# Patient Record
Sex: FEMALE | Race: BLACK OR AFRICAN AMERICAN | NOT HISPANIC OR LATINO | Employment: OTHER | URBAN - METROPOLITAN AREA
[De-identification: names, ages, dates, MRNs, and addresses within clinical notes are randomized per-mention and may not be internally consistent; named-entity substitution may affect disease eponyms.]

---

## 2017-04-10 ENCOUNTER — HOSPITAL ENCOUNTER (OUTPATIENT)
Dept: RADIOLOGY | Facility: HOSPITAL | Age: 67
Discharge: HOME | End: 2017-04-10
Attending: NURSE PRACTITIONER
Payer: MEDICARE

## 2017-04-10 DIAGNOSIS — Z12.31 VISIT FOR SCREENING MAMMOGRAM: ICD-10-CM

## 2017-04-10 PROCEDURE — 77067 SCR MAMMO BI INCL CAD: CPT | Mod: TC

## 2017-04-10 PROCEDURE — 77063 BREAST TOMOSYNTHESIS BI: CPT | Mod: 26,,, | Performed by: RADIOLOGY

## 2017-04-10 PROCEDURE — 77067 SCR MAMMO BI INCL CAD: CPT | Mod: 26,,, | Performed by: RADIOLOGY

## 2018-04-13 ENCOUNTER — OFFICE VISIT (OUTPATIENT)
Dept: FAMILY MEDICINE | Facility: CLINIC | Age: 68
End: 2018-04-13
Payer: MEDICARE

## 2018-04-13 VITALS
OXYGEN SATURATION: 97 % | WEIGHT: 124.31 LBS | HEART RATE: 74 BPM | RESPIRATION RATE: 16 BRPM | BODY MASS INDEX: 20.71 KG/M2 | SYSTOLIC BLOOD PRESSURE: 138 MMHG | HEIGHT: 65 IN | TEMPERATURE: 99 F | DIASTOLIC BLOOD PRESSURE: 76 MMHG

## 2018-04-13 DIAGNOSIS — Z00.00 WELL ADULT EXAM: Primary | ICD-10-CM

## 2018-04-13 DIAGNOSIS — M81.0 AGE-RELATED OSTEOPOROSIS WITHOUT CURRENT PATHOLOGICAL FRACTURE: ICD-10-CM

## 2018-04-13 DIAGNOSIS — Z13.29 SCREENING FOR THYROID DISORDER: ICD-10-CM

## 2018-04-13 DIAGNOSIS — I10 ESSENTIAL HYPERTENSION: ICD-10-CM

## 2018-04-13 PROBLEM — Z98.890 HISTORY OF SURGERY FOR CEREBRAL ANEURYSM: Status: ACTIVE | Noted: 2018-04-13

## 2018-04-13 PROCEDURE — 3075F SYST BP GE 130 - 139MM HG: CPT | Mod: CPTII,S$GLB,, | Performed by: FAMILY MEDICINE

## 2018-04-13 PROCEDURE — 99387 INIT PM E/M NEW PAT 65+ YRS: CPT | Mod: S$GLB,,, | Performed by: FAMILY MEDICINE

## 2018-04-13 PROCEDURE — 99999 PR PBB SHADOW E&M-EST. PATIENT-LVL III: CPT | Mod: PBBFAC,,, | Performed by: FAMILY MEDICINE

## 2018-04-13 PROCEDURE — 3078F DIAST BP <80 MM HG: CPT | Mod: CPTII,S$GLB,, | Performed by: FAMILY MEDICINE

## 2018-04-13 RX ORDER — ALENDRONATE SODIUM 70 MG/1
70 TABLET ORAL
Qty: 4 TABLET | Refills: 2 | Status: SHIPPED | OUTPATIENT
Start: 2018-04-13 | End: 2019-01-07 | Stop reason: SDUPTHER

## 2018-04-13 RX ORDER — LISINOPRIL AND HYDROCHLOROTHIAZIDE 12.5; 2 MG/1; MG/1
1 TABLET ORAL DAILY
Qty: 30 TABLET | Refills: 2 | Status: SHIPPED | OUTPATIENT
Start: 2018-04-13 | End: 2018-05-16 | Stop reason: SDUPTHER

## 2018-04-13 NOTE — PROGRESS NOTES
Chief Complaint   Patient presents with    Annual Exam     est care        Melba Pabon is a 67 y.o. female who presents to [facility].  Chronic medical issues, if present, have been documented.  Acute medical issues, if present have been documented in the Chief Complaint.     Hypertension   This is a chronic problem. The current episode started more than 1 year ago. The problem is unchanged. The problem is controlled. Pertinent negatives include no anxiety, blurred vision, chest pain, headaches, malaise/fatigue, neck pain, orthopnea, palpitations, peripheral edema, PND or shortness of breath. There are no associated agents to hypertension. Risk factors for coronary artery disease include sedentary lifestyle and post-menopausal state. Past treatments include ACE inhibitors and diuretics. The current treatment provides moderate improvement. There are no compliance problems.  There is no history of angina, kidney disease, CAD/MI, CVA, heart failure, left ventricular hypertrophy, PVD, renovascular disease or retinopathy. There is no history of chronic renal disease, coarctation of the aorta, hyperaldosteronism, hypercortisolism, hyperparathyroidism, a hypertension causing med, pheochromocytoma, sleep apnea or a thyroid problem.       ROS  Review of Systems   Constitutional: Negative.  Negative for activity change, appetite change, chills, fatigue, fever and malaise/fatigue.   HENT: Negative for congestion, ear pain, hearing loss, postnasal drip, rhinorrhea, sinus pressure, sore throat and trouble swallowing.    Eyes: Negative.  Negative for blurred vision, pain and visual disturbance.   Respiratory: Negative for cough, chest tightness and shortness of breath.    Cardiovascular: Negative for chest pain, palpitations, orthopnea, leg swelling and PND.   Gastrointestinal: Negative for abdominal pain, constipation, diarrhea, nausea and vomiting.   Endocrine: Negative.    Genitourinary: Negative.  Negative for  "difficulty urinating, dysuria, flank pain, menstrual problem, pelvic pain and urgency.   Musculoskeletal: Negative.  Negative for arthralgias, gait problem, myalgias, neck pain and neck stiffness.   Skin: Negative.  Negative for rash.   Allergic/Immunologic: Negative.  Negative for environmental allergies, food allergies and immunocompromised state.   Neurological: Negative.  Negative for weakness, light-headedness and headaches.   Hematological: Negative.    Psychiatric/Behavioral: Negative.  Negative for decreased concentration, dysphoric mood and sleep disturbance. The patient is not nervous/anxious.        Physical Exam  Vitals:    04/13/18 1502   BP: 138/76   Pulse: 74   Resp: 16   Temp: 98.7 °F (37.1 °C)    Body mass index is 20.69 kg/m².  Weight: 56.4 kg (124 lb 5.4 oz)   Height: 5' 5" (165.1 cm)     Physical Exam   Constitutional: She is oriented to person, place, and time. She appears well-developed and well-nourished. She is active and cooperative.  Non-toxic appearance. She does not have a sickly appearance. She does not appear ill. No distress.   HENT:   Head: Normocephalic and atraumatic.   Right Ear: Hearing, tympanic membrane, external ear and ear canal normal. No tenderness. No foreign bodies. Tympanic membrane is not injected, not scarred, not perforated, not erythematous, not retracted and not bulging. No decreased hearing is noted.   Left Ear: Hearing, tympanic membrane, external ear and ear canal normal. No tenderness. No foreign bodies. Tympanic membrane is not injected, not scarred, not perforated, not erythematous, not retracted and not bulging. No decreased hearing is noted.   Nose: Nose normal. No rhinorrhea or nasal deformity.   Mouth/Throat: Uvula is midline, oropharynx is clear and moist and mucous membranes are normal. She does not have dentures. No oral lesions. No trismus in the jaw. Abnormal dentition. Dental caries present. No dental abscesses, uvula swelling or lacerations.   Eyes: " Conjunctivae, EOM and lids are normal. Pupils are equal, round, and reactive to light. Right eye exhibits no chemosis, no discharge and no exudate. No foreign body present in the right eye. Left eye exhibits no chemosis, no discharge and no exudate. No foreign body present in the left eye. No scleral icterus.   Neck: Normal range of motion and full passive range of motion without pain. Neck supple. No thyroid mass and no thyromegaly present.   Cardiovascular: Normal rate, regular rhythm, S1 normal, S2 normal and normal heart sounds.  Exam reveals no gallop and no friction rub.    No murmur heard.  Pulmonary/Chest: Effort normal. She has no decreased breath sounds. She has no wheezes. She has no rhonchi. She has no rales. She exhibits no mass, no tenderness and no deformity.   Abdominal: Soft. Normal appearance and bowel sounds are normal. She exhibits no distension, no ascites and no mass. There is no hepatosplenomegaly. There is no tenderness. There is no rigidity, no rebound and no guarding.   Musculoskeletal: Normal range of motion.   Lymphadenopathy:        Head (right side): No submental, no submandibular, no tonsillar, no preauricular and no posterior auricular adenopathy present.        Head (left side): No submental, no submandibular, no tonsillar, no preauricular and no posterior auricular adenopathy present.     She has no cervical adenopathy.   Neurological: She is alert and oriented to person, place, and time. She has normal strength. No cranial nerve deficit or sensory deficit. She exhibits normal muscle tone. She displays no seizure activity.   Skin: Skin is warm, dry and intact. No rash noted. She is not diaphoretic. No pallor.   Psychiatric: She has a normal mood and affect. Her speech is normal and behavior is normal. Judgment and thought content normal. Cognition and memory are normal. She is attentive.   Vitals reviewed.      Assessment & Plan    1. Well adult exam  Discussed age appropriate  screenings at this visit and encouraged a healthy diet with low saturated fats, and increased physical activity.  I will request any recent screening test from previous provider and once reviewed, will discuss any necessary follow up or further management.      2. Essential hypertension  Patient was counseled and encouraged to maintain a low sodium diet, as well as increasing physical activity.  Recommend random BP checks at home on a regular basis.  Repeat BP at end of visit was not necessary. Will continue medication at this time, and follow up in 3-6 months, or sooner if blood pressure begins to increase.     - lisinopril-hydrochlorothiazide (PRINZIDE,ZESTORETIC) 20-12.5 mg per tablet; Take 1 tablet by mouth once daily.  Dispense: 30 tablet; Refill: 2  - CBC auto differential; Future  - Comprehensive metabolic panel; Future  - Lipid panel; Future  - TSH; Future    3. Age-related osteoporosis without current pathological fracture  The current medical regimen is effective at this time and no changes to present plan or medications will be made at this visit.     - alendronate (FOSAMAX) 70 MG tablet; Take 1 tablet (70 mg total) by mouth every 7 days.  Dispense: 4 tablet; Refill: 2    4. Screening for thyroid disorder  Screening test will be ordered and once results available patient will be notified of results and managed accordingly.    - TSH; Future      Follow up documented    ACTIVE MEDICAL ISSUES:  Documented in Problem List    PAST MEDICAL HISTORY  Documented  .  PAST SURGICAL HISTORY:  Documented    SOCIAL HISTORY:  Documented    FAMILY HISTORY:  Documented    ALLERGIES AND MEDICATIONS: updated and reviewed.  Documented    Health Maintenance       Date Due Completion Date    Hepatitis C Screening 1950 ---    TETANUS VACCINE 05/07/1969 ---    DEXA SCAN 05/07/1991 ---    Colonoscopy 05/07/2001 ---    Zoster Vaccine 05/07/2011 ---    Pneumococcal (65+) (1 of 2 - PCV13) 05/07/2016 ---    Influenza Vaccine  08/01/2017 ---    Mammogram 04/10/2019 4/10/2017    Lipid Panel 09/27/2021 9/27/2016

## 2018-04-30 ENCOUNTER — LAB VISIT (OUTPATIENT)
Dept: LAB | Facility: HOSPITAL | Age: 68
End: 2018-04-30
Attending: FAMILY MEDICINE
Payer: MEDICARE

## 2018-04-30 DIAGNOSIS — Z13.29 SCREENING FOR THYROID DISORDER: ICD-10-CM

## 2018-04-30 DIAGNOSIS — I10 ESSENTIAL HYPERTENSION: ICD-10-CM

## 2018-04-30 LAB
ALBUMIN SERPL BCP-MCNC: 3.8 G/DL
ALP SERPL-CCNC: 79 U/L
ALT SERPL W/O P-5'-P-CCNC: 10 U/L
ANION GAP SERPL CALC-SCNC: 8 MMOL/L
AST SERPL-CCNC: 23 U/L
BASOPHILS # BLD AUTO: 0.03 K/UL
BASOPHILS NFR BLD: 0.7 %
BILIRUB SERPL-MCNC: 0.6 MG/DL
BUN SERPL-MCNC: 11 MG/DL
CALCIUM SERPL-MCNC: 11.2 MG/DL
CHLORIDE SERPL-SCNC: 105 MMOL/L
CHOLEST SERPL-MCNC: 145 MG/DL
CHOLEST/HDLC SERPL: 2.7 {RATIO}
CO2 SERPL-SCNC: 25 MMOL/L
CREAT SERPL-MCNC: 0.9 MG/DL
DIFFERENTIAL METHOD: ABNORMAL
EOSINOPHIL # BLD AUTO: 0.1 K/UL
EOSINOPHIL NFR BLD: 2.1 %
ERYTHROCYTE [DISTWIDTH] IN BLOOD BY AUTOMATED COUNT: 14.4 %
EST. GFR  (AFRICAN AMERICAN): >60 ML/MIN/1.73 M^2
EST. GFR  (NON AFRICAN AMERICAN): >60 ML/MIN/1.73 M^2
GLUCOSE SERPL-MCNC: 85 MG/DL
HCT VFR BLD AUTO: 40.1 %
HDLC SERPL-MCNC: 54 MG/DL
HDLC SERPL: 37.2 %
HGB BLD-MCNC: 13.3 G/DL
IMM GRANULOCYTES # BLD AUTO: 0.01 K/UL
IMM GRANULOCYTES NFR BLD AUTO: 0.2 %
LDLC SERPL CALC-MCNC: 80 MG/DL
LYMPHOCYTES # BLD AUTO: 1.8 K/UL
LYMPHOCYTES NFR BLD: 42.2 %
MCH RBC QN AUTO: 31.1 PG
MCHC RBC AUTO-ENTMCNC: 33.2 G/DL
MCV RBC AUTO: 94 FL
MONOCYTES # BLD AUTO: 0.4 K/UL
MONOCYTES NFR BLD: 10.1 %
NEUTROPHILS # BLD AUTO: 1.9 K/UL
NEUTROPHILS NFR BLD: 44.7 %
NONHDLC SERPL-MCNC: 91 MG/DL
NRBC BLD-RTO: 0 /100 WBC
PLATELET # BLD AUTO: 188 K/UL
PMV BLD AUTO: 10.3 FL
POTASSIUM SERPL-SCNC: 4.2 MMOL/L
PROT SERPL-MCNC: 7.7 G/DL
RBC # BLD AUTO: 4.27 M/UL
SODIUM SERPL-SCNC: 138 MMOL/L
TRIGL SERPL-MCNC: 55 MG/DL
TSH SERPL DL<=0.005 MIU/L-ACNC: 0.97 UIU/ML
WBC # BLD AUTO: 4.27 K/UL

## 2018-04-30 PROCEDURE — 80053 COMPREHEN METABOLIC PANEL: CPT

## 2018-04-30 PROCEDURE — 85025 COMPLETE CBC W/AUTO DIFF WBC: CPT

## 2018-04-30 PROCEDURE — 36415 COLL VENOUS BLD VENIPUNCTURE: CPT | Mod: PO

## 2018-04-30 PROCEDURE — 84443 ASSAY THYROID STIM HORMONE: CPT

## 2018-04-30 PROCEDURE — 80061 LIPID PANEL: CPT

## 2018-05-16 ENCOUNTER — OFFICE VISIT (OUTPATIENT)
Dept: FAMILY MEDICINE | Facility: CLINIC | Age: 68
End: 2018-05-16
Payer: MEDICARE

## 2018-05-16 VITALS
HEART RATE: 74 BPM | BODY MASS INDEX: 20.57 KG/M2 | OXYGEN SATURATION: 98 % | TEMPERATURE: 98 F | SYSTOLIC BLOOD PRESSURE: 112 MMHG | RESPIRATION RATE: 16 BRPM | DIASTOLIC BLOOD PRESSURE: 70 MMHG | HEIGHT: 65 IN | WEIGHT: 123.44 LBS

## 2018-05-16 DIAGNOSIS — I10 ESSENTIAL HYPERTENSION: Primary | ICD-10-CM

## 2018-05-16 DIAGNOSIS — Z12.11 SCREEN FOR COLON CANCER: ICD-10-CM

## 2018-05-16 DIAGNOSIS — Z11.59 NEED FOR HEPATITIS C SCREENING TEST: ICD-10-CM

## 2018-05-16 DIAGNOSIS — H53.9 VISION CHANGES: ICD-10-CM

## 2018-05-16 DIAGNOSIS — M81.0 AGE-RELATED OSTEOPOROSIS WITHOUT CURRENT PATHOLOGICAL FRACTURE: ICD-10-CM

## 2018-05-16 PROCEDURE — 3078F DIAST BP <80 MM HG: CPT | Mod: CPTII,S$GLB,, | Performed by: FAMILY MEDICINE

## 2018-05-16 PROCEDURE — 3074F SYST BP LT 130 MM HG: CPT | Mod: CPTII,S$GLB,, | Performed by: FAMILY MEDICINE

## 2018-05-16 PROCEDURE — 99999 PR PBB SHADOW E&M-EST. PATIENT-LVL IV: CPT | Mod: PBBFAC,,, | Performed by: FAMILY MEDICINE

## 2018-05-16 PROCEDURE — 99214 OFFICE O/P EST MOD 30 MIN: CPT | Mod: S$GLB,,, | Performed by: FAMILY MEDICINE

## 2018-05-16 RX ORDER — LISINOPRIL AND HYDROCHLOROTHIAZIDE 12.5; 2 MG/1; MG/1
1 TABLET ORAL DAILY
Qty: 30 TABLET | Refills: 2 | Status: SHIPPED | OUTPATIENT
Start: 2018-05-16 | End: 2018-09-11 | Stop reason: SDUPTHER

## 2018-05-16 NOTE — PROGRESS NOTES
Chief Complaint   Patient presents with    Results       Melba Pabon is a 68 y.o. female who presents per the Chief Complaint.  Pt is known to me and was last seen by me on 4/13/2018.  All known chronic medical issues have been documented.       Hypertension   This is a chronic problem. The current episode started more than 1 year ago. The problem is unchanged. The problem is controlled. Pertinent negatives include no anxiety, blurred vision, chest pain, headaches, malaise/fatigue, neck pain, orthopnea, palpitations, peripheral edema, PND or shortness of breath. There are no associated agents to hypertension. Risk factors for coronary artery disease include sedentary lifestyle and post-menopausal state. Past treatments include ACE inhibitors and diuretics. The current treatment provides moderate improvement. There are no compliance problems.  There is no history of angina, kidney disease, CAD/MI, CVA, heart failure, left ventricular hypertrophy, PVD or retinopathy. There is no history of chronic renal disease, coarctation of the aorta, hyperaldosteronism, hypercortisolism, hyperparathyroidism, a hypertension causing med, pheochromocytoma, renovascular disease, sleep apnea or a thyroid problem.        ROS  Review of Systems   Constitutional: Negative.  Negative for activity change, appetite change, chills, diaphoresis, fatigue, fever, malaise/fatigue and unexpected weight change.   HENT: Negative.  Negative for congestion, ear pain, hearing loss, nosebleeds, postnasal drip, rhinorrhea, sinus pressure, sneezing, sore throat and trouble swallowing.    Eyes: Negative for blurred vision, pain and visual disturbance.   Respiratory: Negative for cough, choking and shortness of breath.    Cardiovascular: Negative for chest pain, palpitations, orthopnea, leg swelling and PND.   Gastrointestinal: Negative for abdominal pain, constipation, diarrhea, nausea and vomiting.   Genitourinary: Negative for difficulty urinating,  "dysuria, frequency and urgency.   Musculoskeletal: Negative.  Negative for arthralgias, back pain, gait problem, joint swelling, myalgias and neck pain.   Skin: Negative.    Allergic/Immunologic: Negative for environmental allergies and food allergies.   Neurological: Negative.  Negative for dizziness, seizures, syncope, weakness, light-headedness and headaches.   Psychiatric/Behavioral: Negative.  Negative for confusion, decreased concentration, dysphoric mood and sleep disturbance. The patient is not nervous/anxious.        Physical Exam  Vitals:    05/16/18 1512   BP: 112/70   Pulse: 74   Resp: 16   Temp: 98.2 °F (36.8 °C)    Body mass index is 20.54 kg/m².  Weight: 56 kg (123 lb 7.3 oz)   Height: 5' 5" (165.1 cm)     Physical Exam   Constitutional: She is oriented to person, place, and time. She appears well-developed and well-nourished. She is active and cooperative.  Non-toxic appearance. She does not have a sickly appearance. She does not appear ill. No distress.   HENT:   Head: Normocephalic and atraumatic.   Right Ear: Hearing and external ear normal. No decreased hearing is noted.   Left Ear: Hearing and external ear normal. No decreased hearing is noted.   Nose: Nose normal. No rhinorrhea or nasal deformity.   Mouth/Throat: Uvula is midline and oropharynx is clear and moist. She does not have dentures. Normal dentition.   Eyes: Conjunctivae, EOM and lids are normal. Pupils are equal, round, and reactive to light. Right eye exhibits no chemosis, no discharge and no exudate. No foreign body present in the right eye. Left eye exhibits no chemosis, no discharge and no exudate. No foreign body present in the left eye. No scleral icterus.   Neck: Normal range of motion and full passive range of motion without pain. Neck supple.   Cardiovascular: Normal rate, regular rhythm, S1 normal, S2 normal and normal heart sounds.  Exam reveals no gallop and no friction rub.    No murmur heard.  Pulmonary/Chest: Effort " normal and breath sounds normal. No accessory muscle usage. No respiratory distress. She has no decreased breath sounds. She has no wheezes. She has no rhonchi. She has no rales.   Abdominal: Soft. Normal appearance. She exhibits no distension. There is no hepatosplenomegaly. There is no tenderness. There is no rigidity, no rebound and no guarding.   Musculoskeletal: Normal range of motion.   Neurological: She is alert and oriented to person, place, and time. She has normal strength. No cranial nerve deficit or sensory deficit. She exhibits normal muscle tone. She displays no seizure activity. Coordination and gait normal.   Skin: Skin is warm, dry and intact. No rash noted. She is not diaphoretic.   Psychiatric: She has a normal mood and affect. Her speech is normal and behavior is normal. Judgment and thought content normal. Cognition and memory are normal. She is attentive.       Assessment & Plan    1. Essential hypertension  Patient was counseled and encouraged to maintain a low sodium diet, as well as increasing physical activity.  Recommend random BP checks at home on a regular basis.  Repeat BP at end of visit was not necessary. Will continue medication at this time, and follow up in 3-6 months, or sooner if blood pressure begins to increase.     - lisinopril-hydrochlorothiazide (PRINZIDE,ZESTORETIC) 20-12.5 mg per tablet; Take 1 tablet by mouth once daily.  Dispense: 30 tablet; Refill: 2    2. Age-related osteoporosis without current pathological fracture  Screening test will be ordered and once results available patient will be notified of results and managed accordingly.    - DXA Bone Density Spine And Hip; Future    3. Vision changes  Referral to appropriate specialist for evaluation and management placed in [facility].   - Ambulatory referral to Optometry    4. Need for hepatitis C screening test  Patient advised that a one time screen for Hepatitis C is recommended and will be ordered today. Once results  available, patient will be notified of results.   - Hepatitis C antibody; Future    5. Screen for colon cancer  Patient is due for colonoscopy.  Order placed in [facility] and patient will be contacted to schedule appointment.  I will notify patient of results once available.    - Case request GI: COLONOSCOPY      Follow up documented    ACTIVE MEDICAL ISSUES:  Documented in Problem List    PAST MEDICAL HISTORY  Documented    PAST SURGICAL HISTORY:  Documented    SOCIAL HISTORY:  Documented    FAMILY HISTORY:  Documented    ALLERGIES AND MEDICATIONS: updated and reviewed.  Documented    Health Maintenance       Date Due Completion Date    Hepatitis C Screening 1950 ---    TETANUS VACCINE 05/07/1969 ---    DEXA SCAN 05/07/1991 ---    Colonoscopy 05/07/2001 ---    Pneumococcal (65+) (1 of 2 - PCV13) 05/07/2016 ---    Influenza Vaccine 08/01/2018 ---    Mammogram 04/10/2019 4/10/2017    Lipid Panel 04/30/2023 4/30/2018

## 2018-05-17 ENCOUNTER — TELEPHONE (OUTPATIENT)
Dept: OPTOMETRY | Facility: CLINIC | Age: 68
End: 2018-05-17

## 2018-06-05 ENCOUNTER — HOSPITAL ENCOUNTER (OUTPATIENT)
Dept: RADIOLOGY | Facility: HOSPITAL | Age: 68
Discharge: HOME | End: 2018-06-05
Attending: FAMILY MEDICINE
Payer: MEDICARE

## 2018-06-05 DIAGNOSIS — M81.0 AGE-RELATED OSTEOPOROSIS WITHOUT CURRENT PATHOLOGICAL FRACTURE: ICD-10-CM

## 2018-06-05 PROCEDURE — 77080 DXA BONE DENSITY AXIAL: CPT | Mod: TC

## 2018-06-05 PROCEDURE — 77080 DXA BONE DENSITY AXIAL: CPT | Mod: 26,,, | Performed by: RADIOLOGY

## 2018-07-27 ENCOUNTER — HOSPITAL ENCOUNTER (OUTPATIENT)
Facility: HOSPITAL | Age: 68
LOS: 1 days | Discharge: HOME | End: 2018-07-29
Attending: EMERGENCY MEDICINE | Admitting: INTERNAL MEDICINE
Payer: MEDICARE

## 2018-07-27 DIAGNOSIS — K92.2 ACUTE UPPER GI BLEEDING: Primary | ICD-10-CM

## 2018-07-27 DIAGNOSIS — D62 ACUTE BLOOD LOSS ANEMIA: ICD-10-CM

## 2018-07-27 DIAGNOSIS — D64.9 ANEMIA, UNSPECIFIED TYPE: ICD-10-CM

## 2018-07-27 LAB
ABO + RH BLD: NORMAL
ALBUMIN SERPL BCP-MCNC: 3.7 G/DL
ALP SERPL-CCNC: 64 U/L
ALT SERPL W/O P-5'-P-CCNC: 8 U/L
ANION GAP SERPL CALC-SCNC: 6 MMOL/L
APTT BLDCRRT: 21.9 SEC
AST SERPL-CCNC: 19 U/L
BACTERIA #/AREA URNS HPF: ABNORMAL /HPF
BASOPHILS # BLD AUTO: 0.01 K/UL
BASOPHILS NFR BLD: 0.2 %
BILIRUB SERPL-MCNC: 0.4 MG/DL
BILIRUB UR QL STRIP: NEGATIVE
BILIRUB UR QL STRIP: NEGATIVE
BLD GP AB SCN CELLS X3 SERPL QL: NORMAL
BUN SERPL-MCNC: 38 MG/DL
CALCIUM SERPL-MCNC: 11.4 MG/DL
CHLORIDE SERPL-SCNC: 103 MMOL/L
CLARITY UR: ABNORMAL
CLARITY UR: ABNORMAL
CO2 SERPL-SCNC: 27 MMOL/L
COLOR UR: YELLOW
COLOR UR: YELLOW
CREAT SERPL-MCNC: 0.9 MG/DL
DIFFERENTIAL METHOD: ABNORMAL
EOSINOPHIL # BLD AUTO: 0.1 K/UL
EOSINOPHIL NFR BLD: 1.2 %
ERYTHROCYTE [DISTWIDTH] IN BLOOD BY AUTOMATED COUNT: 14.1 %
EST. GFR  (AFRICAN AMERICAN): >60 ML/MIN/1.73 M^2
EST. GFR  (NON AFRICAN AMERICAN): >60 ML/MIN/1.73 M^2
GLUCOSE SERPL-MCNC: 132 MG/DL
GLUCOSE UR QL STRIP: NEGATIVE
GLUCOSE UR QL STRIP: NEGATIVE
HCT VFR BLD AUTO: 27.1 %
HGB BLD-MCNC: 9.5 G/DL
HGB UR QL STRIP: ABNORMAL
HGB UR QL STRIP: ABNORMAL
INR PPP: 1
KETONES UR QL STRIP: NEGATIVE
KETONES UR QL STRIP: NEGATIVE
LEUKOCYTE ESTERASE UR QL STRIP: NEGATIVE
LEUKOCYTE ESTERASE UR QL STRIP: NEGATIVE
LIPASE SERPL-CCNC: 55 U/L
LYMPHOCYTES # BLD AUTO: 1.8 K/UL
LYMPHOCYTES NFR BLD: 42 %
MAGNESIUM SERPL-MCNC: 2.3 MG/DL
MCH RBC QN AUTO: 31.7 PG
MCHC RBC AUTO-ENTMCNC: 35.1 G/DL
MCV RBC AUTO: 90 FL
MICROSCOPIC COMMENT: ABNORMAL
MONOCYTES # BLD AUTO: 0.3 K/UL
MONOCYTES NFR BLD: 7.7 %
NEUTROPHILS # BLD AUTO: 2.1 K/UL
NEUTROPHILS NFR BLD: 48.7 %
NITRITE UR QL STRIP: NEGATIVE
NITRITE UR QL STRIP: NEGATIVE
PH UR STRIP: 5 [PH] (ref 5–8)
PH UR STRIP: 5 [PH] (ref 5–8)
PLATELET # BLD AUTO: 205 K/UL
PMV BLD AUTO: 10 FL
POTASSIUM SERPL-SCNC: 3.5 MMOL/L
PROT SERPL-MCNC: 7.5 G/DL
PROT UR QL STRIP: NEGATIVE
PROT UR QL STRIP: NEGATIVE
PROTHROMBIN TIME: 10.4 SEC
RBC # BLD AUTO: 3 M/UL
RBC #/AREA URNS HPF: 2 /HPF (ref 0–4)
SODIUM SERPL-SCNC: 136 MMOL/L
SP GR UR STRIP: 1.02 (ref 1–1.03)
SP GR UR STRIP: 1.02 (ref 1–1.03)
SQUAMOUS #/AREA URNS HPF: 20 /HPF
URN SPEC COLLECT METH UR: ABNORMAL
URN SPEC COLLECT METH UR: ABNORMAL
UROBILINOGEN UR STRIP-ACNC: NEGATIVE EU/DL
UROBILINOGEN UR STRIP-ACNC: NEGATIVE EU/DL
WBC # BLD AUTO: 4.31 K/UL
WBC #/AREA URNS HPF: 1 /HPF (ref 0–5)

## 2018-07-27 PROCEDURE — 83735 ASSAY OF MAGNESIUM: CPT

## 2018-07-27 PROCEDURE — 96375 TX/PRO/DX INJ NEW DRUG ADDON: CPT

## 2018-07-27 PROCEDURE — 93010 ELECTROCARDIOGRAM REPORT: CPT | Mod: ,,, | Performed by: INTERNAL MEDICINE

## 2018-07-27 PROCEDURE — 81000 URINALYSIS NONAUTO W/SCOPE: CPT

## 2018-07-27 PROCEDURE — 25000003 PHARM REV CODE 250: Performed by: EMERGENCY MEDICINE

## 2018-07-27 PROCEDURE — C9113 INJ PANTOPRAZOLE SODIUM, VIA: HCPCS | Performed by: EMERGENCY MEDICINE

## 2018-07-27 PROCEDURE — 96361 HYDRATE IV INFUSION ADD-ON: CPT

## 2018-07-27 PROCEDURE — 93005 ELECTROCARDIOGRAM TRACING: CPT

## 2018-07-27 PROCEDURE — 99285 EMERGENCY DEPT VISIT HI MDM: CPT | Mod: 25

## 2018-07-27 PROCEDURE — 63600175 PHARM REV CODE 636 W HCPCS: Performed by: EMERGENCY MEDICINE

## 2018-07-27 PROCEDURE — 80053 COMPREHEN METABOLIC PANEL: CPT

## 2018-07-27 PROCEDURE — G0378 HOSPITAL OBSERVATION PER HR: HCPCS

## 2018-07-27 PROCEDURE — 85730 THROMBOPLASTIN TIME PARTIAL: CPT

## 2018-07-27 PROCEDURE — 85025 COMPLETE CBC W/AUTO DIFF WBC: CPT

## 2018-07-27 PROCEDURE — 86850 RBC ANTIBODY SCREEN: CPT

## 2018-07-27 PROCEDURE — 83690 ASSAY OF LIPASE: CPT

## 2018-07-27 PROCEDURE — 86920 COMPATIBILITY TEST SPIN: CPT

## 2018-07-27 PROCEDURE — 96365 THER/PROPH/DIAG IV INF INIT: CPT

## 2018-07-27 PROCEDURE — 85610 PROTHROMBIN TIME: CPT

## 2018-07-27 PROCEDURE — 96376 TX/PRO/DX INJ SAME DRUG ADON: CPT

## 2018-07-27 RX ORDER — ONDANSETRON 2 MG/ML
8 INJECTION INTRAMUSCULAR; INTRAVENOUS
Status: COMPLETED | OUTPATIENT
Start: 2018-07-27 | End: 2018-07-27

## 2018-07-27 RX ORDER — DEXTROSE MONOHYDRATE AND SODIUM CHLORIDE 5; .9 G/100ML; G/100ML
INJECTION, SOLUTION INTRAVENOUS CONTINUOUS
Status: DISCONTINUED | OUTPATIENT
Start: 2018-07-27 | End: 2018-07-28

## 2018-07-27 RX ORDER — HYDROCODONE BITARTRATE AND ACETAMINOPHEN 5; 325 MG/1; MG/1
1 TABLET ORAL EVERY 6 HOURS PRN
Status: DISCONTINUED | OUTPATIENT
Start: 2018-07-27 | End: 2018-07-29 | Stop reason: HOSPADM

## 2018-07-27 RX ORDER — ONDANSETRON 2 MG/ML
4 INJECTION INTRAMUSCULAR; INTRAVENOUS EVERY 6 HOURS PRN
Status: DISCONTINUED | OUTPATIENT
Start: 2018-07-27 | End: 2018-07-29 | Stop reason: HOSPADM

## 2018-07-27 RX ORDER — HYDROCODONE BITARTRATE AND ACETAMINOPHEN 500; 5 MG/1; MG/1
TABLET ORAL
Status: DISCONTINUED | OUTPATIENT
Start: 2018-07-27 | End: 2018-07-29 | Stop reason: HOSPADM

## 2018-07-27 RX ADMIN — SODIUM CHLORIDE 1000 ML: 0.9 INJECTION, SOLUTION INTRAVENOUS at 05:07

## 2018-07-27 RX ADMIN — DEXTROSE 8 MG/HR: 50 INJECTION, SOLUTION INTRAVENOUS at 11:07

## 2018-07-27 RX ADMIN — DEXTROSE 8 MG/HR: 50 INJECTION, SOLUTION INTRAVENOUS at 07:07

## 2018-07-27 RX ADMIN — DEXTROSE 80 MG: 50 INJECTION, SOLUTION INTRAVENOUS at 06:07

## 2018-07-27 RX ADMIN — DEXTROSE AND SODIUM CHLORIDE: 5; .9 INJECTION, SOLUTION INTRAVENOUS at 11:07

## 2018-07-27 RX ADMIN — ONDANSETRON 8 MG: 2 INJECTION INTRAMUSCULAR; INTRAVENOUS at 06:07

## 2018-07-27 RX ADMIN — SODIUM CHLORIDE 1000 ML: 0.9 INJECTION, SOLUTION INTRAVENOUS at 09:07

## 2018-07-27 NOTE — ED TRIAGE NOTES
[age] y.o. Female presents to the ED with chief complaint of blood in stool. Patient states stool has been present for x3 days with associated abdominal pain. Patient has a mass in her abdomen that is believed to be a hernia. Patient states her stool is black. Patient denies chest pain or recent injury. Patient resting in bed in NAD. Side rails up x2.

## 2018-07-27 NOTE — ED PROVIDER NOTES
Encounter Date: 7/27/2018  SORT: This is a 68 y.o. female with past small bowel surgerywho presents for emergent consideration of melena. Patient reports black stools and periumbilical abdominal pain with bulging. Initial exam notable for periumbilical bulging and tenderness. Patient will be moved to a room when one is available. Orders placed.  CLAUDIA Schmidt PA-C        History     Chief Complaint   Patient presents with    Melena     States she noticed black stool since wednesday.  Also states she has no appetite     HPI   This 68-year-old female presents to the emergency room complaining of a 2 day history of being weak and dizzy and having black stools.  The patient does have abdominal pain she has a knot in her stomach that has been present since she had a bowel surgery for obstruction in the distant past.  The patient had nausea and 1 episode of vomiting yesterday.  She is essentially otherwise well.  Her abdominal pain is around the site of the knot in her abdomen.  She is otherwise well without other injuries or problems.  She has no appetite   Review of patient's allergies indicates:   Allergen Reactions    Pcn [penicillins]      Past Medical History:   Diagnosis Date    Age-related osteoporosis without current pathological fracture 4/13/2018    Allergy     Cataract     Hypertension      Past Surgical History:   Procedure Laterality Date    BRAIN SURGERY      SMALL INTESTINE SURGERY       Family History   Problem Relation Age of Onset    Dementia Mother     No Known Problems Father      Social History   Substance Use Topics    Smoking status: Current Some Day Smoker     Types: Cigarettes    Smokeless tobacco: Never Used    Alcohol use No     Review of Systems  The patient was questioned specifically with regard to the following.  General: Fever, chills, sweats. Neuro: Headache. Eyes: eye problems. ENT: Ear pain, sore throat. Cardiovascular: Chest pain. Respiratory: Cough, shortness of  breath. Gastrointestinal: Abdominal pain, vomiting, diarrhea. Genitourinary: Painful urination.  Musculoskeletal: Arm and leg problems. Skin: Rash.  The review of systems was negative except for the following:  Nausea vomiting and not in the abdomen pain around the not in the abdomen weakness dizziness black stools  Physical Exam     Initial Vitals   BP Pulse Resp Temp SpO2   07/27/18 1647 07/27/18 1647 07/27/18 1647 07/27/18 1750 07/27/18 1647   127/76 103 16 98.9 °F (37.2 °C) 97 %      MAP       --                Physical Exam  The patient was examined specifically for the following:   General:No significant distress, Good color, Warm and dry. Head and neck:Scalp atraumatic, Neck supple. Neurological:Appropriate conversation, Gross motor deficits. Eyes:Conjugate gaze, Clear corneas. ENT: No epistaxis. Cardiac: Regular rate and rhythm, Grossly normal heart tones. Pulmonary: Wheezing, Rales. Gastrointestinal: Abdominal tenderness, Abdominal distention. Musculoskeletal: Extremity deformity, Apparent pain with range of motion of the joints. Skin: Rash.   The findings on examination were normal except for the following:  Patient's heart rate is 103.  She is slightly pale.  She has male on the physical examination that is strongly guaiac positive. She has a large abdominal wall defect in the hypogastrium.  This is spontaneously reduced when lying supine.  There is no other significant abdominal tenderness.  The lungs are clear.  The heart tones are normal.  The extremities are nontender.  The abdomen is nontender.  ED Course   Critical Care  Date/Time: 7/27/2018 9:25 PM  Performed by: STU PICKARD  Authorized by: STU PICKARD   Direct patient critical care time: 22 minutes  Additional history critical care time: 11 minutes  Ordering / reviewing critical care time: 11 minutes  Documentation critical care time: 17 minutes  Consulting other physicians critical care time: 5 minutes  Total critical care time  (exclusive of procedural time) : 66 minutes  Critical care time was exclusive of separately billable procedures and treating other patients and teaching time.  Critical care was necessary to treat or prevent imminent or life-threatening deterioration of the following conditions: circulatory failure.  Critical care was time spent personally by me on the following activities: development of treatment plan with patient or surrogate, discussions with primary provider, evaluation of patient's response to treatment, examination of patient, obtaining history from patient or surrogate, ordering and performing treatments and interventions, ordering and review of laboratory studies, ordering and review of radiographic studies, pulse oximetry, re-evaluation of patient's condition and review of old charts.        Labs Reviewed   CBC W/ AUTO DIFFERENTIAL - Abnormal; Notable for the following:        Result Value    RBC 3.00 (*)     Hemoglobin 9.5 (*)     Hematocrit 27.1 (*)     MCH 31.7 (*)     All other components within normal limits   COMPREHENSIVE METABOLIC PANEL - Abnormal; Notable for the following:     Glucose 132 (*)     BUN, Bld 38 (*)     Calcium 11.4 (*)     ALT 8 (*)     Anion Gap 6 (*)     All other components within normal limits   URINALYSIS, REFLEX TO URINE CULTURE - Abnormal; Notable for the following:     Appearance, UA Hazy (*)     Occult Blood UA 1+ (*)     All other components within normal limits    Narrative:     Preferred Collection Type->Urine, Clean Catch   URINALYSIS, REFLEX TO URINE CULTURE - Abnormal; Notable for the following:     Appearance, UA Hazy (*)     Occult Blood UA 1+ (*)     All other components within normal limits    Narrative:     Preferred Collection Type->Urine, Clean Catch   URINALYSIS MICROSCOPIC - Abnormal; Notable for the following:     Bacteria, UA Few (*)     All other components within normal limits    Narrative:     Preferred Collection Type->Urine, Clean Catch   LIPASE    MAGNESIUM   PROTIME-INR    Narrative:     Recoll. [id number] by SILVERIO at 07/27/2018 18:21, reason:   CLOTTED/DISCARDED/ARCELIA   APTT    Narrative:     Recoll. [id number] by SILVERIO at 07/27/2018 18:21, reason:   CLOTTED/DISCARDED/ARCELIA   TYPE & SCREEN   PREPARE RBC SOFT     EKG Readings: (Independently Interpreted)   This patient is in a normal sinus rhythm with a heart rate of 87.  The p.r. QRS and QT intervals are normal.  There are nonspecific ST segment and T-wave changes.  There is no definite evidence of acute myocardial infarction or malignant arrhythmia.       Imaging Results    None           Medical decision making:  Given the above this patient presents to the emergency room with black stools dizziness and weakness. She is found to have melena on the rectum.  She has a significant anemia.  She has no significant abdominal tenderness.  She is otherwise well.  She does have an elevated BUN.  I believe this is an upper GI bleed.  She was treated with pantoprazole push and drip.  I will admit her and consult Gastroenterology.  Patient did have her blood pressure dropped to 94/50 during her ER visit.  I will bolus her with another L of fluid.  I will do serial hemoglobins and hematocrits.  I will consult Gastroenterology.                         Clinical Impression:   The primary encounter diagnosis was Acute upper GI bleeding. A diagnosis of Anemia, unspecified type was also pertinent to this visit.                             Dillon Cabrera MD  07/27/18 2027

## 2018-07-28 PROBLEM — E83.52 HYPERCALCEMIA: Status: ACTIVE | Noted: 2018-07-28

## 2018-07-28 PROBLEM — D62 ACUTE BLOOD LOSS ANEMIA: Status: ACTIVE | Noted: 2018-07-28

## 2018-07-28 LAB
ALBUMIN SERPL BCP-MCNC: 2.9 G/DL
ALP SERPL-CCNC: 54 U/L
ALT SERPL W/O P-5'-P-CCNC: 8 U/L
ANION GAP SERPL CALC-SCNC: 4 MMOL/L
AST SERPL-CCNC: 20 U/L
BILIRUB SERPL-MCNC: 0.7 MG/DL
BLD PROD TYP BPU: NORMAL
BLOOD UNIT EXPIRATION DATE: NORMAL
BLOOD UNIT TYPE CODE: 7300
BLOOD UNIT TYPE: NORMAL
BUN SERPL-MCNC: 16 MG/DL
CALCIUM SERPL-MCNC: 9.2 MG/DL
CHLORIDE SERPL-SCNC: 109 MMOL/L
CO2 SERPL-SCNC: 23 MMOL/L
CODING SYSTEM: NORMAL
CREAT SERPL-MCNC: 0.7 MG/DL
DISPENSE STATUS: NORMAL
EST. GFR  (AFRICAN AMERICAN): >60 ML/MIN/1.73 M^2
EST. GFR  (NON AFRICAN AMERICAN): >60 ML/MIN/1.73 M^2
GLUCOSE SERPL-MCNC: 103 MG/DL
HCT VFR BLD AUTO: 20.8 %
HCT VFR BLD AUTO: 25.6 %
HCT VFR BLD AUTO: 25.7 %
HGB BLD-MCNC: 7 G/DL
HGB BLD-MCNC: 8.8 G/DL
HGB BLD-MCNC: 8.8 G/DL
IRON SERPL-MCNC: 79 UG/DL
POTASSIUM SERPL-SCNC: 3.7 MMOL/L
PROT SERPL-MCNC: 5.7 G/DL
RETICS/RBC NFR AUTO: 2.2 %
SATURATED IRON: 35 %
SODIUM SERPL-SCNC: 136 MMOL/L
TOTAL IRON BINDING CAPACITY: 228 UG/DL
TRANS ERYTHROCYTES VOL PATIENT: NORMAL ML
TRANSFERRIN SERPL-MCNC: 154 MG/DL

## 2018-07-28 PROCEDURE — 83540 ASSAY OF IRON: CPT

## 2018-07-28 PROCEDURE — 85018 HEMOGLOBIN: CPT

## 2018-07-28 PROCEDURE — 36430 TRANSFUSION BLD/BLD COMPNT: CPT

## 2018-07-28 PROCEDURE — 85014 HEMATOCRIT: CPT | Mod: 91

## 2018-07-28 PROCEDURE — 85045 AUTOMATED RETICULOCYTE COUNT: CPT

## 2018-07-28 PROCEDURE — G0378 HOSPITAL OBSERVATION PER HR: HCPCS

## 2018-07-28 PROCEDURE — P9021 RED BLOOD CELLS UNIT: HCPCS

## 2018-07-28 PROCEDURE — 25000003 PHARM REV CODE 250: Performed by: EMERGENCY MEDICINE

## 2018-07-28 PROCEDURE — 25000003 PHARM REV CODE 250: Performed by: PHYSICIAN ASSISTANT

## 2018-07-28 PROCEDURE — 82728 ASSAY OF FERRITIN: CPT

## 2018-07-28 PROCEDURE — 63600175 PHARM REV CODE 636 W HCPCS: Performed by: EMERGENCY MEDICINE

## 2018-07-28 PROCEDURE — 85018 HEMOGLOBIN: CPT | Mod: 91

## 2018-07-28 PROCEDURE — 80053 COMPREHEN METABOLIC PANEL: CPT

## 2018-07-28 PROCEDURE — 36415 COLL VENOUS BLD VENIPUNCTURE: CPT

## 2018-07-28 PROCEDURE — C9113 INJ PANTOPRAZOLE SODIUM, VIA: HCPCS | Performed by: EMERGENCY MEDICINE

## 2018-07-28 RX ORDER — SODIUM CHLORIDE 9 MG/ML
INJECTION, SOLUTION INTRAVENOUS CONTINUOUS
Status: ACTIVE | OUTPATIENT
Start: 2018-07-28 | End: 2018-07-29

## 2018-07-28 RX ORDER — FERROUS GLUCONATE 324(38)MG
324 TABLET ORAL
Status: DISCONTINUED | OUTPATIENT
Start: 2018-07-29 | End: 2018-07-29 | Stop reason: HOSPADM

## 2018-07-28 RX ADMIN — DEXTROSE 8 MG/HR: 50 INJECTION, SOLUTION INTRAVENOUS at 07:07

## 2018-07-28 RX ADMIN — SODIUM CHLORIDE: 0.9 INJECTION, SOLUTION INTRAVENOUS at 07:07

## 2018-07-28 RX ADMIN — DEXTROSE 8 MG/HR: 50 INJECTION, SOLUTION INTRAVENOUS at 09:07

## 2018-07-28 RX ADMIN — DEXTROSE 8 MG/HR: 50 INJECTION, SOLUTION INTRAVENOUS at 04:07

## 2018-07-28 NOTE — ASSESSMENT & PLAN NOTE
Corrected calcium of 11.6, likely secondary to dehydration from acute fluid loss.   If no improvement with rehydration will investigate further.

## 2018-07-28 NOTE — CONSULTS
DATE OF PROCEDURE:  07/28/2018    CONSULT INDICATION:  GI bleed.    HISTORY OF PRESENT ILLNESS:  This 68-year-old woman who had black stool after   taking a laxative this past Tuesday.  It became worse on Wednesday.  She has had   no bowel movements since then.  She was, however, noted to be dizzy and weak   and brought by family members to the Emergency Room.  She denies any associated   abdominal pain.  She does have a history of an abdominal hernia, which is easily   reducible, and not changed from prior.  This was after operative treatment for   small bowel obstruction treated by Dr. Harrison.  She did have one episode of   nausea and vomiting, but otherwise feels well.  She was found to have severe   anemia with a hemoglobin of 7, blood transfusion pending.  No aggravating NSAIDs   or blood thinners, no family history of malignancy.  She denies any endoscopy   or colonoscopy.    ALLERGIES:  PENICILLIN.    PAST MEDICAL HISTORY:  Hypertension, osteoporosis.    PAST SURGICAL HISTORY:  Cataracts.    FAMILY HISTORY:  No familial gastrointestinal illnesses.    SOCIAL HISTORY:  Notable for tobacco use, alcohol or drug use.    REVIEW OF SYSTEMS:  Notable for dizziness and palpitations, fatigue, nausea.    Otherwise, negative x10 systems.    PHYSICAL EXAMINATION:  VITAL SIGNS:  Blood pressure 127/76, pulse 103, respirations 16, temperature   98.9.  GENERAL:  She appears comfortable, in no apparent distress, alert and oriented   x3.  Normal mood, affect and insight.  HEAD AND NECK:  Normocephalic, atraumatic.  Extraocular movements are intact.    No oropharyngeal lesions or erythema.  NECK:  Supple.  No thyroid mass, nodule or goiter.  LYMPH NODE:  Reveals no cervical, supraclavicular or axillary lymphadenopathy.  LUNGS:  Clear to auscultation bilaterally with good inspiratory and expiratory   air movement effort.  CARDIAC:  Reveals a regular rate and rhythm.  No murmurs, rubs or gallops.  ABDOMEN:  Soft, nontender,  nondistended, normoactive bowel sounds.  There is a   reducible umbilical hernia and a prior surgical scar.  SKIN:  Reveals no visible or palpable lesions aside from prior surgical scar.  EXTREMITIES:  Reveal no cyanosis, clubbing, edema or muscle wasting.  PSYCH:  Reveals normal mood, affect, insight.    LABORATORY WORK:  Reviewed.  Hemoglobin of 7 g/dL after hydration, down from   9.5.  Liver function tests normal.  Occult blood positive.    EKG, normal sinus rhythm.    IMPRESSION:  1.  GI bleed, presumably upper given what sounds like melena.  2.  Anemia, acute blood loss.    PLAN:  1.  EGD likely Monday with anesthesia assistance plus consult.  2.  Outpatient colonoscopy.  3.  Transfuse 1 unit packed red cells as planned.  4.  CBC tomorrow morning.  5.  Regular diet okay for now.  6.  No evidence of active bleeding.  7.  Continue pantoprazole in the future.      GMR/IN  dd: 07/28/2018 09:41:41 (CDT)  td: 07/28/2018 15:25:52 (CDT)  Doc ID   #[id number]  Job ID #[id number]    CC:

## 2018-07-28 NOTE — HOSPITAL COURSE
Melba Pabon 68 y.o. female admitted to the hospital for evaluation and management of acute GI bleed. In the ED, patient was found to have melena on rectal exam and H&H of 9/27.1. She was started on protonix GTT and GI consulted. The next morning patient's H&H was 7/20.8. Patient was transfused a unit of blood and and was seen by GI. GI recommended checking a CBC in the AM after transfusion. On morning of 7/29. H&H stable at 8.3&23.8. Checked again at noon and up to 9.5&27.5. GI cleared for discharge on PPI BID and iron supp for EGD on Tuesday.

## 2018-07-28 NOTE — PLAN OF CARE
Problem: Anemia (Adult)  Intervention: Facilitate Safe Activity   07/28/18 0356   Musculoskeletal Interventions   Fatigue Management activity schedule adjusted;fatigue-related activity identified;paced activity encouraged     Intervention: Assist with Determining Underlying Cause   07/28/18 0356   Safety Interventions   Bleeding Precautions coagulation study results reviewed;monitored for signs of bleeding     Intervention: Minimize Infection Risk   07/28/18 0356   Safety Interventions   Infection Prevention environmental surveillance;rest/sleep promoted     Intervention: Promote Hydration and Nutrition   07/28/18 0356   Nutrition Interventions   Oral Nutrition Promotion rest periods promoted;safe use of adaptive equipment encouraged;medicated

## 2018-07-28 NOTE — PROGRESS NOTES
PCP hospital follow-up scheduled for patient: 8/2/18 at 2pm; written discharge information reviewed with and provided to patient in blue folder.

## 2018-07-28 NOTE — PLAN OF CARE
07/28/18 0915   Discharge Assessment   Assessment Type Discharge Planning Assessment   Confirmed/corrected address and phone number on facesheet? Yes   Assessment information obtained from? Patient   Communicated expected length of stay with patient/caregiver no   Prior to hospitilization cognitive status: Alert/Oriented   Prior to hospitalization functional status: Independent   Current cognitive status: Alert/Oriented   Current Functional Status: Independent   Facility Arrived From: Home   Lives With alone   Is patient able to care for self after discharge? Yes   Who are your caregiver(s) and their phone number(s)? Ellyn-daughter: [phone]   Patient's perception of discharge disposition home or selfcare   Readmission Within The Last 30 Days no previous admission in last 30 days   Patient currently being followed by outpatient case management? No   Patient currently receives any other outside agency services? No   Equipment Currently Used at Home none   Do you have any problems affording any of your prescribed medications? No   Is the patient taking medications as prescribed? yes   Does the patient have transportation home? Yes   Transportation Available car;family or friend will provide   Does the patient receive services at the [facility]? No   Discharge Plan A Home with family   Discharge Plan B Other  (TBD)   Patient/Family In Agreement With Plan yes     Patient is at home alone; daughter is supportive and helps patient at home when needed; no current services; no DME; no needs anticipated    PCP: Azikiwe K Lombard, MD     Extended Emergency Contact Information  Primary Emergency Contact: ClementeEllyn  Address: [address] [place] [id number] [facility]  Home Phone: [phone]  Mobile Phone: [phone]  Relation: Daughter  Secondary Emergency Contact: Javier Edward   [place]  Mobile Phone: [phone]  Relation: Brother     [facility] - [place], [place] - [address]  [address] [place] [address]  Phone: [phone] Fax: [phone]    [facility] - [place], [place] - [address]  [address] [facility] [address]  Phone: [phone] Fax: [phone]    Payor: [employer] MEDICARE / Plan: HUMAN MEDICARE HMO / Product Type: Capitation /      1)  Warning signs/symptoms information reviewed with and provided to patient in blue folder  2)  Discharge planning begins upon admission   3)  Discussed and reinforced patient responsibility for managing health care at home including:        a) Acquiring prescribed medications; b) following-through with scheduled follow-up appointments or scheduling those that could not be set; and c) monitoring health at home.

## 2018-07-28 NOTE — ASSESSMENT & PLAN NOTE
Patient with 1 week history of black stools and melena on exam in the ED. H&H 9&27 in the ED dropped to 7&20 on floor.   Typed and Screened  Protonix GTT  S/P 1 unit of PRB  Follow up H&H after transfusion  Repeat CBC in AM  GI consulted  Will continue IVF until hemodynamically stable.

## 2018-07-28 NOTE — H&P
[facility] Hospital Medicine  History & Physical    Patient Name: Melba Pabon  MRN: [id number]  Admission Date: 7/27/2018  Attending Physician: Osman Orellana MD   Primary Care Provider: Azikiwe K Lombard, MD         Patient information was obtained from patient and ER records.     Subjective:     Principal Problem:Acute upper GI bleeding    Chief Complaint:   Chief Complaint   Patient presents with    Melena     States she noticed black stool since wednesday.  Also states she has no appetite        HPI: Melba Pabon 68 y.o. female with HTN, s/p abdominal hernia repair, s/p brain aneurysm repair (25yrs ago) presenting to the hospital for evaluation and management of black stools. She states over the past week she has noticed a few dark stools, and these dark stools eventually prompted her to come to the hospital. She endorses fatigue, lightheadedness, and dizziness since she began noticing the dark stools. She denies abdominal pain currently. She did not endorse nausea or vomiting. She reported using a laxative early in the week and that's what prompted her to notice the black stools. She reports her last colonoscopy was 10 years ago.     In the ED, patient was found to have melena on rectal exam and an H&H of 9/27.1. She was started protonix GTT and admitted.    Past Medical History:   Diagnosis Date    Age-related osteoporosis without current pathological fracture 4/13/2018    Allergy     Cataract     Hypertension        Past Surgical History:   Procedure Laterality Date    BRAIN SURGERY      SMALL INTESTINE SURGERY         Review of patient's allergies indicates:   Allergen Reactions    Pcn [penicillins]        No current facility-administered medications on file prior to encounter.      Current Outpatient Prescriptions on File Prior to Encounter   Medication Sig    alendronate (FOSAMAX) 70 MG tablet Take 1 tablet (70 mg total) by mouth every 7 days.    DOCUSATE SODIUM (STOOL  SOFTENER ORAL) Take by mouth once daily.    lisinopril-hydrochlorothiazide (PRINZIDE,ZESTORETIC) 20-12.5 mg per tablet Take 1 tablet by mouth once daily.    hydrocodone-acetaminophen 5-325mg (NORCO) 5-325 mg per tablet Take 1 tablet by mouth every 6 (six) hours as needed.     Family History     Problem Relation (Age of Onset)    Dementia Mother    No Known Problems Father        Social History Main Topics    Smoking status: Current Some Day Smoker     Packs/day: 0.50     Years: 25.00     Types: Cigarettes    Smokeless tobacco: Never Used    Alcohol use No    Drug use: No    Sexual activity: Not on file     Review of Systems   Constitutional: Negative for chills and fever.   HENT: Negative for nosebleeds and tinnitus.    Eyes: Negative for photophobia and visual disturbance.   Respiratory: Negative for shortness of breath and wheezing.    Cardiovascular: Negative for chest pain, palpitations and leg swelling.   Gastrointestinal: Positive for blood in stool and constipation. Negative for abdominal distention, nausea and vomiting.   Genitourinary: Negative for flank pain and hematuria.   Musculoskeletal: Negative for gait problem and joint swelling.   Skin: Negative for rash and wound.   Neurological: Positive for dizziness and light-headedness. Negative for seizures and syncope.     Objective:     Vital Signs (Most Recent):  Temp: 98 °F (36.7 °C) (07/28/18 1141)  Pulse: 91 (07/28/18 1141)  Resp: 17 (07/28/18 1141)  BP: (!) 140/72 (07/28/18 1141)  SpO2: 95 % (07/28/18 1141) Vital Signs (24h Range):  Temp:  [97.5 °F (36.4 °C)-98.9 °F (37.2 °C)] 98 °F (36.7 °C)  Pulse:  [] 91  Resp:  [16-21] 17  SpO2:  [95 %-100 %] 95 %  BP: ()/(52-95) 140/72     Weight: 56.1 kg (123 lb 10.9 oz)  Body mass index is 20.58 kg/m².    Physical Exam   Constitutional: She is oriented to person, place, and time. She appears well-developed and well-nourished. No distress.   HENT:   Head: Normocephalic and atraumatic.   Poor  dentition   Eyes: Conjunctivae and EOM are normal. Right eye exhibits no discharge. Left eye exhibits no discharge.   Neck: Normal range of motion. No thyromegaly present.   Cardiovascular: Normal rate, regular rhythm and intact distal pulses.    No murmur heard.  Pulmonary/Chest: Effort normal and breath sounds normal. No respiratory distress.   Abdominal: Soft. Bowel sounds are normal. She exhibits no distension and no mass. There is no tenderness. A hernia (large umbilical hernia) is present.   Musculoskeletal: She exhibits no edema or deformity.   Neurological: She is alert and oriented to person, place, and time.   Skin: Skin is warm and dry.   Psychiatric: She has a normal mood and affect. Her behavior is normal.   Nursing note and vitals reviewed.        CRANIAL NERVES     CN III, IV, VI   Extraocular motions are normal.        Significant Labs:   CBC:   Recent Labs  Lab 07/27/18  1734 07/28/18  0759   WBC 4.31  --    HGB 9.5* 7.0*   HCT 27.1* 20.8*     --      CMP:   Recent Labs  Lab 07/27/18  1734      K 3.5      CO2 27   *   BUN 38*   CREATININE 0.9   CALCIUM 11.4*   PROT 7.5   ALBUMIN 3.7   BILITOT 0.4   ALKPHOS 64   AST 19   ALT 8*   ANIONGAP 6*   EGFRNONAA >60     Coagulation:   Recent Labs  Lab 07/27/18  1821   INR 1.0   APTT 21.9       Significant Imaging: I have reviewed all pertinent imaging results/findings within the past 24 hours.    Assessment/Plan:     * Acute upper GI bleeding    Patient with 1 week history of black stools and melena on exam in the ED. H&H 9&27 in the ED dropped to 7&20 on floor.   Typed and Screened  Protonix GTT  S/P 1 unit of PRB  Follow up H&H after transfusion  Repeat CBC in AM  GI consulted  Will continue IVF until hemodynamically stable.           Essential hypertension    Patient normally hypertensive. Over course of admission patient has low normotensive blood pressures, with one episode of hypotension to 83/52. When patient returns to her  normal blood pressure greater thajn 120 will consider beginning her home medication.            Acute blood loss anemia    Due to GI bleed. H&H of 9/27 dropped to 7/20 on floor  S/P 1 unit PRB  transferrin, ferritin, TIBC and Reticulocyte pending  Iron supplementation started        Hypercalcemia    Corrected calcium of 11.6, likely secondary to dehydration from acute fluid loss.   If no improvement with rehydration will investigate further.           VTE Risk Mitigation         Ordered     Place sequential compression device  Until discontinued      07/28/18 1313     Place TRACEY hose  Until discontinued      07/27/18 1914         Transfuse 1 unit, protonix GTT, and GI consult. If hemodynamically stable and cleared by GI will d/c.      VTE: TRACEY/SCD no lovenox in setting of possible bleed  Code: Full  Diet: Regular diet      Elmo Ruiz PA-C  Department of Hospital Medicine   [facility]

## 2018-07-28 NOTE — ASSESSMENT & PLAN NOTE
Due to GI bleed. H&H of 9/27 dropped to 7/20 on floor  S/P 1 unit PRB  transferrin, ferritin, TIBC and Reticulocyte pending  Iron supplementation started

## 2018-07-28 NOTE — CONSULTS
Dictation #1  MRN:[id number]  CSN:[id number]  [id number]  Imp: GIB, anemia post hemorrhagic  Plan:  PPI gtt or BID  EGD Monday or as outpatient if stable overnight  Colonoscopy as outpatient  Transfuse 1 unit PRBC as planned  Diet ok - no BM since Wednesday, not a brisk bleed  CBC post transfusion and in AM

## 2018-07-28 NOTE — HPI
Melba Pabon 68 y.o. female with HTN, s/p abdominal hernia repair, s/p brain aneurysm repair (25yrs ago) presenting to the hospital for evaluation and management of black stools. She states over the past week she has noticed a few dark stools, and these dark stools eventually prompted her to come to the hospital. She endorses fatigue, lightheadedness, and dizziness since she began noticing the dark stools. She denies abdominal pain currently. She did not endorse nausea or vomiting. She reported using a laxative early in the week and that's what prompted her to notice the black stools. She reports her last colonoscopy was 10 years ago.     In the ED, patient was found to have melena on rectal exam and an H&H of 9/27.1. She was started protonix GTT and admitted.

## 2018-07-28 NOTE — ASSESSMENT & PLAN NOTE
Patient normally hypertensive. Over course of admission patient has low normotensive blood pressures, with one episode of hypotension to 83/52. When patient returns to her normal blood pressure greater thajn 120 will consider beginning her home medication.

## 2018-07-28 NOTE — SUBJECTIVE & OBJECTIVE
Past Medical History:   Diagnosis Date    Age-related osteoporosis without current pathological fracture 4/13/2018    Allergy     Cataract     Hypertension        Past Surgical History:   Procedure Laterality Date    BRAIN SURGERY      SMALL INTESTINE SURGERY         Review of patient's allergies indicates:   Allergen Reactions    Pcn [penicillins]        No current facility-administered medications on file prior to encounter.      Current Outpatient Prescriptions on File Prior to Encounter   Medication Sig    alendronate (FOSAMAX) 70 MG tablet Take 1 tablet (70 mg total) by mouth every 7 days.    DOCUSATE SODIUM (STOOL SOFTENER ORAL) Take by mouth once daily.    lisinopril-hydrochlorothiazide (PRINZIDE,ZESTORETIC) 20-12.5 mg per tablet Take 1 tablet by mouth once daily.    hydrocodone-acetaminophen 5-325mg (NORCO) 5-325 mg per tablet Take 1 tablet by mouth every 6 (six) hours as needed.     Family History     Problem Relation (Age of Onset)    Dementia Mother    No Known Problems Father        Social History Main Topics    Smoking status: Current Some Day Smoker     Packs/day: 0.50     Years: 25.00     Types: Cigarettes    Smokeless tobacco: Never Used    Alcohol use No    Drug use: No    Sexual activity: Not on file     Review of Systems   Constitutional: Negative for chills and fever.   HENT: Negative for nosebleeds and tinnitus.    Eyes: Negative for photophobia and visual disturbance.   Respiratory: Negative for shortness of breath and wheezing.    Cardiovascular: Negative for chest pain, palpitations and leg swelling.   Gastrointestinal: Positive for blood in stool and constipation. Negative for abdominal distention, nausea and vomiting.   Genitourinary: Negative for flank pain and hematuria.   Musculoskeletal: Negative for gait problem and joint swelling.   Skin: Negative for rash and wound.   Neurological: Positive for dizziness and light-headedness. Negative for seizures and syncope.      Objective:     Vital Signs (Most Recent):  Temp: 98 °F (36.7 °C) (07/28/18 1141)  Pulse: 91 (07/28/18 1141)  Resp: 17 (07/28/18 1141)  BP: (!) 140/72 (07/28/18 1141)  SpO2: 95 % (07/28/18 1141) Vital Signs (24h Range):  Temp:  [97.5 °F (36.4 °C)-98.9 °F (37.2 °C)] 98 °F (36.7 °C)  Pulse:  [] 91  Resp:  [16-21] 17  SpO2:  [95 %-100 %] 95 %  BP: ()/(52-95) 140/72     Weight: 56.1 kg (123 lb 10.9 oz)  Body mass index is 20.58 kg/m².    Physical Exam   Constitutional: She is oriented to person, place, and time. She appears well-developed and well-nourished. No distress.   HENT:   Head: Normocephalic and atraumatic.   Poor dentition   Eyes: Conjunctivae and EOM are normal. Right eye exhibits no discharge. Left eye exhibits no discharge.   Neck: Normal range of motion. No thyromegaly present.   Cardiovascular: Normal rate, regular rhythm and intact distal pulses.    No murmur heard.  Pulmonary/Chest: Effort normal and breath sounds normal. No respiratory distress.   Abdominal: Soft. Bowel sounds are normal. She exhibits no distension and no mass. There is no tenderness. A hernia (large umbilical hernia) is present.   Musculoskeletal: She exhibits no edema or deformity.   Neurological: She is alert and oriented to person, place, and time.   Skin: Skin is warm and dry.   Psychiatric: She has a normal mood and affect. Her behavior is normal.   Nursing note and vitals reviewed.        CRANIAL NERVES     CN III, IV, VI   Extraocular motions are normal.        Significant Labs:   CBC:   Recent Labs  Lab 07/27/18  1734 07/28/18  0759   WBC 4.31  --    HGB 9.5* 7.0*   HCT 27.1* 20.8*     --      CMP:   Recent Labs  Lab 07/27/18  1734      K 3.5      CO2 27   *   BUN 38*   CREATININE 0.9   CALCIUM 11.4*   PROT 7.5   ALBUMIN 3.7   BILITOT 0.4   ALKPHOS 64   AST 19   ALT 8*   ANIONGAP 6*   EGFRNONAA >60     Coagulation:   Recent Labs  Lab 07/27/18  1821   INR 1.0   APTT 21.9        Significant Imaging: I have reviewed all pertinent imaging results/findings within the past 24 hours.

## 2018-07-28 NOTE — PLAN OF CARE
Problem: Gastrointestinal Bleeding (Adult)  Intervention: Monitor/Manage Gastrointestinal Bleed Characteristics/Effects   07/28/18 0358   Monitor/Manage Gastrointestinal Bleed Characteristics/Effects   GI Signs/Symptoms abdominal discomfort   Nausea/Vomiting Signs/Symptoms nausea intermittent   Safety Interventions   Medication Review/Management infusion initiated;medications reviewed     Intervention: Support/Optimize Psychosocial Response to Illness   07/28/18 0358   Coping/Psychosocial Interventions   Supportive Measures active listening utilized;positive reinforcement provided;problem solving facilitated;relaxation techniques promoted;self-care encouraged;verbalization of feelings encouraged     Intervention: Provide Comfort Measures Related to Nausea/Vomiting   07/28/18 0358   Monitor/Manage Hypovolemia   Nausea/Vomiting Interventions medication given     Intervention: Provide Frequent Perineal Care   07/28/18 0358   Skin Interventions   Skin Protection Urinary containment device;Incontinence pads     Intervention: Elevate Head of Bed 30-45 Degrees   07/28/18 0358   Safety Interventions   Aspiration Precautions NPO pending swallow screening/evaluation   Positioning   Head of Bed (HOB) HOB elevated     Intervention: Monitor/Manage Fluid Electrolyte Balance   07/28/18 0358   Nutrition Interventions   Fluid/Electrolyte Management intravenous fluid replacement initiated

## 2018-07-29 VITALS
HEIGHT: 65 IN | TEMPERATURE: 99 F | WEIGHT: 123.69 LBS | BODY MASS INDEX: 20.61 KG/M2 | RESPIRATION RATE: 18 BRPM | DIASTOLIC BLOOD PRESSURE: 65 MMHG | SYSTOLIC BLOOD PRESSURE: 142 MMHG | HEART RATE: 82 BPM | OXYGEN SATURATION: 99 %

## 2018-07-29 LAB
ALBUMIN SERPL BCP-MCNC: 2.5 G/DL
ALP SERPL-CCNC: 54 U/L
ALT SERPL W/O P-5'-P-CCNC: 9 U/L
ANION GAP SERPL CALC-SCNC: 3 MMOL/L
AST SERPL-CCNC: 18 U/L
BILIRUB SERPL-MCNC: 0.8 MG/DL
BUN SERPL-MCNC: 10 MG/DL
CALCIUM SERPL-MCNC: 8.7 MG/DL
CHLORIDE SERPL-SCNC: 111 MMOL/L
CO2 SERPL-SCNC: 22 MMOL/L
CREAT SERPL-MCNC: 0.7 MG/DL
EST. GFR  (AFRICAN AMERICAN): >60 ML/MIN/1.73 M^2
EST. GFR  (NON AFRICAN AMERICAN): >60 ML/MIN/1.73 M^2
GLUCOSE SERPL-MCNC: 90 MG/DL
HCT VFR BLD AUTO: 23 %
HCT VFR BLD AUTO: 23.8 %
HCT VFR BLD AUTO: 27.5 %
HGB BLD-MCNC: 8 G/DL
HGB BLD-MCNC: 8.3 G/DL
HGB BLD-MCNC: 9.5 G/DL
POTASSIUM SERPL-SCNC: 3.7 MMOL/L
PROT SERPL-MCNC: 4.9 G/DL
SODIUM SERPL-SCNC: 136 MMOL/L

## 2018-07-29 PROCEDURE — 63600175 PHARM REV CODE 636 W HCPCS: Performed by: NURSE PRACTITIONER

## 2018-07-29 PROCEDURE — 85014 HEMATOCRIT: CPT | Mod: 91

## 2018-07-29 PROCEDURE — G0378 HOSPITAL OBSERVATION PER HR: HCPCS

## 2018-07-29 PROCEDURE — C9113 INJ PANTOPRAZOLE SODIUM, VIA: HCPCS | Performed by: EMERGENCY MEDICINE

## 2018-07-29 PROCEDURE — 85018 HEMOGLOBIN: CPT

## 2018-07-29 PROCEDURE — 80053 COMPREHEN METABOLIC PANEL: CPT

## 2018-07-29 PROCEDURE — C9113 INJ PANTOPRAZOLE SODIUM, VIA: HCPCS | Performed by: NURSE PRACTITIONER

## 2018-07-29 PROCEDURE — 25000003 PHARM REV CODE 250: Performed by: PHYSICIAN ASSISTANT

## 2018-07-29 PROCEDURE — 36415 COLL VENOUS BLD VENIPUNCTURE: CPT

## 2018-07-29 PROCEDURE — 63600175 PHARM REV CODE 636 W HCPCS: Performed by: EMERGENCY MEDICINE

## 2018-07-29 PROCEDURE — 25000003 PHARM REV CODE 250: Performed by: EMERGENCY MEDICINE

## 2018-07-29 PROCEDURE — 25000003 PHARM REV CODE 250: Performed by: NURSE PRACTITIONER

## 2018-07-29 RX ORDER — FERROUS SULFATE 324(65)MG
325 TABLET, DELAYED RELEASE (ENTERIC COATED) ORAL DAILY
Refills: 0 | COMMUNITY
Start: 2018-07-29 | End: 2018-11-01

## 2018-07-29 RX ORDER — FERROUS GLUCONATE 324(38)MG
324 TABLET ORAL
Status: DISCONTINUED | OUTPATIENT
Start: 2018-07-30 | End: 2018-07-29

## 2018-07-29 RX ORDER — PANTOPRAZOLE SODIUM 40 MG/1
40 TABLET, DELAYED RELEASE ORAL 2 TIMES DAILY
Qty: 30 TABLET | Refills: 0 | Status: SHIPPED | OUTPATIENT
Start: 2018-07-29 | End: 2018-08-13 | Stop reason: SDUPTHER

## 2018-07-29 RX ADMIN — DEXTROSE 40 MG: 50 INJECTION, SOLUTION INTRAVENOUS at 08:07

## 2018-07-29 RX ADMIN — DEXTROSE 8 MG/HR: 50 INJECTION, SOLUTION INTRAVENOUS at 02:07

## 2018-07-29 RX ADMIN — FERROUS GLUCONATE TAB 324 MG (37.5 MG ELEMENTAL IRON) 324 MG: 324 (37.5 FE) TAB at 08:07

## 2018-07-29 NOTE — PLAN OF CARE
07/29/18 1431   Final Note   Assessment Type Final Discharge Note   Discharge Disposition Home   What phone number can be called within the next 1-3 days to see how you are doing after discharge? ([phone] )   Hospital Follow Up  Appt(s) scheduled? Yes  (PCP; patient will schedule GI on Monday)   Discharge plans and expectations educations in teach back method with documentation complete? Yes   Right Care Referral Info   Post Acute Recommendation No Care

## 2018-07-29 NOTE — DISCHARGE INSTRUCTIONS
Do not take Lisinopril and Hydrochlorothiazide until after you have seen Dr. Garrido in Gastroenterology.       When You Have Gastrointestinal (GI) Bleeding    Blood in your vomit or stool can be a sign of gastrointestinal (GI) bleeding. GI bleeding can be scary. But the cause may not be serious. You should always see a doctor if GI bleeding occurs.  The GI tract  The GI tract is the path through which food travels in the body. Food passes from the mouth down the esophagus (the tube from the mouth to the stomach). Food begins to break down in the stomach. It then moves through the duodenum, the first part of the small intestine. Nutrients are absorbed as food travels through the small intestine. What is left passes into the colon (large intestine) as waste. The colon removes water from the waste. Waste continues from the colon to the rectum (where stool is stored). Waste then leaves the body through the anus.  Causes of GI bleeding  GI bleeding can be caused by many different problems. Some of the more common causes include:  · Swollen veins in the anus (hemorrhoids)  · Swollen veins in the esophagus (varices)  · Sore on the lining of the GI tract (ulcer)  · Cuts or scrapes in the mouth or throat  · Infection caused by germs such as bacteria or parasites  · Food allergies, such as milk allergy in young children  · Medicines  · Inflammation of the GI tract (gastritis or esophagitis)  · Colitis (Crohn's disease or ulcerative colitis)  · Cancer (tumors or polyps)  · Abnormal pouches in the colon (diverticula)  · Tears in the esophagus or anus  · Nosebleed  · Abnormal blood vessels in the GI tract (angiodysplasia)  Diagnosing the cause of blood in stool  If blood is coming out in your stool, you may have a lower GI tract problem or a very fast upper GI tract bleed. Bleeding from the GI tract can be bright red. Or it may look dark and tarry. Tests may also find blood in your stool that cant be seen with the eye  (occult blood). To find out the cause, tests that may be ordered include:  · Blood tests. A blood sample is taken and sent to a lab for exam.  · Hemoccult test. Checks a stool sample for blood.  · Stool culture. Checks a stool sample for bacteria or parasites.  · X-ray, ultrasound, or CT scan. Imaging tests that take pictures of the digestive tract.  · Colonoscopy or sigmoidoscopy. This test uses a flexible tube with a tiny camera. The tube is inserted through your anus into your rectum to see the inside of your colon. Your provider can also take a tiny tissue sample (biopsy) and treat a bleeding source  Diagnosing the cause of blood in vomit  If you are vomiting blood or something that looks like coffee grounds, you may have an upper GI tract problem. To find the cause, tests that may be done include:  · Upper Endoscopy. A flexible tube with a tiny camera is inserted through your mouth and throat to see inside your upper GI tract. This lets your provider take a tiny tissue sample (biopsy) and treat a bleeding source.  · Nasogastric lavage. This can tell if you have upper GI or lower GI bleeding.  · X-ray, ultrasound, or CT scan. Imaging tests that take pictures of your digestive tract.  · Upper GI series. X-rays of the upper part of your GI tract taken from inside your body.  · Enteroscopy. This sends a flexible tube or a small, swallowed capsule camera into your small intestine.  When to call your healthcare provider  Call your healthcare provider right away if you have any of the following:  · Bleeding from your mouth or anus that can't be stopped  · Fever of 100.4°F (38.0°) or higher  · Bleeding along with feeling lightheaded or dizzy  · Signs of fluid loss (dehydration). These include a dry, sticky mouth, decreased urine output; and very dark urine.  · Belly (abdominal) pain   Date Last Reviewed: 7/1/2016  © 0845-1973 [employer]. [address] [place] [address], [place] [address]. All rights reserved.  This information is not intended as a substitute for professional medical care. Always follow your healthcare professional's instructions.

## 2018-07-29 NOTE — PLAN OF CARE
Problem: Fall Risk (Adult)  Goal: Identify Related Risk Factors and Signs and Symptoms  Related risk factors and signs and symptoms are identified upon initiation of Human Response Clinical Practice Guideline (CPG)   Outcome: Ongoing (interventions implemented as appropriate)   07/29/18 0632   Fall Risk   Related Risk Factors (Fall Risk) age-related changes;gait/mobility problems;sleep pattern alteration;environment unfamiliar;objects hard to reach;inadequate lighting;fear of falling;fatigue/slow reaction     Goal: Absence of Falls  Patient will demonstrate the desired outcomes by discharge/transition of care.   Outcome: Ongoing (interventions implemented as appropriate)   07/29/18 0632   Fall Risk (Adult)   Absence of Falls making progress toward outcome       Problem: Anemia (Adult)  Goal: Identify Related Risk Factors and Signs and Symptoms  Related risk factors and signs and symptoms are identified upon initiation of Human Response Clinical Practice Guideline (CPG)   Outcome: Ongoing (interventions implemented as appropriate)   07/29/18 0632   Anemia   Signs and Symptoms (Anemia) dizziness;fatigue     Goal: Symptom Improvement  Patient will demonstrate the desired outcomes by discharge/transition of care.   Outcome: Ongoing (interventions implemented as appropriate)   07/29/18 0632   Anemia (Adult)   Symptom Improvement making progress toward outcome

## 2018-07-29 NOTE — PROGRESS NOTES
Patient provided with written discharge information form with PCP follow-up appointment and information for GI follow-up which patient will schedule on Monday.

## 2018-07-29 NOTE — NURSING
Report received by INDY Franklin. The pt is lying in bed watching tv. She is having any pain or discomfort. Tele monitor in progress with alarms set. Safety precautions maintained and bed locked in lowest position. Side rails up X2. Call bell and personal items within reach. Will continue to monitor pt for any changes.

## 2018-07-29 NOTE — PROGRESS NOTES
Discharge instructions given to patient at bedside. Patient verbalized understanding of instructions and to call on Monday to schedule follow-up GI appointment . Patient states willingness to comply. Saline lock removed. Tele monitoring removed.

## 2018-07-29 NOTE — ASSESSMENT & PLAN NOTE
Corrected calcium of 11.6, likely secondary to dehydration from acute fluid loss.   Corrected to 8.7 with volume resuscitation.

## 2018-07-29 NOTE — DISCHARGE SUMMARY
[facility] Hospital Medicine  Discharge Summary      Patient Name: Melba Pabon  MRN: [id number]  Admission Date: 7/27/2018  Hospital Length of Stay: 1 days  Discharge Date and Time:  07/29/2018 1:10 PM  Attending Physician: Osman Orellana MD   Discharging Provider: Elmo Ruiz PA-C  Primary Care Provider: Azikiwe K Lombard, MD      HPI:   Melba Pabon 68 y.o. female with HTN, s/p abdominal hernia repair, s/p brain aneurysm repair (25yrs ago) presenting to the hospital for evaluation and management of black stools. She states over the past week she has noticed a few dark stools, and these dark stools eventually prompted her to come to the hospital. She endorses fatigue, lightheadedness, and dizziness since she began noticing the dark stools. She denies abdominal pain currently. She did not endorse nausea or vomiting. She reported using a laxative early in the week and that's what prompted her to notice the black stools. She reports her last colonoscopy was 10 years ago.     In the ED, patient was found to have melena on rectal exam and an H&H of 9/27.1. She was started protonix GTT and admitted.    * No surgery found *      Hospital Course:   Melba Pabon 68 y.o. female admitted to the hospital for evaluation and management of acute GI bleed. In the ED, patient was found to have melena on rectal exam and H&H of 9/27.1. She was started on protonix GTT and GI consulted. The next morning patient's H&H was 7/20.8. Patient was transfused a unit of blood and and was seen by GI. GI recommended checking a CBC in the AM after transfusion. On morning of 7/29. H&H stable at 8.3&23.8. Checked again at noon and up to 9.5&27.5. GI cleared for discharge on PPI BID and iron supp for EGD on Tuesday.      Consults:   Consults         Status Ordering Provider     Inpatient consult to Gastroenterology  Once     Provider:  Jef Garrido MD    Completed STU PICKARD          * Acute upper GI bleeding     Patient with 1 week history of black stools and melena on exam in the ED. H&H 9&27 in the ED dropped to 7&20 on floor.   Typed and Screened  Protonix GTT  S/P 1 unit of PRB  Follow up H&H after transfusion  Repeat CBC in AM  GI consulted  Will continue IVF until hemodynamically stable.           Essential hypertension    Patient normally hypertensive. Over course of admission patient has low normotensive blood pressures, with one episode of hypotension to 83/52. When patient returns to her normal blood pressure greater thajn 120 will consider beginning her home medication.            Acute blood loss anemia    Due to GI bleed. H&H of 9/27 dropped to 7/20 on floor  S/P 1 unit PRB  transferrin, ferritin, TIBC and Reticulocyte pending  Iron supplementation started        Hypercalcemia    Corrected calcium of 11.6, likely secondary to dehydration from acute fluid loss.   Corrected to 8.7 with volume resuscitation.           Final Active Diagnoses:    Diagnosis Date Noted POA    PRINCIPAL PROBLEM:  Acute upper GI bleeding [K92.2] 07/27/2018 Yes    Essential hypertension [I10] 04/13/2018 Yes    Acute blood loss anemia [D62] 07/28/2018 Unknown    Hypercalcemia [E83.52] 07/28/2018 Unknown      Problems Resolved During this Admission:    Diagnosis Date Noted Date Resolved POA       Discharged Condition: stable    Disposition: Home or Self Care    Follow Up:  Follow-up Information     Azikiwe K Lombard, MD On 8/2/2018.    Specialty:  Family Medicine  Why:  2:00pm [facility] hospital follow-up appointment scheduled   Contact information:  [address] [place] [address]  [phone]             Schedule an appointment as soon as possible for a visit with Jef Garrido MD.    Specialty:  Gastroenterology  Why:  Call Monday to schedule a Gastroenterology follow-up with the [facility]: [phone]  Contact information:  [address] [place] [address]  [id number]  [facility]  [place]  [id number]  [phone]                 Patient Instructions:     Diet Cardiac     Notify your health care provider if you experience any of the following:  temperature >100.4     Notify your health care provider if you experience any of the following:  persistent nausea and vomiting or diarrhea     Notify your health care provider if you experience any of the following:  persistent dizziness, light-headedness, or visual disturbances     Activity as tolerated         Significant Diagnostic Studies: Labs:   CMP   Recent Labs  Lab 07/27/18  1734 07/28/18  1624 07/29/18  0548    136 136   K 3.5 3.7 3.7    109 111*   CO2 27 23 22*   * 103 90   BUN 38* 16 10   CREATININE 0.9 0.7 0.7   CALCIUM 11.4* 9.2 8.7   PROT 7.5 5.7* 4.9*   ALBUMIN 3.7 2.9* 2.5*   BILITOT 0.4 0.7 0.8   ALKPHOS 64 54* 54*   AST 19 20 18   ALT 8* 8* 9*   ANIONGAP 6* 4* 3*   ESTGFRAFRICA >60 >60 >60   EGFRNONAA >60 >60 >60    and CBC   Recent Labs  Lab 07/27/18  1734  07/28/18  2355 07/29/18  0547 07/29/18  1151   WBC 4.31  --   --   --   --    HGB 9.5*  < > 8.0* 8.3* 9.5*   HCT 27.1*  < > 23.0* 23.8* 27.5*     --   --   --   --    < > = values in this interval not displayed.    Pending Diagnostic Studies:     None         Medications:  Reconciled Home Medications:      Medication List      START taking these medications    ferrous sulfate 324 mg (65 mg iron) Tbec  Take 1 tablet (324 mg total) by mouth once daily.     pantoprazole 40 MG tablet  Commonly known as:  PROTONIX  Take 1 tablet (40 mg total) by mouth 2 (two) times daily.        CONTINUE taking these medications    alendronate 70 MG tablet  Commonly known as:  FOSAMAX  Take 1 tablet (70 mg total) by mouth every 7 days.     lisinopril-hydrochlorothiazide 20-12.5 mg per tablet  Commonly known as:  PRINZIDE,ZESTORETIC  Take 1 tablet by mouth once daily.    Patient instructed to not take until after seeing Dr. Garrido in GI.      STOOL SOFTENER ORAL  Take by mouth once daily.         STOP taking these medications    HYDROcodone-acetaminophen 5-325 mg per tablet  Commonly known as:  NORCO            Indwelling Lines/Drains at time of discharge:   Lines/Drains/Airways          No matching active lines, drains, or airways          Time spent on the discharge of patient: 34 minutes  Patient was seen and examined on the date of discharge and determined to be suitable for discharge.         Elmo Ruiz PA-C  Department of Hospital Medicine  [facility]

## 2018-07-29 NOTE — PROGRESS NOTES
"No new events; no new complaints ON  No BM, no bleeding, no abd or CP  Case d/w hospitalist, plan for outpatient mgmt  /66   Pulse (!) 57   Temp 98.3 °F (36.8 °C)   Resp 18   Ht 5' 5" (1.651 m)   Wt 56.1 kg (123 lb 10.9 oz)   SpO2 99%   Breastfeeding? No   BMI 20.58 kg/m²   Comf NAD A&O x3  CTA B  RRR no mrg  S NT ND NABS  No c/c/e or MW  Nl mood, affect, insight    Lab Results   Component Value Date    WBC 4.31 07/27/2018    HGB 8.3 (L) 07/29/2018    HCT 23.8 (L) 07/29/2018    MCV 90 07/27/2018     07/27/2018     Imp:  Anemia, acute blood loss  GIB  Plan:  EGD likely Tuesday  Discharge on PPI twice daily  Iron supplement daily  Colonsocopy near future  "

## 2018-07-30 ENCOUNTER — NURSE TRIAGE (OUTPATIENT)
Dept: ADMINISTRATIVE | Facility: CLINIC | Age: 68
End: 2018-07-30

## 2018-08-01 ENCOUNTER — NURSE TRIAGE (OUTPATIENT)
Dept: ADMINISTRATIVE | Facility: CLINIC | Age: 68
End: 2018-08-01

## 2018-08-01 NOTE — TELEPHONE ENCOUNTER
Reason for Disposition   Health Information question, no triage required and triager able to answer question    Protocols used: ST INFORMATION ONLY CALL-A-AH    Pt at Dr Benavidez's (GI)  office yesterday. States that he gave her a medicine reconciliation and told her to restart BP med that was stopped in the hospital. Pt calling to see if it is OK to take BP med. States SBP was 140 at the office visit yesterday. Informed pt if MD told her to take BP med it is ok to take it.

## 2018-08-02 ENCOUNTER — OFFICE VISIT (OUTPATIENT)
Dept: FAMILY MEDICINE | Facility: CLINIC | Age: 68
End: 2018-08-02
Payer: MEDICARE

## 2018-08-02 VITALS
BODY MASS INDEX: 20.64 KG/M2 | WEIGHT: 123.88 LBS | HEART RATE: 78 BPM | SYSTOLIC BLOOD PRESSURE: 128 MMHG | DIASTOLIC BLOOD PRESSURE: 80 MMHG | TEMPERATURE: 99 F | RESPIRATION RATE: 18 BRPM | HEIGHT: 65 IN | OXYGEN SATURATION: 98 %

## 2018-08-02 DIAGNOSIS — D62 ACUTE BLOOD LOSS ANEMIA: ICD-10-CM

## 2018-08-02 DIAGNOSIS — K92.2 UPPER GI BLEED: ICD-10-CM

## 2018-08-02 DIAGNOSIS — K31.89 GASTRIC MASS: Primary | ICD-10-CM

## 2018-08-02 PROCEDURE — 3079F DIAST BP 80-89 MM HG: CPT | Mod: CPTII,S$GLB,, | Performed by: FAMILY MEDICINE

## 2018-08-02 PROCEDURE — 99999 PR PBB SHADOW E&M-EST. PATIENT-LVL III: CPT | Mod: PBBFAC,,, | Performed by: FAMILY MEDICINE

## 2018-08-02 PROCEDURE — 99214 OFFICE O/P EST MOD 30 MIN: CPT | Mod: S$GLB,,, | Performed by: FAMILY MEDICINE

## 2018-08-02 PROCEDURE — 3074F SYST BP LT 130 MM HG: CPT | Mod: CPTII,S$GLB,, | Performed by: FAMILY MEDICINE

## 2018-08-02 NOTE — PROGRESS NOTES
Chief Complaint   Patient presents with    Hospital Follow Up       Melba Pabon is a 68 y.o. female who presents per the Chief Complaint.  Pt is known to me and was last seen by me on 5/16/2018.  All known chronic medical issues have been documented.  Patient recently diagnosed with gastric mass following symptomatic upper GI bleed.      Hypertension   This is a chronic problem. The current episode started more than 1 year ago. The problem is unchanged. The problem is controlled. Pertinent negatives include no anxiety, blurred vision, chest pain, headaches, malaise/fatigue, neck pain, orthopnea, palpitations, peripheral edema, PND or shortness of breath. There are no associated agents to hypertension. Risk factors for coronary artery disease include sedentary lifestyle and post-menopausal state. Past treatments include ACE inhibitors and diuretics. The current treatment provides moderate improvement. There are no compliance problems.  There is no history of angina, kidney disease, CAD/MI, CVA, heart failure, left ventricular hypertrophy, PVD or retinopathy. There is no history of chronic renal disease, coarctation of the aorta, hyperaldosteronism, hypercortisolism, hyperparathyroidism, a hypertension causing med, pheochromocytoma, renovascular disease, sleep apnea or a thyroid problem.        ROS  Review of Systems   Constitutional: Negative for activity change, appetite change, chills, diaphoresis, fatigue, fever, malaise/fatigue and unexpected weight change.   HENT: Negative.  Negative for congestion, ear pain, hearing loss, nosebleeds, postnasal drip, rhinorrhea, sinus pressure, sneezing, sore throat and trouble swallowing.    Eyes: Negative for blurred vision, pain and visual disturbance.   Respiratory: Negative for cough, choking and shortness of breath.    Cardiovascular: Negative for chest pain, palpitations, orthopnea, leg swelling and PND.   Gastrointestinal: Positive for blood in stool. Negative for  "abdominal pain, constipation, diarrhea, nausea and vomiting.   Genitourinary: Negative for difficulty urinating, dysuria, frequency and urgency.   Musculoskeletal: Negative.  Negative for arthralgias, back pain, gait problem, joint swelling, myalgias and neck pain.   Skin: Negative.    Allergic/Immunologic: Negative for environmental allergies and food allergies.   Neurological: Positive for weakness and light-headedness (improved). Negative for dizziness, tremors, seizures, syncope, facial asymmetry, speech difficulty, numbness and headaches.   Hematological: Negative for adenopathy. Does not bruise/bleed easily.   Psychiatric/Behavioral: Negative.  Negative for confusion, decreased concentration, dysphoric mood and sleep disturbance. The patient is not nervous/anxious.        Physical Exam  Vitals:    08/02/18 1418   BP: 128/80   Pulse: 78   Resp: 18   Temp: 98.7 °F (37.1 °C)    Body mass index is 20.62 kg/m².  Weight: 56.2 kg (123 lb 14.4 oz)   Height: 5' 5" (165.1 cm)     Physical Exam   Constitutional: She is oriented to person, place, and time. She appears well-developed and well-nourished. She is active and cooperative.  Non-toxic appearance. She does not have a sickly appearance. She does not appear ill. No distress.   HENT:   Head: Normocephalic and atraumatic.   Right Ear: Hearing and external ear normal. No decreased hearing is noted.   Left Ear: Hearing and external ear normal. No decreased hearing is noted.   Nose: Nose normal. No rhinorrhea or nasal deformity.   Mouth/Throat: Uvula is midline and oropharynx is clear and moist. She does not have dentures. Normal dentition.   Eyes: Conjunctivae, EOM and lids are normal. Pupils are equal, round, and reactive to light. Right eye exhibits no chemosis, no discharge and no exudate. No foreign body present in the right eye. Left eye exhibits no chemosis, no discharge and no exudate. No foreign body present in the left eye. No scleral icterus.   Neck: Normal " range of motion and full passive range of motion without pain. Neck supple.   Cardiovascular: Normal rate, regular rhythm, S1 normal, S2 normal and normal heart sounds.  Exam reveals no gallop and no friction rub.    No murmur heard.  Pulmonary/Chest: Effort normal and breath sounds normal. No accessory muscle usage. No respiratory distress. She has no decreased breath sounds. She has no wheezes. She has no rhonchi. She has no rales.   Abdominal: Soft. Normal appearance. She exhibits no distension. There is no hepatosplenomegaly. There is no tenderness. There is no rigidity, no rebound and no guarding.   Musculoskeletal: Normal range of motion.   Neurological: She is alert and oriented to person, place, and time. She has normal strength. No cranial nerve deficit or sensory deficit. She exhibits normal muscle tone. She displays no seizure activity. Coordination and gait normal.   Skin: Skin is warm, dry and intact. No rash noted. She is not diaphoretic.   Psychiatric: She has a normal mood and affect. Her speech is normal and behavior is normal. Judgment and thought content normal. Cognition and memory are normal. She is attentive.       Assessment & Plan    1. Gastric mass  Managed by GI; scheduled to meet with Oncology Surgeon next week.  Mass suspected to be malignant.  Pathology report through [facility]; not available at this time.    2. Upper GI bleed  Managed by GI; imaging completed but results not available at this time. Patient is scheduled for colonoscopy through [facility].    3. Acute blood loss anemia  Recommended to continue daily iron with stool softener to increase blood level.        Follow up documented    ACTIVE MEDICAL ISSUES:  Documented in Problem List    PAST MEDICAL HISTORY  Documented    PAST SURGICAL HISTORY:  Documented    SOCIAL HISTORY:  Documented    FAMILY HISTORY:  Documented    ALLERGIES AND MEDICATIONS: updated and reviewed.  Documented    Health Maintenance       Date Due  Completion Date    TETANUS VACCINE 05/07/1969 ---    Colonoscopy 05/07/2001 ---    Pneumococcal (65+) (1 of 2 - PCV13) 05/07/2016 ---    Influenza Vaccine 08/01/2018 ---    Mammogram 04/10/2019 4/10/2017    DEXA SCAN 06/05/2021 6/5/2018    Lipid Panel 04/30/2023 4/30/2018

## 2018-08-08 NOTE — H&P (VIEW-ONLY)
History & Physical    SUBJECTIVE:     History of Present Illness:  Patient is a 68 y.o. 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 She has had recent weight lost of at least 20 lbs. She does have a decreased appetite.     Chief Complaint   Patient presents with    Other     GIST       Review of patient's allergies indicates:   Allergen Reactions    Pcn [penicillins]        Current Outpatient Prescriptions   Medication Sig Dispense Refill    alendronate (FOSAMAX) 70 MG tablet Take 1 tablet (70 mg total) by mouth every 7 days. 4 tablet 2    DOCUSATE SODIUM (STOOL SOFTENER ORAL) Take by mouth once daily.      ferrous sulfate 324 mg (65 mg iron) TbEC Take 1 tablet (324 mg total) by mouth once daily.  0    lisinopril-hydrochlorothiazide (PRINZIDE,ZESTORETIC) 20-12.5 mg per tablet Take 1 tablet by mouth once daily. 30 tablet 2    pantoprazole (PROTONIX) 40 MG tablet Take 1 tablet (40 mg total) by mouth 2 (two) times daily. 30 tablet 0     No current facility-administered medications for this visit.        Past Medical History:   Diagnosis Date    Acute blood loss anemia 7/28/2018    Age-related osteoporosis without current pathological fracture 4/13/2018    Allergy     Cataract     Hypertension      Past Surgical History:   Procedure Laterality Date    BRAIN SURGERY      COLON SURGERY      EYE SURGERY      SMALL INTESTINE SURGERY       Family History   Problem Relation Age of Onset    Dementia Mother     No Known Problems Father      Social History   Substance Use Topics    Smoking status: Current Some Day Smoker     Packs/day: 0.50     Years: 25.00     Types: Cigarettes    Smokeless tobacco: Never Used    Alcohol use No        Review of Systems:  Review of Systems   Constitutional: Negative.    HENT: Negative.    Eyes: Negative.    Respiratory: Negative.    Cardiovascular: Negative.    Gastrointestinal: Negative.    Endocrine: Negative.   "  Musculoskeletal: Negative.    Skin: Negative.    Allergic/Immunologic: Negative.    Neurological: Negative.    Hematological: Negative.    Psychiatric/Behavioral: Negative.    All other systems reviewed and are negative.      OBJECTIVE:     Vital Signs (Most Recent)  Pulse: 81 (08/08/18 0908)  BP: 139/75 (08/08/18 0908)  5' 5" (1.651 m)  56.7 kg (125 lb)     Physical Exam:  Physical Exam   Constitutional: She is oriented to person, place, and time. She appears well-developed and well-nourished.   HENT:   Head: Normocephalic and atraumatic.   Right Ear: External ear normal.   Left Ear: External ear normal.   Nose: Nose normal.   Mouth/Throat: Oropharynx is clear and moist.   Eyes: Conjunctivae and EOM are normal. Pupils are equal, round, and reactive to light.   Neck: Normal range of motion. Neck supple.   Cardiovascular: Normal rate, regular rhythm, normal heart sounds and intact distal pulses.    Pulmonary/Chest: Effort normal and breath sounds normal.   Abdominal: Soft. Bowel sounds are normal. A hernia is present. Hernia confirmed positive in the ventral area.       Musculoskeletal: Normal range of motion.   Neurological: She is alert and oriented to person, place, and time. She has normal reflexes.   Skin: Skin is warm and dry.   Psychiatric: She has a normal mood and affect. Her behavior is normal. Thought content normal.   Vitals reviewed.      Laboratory  none    Diagnostic Results:  CT: Reviewed    ASSESSMENT/PLAN:     Gastric mass    PLAN:Plan     I will take her to the OR for gastric resection with the risks and possible complications being discussed and she agrees to proceed as planned       "

## 2018-08-16 ENCOUNTER — HOSPITAL ENCOUNTER (OUTPATIENT)
Dept: PREADMISSION TESTING | Facility: HOSPITAL | Age: 68
Discharge: HOME | End: 2018-08-16
Attending: SURGERY
Payer: MEDICARE

## 2018-08-16 VITALS
DIASTOLIC BLOOD PRESSURE: 75 MMHG | HEIGHT: 65 IN | HEART RATE: 79 BPM | SYSTOLIC BLOOD PRESSURE: 113 MMHG | OXYGEN SATURATION: 98 % | BODY MASS INDEX: 19.98 KG/M2 | TEMPERATURE: 98 F | RESPIRATION RATE: 16 BRPM | WEIGHT: 119.94 LBS

## 2018-08-16 DIAGNOSIS — Z01.818 PRE-OP TESTING: Primary | ICD-10-CM

## 2018-08-16 LAB
ANION GAP SERPL CALC-SCNC: 6 MMOL/L
BASOPHILS # BLD AUTO: 0.01 K/UL
BASOPHILS NFR BLD: 0.2 %
BUN SERPL-MCNC: 15 MG/DL
CALCIUM SERPL-MCNC: 10.5 MG/DL
CHLORIDE SERPL-SCNC: 102 MMOL/L
CO2 SERPL-SCNC: 28 MMOL/L
CREAT SERPL-MCNC: 0.9 MG/DL
DIFFERENTIAL METHOD: ABNORMAL
EOSINOPHIL # BLD AUTO: 0.1 K/UL
EOSINOPHIL NFR BLD: 1.7 %
ERYTHROCYTE [DISTWIDTH] IN BLOOD BY AUTOMATED COUNT: 14.8 %
EST. GFR  (AFRICAN AMERICAN): >60 ML/MIN/1.73 M^2
EST. GFR  (NON AFRICAN AMERICAN): >60 ML/MIN/1.73 M^2
GLUCOSE SERPL-MCNC: 105 MG/DL
HCT VFR BLD AUTO: 33.1 %
HGB BLD-MCNC: 11.5 G/DL
LYMPHOCYTES # BLD AUTO: 1.6 K/UL
LYMPHOCYTES NFR BLD: 35.4 %
MCH RBC QN AUTO: 31.9 PG
MCHC RBC AUTO-ENTMCNC: 34.7 G/DL
MCV RBC AUTO: 92 FL
MONOCYTES # BLD AUTO: 0.3 K/UL
MONOCYTES NFR BLD: 6.5 %
NEUTROPHILS # BLD AUTO: 2.6 K/UL
NEUTROPHILS NFR BLD: 56.2 %
PLATELET # BLD AUTO: 265 K/UL
PMV BLD AUTO: 9.3 FL
POTASSIUM SERPL-SCNC: 4.4 MMOL/L
RBC # BLD AUTO: 3.61 M/UL
SODIUM SERPL-SCNC: 136 MMOL/L
WBC # BLD AUTO: 4.63 K/UL

## 2018-08-16 PROCEDURE — 85025 COMPLETE CBC W/AUTO DIFF WBC: CPT

## 2018-08-16 PROCEDURE — 36415 COLL VENOUS BLD VENIPUNCTURE: CPT

## 2018-08-16 PROCEDURE — 80048 BASIC METABOLIC PNL TOTAL CA: CPT

## 2018-08-16 RX ORDER — PNV NO.95/FERROUS FUM/FOLIC AC 28MG-0.8MG
100 TABLET ORAL DAILY
COMMUNITY
End: 2020-11-02 | Stop reason: CLARIF

## 2018-08-16 RX ORDER — PANTOPRAZOLE SODIUM 40 MG/1
40 TABLET, DELAYED RELEASE ORAL 2 TIMES DAILY
Qty: 30 TABLET | Refills: 2 | Status: SHIPPED | OUTPATIENT
Start: 2018-08-16 | End: 2018-10-01 | Stop reason: ALTCHOICE

## 2018-08-16 NOTE — DISCHARGE INSTRUCTIONS
"For this procedure you will shower at home the night before and also the morning of surgery with HIBICLENS soap provided by the pre op nurse. Do not use this soap on your face or genitals. You will shower a third time here at the hospital on the morning of surgery. Rinse completely after each shower.  Please place clean linens on your bed the night before surgery. Please wear fresh clean clothing after each shower.  No shaving of procedural area at least 4-5 days before surgery due to   increased risk of skin irritation and/or possible infection.        Your surgery is scheduled for _Monday Aug. 27, 2018___________________.    Call [phone] between 2 p.m. and 5 p.m. on   _Friday 8-24-18____ to find out your arrival time for the day of your surgery.      Please report to SAME DAY SURGERY UNIT on the 2nd FLOOR at _______ a.m.  Use front door entrance. The doors open at 0530 am.      If you need WHEELCHAIR assistance please call  [phone] from your cell phone or "[phone]"  from the  hospital courtesy phone located to the right after you enter the hospital lobby.      INSTRUCTIONS IMPORTANT!!!  ¨ Do not eat or drink after 12 midnight-including water. OK to brush teeth, no   gum, candy or mints!            _x___  Return to Hospital Lab on _Saturday Aug. 25, 2018  At 10 AM____for additional blood test.    _x___  Prep instructions: ENEMA   SHOWER   OTHER  ______________________  __x__  Please shower using Hibiclens soap the night before AND  the morning of your surgery/procedure. Do not use Hibiclens on your face or genitals               If your surgery is around your belly button (Navel) be sure to wash inside your  belly button also. Rinse hibiclens off completely.  __x__  No shaving of procedural area at least 4-5 days before surgery due to   increased risk of skin irritation and/or possible infection.  __x__  Do not wear makeup, including mascara. WEARING EYE MAKEUP MAY LEAD TO SERIOUS EYE INJURY during surgery.  __x__  No " powder, lotions or creams to your body.  __x__  You may wear only deodorant on the day of surgery.  __x__  Please remove all jewelry, including piercings and leave at home.  __x__  No money or valuables needed. Please leave at home.  You may bring your cell phone.  ____  Please bring any documents given by your doctor.  ____  If going home the same day, arrange for a ride home. You will not be able to   drive if Anesthesia was used.  ____  Children under 18 years require a parent / guardian present the entire time   they are in surgery / recovery.  __x__  Wear loose fitting clothing. Allow for dressings, bandages.  __x__  Stop Aspirin, Ibuprofen, Motrin and Aleve at least 3-5 days before  surgery, unless otherwise instructed by your doctor, or the nurse.              You MAY use Tylenol/acetaminophen until day of surgery.    __x__  Call MD for temperature above 101 degrees.        __x__ Stop taking any Fish Oil supplement or any Vitamins that contain Vitamin E at least 5 days prior to surgery.          I have read or had read and explained to me, and understand the above information.  Additional comments or instructions:Please call   [phone] if you have any questions regarding the instructions above.

## 2018-08-16 NOTE — PRE-PROCEDURE INSTRUCTIONS
Pre operative instructions, medication directives and pain scales/post op pain management discussed with patient.  Instructed to wash with Hibiclens prior to surgeryAll questions were answered. Patient re-assured regarding surgical procedure and day of surgery routine as needed. Patient verbalized understanding of pre-op instructions.

## 2018-08-26 ENCOUNTER — ANESTHESIA EVENT (OUTPATIENT)
Dept: SURGERY | Facility: HOSPITAL | Age: 68
DRG: 517 | End: 2018-08-26
Payer: MEDICARE

## 2018-08-27 ENCOUNTER — HOSPITAL ENCOUNTER (INPATIENT)
Facility: HOSPITAL | Age: 68
LOS: 5 days | Discharge: HOME | DRG: 517 | End: 2018-09-01
Attending: SURGERY | Admitting: SURGERY
Payer: MEDICARE

## 2018-08-27 ENCOUNTER — ANESTHESIA (OUTPATIENT)
Dept: SURGERY | Facility: HOSPITAL | Age: 68
DRG: 517 | End: 2018-08-27
Payer: MEDICARE

## 2018-08-27 DIAGNOSIS — K31.89 GASTRIC MASS: Primary | ICD-10-CM

## 2018-08-27 PROCEDURE — 63600175 PHARM REV CODE 636 W HCPCS: Performed by: SURGERY

## 2018-08-27 PROCEDURE — 88307 TISSUE EXAM BY PATHOLOGIST: CPT | Performed by: PATHOLOGY

## 2018-08-27 PROCEDURE — 0FB20ZX EXCISION OF LEFT LOBE LIVER, OPEN APPROACH, DIAGNOSTIC: ICD-10-PCS | Performed by: SURGERY

## 2018-08-27 PROCEDURE — 63600175 PHARM REV CODE 636 W HCPCS: Performed by: ANESTHESIOLOGY

## 2018-08-27 PROCEDURE — 71000039 HC RECOVERY, EACH ADD'L HOUR: Performed by: SURGERY

## 2018-08-27 PROCEDURE — C9290 INJ, BUPIVACAINE LIPOSOME: HCPCS | Performed by: SURGERY

## 2018-08-27 PROCEDURE — D9220A PRA ANESTHESIA: Mod: CRNA,,, | Performed by: NURSE ANESTHETIST, CERTIFIED REGISTERED

## 2018-08-27 PROCEDURE — 36000707: Performed by: SURGERY

## 2018-08-27 PROCEDURE — 71000033 HC RECOVERY, INTIAL HOUR: Performed by: SURGERY

## 2018-08-27 PROCEDURE — 63600175 PHARM REV CODE 636 W HCPCS: Performed by: NURSE ANESTHETIST, CERTIFIED REGISTERED

## 2018-08-27 PROCEDURE — 11000001 HC ACUTE MED/SURG PRIVATE ROOM

## 2018-08-27 PROCEDURE — 37000008 HC ANESTHESIA 1ST 15 MINUTES: Performed by: SURGERY

## 2018-08-27 PROCEDURE — 88342 IMHCHEM/IMCYTCHM 1ST ANTB: CPT | Performed by: PATHOLOGY

## 2018-08-27 PROCEDURE — 82962 GLUCOSE BLOOD TEST: CPT | Performed by: SURGERY

## 2018-08-27 PROCEDURE — 27100025 HC TUBING, SET FLUID WARMER: Performed by: NURSE ANESTHETIST, CERTIFIED REGISTERED

## 2018-08-27 PROCEDURE — S0020 INJECTION, BUPIVICAINE HYDRO: HCPCS | Performed by: SURGERY

## 2018-08-27 PROCEDURE — 88342 IMHCHEM/IMCYTCHM 1ST ANTB: CPT | Mod: 26,,, | Performed by: PATHOLOGY

## 2018-08-27 PROCEDURE — 36000706: Performed by: SURGERY

## 2018-08-27 PROCEDURE — 25000003 PHARM REV CODE 250: Performed by: NURSE ANESTHETIST, CERTIFIED REGISTERED

## 2018-08-27 PROCEDURE — 25000003 PHARM REV CODE 250: Performed by: SURGERY

## 2018-08-27 PROCEDURE — 25000003 PHARM REV CODE 250: Performed by: ANESTHESIOLOGY

## 2018-08-27 PROCEDURE — 88307 TISSUE EXAM BY PATHOLOGIST: CPT | Mod: 26,,, | Performed by: PATHOLOGY

## 2018-08-27 PROCEDURE — D9220A PRA ANESTHESIA: Mod: ANES,,, | Performed by: ANESTHESIOLOGY

## 2018-08-27 PROCEDURE — 37000009 HC ANESTHESIA EA ADD 15 MINS: Performed by: SURGERY

## 2018-08-27 PROCEDURE — 88341 IMHCHEM/IMCYTCHM EA ADD ANTB: CPT | Mod: 26,,, | Performed by: PATHOLOGY

## 2018-08-27 PROCEDURE — 0DB60ZZ EXCISION OF STOMACH, OPEN APPROACH: ICD-10-PCS | Performed by: SURGERY

## 2018-08-27 PROCEDURE — 27201423 OPTIME MED/SURG SUP & DEVICES STERILE SUPPLY: Performed by: SURGERY

## 2018-08-27 PROCEDURE — 94799 UNLISTED PULMONARY SVC/PX: CPT

## 2018-08-27 DEVICE — IMPLANTABLE DEVICE: Type: IMPLANTABLE DEVICE | Site: ABDOMEN | Status: FUNCTIONAL

## 2018-08-27 RX ORDER — ACETAMINOPHEN 10 MG/ML
1000 INJECTION, SOLUTION INTRAVENOUS ONCE
Status: DISCONTINUED | OUTPATIENT
Start: 2018-08-27 | End: 2018-08-27

## 2018-08-27 RX ORDER — GLYCOPYRROLATE 0.2 MG/ML
INJECTION INTRAMUSCULAR; INTRAVENOUS
Status: DISCONTINUED | OUTPATIENT
Start: 2018-08-27 | End: 2018-08-27

## 2018-08-27 RX ORDER — MEPERIDINE HYDROCHLORIDE 50 MG/ML
12.5 INJECTION INTRAMUSCULAR; INTRAVENOUS; SUBCUTANEOUS ONCE AS NEEDED
Status: DISCONTINUED | OUTPATIENT
Start: 2018-08-27 | End: 2018-08-27 | Stop reason: HOSPADM

## 2018-08-27 RX ORDER — ALVIMOPAN 12 MG/1
12 CAPSULE ORAL 2 TIMES DAILY
Status: DISCONTINUED | OUTPATIENT
Start: 2018-08-27 | End: 2018-09-01 | Stop reason: HOSPADM

## 2018-08-27 RX ORDER — SODIUM CHLORIDE 0.9 % (FLUSH) 0.9 %
3 SYRINGE (ML) INJECTION
Status: DISCONTINUED | OUTPATIENT
Start: 2018-08-27 | End: 2018-08-27

## 2018-08-27 RX ORDER — ACETAMINOPHEN 10 MG/ML
1000 INJECTION, SOLUTION INTRAVENOUS EVERY 8 HOURS
Status: DISCONTINUED | OUTPATIENT
Start: 2018-08-27 | End: 2018-08-27

## 2018-08-27 RX ORDER — MORPHINE SULFATE 4 MG/ML
5 INJECTION, SOLUTION INTRAMUSCULAR; INTRAVENOUS EVERY 4 HOURS PRN
Status: DISCONTINUED | OUTPATIENT
Start: 2018-08-27 | End: 2018-08-31

## 2018-08-27 RX ORDER — CEFAZOLIN SODIUM 2 G/50ML
2 SOLUTION INTRAVENOUS
Status: COMPLETED | OUTPATIENT
Start: 2018-08-27 | End: 2018-08-27

## 2018-08-27 RX ORDER — HYDROMORPHONE HYDROCHLORIDE 2 MG/ML
0.2 INJECTION, SOLUTION INTRAMUSCULAR; INTRAVENOUS; SUBCUTANEOUS EVERY 5 MIN PRN
Status: DISCONTINUED | OUTPATIENT
Start: 2018-08-27 | End: 2018-08-27 | Stop reason: HOSPADM

## 2018-08-27 RX ORDER — ROCURONIUM BROMIDE 10 MG/ML
INJECTION, SOLUTION INTRAVENOUS
Status: DISCONTINUED | OUTPATIENT
Start: 2018-08-27 | End: 2018-08-27

## 2018-08-27 RX ORDER — PROPOFOL 10 MG/ML
VIAL (ML) INTRAVENOUS
Status: DISCONTINUED | OUTPATIENT
Start: 2018-08-27 | End: 2018-08-27

## 2018-08-27 RX ORDER — DEXTROSE MONOHYDRATE, SODIUM CHLORIDE, AND POTASSIUM CHLORIDE 50; 1.49; 4.5 G/1000ML; G/1000ML; G/1000ML
INJECTION, SOLUTION INTRAVENOUS CONTINUOUS
Status: DISCONTINUED | OUTPATIENT
Start: 2018-08-27 | End: 2018-08-27

## 2018-08-27 RX ORDER — SODIUM CHLORIDE 9 MG/ML
INJECTION, SOLUTION INTRAVENOUS CONTINUOUS
Status: DISCONTINUED | OUTPATIENT
Start: 2018-08-27 | End: 2018-08-31

## 2018-08-27 RX ORDER — SODIUM CHLORIDE 9 MG/ML
INJECTION, SOLUTION INTRAVENOUS CONTINUOUS
Status: DISCONTINUED | OUTPATIENT
Start: 2018-08-27 | End: 2018-08-27

## 2018-08-27 RX ORDER — FENTANYL CITRATE 50 UG/ML
INJECTION, SOLUTION INTRAMUSCULAR; INTRAVENOUS
Status: DISCONTINUED | OUTPATIENT
Start: 2018-08-27 | End: 2018-08-27

## 2018-08-27 RX ORDER — HYDRALAZINE HYDROCHLORIDE 20 MG/ML
10 INJECTION INTRAMUSCULAR; INTRAVENOUS EVERY 6 HOURS PRN
Status: DISCONTINUED | OUTPATIENT
Start: 2018-08-27 | End: 2018-08-27

## 2018-08-27 RX ORDER — SODIUM CHLORIDE, SODIUM LACTATE, POTASSIUM CHLORIDE, CALCIUM CHLORIDE 600; 310; 30; 20 MG/100ML; MG/100ML; MG/100ML; MG/100ML
INJECTION, SOLUTION INTRAVENOUS CONTINUOUS
Status: DISCONTINUED | OUTPATIENT
Start: 2018-08-27 | End: 2018-08-27

## 2018-08-27 RX ORDER — OXYCODONE AND ACETAMINOPHEN 5; 325 MG/1; MG/1
1 TABLET ORAL
Status: DISCONTINUED | OUTPATIENT
Start: 2018-08-27 | End: 2018-08-27 | Stop reason: HOSPADM

## 2018-08-27 RX ORDER — LIDOCAINE HCL/PF 100 MG/5ML
SYRINGE (ML) INTRAVENOUS
Status: DISCONTINUED | OUTPATIENT
Start: 2018-08-27 | End: 2018-08-27

## 2018-08-27 RX ORDER — LIDOCAINE HYDROCHLORIDE 10 MG/ML
1 INJECTION, SOLUTION EPIDURAL; INFILTRATION; INTRACAUDAL; PERINEURAL ONCE
Status: DISCONTINUED | OUTPATIENT
Start: 2018-08-27 | End: 2018-08-27 | Stop reason: HOSPADM

## 2018-08-27 RX ORDER — METOCLOPRAMIDE HYDROCHLORIDE 5 MG/ML
10 INJECTION INTRAMUSCULAR; INTRAVENOUS EVERY 10 MIN PRN
Status: DISCONTINUED | OUTPATIENT
Start: 2018-08-27 | End: 2018-08-27 | Stop reason: HOSPADM

## 2018-08-27 RX ORDER — SUCCINYLCHOLINE CHLORIDE 20 MG/ML
INJECTION INTRAMUSCULAR; INTRAVENOUS
Status: DISCONTINUED | OUTPATIENT
Start: 2018-08-27 | End: 2018-08-27

## 2018-08-27 RX ORDER — NEOSTIGMINE METHYLSULFATE 1 MG/ML
INJECTION, SOLUTION INTRAVENOUS
Status: DISCONTINUED | OUTPATIENT
Start: 2018-08-27 | End: 2018-08-27

## 2018-08-27 RX ORDER — BUPIVACAINE HYDROCHLORIDE 2.5 MG/ML
INJECTION, SOLUTION INFILTRATION; PERINEURAL
Status: DISCONTINUED | OUTPATIENT
Start: 2018-08-27 | End: 2018-08-27 | Stop reason: HOSPADM

## 2018-08-27 RX ORDER — MORPHINE SULFATE 4 MG/ML
2 INJECTION, SOLUTION INTRAMUSCULAR; INTRAVENOUS EVERY 5 MIN PRN
Status: DISCONTINUED | OUTPATIENT
Start: 2018-08-27 | End: 2018-08-27 | Stop reason: HOSPADM

## 2018-08-27 RX ORDER — ONDANSETRON 2 MG/ML
INJECTION INTRAMUSCULAR; INTRAVENOUS
Status: DISCONTINUED | OUTPATIENT
Start: 2018-08-27 | End: 2018-08-27

## 2018-08-27 RX ORDER — ACETAMINOPHEN 10 MG/ML
1000 INJECTION, SOLUTION INTRAVENOUS EVERY 8 HOURS
Status: COMPLETED | OUTPATIENT
Start: 2018-08-27 | End: 2018-08-28

## 2018-08-27 RX ORDER — MIDAZOLAM HYDROCHLORIDE 1 MG/ML
INJECTION, SOLUTION INTRAMUSCULAR; INTRAVENOUS
Status: DISCONTINUED | OUTPATIENT
Start: 2018-08-27 | End: 2018-08-27

## 2018-08-27 RX ORDER — ACETAMINOPHEN 10 MG/ML
INJECTION, SOLUTION INTRAVENOUS
Status: DISCONTINUED | OUTPATIENT
Start: 2018-08-27 | End: 2018-08-27

## 2018-08-27 RX ORDER — ENOXAPARIN SODIUM 100 MG/ML
30 INJECTION SUBCUTANEOUS EVERY 24 HOURS
Status: DISCONTINUED | OUTPATIENT
Start: 2018-08-28 | End: 2018-08-31

## 2018-08-27 RX ADMIN — SUCCINYLCHOLINE CHLORIDE 100 MG: 20 INJECTION, SOLUTION INTRAMUSCULAR; INTRAVENOUS at 08:08

## 2018-08-27 RX ADMIN — ROCURONIUM BROMIDE 10 MG: 10 INJECTION, SOLUTION INTRAVENOUS at 08:08

## 2018-08-27 RX ADMIN — ALVIMOPAN 12 MG: 12 CAPSULE ORAL at 02:08

## 2018-08-27 RX ADMIN — ONDANSETRON 4 MG: 2 INJECTION, SOLUTION INTRAMUSCULAR; INTRAVENOUS at 09:08

## 2018-08-27 RX ADMIN — SODIUM CHLORIDE: 0.9 INJECTION, SOLUTION INTRAVENOUS at 11:08

## 2018-08-27 RX ADMIN — FENTANYL CITRATE 100 MCG: 50 INJECTION INTRAMUSCULAR; INTRAVENOUS at 08:08

## 2018-08-27 RX ADMIN — ALVIMOPAN 12 MG: 12 CAPSULE ORAL at 09:08

## 2018-08-27 RX ADMIN — SODIUM CHLORIDE, SODIUM LACTATE, POTASSIUM CHLORIDE, AND CALCIUM CHLORIDE: .6; .31; .03; .02 INJECTION, SOLUTION INTRAVENOUS at 07:08

## 2018-08-27 RX ADMIN — ROCURONIUM BROMIDE 15 MG: 10 INJECTION, SOLUTION INTRAVENOUS at 08:08

## 2018-08-27 RX ADMIN — ACETAMINOPHEN 1000 MG: 10 INJECTION, SOLUTION INTRAVENOUS at 09:08

## 2018-08-27 RX ADMIN — HYDROMORPHONE HYDROCHLORIDE 0.2 MG: 2 INJECTION, SOLUTION INTRAMUSCULAR; INTRAVENOUS; SUBCUTANEOUS at 09:08

## 2018-08-27 RX ADMIN — GLYCOPYRROLATE 0.4 MG: 0.2 INJECTION, SOLUTION INTRAMUSCULAR; INTRAVENOUS at 09:08

## 2018-08-27 RX ADMIN — PROPOFOL 130 MG: 10 INJECTION, EMULSION INTRAVENOUS at 08:08

## 2018-08-27 RX ADMIN — NEOSTIGMINE METHYLSULFATE 3 MG: 1 INJECTION INTRAVENOUS at 09:08

## 2018-08-27 RX ADMIN — ACETAMINOPHEN 1000 MG: 10 INJECTION, SOLUTION INTRAVENOUS at 06:08

## 2018-08-27 RX ADMIN — CEFAZOLIN SODIUM 2 G: 2 SOLUTION INTRAVENOUS at 08:08

## 2018-08-27 RX ADMIN — SODIUM CHLORIDE: 0.9 INJECTION, SOLUTION INTRAVENOUS at 09:08

## 2018-08-27 RX ADMIN — HYDROMORPHONE HYDROCHLORIDE 0.2 MG: 2 INJECTION, SOLUTION INTRAMUSCULAR; INTRAVENOUS; SUBCUTANEOUS at 10:08

## 2018-08-27 RX ADMIN — LIDOCAINE HYDROCHLORIDE 75 MG: 20 INJECTION, SOLUTION INTRAVENOUS at 08:08

## 2018-08-27 RX ADMIN — MIDAZOLAM HYDROCHLORIDE 2 MG: 1 INJECTION, SOLUTION INTRAMUSCULAR; INTRAVENOUS at 08:08

## 2018-08-27 NOTE — ANESTHESIA PREPROCEDURE EVALUATION
08/27/2018  Melba Pabon is a 68 y.o., female.    Anesthesia Evaluation    I have reviewed the Patient Summary Reports.     I have reviewed the Medications.     Review of Systems  Anesthesia Hx:  No problems with previous Anesthesia    Social:  Smoker, No Alcohol Use    Hematology/Oncology:         -- Anemia:   Cardiovascular:   Exercise tolerance: good Hypertension        Physical Exam  General:  Well nourished    Airway/Jaw/Neck:  AIRWAY FINDINGS: Normal       Dental:  DENTAL FINDINGS: Normal   Chest/Lungs:  Chest/Lungs Clear    Heart/Vascular:  Heart Findings: Normal       Mental Status:  Mental Status Findings:  Cooperative, Alert and Oriented         Anesthesia Plan  Type of Anesthesia, risks & benefits discussed:  Anesthesia Type:  general  Patient's Preference:   Intra-op Monitoring Plan: standard ASA monitors  Intra-op Monitoring Plan Comments:   Post Op Pain Control Plan: multimodal analgesia, IV/PO Opioids PRN and per primary service following discharge from PACU  Post Op Pain Control Plan Comments:   Induction:   IV  Beta Blocker:  Patient is not currently on a Beta-Blocker (No further documentation required).       Informed Consent: Patient understands risks and agrees with Anesthesia plan.  Questions answered. Anesthesia consent signed with patient.  ASA Score: 3     Day of Surgery Review of History & Physical:    H&P update referred to the surgeon.         Ready For Surgery From Anesthesia Perspective.

## 2018-08-27 NOTE — OP NOTE
DATE OF PROCEDURE:  08/27/2018.    PREOPERATIVE DIAGNOSIS:  Gastric mass.    POSTOPERATIVE DIAGNOSIS:  Gastric mass with hepatic cyst.    PROCEDURE PERFORMED:  Resection of gastric mass, drainage of hepatic cyst.    SURGEON:  Chuck Palomares M.D.    ASSISTANT:  Karl Edward M.D.    ANESTHESIA:  General.    ESTIMATED BLOOD LOSS:  Minimal.    SPECIMENS:  Gastric mass as well as hepatic cyst wall.    DESCRIPTION OF OPERATION:  The patient was brought to the Operating Room, placed   on the operating table in the supine position.  Under adequate anesthesia,   prepped and draped around her abdomen in the usual sterile fashion.  Incision   was made in the upper midline after the proposed incision site was injected with   local anesthetic.  It was deepened to expose the patient's abdominal cavity   with some adhesions inferiorly.  A self-retaining retractor was then placed.    The lesser sac was entered by dividing the gastrocolic ligament.  Upon doing   this all the way up to the short gastrics, there was a mass measuring   approximately 5 cm that could be appreciated extending along the posterior wall.    With a staple,r three loads, this was wedged out.  There was no evidence of   any ongoing bleeding or issues.  They could not be resolved with electrocautery.    A piece of amniotic membrane tissue was then placed over this area as well as   some Evicel on the lesser sac.  The patient also had an incidental finding of   hepatic cyst that was in segment 3 of the liver.  The roof was unroofed with the   Harmonic scalpel and the cyst wall was sent to Pathology for analysis.  NG tube   was in place.  No evidence of any other issues.  The patient's abdomen was then   closed in layers with absorbable suture.  Bandages were applied.  The patient   was awakened and transported to Recovery Room in satisfactory condition.  /nita [id number] blank(s)        HILDA/VALERIA  dd: 08/27/2018 09:24:21 (CDT)  td: 08/27/2018 10:59:30 (CDT)  Doc ID    #[id number]  Job ID #[id number]    CC:     X

## 2018-08-27 NOTE — BRIEF OP NOTE
[facility]  Brief Operative Note    SUMMARY     Surgery Date: 8/27/2018     Surgeon(s) and Role:     * Chuck Palomares MD - Primary     * Karl Edward MD - Resident - Assisting        Pre-op Diagnosis:  Gastric mass [K31.9]    Post-op Diagnosis:  Post-Op Diagnosis Codes:     * Gastric mass [K31.9] hepatic cyst    Procedure(s) (LRB):  RESECTION GASTRIC MASS (N/A)   Open drainage of hepatic cyst    Anesthesia: General    Description of Procedure: resection of posterior gastric mass and drainage of hepatic cyst    Description of the findings of the procedure: gastric mass and hepatic cyst    Estimated Blood Loss: minimal         Specimens:   Specimen (12h ago, onward)    Start     Ordered    08/27/18 0916  Specimen to Pathology - Surgery  Once     Comments:  GASTRIC MASSLIVER CYST     Start Status   08/27/18 0916 Collected (08/27/18 0915)       08/27/18 0915

## 2018-08-27 NOTE — TRANSFER OF CARE
"Anesthesia Transfer of Care Note    Patient: Melba Pabon    Procedure(s) Performed: Procedure(s) (LRB):  RESECTION GASTRIC MASS (N/A)    Patient location: PACU    Anesthesia Type: general    Transport from OR: Transported from OR on room air with adequate spontaneous ventilation    Post pain: adequate analgesia    Post assessment: no apparent anesthetic complications and tolerated procedure well    Post vital signs: stable    Level of consciousness: sedated and responds to stimulation    Nausea/Vomiting: no nausea/vomiting    Complications: none    Transfer of care protocol was followed      Last vitals:   Visit Vitals  BP (!) 168/76 (BP Location: Left arm)   Pulse 72   Temp 36.6 °C (97.9 °F) (Oral)   Resp 18   Ht 5' 5" (1.651 m)   Wt 51.7 kg (114 lb)   SpO2 100%   BMI 18.97 kg/m²     "

## 2018-08-27 NOTE — NURSING
Patient transported to unit from PACU.  No apparent distress, pain or SOB.  NG tube to R nare, no apparent discomfort.  Patient moving independently in bed.

## 2018-08-28 LAB
ALBUMIN SERPL BCP-MCNC: 3.2 G/DL
ALP SERPL-CCNC: 72 U/L
ALT SERPL W/O P-5'-P-CCNC: 6 U/L
ANION GAP SERPL CALC-SCNC: 7 MMOL/L
ANION GAP SERPL CALC-SCNC: 7 MMOL/L
AST SERPL-CCNC: 18 U/L
BASOPHILS # BLD AUTO: 0.01 K/UL
BASOPHILS NFR BLD: 0.2 %
BILIRUB SERPL-MCNC: 1 MG/DL
BUN SERPL-MCNC: 10 MG/DL
BUN SERPL-MCNC: 10 MG/DL
CALCIUM SERPL-MCNC: 10.1 MG/DL
CALCIUM SERPL-MCNC: 10.1 MG/DL
CHLORIDE SERPL-SCNC: 101 MMOL/L
CHLORIDE SERPL-SCNC: 101 MMOL/L
CO2 SERPL-SCNC: 29 MMOL/L
CO2 SERPL-SCNC: 29 MMOL/L
CREAT SERPL-MCNC: 0.9 MG/DL
CREAT SERPL-MCNC: 0.9 MG/DL
DIFFERENTIAL METHOD: ABNORMAL
EOSINOPHIL # BLD AUTO: 0 K/UL
EOSINOPHIL NFR BLD: 0.2 %
ERYTHROCYTE [DISTWIDTH] IN BLOOD BY AUTOMATED COUNT: 13.7 %
EST. GFR  (AFRICAN AMERICAN): >60 ML/MIN/1.73 M^2
EST. GFR  (AFRICAN AMERICAN): >60 ML/MIN/1.73 M^2
EST. GFR  (NON AFRICAN AMERICAN): >60 ML/MIN/1.73 M^2
EST. GFR  (NON AFRICAN AMERICAN): >60 ML/MIN/1.73 M^2
GLUCOSE SERPL-MCNC: 72 MG/DL
GLUCOSE SERPL-MCNC: 72 MG/DL
HCT VFR BLD AUTO: 36.3 %
HGB BLD-MCNC: 12 G/DL
LYMPHOCYTES # BLD AUTO: 0.7 K/UL
LYMPHOCYTES NFR BLD: 13.4 %
MAGNESIUM SERPL-MCNC: 1.9 MG/DL
MCH RBC QN AUTO: 30.9 PG
MCHC RBC AUTO-ENTMCNC: 33.1 G/DL
MCV RBC AUTO: 94 FL
MONOCYTES # BLD AUTO: 0.3 K/UL
MONOCYTES NFR BLD: 6.4 %
NEUTROPHILS # BLD AUTO: 4.2 K/UL
NEUTROPHILS NFR BLD: 79.8 %
PLATELET # BLD AUTO: 210 K/UL
PMV BLD AUTO: 9.3 FL
POCT GLUCOSE: 100 MG/DL (ref 70–110)
POTASSIUM SERPL-SCNC: 4.2 MMOL/L
POTASSIUM SERPL-SCNC: 4.2 MMOL/L
PROT SERPL-MCNC: 6.7 G/DL
RBC # BLD AUTO: 3.88 M/UL
SODIUM SERPL-SCNC: 137 MMOL/L
SODIUM SERPL-SCNC: 137 MMOL/L
WBC # BLD AUTO: 5.29 K/UL

## 2018-08-28 PROCEDURE — 85025 COMPLETE CBC W/AUTO DIFF WBC: CPT

## 2018-08-28 PROCEDURE — 25000003 PHARM REV CODE 250: Performed by: PEDIATRICS

## 2018-08-28 PROCEDURE — 63600175 PHARM REV CODE 636 W HCPCS: Performed by: SURGERY

## 2018-08-28 PROCEDURE — 63600175 PHARM REV CODE 636 W HCPCS: Performed by: PEDIATRICS

## 2018-08-28 PROCEDURE — 94761 N-INVAS EAR/PLS OXIMETRY MLT: CPT

## 2018-08-28 PROCEDURE — 36415 COLL VENOUS BLD VENIPUNCTURE: CPT

## 2018-08-28 PROCEDURE — 11000001 HC ACUTE MED/SURG PRIVATE ROOM

## 2018-08-28 PROCEDURE — 80053 COMPREHEN METABOLIC PANEL: CPT

## 2018-08-28 PROCEDURE — 94799 UNLISTED PULMONARY SVC/PX: CPT

## 2018-08-28 PROCEDURE — 83735 ASSAY OF MAGNESIUM: CPT

## 2018-08-28 PROCEDURE — 25000003 PHARM REV CODE 250: Performed by: SURGERY

## 2018-08-28 RX ORDER — HYDRALAZINE HYDROCHLORIDE 20 MG/ML
10 INJECTION INTRAMUSCULAR; INTRAVENOUS EVERY 8 HOURS PRN
Status: DISCONTINUED | OUTPATIENT
Start: 2018-08-28 | End: 2018-09-01 | Stop reason: HOSPADM

## 2018-08-28 RX ORDER — ACETAMINOPHEN 10 MG/ML
1000 INJECTION, SOLUTION INTRAVENOUS EVERY 8 HOURS
Status: COMPLETED | OUTPATIENT
Start: 2018-08-28 | End: 2018-08-29

## 2018-08-28 RX ADMIN — ACETAMINOPHEN 1000 MG: 10 INJECTION, SOLUTION INTRAVENOUS at 02:08

## 2018-08-28 RX ADMIN — ALVIMOPAN 12 MG: 12 CAPSULE ORAL at 09:08

## 2018-08-28 RX ADMIN — HYDRALAZINE HYDROCHLORIDE 10 MG: 20 INJECTION INTRAMUSCULAR; INTRAVENOUS at 09:08

## 2018-08-28 RX ADMIN — SODIUM CHLORIDE: 0.9 INJECTION, SOLUTION INTRAVENOUS at 04:08

## 2018-08-28 RX ADMIN — PROMETHAZINE HYDROCHLORIDE 6.25 MG: 25 INJECTION INTRAMUSCULAR; INTRAVENOUS at 01:08

## 2018-08-28 RX ADMIN — SODIUM CHLORIDE: 0.9 INJECTION, SOLUTION INTRAVENOUS at 02:08

## 2018-08-28 RX ADMIN — ENOXAPARIN SODIUM 30 MG: 100 INJECTION SUBCUTANEOUS at 04:08

## 2018-08-28 RX ADMIN — ACETAMINOPHEN 1000 MG: 10 INJECTION, SOLUTION INTRAVENOUS at 09:08

## 2018-08-28 RX ADMIN — PROMETHAZINE HYDROCHLORIDE 6.25 MG: 25 INJECTION INTRAMUSCULAR; INTRAVENOUS at 09:08

## 2018-08-28 NOTE — PLAN OF CARE
Problem: Patient Care Overview  Goal: Plan of Care Review  Outcome: Ongoing (interventions implemented as appropriate)  Patient remains free from injury and falls. Pain controlled with scheduled IV tylenol. IVF infusing as ordered. Midline abdominal dressing clean, dry, and intact. Remains NPO. Medicated once for nausea. NGT to LIWS, drainage brownish/red. Total 400ml this shift. Current plan of care continued.

## 2018-08-28 NOTE — PROGRESS NOTES
[facility]      General Surgery Progress Note    08/28/2018  7:26 AM      Patient: Melba Pabon  MRN: [id number]  Admit Date: 8/27/2018  LOS: 1    POD #  1    Subjective                                                                                                        No acute events overnight.  NG remains to LIWS with output 700 cc recorded; complained of some phlegm in addition to this. Pain present but controlled. No flatus or BM yet.    Patient Active Problem List   Diagnosis    History of resection of small bowel    History of surgery for cerebral aneurysm    Essential hypertension    Age-related osteoporosis without current pathological fracture    Upper GI bleed    Acute blood loss anemia    Hypercalcemia    Gastric mass       No current facility-administered medications on file prior to encounter.      Current Outpatient Medications on File Prior to Encounter   Medication Sig Dispense Refill    DOCUSATE SODIUM (STOOL SOFTENER ORAL) Take by mouth once daily.      ferrous sulfate 324 mg (65 mg iron) TbEC Take 1 tablet (324 mg total) by mouth once daily.  0    lisinopril-hydrochlorothiazide (PRINZIDE,ZESTORETIC) 20-12.5 mg per tablet Take 1 tablet by mouth once daily. 30 tablet 2    alendronate (FOSAMAX) 70 MG tablet Take 1 tablet (70 mg total) by mouth every 7 days. 4 tablet 2       Objective                                                                                                          Vitals:    08/27/18 1927 08/27/18 2009 08/27/18 2319 08/28/18 0511   BP:  136/61 119/60 133/64   BP Location:  Right arm Right arm Right arm   Patient Position:  Lying Lying Lying   Pulse: (!) 55 (!) 52 62 62   Resp: 17 18 18 18   Temp:  98.2 °F (36.8 °C) 98.6 °F (37 °C) 98.9 °F (37.2 °C)   TempSrc:  Oral Oral Oral   SpO2: 97% (!) 94% 96% 96%   Weight:       Height:           CBC No results for input(s): WBC, HGB, HCT, PLT in the last 168 hours.    CHEM No results for input(s): NA,  K, CL, CO2, BUN, CREATININE, GLU in the last 168 hours.    PHYSICAL EXAM:    GEN: NAD, alert  HEENT: atraumatic, normocephalic  RESP: equal movement, no distress  CV: RRR no m/r/g  ABD: soft, appropriately tender at incision sites, incisions clean and dry, some distension, no rebound or guarding  EXT:  Moves all, no c/c/e bilateral lower extreminnards    Assessment / Plan:                                                                                           A/P: 67 yo F stable overnight POD#1 s/p lap resection of gastric mass  Continue NPO with NG to LIWS for now, await return of bowel function   Encourage IS, ambulation   Follow up labs  Lovenox  Dispo pending clinical improvement with return of bowel function      Michelle Myles MD  [facility] Surgery PGY-IV

## 2018-08-28 NOTE — ANESTHESIA POSTPROCEDURE EVALUATION
"Anesthesia Post Evaluation    Patient: Melba Pabon    Procedure(s) Performed: Procedure(s) (LRB):  RESECTION GASTRIC MASS (N/A)    Final Anesthesia Type: general  Patient location during evaluation: PACU  Patient participation: Yes- Able to Participate  Level of consciousness: awake and alert and oriented  Post-procedure vital signs: reviewed and stable  Pain management: adequate  Airway patency: patent  PONV status at discharge: No PONV  Anesthetic complications: no      Cardiovascular status: blood pressure returned to baseline and hemodynamically stable  Respiratory status: unassisted, spontaneous ventilation and room air  Hydration status: euvolemic  Follow-up not needed.        Visit Vitals  BP (!) 150/65 (BP Location: Right arm, Patient Position: Lying)   Pulse (!) 52   Temp 36.9 °C (98.4 °F) (Oral)   Resp 18   Ht 5' 5" (1.651 m)   Wt 51.7 kg (114 lb)   SpO2 97%   Breastfeeding? No   BMI 18.97 kg/m²       Pain/La Score: Pain Assessment Performed: Yes (8/28/2018  8:00 AM)  Presence of Pain: denies (8/28/2018  8:00 AM)  Pain Rating Prior to Med Admin: 2 (8/28/2018  2:35 PM)  Pain Rating Post Med Admin: 2 (8/28/2018  2:50 AM)  La Score: 9 (8/27/2018 10:30 AM)        "

## 2018-08-29 PROCEDURE — 99900035 HC TECH TIME PER 15 MIN (STAT)

## 2018-08-29 PROCEDURE — 94761 N-INVAS EAR/PLS OXIMETRY MLT: CPT

## 2018-08-29 PROCEDURE — 63600175 PHARM REV CODE 636 W HCPCS: Performed by: SURGERY

## 2018-08-29 PROCEDURE — 11000001 HC ACUTE MED/SURG PRIVATE ROOM

## 2018-08-29 PROCEDURE — 25000003 PHARM REV CODE 250: Performed by: PEDIATRICS

## 2018-08-29 PROCEDURE — 25000003 PHARM REV CODE 250: Performed by: SURGERY

## 2018-08-29 PROCEDURE — 63600175 PHARM REV CODE 636 W HCPCS: Performed by: PEDIATRICS

## 2018-08-29 RX ORDER — METOCLOPRAMIDE 10 MG/1
10 TABLET ORAL
Status: DISCONTINUED | OUTPATIENT
Start: 2018-08-29 | End: 2018-08-31

## 2018-08-29 RX ADMIN — ALVIMOPAN 12 MG: 12 CAPSULE ORAL at 08:08

## 2018-08-29 RX ADMIN — ACETAMINOPHEN 1000 MG: 10 INJECTION, SOLUTION INTRAVENOUS at 03:08

## 2018-08-29 RX ADMIN — HYDRALAZINE HYDROCHLORIDE 10 MG: 20 INJECTION INTRAMUSCULAR; INTRAVENOUS at 04:08

## 2018-08-29 RX ADMIN — ACETAMINOPHEN 1000 MG: 10 INJECTION, SOLUTION INTRAVENOUS at 05:08

## 2018-08-29 RX ADMIN — PROMETHAZINE HYDROCHLORIDE 6.25 MG: 25 INJECTION INTRAMUSCULAR; INTRAVENOUS at 07:08

## 2018-08-29 RX ADMIN — METOCLOPRAMIDE HYDROCHLORIDE 10 MG: 10 TABLET ORAL at 11:08

## 2018-08-29 RX ADMIN — ALVIMOPAN 12 MG: 12 CAPSULE ORAL at 09:08

## 2018-08-29 RX ADMIN — ENOXAPARIN SODIUM 30 MG: 100 INJECTION SUBCUTANEOUS at 04:08

## 2018-08-29 RX ADMIN — METOCLOPRAMIDE HYDROCHLORIDE 10 MG: 10 TABLET ORAL at 04:08

## 2018-08-29 NOTE — PLAN OF CARE
08/28/18 1449   Discharge Assessment   Assessment Type Discharge Planning Assessment   Confirmed/corrected address and phone number on facesheet? Yes   Assessment information obtained from? Patient   Prior to hospitilization cognitive status: Alert/Oriented   Prior to hospitalization functional status: Independent   Current cognitive status: Alert/Oriented   Current Functional Status: Independent   Lives With alone   Able to Return to Prior Arrangements yes   Is patient able to care for self after discharge? Unable to determine at this time (comments)   Who are your caregiver(s) and their phone number(s)? David bolton- [phone]   Patient's perception of discharge disposition home or selfcare;home health   Readmission Within The Last 30 Days previous discharge plan unsuccessful   Patient currently being followed by outpatient case management? No   Patient currently receives any other outside agency services? No   Equipment Currently Used at Home none   Do you have any problems affording any of your prescribed medications? No   Is the patient taking medications as prescribed? yes   Does the patient have transportation home? Yes   Transportation Available family or friend will provide;car   Does the patient receive services at the [facility]? No   Discharge Plan A Home with family   Discharge Plan B Home with family;Home Health  (with follow up appointments)   Patient/Family In Agreement With Plan yes     [id number] - [place], [place] - [address]  [address] [place] [address]  Phone: [phone] Fax: [phone]    [facility] - [place], [place] - [address]  [address] [address]  Phone: [phone] Fax: [phone]

## 2018-08-29 NOTE — PLAN OF CARE
Problem: Patient Care Overview  Goal: Plan of Care Review  Outcome: Ongoing (interventions implemented as appropriate)  Patient remains free from injury and falls. Pain is adequately controlled with scheduled IV Tylenol. Patient remains NPO. NGT to LIWS. Patient had episode of nausea and coughing up phlegm. PRN phenergan administered with relief. Patient ambulated with previous shift around nursing unit. Denies passing gas. Current plan of care continued.

## 2018-08-29 NOTE — PROGRESS NOTES
[facility]      General Surgery Progress Note    08/29/2018  7:31 AM      Patient: Melba Pabon  MRN: [id number]  Admit Date: 8/27/2018  LOS: 2        Subjective                                                                                                      POD2 open partial gastrectomy.   Doing well NGT inplace 1L output.  Pain  Controlled.   Did have some vomiting around NGT  However in good position and flushed, not occluded.     Patient Active Problem List   Diagnosis    History of resection of small bowel    History of surgery for cerebral aneurysm    Essential hypertension    Age-related osteoporosis without current pathological fracture    Upper GI bleed    Acute blood loss anemia    Hypercalcemia    Gastric mass       No current facility-administered medications on file prior to encounter.      Current Outpatient Medications on File Prior to Encounter   Medication Sig Dispense Refill    DOCUSATE SODIUM (STOOL SOFTENER ORAL) Take by mouth once daily.      ferrous sulfate 324 mg (65 mg iron) TbEC Take 1 tablet (324 mg total) by mouth once daily.  0    lisinopril-hydrochlorothiazide (PRINZIDE,ZESTORETIC) 20-12.5 mg per tablet Take 1 tablet by mouth once daily. 30 tablet 2    alendronate (FOSAMAX) 70 MG tablet Take 1 tablet (70 mg total) by mouth every 7 days. 4 tablet 2       Objective                                                                                                          Vitals:    08/29/18 0459 08/29/18 0747 08/29/18 0800 08/29/18 1111   BP: 131/72 (!) 176/72  (!) 158/74   BP Location: Left arm Right arm  Left arm   Patient Position: Lying Lying  Lying   Pulse: (!) 57 (!) 55  62   Resp: 18 19  18   Temp: 98.4 °F (36.9 °C) 98.5 °F (36.9 °C)  98.7 °F (37.1 °C)   TempSrc: Oral Oral  Oral   SpO2: 98% 98% 98% 98%   Weight:       Height:           CBC   Recent Labs   Lab  08/28/18   0719   WBC  5.29   HGB  12.0   HCT  36.3*   PLT  210       CHEM   Recent Labs    Lab  08/28/18   0719   NA  137  137   K  4.2  4.2   CL  101  101   CO2  29  29   BUN  10  10   CREATININE  0.9  0.9   GLU  72  72       PHYSICAL EXAM:    GEN: NAD, sitting up in bed   HEENT: Atraumatic NGT in place to LIS with bilious output   RESP: Nonlabored  CV: RRR  ABD: Soft, incision clean and dry. Appropriate tenderness, nondistended   EXT:  No peripheral edema     Assessment / Plan:                                                                                           A/P:  68 y.o. female POD2 open partial gastrectomy.   Doing well.  - Keep NGT to LIS, NPO flush PRN   - protonix IV  - IVF @100  - Up and out of bed  - PRN pain meds  - lovenox   - Will keep NGT till at least tomorrow prior to removal.    - start michelle Edward MD  PGY5 General Surgery

## 2018-08-30 LAB
ANION GAP SERPL CALC-SCNC: 18 MMOL/L
BASOPHILS # BLD AUTO: 0.01 K/UL
BASOPHILS NFR BLD: 0.2 %
BUN SERPL-MCNC: 11 MG/DL
CALCIUM SERPL-MCNC: 10 MG/DL
CHLORIDE SERPL-SCNC: 103 MMOL/L
CO2 SERPL-SCNC: 19 MMOL/L
CREAT SERPL-MCNC: 0.8 MG/DL
DIFFERENTIAL METHOD: ABNORMAL
EOSINOPHIL # BLD AUTO: 0 K/UL
EOSINOPHIL NFR BLD: 0.2 %
ERYTHROCYTE [DISTWIDTH] IN BLOOD BY AUTOMATED COUNT: 13.9 %
EST. GFR  (AFRICAN AMERICAN): >60 ML/MIN/1.73 M^2
EST. GFR  (NON AFRICAN AMERICAN): >60 ML/MIN/1.73 M^2
GLUCOSE SERPL-MCNC: 50 MG/DL
HCT VFR BLD AUTO: 36.7 %
HGB BLD-MCNC: 12.4 G/DL
LYMPHOCYTES # BLD AUTO: 1.1 K/UL
LYMPHOCYTES NFR BLD: 16.7 %
MCH RBC QN AUTO: 31.4 PG
MCHC RBC AUTO-ENTMCNC: 33.8 G/DL
MCV RBC AUTO: 93 FL
MONOCYTES # BLD AUTO: 0.5 K/UL
MONOCYTES NFR BLD: 7.9 %
NEUTROPHILS # BLD AUTO: 4.8 K/UL
NEUTROPHILS NFR BLD: 74.8 %
PLATELET # BLD AUTO: 202 K/UL
PMV BLD AUTO: 10.6 FL
POTASSIUM SERPL-SCNC: 3.3 MMOL/L
RBC # BLD AUTO: 3.95 M/UL
SODIUM SERPL-SCNC: 140 MMOL/L
WBC # BLD AUTO: 6.36 K/UL

## 2018-08-30 PROCEDURE — 63600175 PHARM REV CODE 636 W HCPCS: Performed by: PEDIATRICS

## 2018-08-30 PROCEDURE — 36415 COLL VENOUS BLD VENIPUNCTURE: CPT

## 2018-08-30 PROCEDURE — 25000003 PHARM REV CODE 250: Performed by: SURGERY

## 2018-08-30 PROCEDURE — 85025 COMPLETE CBC W/AUTO DIFF WBC: CPT

## 2018-08-30 PROCEDURE — 11000001 HC ACUTE MED/SURG PRIVATE ROOM

## 2018-08-30 PROCEDURE — 63600175 PHARM REV CODE 636 W HCPCS: Performed by: SURGERY

## 2018-08-30 PROCEDURE — 25000003 PHARM REV CODE 250: Performed by: PEDIATRICS

## 2018-08-30 PROCEDURE — 80048 BASIC METABOLIC PNL TOTAL CA: CPT

## 2018-08-30 PROCEDURE — 63600175 PHARM REV CODE 636 W HCPCS: Performed by: STUDENT IN AN ORGANIZED HEALTH CARE EDUCATION/TRAINING PROGRAM

## 2018-08-30 RX ORDER — ACETAMINOPHEN 10 MG/ML
1000 INJECTION, SOLUTION INTRAVENOUS EVERY 8 HOURS
Status: COMPLETED | OUTPATIENT
Start: 2018-08-30 | End: 2018-08-30

## 2018-08-30 RX ORDER — DIPHENHYDRAMINE HYDROCHLORIDE 50 MG/ML
25 INJECTION INTRAMUSCULAR; INTRAVENOUS ONCE
Status: COMPLETED | OUTPATIENT
Start: 2018-08-30 | End: 2018-08-30

## 2018-08-30 RX ADMIN — HYDRALAZINE HYDROCHLORIDE 10 MG: 20 INJECTION INTRAMUSCULAR; INTRAVENOUS at 08:08

## 2018-08-30 RX ADMIN — ENOXAPARIN SODIUM 30 MG: 100 INJECTION SUBCUTANEOUS at 04:08

## 2018-08-30 RX ADMIN — ALVIMOPAN 12 MG: 12 CAPSULE ORAL at 08:08

## 2018-08-30 RX ADMIN — METOCLOPRAMIDE HYDROCHLORIDE 10 MG: 10 TABLET ORAL at 04:08

## 2018-08-30 RX ADMIN — PROMETHAZINE HYDROCHLORIDE 6.25 MG: 25 INJECTION INTRAMUSCULAR; INTRAVENOUS at 08:08

## 2018-08-30 RX ADMIN — PROMETHAZINE HYDROCHLORIDE 6.25 MG: 25 INJECTION INTRAMUSCULAR; INTRAVENOUS at 12:08

## 2018-08-30 RX ADMIN — SODIUM CHLORIDE: 0.9 INJECTION, SOLUTION INTRAVENOUS at 12:08

## 2018-08-30 RX ADMIN — ACETAMINOPHEN 1000 MG: 10 INJECTION, SOLUTION INTRAVENOUS at 09:08

## 2018-08-30 RX ADMIN — DIPHENHYDRAMINE HYDROCHLORIDE 25 MG: 50 INJECTION, SOLUTION INTRAMUSCULAR; INTRAVENOUS at 12:08

## 2018-08-30 RX ADMIN — ACETAMINOPHEN 1000 MG: 10 INJECTION, SOLUTION INTRAVENOUS at 05:08

## 2018-08-30 RX ADMIN — METOCLOPRAMIDE HYDROCHLORIDE 10 MG: 10 TABLET ORAL at 05:08

## 2018-08-30 RX ADMIN — METOCLOPRAMIDE HYDROCHLORIDE 10 MG: 10 TABLET ORAL at 12:08

## 2018-08-30 RX ADMIN — SODIUM CHLORIDE: 0.9 INJECTION, SOLUTION INTRAVENOUS at 10:08

## 2018-08-30 RX ADMIN — ACETAMINOPHEN 1000 MG: 10 INJECTION, SOLUTION INTRAVENOUS at 01:08

## 2018-08-30 NOTE — PROGRESS NOTES
[facility]      General Surgery Progress Note    08/30/2018  7:31 AM      Patient: Melba Pabon  MRN: [id number]  Admit Date: 8/27/2018  LOS: 3        Subjective                                                                                                      POD3 open partial gastrectomy.   Doing well NGT inplace now clear with 600cc output. No N/V. Having +gas no Bm.   Pain  Controlled.       Patient Active Problem List   Diagnosis    History of resection of small bowel    History of surgery for cerebral aneurysm    Essential hypertension    Age-related osteoporosis without current pathological fracture    Upper GI bleed    Acute blood loss anemia    Hypercalcemia    Gastric mass       No current facility-administered medications on file prior to encounter.      Current Outpatient Medications on File Prior to Encounter   Medication Sig Dispense Refill    DOCUSATE SODIUM (STOOL SOFTENER ORAL) Take by mouth once daily.      ferrous sulfate 324 mg (65 mg iron) TbEC Take 1 tablet (324 mg total) by mouth once daily.  0    lisinopril-hydrochlorothiazide (PRINZIDE,ZESTORETIC) 20-12.5 mg per tablet Take 1 tablet by mouth once daily. 30 tablet 2    alendronate (FOSAMAX) 70 MG tablet Take 1 tablet (70 mg total) by mouth every 7 days. 4 tablet 2       Objective                                                                                                          Vitals:    08/29/18 1605 08/29/18 1939 08/29/18 2354 08/30/18 0825   BP: (!) 154/74 132/64 137/68 135/68   BP Location: Right arm Left arm  Right arm   Patient Position: Lying Lying Lying Lying   Pulse: 60 64 (!) 57 61   Resp: 19 18 18 18   Temp: 99.2 °F (37.3 °C) 99.1 °F (37.3 °C) 98.3 °F (36.8 °C) 98.5 °F (36.9 °C)   TempSrc: Oral Oral Oral Oral   SpO2: 98% 97% 97% 97%   Weight:       Height:           CBC   Recent Labs   Lab  08/28/18   0719  08/30/18   0537   WBC  5.29  6.36   HGB  12.0  12.4   HCT  36.3*  36.7*   PLT  210  202        CHEM   Recent Labs   Lab  08/28/18   0719  08/30/18   0537   NA  137  137  140   K  4.2  4.2  3.3*   CL  101  101  103   CO2  29  29  19*   BUN  10  10  11   CREATININE  0.9  0.9  0.8   GLU  72  72  50*       PHYSICAL EXAM:    GEN: NAD, sitting up in bed   HEENT: Atraumatic NGT in place to LIS   RESP: Nonlabored  CV: RRR  ABD: Soft, incision clean and dry. Appropriate tenderness, nondistended   EXT:  No peripheral edema     Assessment / Plan:                                                                                           A/P:  68 y.o. female POD3 open partial gastrectomy.   Doing well.  - ok to remove NGT, sips and ice chips for now, will advance likely later today   - protonix IV  - IVF @100 until taking full PO.   - Up and out of bed  - PRN pain meds  - lovenox   - continue reglan for now     Karl Edward MD  PGY5 General Surgery

## 2018-08-30 NOTE — PLAN OF CARE
Problem: Patient Care Overview  Goal: Plan of Care Review  Outcome: Ongoing (interventions implemented as appropriate)  Pt alert resting in bed no complaint of pain; pt had episode of NV prn meds given; scheduled meds given without difficulty; NG tube w/ LIWS; pt free from fall/injury during shift. Call light with in reach bed locked in lowest side rails up x2. Will cont to monitor

## 2018-08-31 LAB
ANION GAP SERPL CALC-SCNC: 19 MMOL/L
BASOPHILS # BLD AUTO: 0.04 K/UL
BASOPHILS NFR BLD: 0.6 %
BUN SERPL-MCNC: 8 MG/DL
CALCIUM SERPL-MCNC: 9.9 MG/DL
CHLORIDE SERPL-SCNC: 104 MMOL/L
CO2 SERPL-SCNC: 15 MMOL/L
CREAT SERPL-MCNC: 0.8 MG/DL
DIFFERENTIAL METHOD: ABNORMAL
EOSINOPHIL # BLD AUTO: 0.1 K/UL
EOSINOPHIL NFR BLD: 0.9 %
ERYTHROCYTE [DISTWIDTH] IN BLOOD BY AUTOMATED COUNT: 13.8 %
EST. GFR  (AFRICAN AMERICAN): >60 ML/MIN/1.73 M^2
EST. GFR  (NON AFRICAN AMERICAN): >60 ML/MIN/1.73 M^2
GLUCOSE SERPL-MCNC: 50 MG/DL
HCT VFR BLD AUTO: 37.1 %
HGB BLD-MCNC: 13.2 G/DL
LYMPHOCYTES # BLD AUTO: 1.1 K/UL
LYMPHOCYTES NFR BLD: 16.8 %
MCH RBC QN AUTO: 31.9 PG
MCHC RBC AUTO-ENTMCNC: 35.6 G/DL
MCV RBC AUTO: 90 FL
MONOCYTES # BLD AUTO: 0.5 K/UL
MONOCYTES NFR BLD: 7.5 %
NEUTROPHILS # BLD AUTO: 4.7 K/UL
NEUTROPHILS NFR BLD: 75 %
PLATELET # BLD AUTO: ABNORMAL K/UL
PLATELET BLD QL SMEAR: ABNORMAL
PMV BLD AUTO: ABNORMAL FL
POTASSIUM SERPL-SCNC: 3.3 MMOL/L
RBC # BLD AUTO: 4.14 M/UL
SODIUM SERPL-SCNC: 138 MMOL/L
WBC # BLD AUTO: 6.36 K/UL

## 2018-08-31 PROCEDURE — 25000003 PHARM REV CODE 250: Performed by: PEDIATRICS

## 2018-08-31 PROCEDURE — 80048 BASIC METABOLIC PNL TOTAL CA: CPT

## 2018-08-31 PROCEDURE — 85025 COMPLETE CBC W/AUTO DIFF WBC: CPT

## 2018-08-31 PROCEDURE — 36415 COLL VENOUS BLD VENIPUNCTURE: CPT

## 2018-08-31 PROCEDURE — 25000003 PHARM REV CODE 250: Performed by: SURGERY

## 2018-08-31 PROCEDURE — 63600175 PHARM REV CODE 636 W HCPCS: Performed by: PEDIATRICS

## 2018-08-31 PROCEDURE — 63600175 PHARM REV CODE 636 W HCPCS: Performed by: SURGERY

## 2018-08-31 PROCEDURE — 11000001 HC ACUTE MED/SURG PRIVATE ROOM

## 2018-08-31 RX ORDER — TRAMADOL HYDROCHLORIDE 50 MG/1
50 TABLET ORAL EVERY 6 HOURS PRN
Status: DISCONTINUED | OUTPATIENT
Start: 2018-08-31 | End: 2018-09-01 | Stop reason: HOSPADM

## 2018-08-31 RX ORDER — ENOXAPARIN SODIUM 100 MG/ML
40 INJECTION SUBCUTANEOUS EVERY 24 HOURS
Status: DISCONTINUED | OUTPATIENT
Start: 2018-08-31 | End: 2018-09-01 | Stop reason: HOSPADM

## 2018-08-31 RX ORDER — LISINOPRIL 20 MG/1
20 TABLET ORAL DAILY
Status: DISCONTINUED | OUTPATIENT
Start: 2018-08-31 | End: 2018-09-01 | Stop reason: HOSPADM

## 2018-08-31 RX ORDER — HYDROCHLOROTHIAZIDE 12.5 MG/1
12.5 TABLET ORAL DAILY
Status: DISCONTINUED | OUTPATIENT
Start: 2018-08-31 | End: 2018-09-01 | Stop reason: HOSPADM

## 2018-08-31 RX ORDER — PANTOPRAZOLE SODIUM 40 MG/1
40 TABLET, DELAYED RELEASE ORAL DAILY
Status: DISCONTINUED | OUTPATIENT
Start: 2018-08-31 | End: 2018-09-01 | Stop reason: HOSPADM

## 2018-08-31 RX ORDER — TRAMADOL HYDROCHLORIDE 50 MG/1
50 TABLET ORAL EVERY 6 HOURS PRN
Qty: 20 TABLET | Refills: 0 | Status: SHIPPED | OUTPATIENT
Start: 2018-08-31 | End: 2018-10-01

## 2018-08-31 RX ADMIN — ALVIMOPAN 12 MG: 12 CAPSULE ORAL at 09:08

## 2018-08-31 RX ADMIN — HYDROCHLOROTHIAZIDE 12.5 MG: 12.5 TABLET ORAL at 04:08

## 2018-08-31 RX ADMIN — PROMETHAZINE HYDROCHLORIDE 6.25 MG: 25 INJECTION INTRAMUSCULAR; INTRAVENOUS at 07:08

## 2018-08-31 RX ADMIN — ALVIMOPAN 12 MG: 12 CAPSULE ORAL at 08:08

## 2018-08-31 RX ADMIN — METOCLOPRAMIDE HYDROCHLORIDE 10 MG: 10 TABLET ORAL at 12:08

## 2018-08-31 RX ADMIN — TRAMADOL HYDROCHLORIDE 50 MG: 50 TABLET, FILM COATED ORAL at 09:08

## 2018-08-31 RX ADMIN — SODIUM CHLORIDE: 0.9 INJECTION, SOLUTION INTRAVENOUS at 07:08

## 2018-08-31 RX ADMIN — HYDRALAZINE HYDROCHLORIDE 10 MG: 20 INJECTION INTRAMUSCULAR; INTRAVENOUS at 04:08

## 2018-08-31 RX ADMIN — PANTOPRAZOLE SODIUM 40 MG: 40 TABLET, DELAYED RELEASE ORAL at 04:08

## 2018-08-31 RX ADMIN — LISINOPRIL 20 MG: 20 TABLET ORAL at 04:08

## 2018-08-31 RX ADMIN — METOCLOPRAMIDE HYDROCHLORIDE 10 MG: 10 TABLET ORAL at 05:08

## 2018-08-31 RX ADMIN — ENOXAPARIN SODIUM 40 MG: 40 INJECTION, SOLUTION INTRAVENOUS; SUBCUTANEOUS at 04:08

## 2018-08-31 NOTE — DISCHARGE SUMMARY
[facility]  Discharge Summary      Admit Date: 8/27/2018    Discharge Date and Time: 9/1/18 11am    Attending Physician: Chuck Palomares MD     Reason for Admission: Partial gastrectomy for GIST    Procedures Performed: Procedure(s) (LRB):  RESECTION GASTRIC MASS (N/A)    Hospital Course (synopsis of major diagnoses, care, treatment, and services provided during the course of the hospital stay):   Patient was admitted for an elective open partial gastrectomy (wedge). Post op she did well NGT was left in place to LIS for 3 days. It was then removed and given sips of CLD. She was then advanced to regular diet which she tolerated well. The following day she was tolerating diet and was discharged to home.     Consults: none    Significant Diagnostic Studies: Labs:   CBC   Recent Labs   Lab  08/30/18   0537  08/31/18   0535   WBC  6.36  6.36   HGB  12.4  13.2   HCT  36.7*  37.1   PLT  202  SEE COMMENT       Final Diagnoses:    Principal Problem: Gastric mass   Secondary Diagnoses:   Active Hospital Problems    Diagnosis  POA    *Gastric mass [K31.9]  Yes      Resolved Hospital Problems   No resolved problems to display.       Discharged Condition: good    Disposition: Home or Self Care    Follow Up/Patient Instructions:     Medications:  Reconciled Home Medications:      Medication List      ASK your doctor about these medications    alendronate 70 MG tablet  Commonly known as:  FOSAMAX  Take 1 tablet (70 mg total) by mouth every 7 days.     ferrous sulfate 324 mg (65 mg iron) Tbec  Take 1 tablet (324 mg total) by mouth once daily.     lisinopril-hydrochlorothiazide 20-12.5 mg per tablet  Commonly known as:  PRINZIDE,ZESTORETIC  Take 1 tablet by mouth once daily.     pantoprazole 40 MG tablet  Commonly known as:  PROTONIX  Take 1 tablet (40 mg total) by mouth 2 (two) times daily.     STOOL SOFTENER ORAL  Take by mouth once daily.     VITAMIN B-12 100 MCG tablet  Generic drug:  cyanocobalamin  Take  100 mcg by mouth once daily.          No discharge procedures on file.  Follow-up Information     Chuck Palomares MD On 9/5/2018.    Specialty:  General Surgery  Why:  at 10am.   Contact information:  [address] [place] [address]  [address]  [place] [address] [phone]

## 2018-08-31 NOTE — PLAN OF CARE
08/31/18 1020   Medicare Message   Important Message from Medicare regarding Discharge Appeal Rights Given to patient/caregiver;Explained to patient/caregiver;Signed/date by patient/caregiver   Date IMM was signed 08/31/18   Time IMM was signed 1020

## 2018-08-31 NOTE — PROGRESS NOTES
[facility]      General Surgery Progress Note    08/31/2018  7:31 AM      Patient: Melba Pabon  MRN: [id number]  Admit Date: 8/27/2018  LOS: 4        Subjective                                                                                                      POD4 open partial gastrectomy.   Now on regular diet, has not taken much PO.        Patient Active Problem List   Diagnosis    History of resection of small bowel    History of surgery for cerebral aneurysm    Essential hypertension    Age-related osteoporosis without current pathological fracture    Upper GI bleed    Acute blood loss anemia    Hypercalcemia    Gastric mass       No current facility-administered medications on file prior to encounter.      Current Outpatient Medications on File Prior to Encounter   Medication Sig Dispense Refill    DOCUSATE SODIUM (STOOL SOFTENER ORAL) Take by mouth once daily.      ferrous sulfate 324 mg (65 mg iron) TbEC Take 1 tablet (324 mg total) by mouth once daily.  0    lisinopril-hydrochlorothiazide (PRINZIDE,ZESTORETIC) 20-12.5 mg per tablet Take 1 tablet by mouth once daily. 30 tablet 2    alendronate (FOSAMAX) 70 MG tablet Take 1 tablet (70 mg total) by mouth every 7 days. 4 tablet 2       Objective                                                                                                          Vitals:    08/31/18 0459 08/31/18 0543 08/31/18 0752 08/31/18 1120   BP: (!) 153/73 134/72 (!) 160/72 138/81   Patient Position:       Pulse: 73 75 82 87   Resp:   18 18   Temp:   97.5 °F (36.4 °C) 98.9 °F (37.2 °C)   TempSrc:       SpO2:   99% 99%   Weight:       Height:           CBC   Recent Labs   Lab  08/30/18   0537  08/31/18   0535   WBC  6.36  6.36   HGB  12.4  13.2   HCT  36.7*  37.1   PLT  202  SEE COMMENT       CHEM   Recent Labs   Lab  08/30/18   0537  08/31/18   0535   NA  140  138   K  3.3*  3.3*   CL  103  104   CO2  19*  15*   BUN  11  8   CREATININE  0.8  0.8   GLU   50*  50*       PHYSICAL EXAM:    GEN: NAD, sitting up in bed   HEENT: Atraumatic  RESP: Nonlabored  CV: RRR  ABD: Soft, incision clean and dry. Appropriate tenderness, nondistended   EXT:  No peripheral edema     Assessment / Plan:                                                                                           A/P:  68 y.o. female POD4 open partial gastrectomy.   Doing well.  - regular diet   - protonix PO  - dc IVF  - Up and out of bed  - PRN pain meds  - lovenox   - continue home medications  - home tomorrow     Karl Edward MD  PGY5 General Surgery

## 2018-08-31 NOTE — PLAN OF CARE
Problem: Patient Care Overview  Goal: Plan of Care Review  Outcome: Ongoing (interventions implemented as appropriate)  Pt AAO resting well in bed no complaint of pain ordered meds given w/out difficulty cont IV fluids; SCDs; N/V noted; call light w/in reach bed locked in lowest position will cont to monitor

## 2018-09-01 VITALS
OXYGEN SATURATION: 98 % | TEMPERATURE: 98 F | BODY MASS INDEX: 18.99 KG/M2 | SYSTOLIC BLOOD PRESSURE: 143 MMHG | WEIGHT: 114 LBS | RESPIRATION RATE: 18 BRPM | HEIGHT: 65 IN | DIASTOLIC BLOOD PRESSURE: 76 MMHG | HEART RATE: 71 BPM

## 2018-09-01 LAB
ANION GAP SERPL CALC-SCNC: 11 MMOL/L
BASOPHILS # BLD AUTO: 0.01 K/UL
BASOPHILS NFR BLD: 0.2 %
BUN SERPL-MCNC: 6 MG/DL
CALCIUM SERPL-MCNC: 9.8 MG/DL
CHLORIDE SERPL-SCNC: 99 MMOL/L
CO2 SERPL-SCNC: 25 MMOL/L
CREAT SERPL-MCNC: 0.8 MG/DL
DIFFERENTIAL METHOD: ABNORMAL
EOSINOPHIL # BLD AUTO: 0.2 K/UL
EOSINOPHIL NFR BLD: 3.3 %
ERYTHROCYTE [DISTWIDTH] IN BLOOD BY AUTOMATED COUNT: 13.6 %
EST. GFR  (AFRICAN AMERICAN): >60 ML/MIN/1.73 M^2
EST. GFR  (NON AFRICAN AMERICAN): >60 ML/MIN/1.73 M^2
GLUCOSE SERPL-MCNC: 97 MG/DL
HCT VFR BLD AUTO: 36.8 %
HGB BLD-MCNC: 12.9 G/DL
LYMPHOCYTES # BLD AUTO: 1.3 K/UL
LYMPHOCYTES NFR BLD: 29 %
MCH RBC QN AUTO: 31.5 PG
MCHC RBC AUTO-ENTMCNC: 35.1 G/DL
MCV RBC AUTO: 90 FL
MONOCYTES # BLD AUTO: 0.4 K/UL
MONOCYTES NFR BLD: 8.3 %
NEUTROPHILS # BLD AUTO: 2.7 K/UL
NEUTROPHILS NFR BLD: 59.2 %
PLATELET # BLD AUTO: 207 K/UL
PMV BLD AUTO: 9.9 FL
POTASSIUM SERPL-SCNC: 2.5 MMOL/L
RBC # BLD AUTO: 4.1 M/UL
SODIUM SERPL-SCNC: 135 MMOL/L
WBC # BLD AUTO: 4.59 K/UL

## 2018-09-01 PROCEDURE — 85025 COMPLETE CBC W/AUTO DIFF WBC: CPT

## 2018-09-01 PROCEDURE — 36415 COLL VENOUS BLD VENIPUNCTURE: CPT

## 2018-09-01 PROCEDURE — 25000003 PHARM REV CODE 250: Performed by: PEDIATRICS

## 2018-09-01 PROCEDURE — 25000003 PHARM REV CODE 250: Performed by: SURGERY

## 2018-09-01 PROCEDURE — 80048 BASIC METABOLIC PNL TOTAL CA: CPT

## 2018-09-01 RX ORDER — POTASSIUM CHLORIDE 20 MEQ/1
80 TABLET, EXTENDED RELEASE ORAL ONCE
Status: COMPLETED | OUTPATIENT
Start: 2018-09-01 | End: 2018-09-01

## 2018-09-01 RX ADMIN — PANTOPRAZOLE SODIUM 40 MG: 40 TABLET, DELAYED RELEASE ORAL at 08:09

## 2018-09-01 RX ADMIN — HYDROCHLOROTHIAZIDE 12.5 MG: 12.5 TABLET ORAL at 08:09

## 2018-09-01 RX ADMIN — LISINOPRIL 20 MG: 20 TABLET ORAL at 08:09

## 2018-09-01 RX ADMIN — POTASSIUM CHLORIDE 80 MEQ: 1500 TABLET, EXTENDED RELEASE ORAL at 10:09

## 2018-09-01 RX ADMIN — ALVIMOPAN 12 MG: 12 CAPSULE ORAL at 08:09

## 2018-09-01 NOTE — PLAN OF CARE
"   09/01/18 1107   Final Note   Assessment Type Final Discharge Note   Discharge Disposition Home   What phone number can be called within the next 1-3 days to see how you are doing after discharge? [phone]   Hospital Follow Up  Appt(s) scheduled? Yes   Discharge plans and expectations educations in teach back method with documentation complete? Yes     EDUCATION:  Pt provided with educational information on POST OPP.  Information reviewed and placed in :My Healthcare Packet" to be brought home for  use as resource after discharge.  Information included:  signs and symptoms to look for at discharge. JORDYN instructed pt to call the doctor if experiencing symptoms that may indicate a medical emergency: CALL 911 if signs and symptoms worsen.   Reminded pt things SHE will be responsible for to manage HER healthcare at home: getting Rx filled, attending follow up appointments, and taking medication as prescribed were discussed.   Teach back method used.  All questions answered.  Patient verbalized understanding of all information.      JORDYN provided pt with a copy of follow-up appointments. JORDYN explained/highlighted date, time, and location of each appointment. JORDYN provided pt with a blue folder and instructed pt to place all medical documents in blue folder. JORDYN explained to pt the nurse will provide an AVS with diagnosis and instructed pt to place in blue folder and bring to follow-up appointment. JORDYN notified pt nurse, Jacqueline, all CM needs have been met.     "

## 2018-09-01 NOTE — NURSING
Pt's discharge instructions, prescriptions and follow up appts given. Verbalized understanding. IV discontinued. Belongings at bedside. Pt refused transported and ambulated off unit with nurse to car

## 2018-09-01 NOTE — PLAN OF CARE
Problem: Patient Care Overview  Goal: Plan of Care Review  Outcome: Ongoing (interventions implemented as appropriate)   08/31/18 2010   Coping/Psychosocial   Plan Of Care Reviewed With patient   No falls or injuries noted.Oriented to person place and thing,Staples intact to mid abdomen. No drainage redness or  Edema.Turned every 2 hours and prn.Denies pain

## 2018-09-01 NOTE — PROGRESS NOTES
WRITTEN HEALTHCARE DISCHARGE INFORMATION     Things that YOU are RESPONSIBLE for to Manage Your Care At Home:    1. Getting your prescriptions filled.  2. Taking you medications as directed. DO NOT MISS ANY DOSES!  3. Going to your follow-up doctor appointments. This is important because it allows the doctor to monitor your progress and to determine if any changes need to be made to your treatment plan.    If you are unable to make your follow up appointments, please call the number listed and reschedule this appointment.     ____________HELP AT HOME____________________    Experiencing any SIGNS or SYMPTOMS: YOU CAN    Schedule a same day appopintment with your Primary Care Doctor or  you can call [facility] Line for 24/7 assistance at [phone]    If you are experience any signs or symptoms that have become severe, Call 911 and come to your nearest Emergency Room.    Thank you for choosing [facility] and allowing us to care for you.   From your care management team: Bharti CROOKS [facility] [phone]    You should receive a call from [facility] within 48-72 hours to help manage your care after discharge. Please try to make sure that you answer your phone for this important phone call.  Follow-up Information     Chuck Palomares MD On 9/5/2018.    Specialty:  General Surgery  Why:  at 10am.   Contact information:  [address] [place] [address]  [address]  [place] [address]  [phone]             Azikiwe K Lombard, MD On 9/11/2018.    Specialty:  Family Medicine  Why:  Outpatient Services, Please arrive by 10:20am.   Contact information:  [address] [place] [id number] [phone]

## 2018-09-01 NOTE — PLAN OF CARE
Problem: Patient Care Overview  Goal: Plan of Care Review  Outcome: Ongoing (interventions implemented as appropriate)   09/01/18 0685   Coping/Psychosocial   Plan Of Care Reviewed With patient   Pt AOx4. No c/o pain. No s/s of distress or discomfort. Jacques intact. abd incision c/d/i. Safety maintained. Will cont with plan of care....

## 2018-09-04 NOTE — PHYSICIAN QUERY
PT Name: Melba Pabon  MR #: [id number]    Physician Query Form - Pathology Findings Clarification     CDS/: Kaila Benoit RN             Contact information:[phone]  This form is a permanent document in the medical record.     Query Date: September 4, 2018      By submitting this query, we are merely seeking further clarification of documentation.  Please utilize your independent clinical judgment when addressing the question(s) below.      The medical record contains the following:     Findings Supporting Clinical Information Location in Medical Record   Part 1  Segment of stomach (partial gastrectomy, submitted as gastric mass):  -Spindle cell neoplasm.  -A battery of immunohistochemical stains is in progress. A supplemental report will follow.  Part 2  Segment of liver (submitted as liver cyst):  -Benign cyst suggestive of lymphangioma  Path report 9/4     Please document the clinical significance of the Pathologists findings of __above noted  findings_____________________.          [x  ] I agree with the Pathology Findings        [  ] I do not agree with the Pathology Findings        [  ] Clinically Insignificant        [  ] Clinically Undetermined        [  ] Other/Clarification of Findings: ______________________________________________    Please document in your progress notes daily for the duration of treatment until resolved and include in your discharge summary.

## 2018-09-11 ENCOUNTER — OFFICE VISIT (OUTPATIENT)
Dept: FAMILY MEDICINE | Facility: CLINIC | Age: 68
End: 2018-09-11
Payer: MEDICARE

## 2018-09-11 VITALS
OXYGEN SATURATION: 98 % | HEIGHT: 65 IN | RESPIRATION RATE: 16 BRPM | HEART RATE: 90 BPM | WEIGHT: 118.81 LBS | TEMPERATURE: 98 F | BODY MASS INDEX: 19.79 KG/M2 | SYSTOLIC BLOOD PRESSURE: 116 MMHG | DIASTOLIC BLOOD PRESSURE: 72 MMHG

## 2018-09-11 DIAGNOSIS — Z23 NEED FOR INFLUENZA VACCINATION: ICD-10-CM

## 2018-09-11 DIAGNOSIS — I10 ESSENTIAL HYPERTENSION: Primary | ICD-10-CM

## 2018-09-11 DIAGNOSIS — K31.89 GASTRIC MASS: ICD-10-CM

## 2018-09-11 DIAGNOSIS — Z23 NEED FOR PNEUMOCOCCAL VACCINATION: ICD-10-CM

## 2018-09-11 PROBLEM — K92.2 UPPER GI BLEED: Status: RESOLVED | Noted: 2018-07-27 | Resolved: 2018-09-11

## 2018-09-11 PROCEDURE — 3074F SYST BP LT 130 MM HG: CPT | Mod: CPTII,,, | Performed by: FAMILY MEDICINE

## 2018-09-11 PROCEDURE — 99213 OFFICE O/P EST LOW 20 MIN: CPT | Mod: PBBFAC,PO | Performed by: FAMILY MEDICINE

## 2018-09-11 PROCEDURE — 99214 OFFICE O/P EST MOD 30 MIN: CPT | Mod: S$PBB,,, | Performed by: FAMILY MEDICINE

## 2018-09-11 PROCEDURE — 1101F PT FALLS ASSESS-DOCD LE1/YR: CPT | Mod: CPTII,,, | Performed by: FAMILY MEDICINE

## 2018-09-11 PROCEDURE — 3078F DIAST BP <80 MM HG: CPT | Mod: CPTII,,, | Performed by: FAMILY MEDICINE

## 2018-09-11 PROCEDURE — 99999 PR PBB SHADOW E&M-EST. PATIENT-LVL III: CPT | Mod: PBBFAC,,, | Performed by: FAMILY MEDICINE

## 2018-09-11 RX ORDER — LISINOPRIL AND HYDROCHLOROTHIAZIDE 12.5; 2 MG/1; MG/1
1 TABLET ORAL DAILY
Qty: 30 TABLET | Refills: 5 | Status: SHIPPED | OUTPATIENT
Start: 2018-09-11 | End: 2019-01-07 | Stop reason: SDUPTHER

## 2018-09-11 NOTE — PROGRESS NOTES
Chief Complaint   Patient presents with    Hospital Follow Up       Melba Pabon is a 68 y.o. female who presents per the Chief Complaint.  Pt is known to me and was last seen by me on 8/2/2018.  All known chronic medical issues have been documented.       Hypertension   This is a chronic problem. The current episode started more than 1 year ago. The problem is unchanged. The problem is controlled. Pertinent negatives include no anxiety, blurred vision, chest pain, headaches, malaise/fatigue, neck pain, orthopnea, palpitations, peripheral edema, PND or shortness of breath. There are no associated agents to hypertension. Risk factors for coronary artery disease include sedentary lifestyle and post-menopausal state. Past treatments include ACE inhibitors and diuretics. The current treatment provides moderate improvement. There are no compliance problems.  There is no history of angina, kidney disease, CAD/MI, CVA, heart failure, left ventricular hypertrophy, PVD or retinopathy. There is no history of chronic renal disease, coarctation of the aorta, hyperaldosteronism, hypercortisolism, hyperparathyroidism, a hypertension causing med, pheochromocytoma, renovascular disease, sleep apnea or a thyroid problem.        ROS  Review of Systems   Constitutional: Negative for activity change, appetite change, chills, diaphoresis, fatigue, fever, malaise/fatigue and unexpected weight change.   HENT: Negative.  Negative for congestion, ear pain, hearing loss, nosebleeds, postnasal drip, rhinorrhea, sinus pressure, sneezing, sore throat and trouble swallowing.    Eyes: Negative for blurred vision, pain and visual disturbance.   Respiratory: Negative for cough, choking and shortness of breath.    Cardiovascular: Negative for chest pain, palpitations, orthopnea, leg swelling and PND.   Gastrointestinal: Negative for abdominal distention, abdominal pain, anal bleeding, blood in stool, constipation, diarrhea, nausea, rectal pain and  "vomiting.   Genitourinary: Negative for difficulty urinating, dysuria, frequency and urgency.   Musculoskeletal: Positive for gait problem (following surgery). Negative for arthralgias, back pain, joint swelling, myalgias and neck pain.   Skin: Negative.    Allergic/Immunologic: Negative for environmental allergies and food allergies.   Neurological: Positive for weakness (following surgery). Negative for dizziness, tremors, seizures, syncope, facial asymmetry, speech difficulty, light-headedness (improved), numbness and headaches.   Hematological: Negative for adenopathy. Does not bruise/bleed easily.   Psychiatric/Behavioral: Negative.  Negative for confusion, decreased concentration, dysphoric mood and sleep disturbance. The patient is not nervous/anxious.        Physical Exam  Vitals:    09/11/18 0946   BP: 116/72   Pulse: 90   Resp: 16   Temp: 98.1 °F (36.7 °C)    Body mass index is 19.77 kg/m².  Weight: 53.9 kg (118 lb 13.3 oz)   Height: 5' 5" (165.1 cm)     Physical Exam   Constitutional: She is oriented to person, place, and time. She appears well-developed and well-nourished. She is active and cooperative.  Non-toxic appearance. She does not have a sickly appearance. She does not appear ill. No distress.   HENT:   Head: Normocephalic and atraumatic.   Right Ear: Hearing and external ear normal. No decreased hearing is noted.   Left Ear: Hearing and external ear normal. No decreased hearing is noted.   Nose: Nose normal. No rhinorrhea or nasal deformity.   Mouth/Throat: Uvula is midline and oropharynx is clear and moist. She does not have dentures. Normal dentition.   Eyes: Conjunctivae, EOM and lids are normal. Pupils are equal, round, and reactive to light. Right eye exhibits no chemosis, no discharge and no exudate. No foreign body present in the right eye. Left eye exhibits no chemosis, no discharge and no exudate. No foreign body present in the left eye. No scleral icterus.   Neck: Normal range of " motion and full passive range of motion without pain. Neck supple.   Cardiovascular: Normal rate, regular rhythm, S1 normal, S2 normal and normal heart sounds. Exam reveals no gallop and no friction rub.   No murmur heard.  Pulmonary/Chest: Effort normal and breath sounds normal. No accessory muscle usage. No respiratory distress. She has no decreased breath sounds. She has no wheezes. She has no rhonchi. She has no rales.   Abdominal: Soft. Normal appearance. She exhibits no distension. There is no hepatosplenomegaly. There is no tenderness. There is no rigidity, no rebound and no guarding.   Musculoskeletal: Normal range of motion.   Neurological: She is alert and oriented to person, place, and time. She has normal strength. No cranial nerve deficit or sensory deficit. She exhibits normal muscle tone. She displays no seizure activity. Coordination and gait normal.   Skin: Skin is warm, dry and intact. No rash noted. She is not diaphoretic.        Psychiatric: She has a normal mood and affect. Her speech is normal and behavior is normal. Judgment and thought content normal. Cognition and memory are normal. She is attentive.       Assessment & Plan    1. Essential hypertension  Patient was counseled and encouraged to maintain a low sodium diet, as well as increasing physical activity.  Recommend random BP checks at home on a regular basis.  Repeat BP at end of visit was not necessary. Will continue medication at this time, and follow up in 3-6 months, or sooner if blood pressure begins to increase.     - lisinopril-hydrochlorothiazide (PRINZIDE,ZESTORETIC) 20-12.5 mg per tablet; Take 1 tablet by mouth once daily.  Dispense: 30 tablet; Refill: 5    2. Gastric mass  Recent surgical resection; awaiting pathology staining to confirm diagnosis.  Patient without residual effects and states her appetite has improved.    3. Need for influenza vaccination  Patient will return for vaccine at a later date.  - Influenza - High  Dose (65+) (PF) (IM); Future    4. Need for pneumococcal vaccination  Patient will return for vaccine at a later date.  - Pneumococcal Conjugate Vaccine (13 Valent) (IM); Future      Follow up documented    ACTIVE MEDICAL ISSUES:  Documented in Problem List    PAST MEDICAL HISTORY  Documented    PAST SURGICAL HISTORY:  Documented    SOCIAL HISTORY:  Documented    FAMILY HISTORY:  Documented    ALLERGIES AND MEDICATIONS: updated and reviewed.  Documented    Health Maintenance       Date Due Completion Date    TETANUS VACCINE 05/07/1969 ---    Colonoscopy 05/07/2001 ---    Pneumococcal (65+) (1 of 2 - PCV13) 05/07/2016 ---    Influenza Vaccine 08/01/2018 ---    Mammogram 04/10/2019 4/10/2017    DEXA SCAN 06/05/2021 6/5/2018    Lipid Panel 04/30/2023 4/30/2018

## 2018-09-26 ENCOUNTER — OFFICE VISIT (OUTPATIENT)
Dept: SURGERY | Facility: CLINIC | Age: 68
End: 2018-09-26
Payer: MEDICARE

## 2018-09-26 DIAGNOSIS — C49.A0 GASTROINTESTINAL STROMAL TUMOR (GIST): Primary | ICD-10-CM

## 2018-09-26 PROCEDURE — 99024 POSTOP FOLLOW-UP VISIT: CPT | Mod: S$GLB,,, | Performed by: SURGERY

## 2018-09-26 NOTE — PROGRESS NOTES
Subjective:       Patient ID: Melba Pabon is a 68 y.o. female.    Chief Complaint: Post-op Evaluation    HPI 67 yo female with recent gastric resection for GIST without complaint  Review of Systems   Constitutional: Negative.    HENT: Negative.    Eyes: Negative.    Respiratory: Negative.    Cardiovascular: Negative.    Gastrointestinal: Negative.    Endocrine: Negative.    Musculoskeletal: Negative.    Skin: Negative.    Allergic/Immunologic: Negative.    Neurological: Negative.    Hematological: Negative.    Psychiatric/Behavioral: Negative.    All other systems reviewed and are negative.      Objective:      Physical Exam   Constitutional: She is oriented to person, place, and time. She appears well-developed and well-nourished.   HENT:   Head: Normocephalic and atraumatic.   Right Ear: External ear normal.   Left Ear: External ear normal.   Nose: Nose normal.   Mouth/Throat: Oropharynx is clear and moist.   Eyes: Conjunctivae and EOM are normal. Pupils are equal, round, and reactive to light.   Neck: Normal range of motion. Neck supple.   Cardiovascular: Normal rate, regular rhythm, normal heart sounds and intact distal pulses.   Pulmonary/Chest: Effort normal and breath sounds normal.   Abdominal: Soft. Bowel sounds are normal.   Musculoskeletal: Normal range of motion.   Neurological: She is alert and oriented to person, place, and time. She has normal reflexes.   Skin: Skin is warm and dry.   Psychiatric: She has a normal mood and affect. Her behavior is normal. Thought content normal.   Vitals reviewed.      Assessment:       1. Gastrointestinal stromal tumor (GIST)         Plan:       I will refer her to Dr Orantes for evaluation and possible treatment

## 2018-09-27 ENCOUNTER — TELEPHONE (OUTPATIENT)
Dept: SURGERY | Facility: CLINIC | Age: 68
End: 2018-09-27

## 2018-10-01 ENCOUNTER — OFFICE VISIT (OUTPATIENT)
Dept: HEMATOLOGY/ONCOLOGY | Facility: CLINIC | Age: 68
End: 2018-10-01
Payer: MEDICARE

## 2018-10-01 VITALS
TEMPERATURE: 99 F | DIASTOLIC BLOOD PRESSURE: 79 MMHG | OXYGEN SATURATION: 97 % | BODY MASS INDEX: 20.2 KG/M2 | SYSTOLIC BLOOD PRESSURE: 127 MMHG | HEART RATE: 99 BPM | HEIGHT: 63 IN | WEIGHT: 114 LBS

## 2018-10-01 DIAGNOSIS — C49.A0 GASTROINTESTINAL STROMAL TUMOR (GIST): Primary | ICD-10-CM

## 2018-10-01 PROCEDURE — 3074F SYST BP LT 130 MM HG: CPT | Mod: CPTII,,, | Performed by: INTERNAL MEDICINE

## 2018-10-01 PROCEDURE — 99213 OFFICE O/P EST LOW 20 MIN: CPT | Mod: PBBFAC | Performed by: INTERNAL MEDICINE

## 2018-10-01 PROCEDURE — 3078F DIAST BP <80 MM HG: CPT | Mod: CPTII,,, | Performed by: INTERNAL MEDICINE

## 2018-10-01 PROCEDURE — 99205 OFFICE O/P NEW HI 60 MIN: CPT | Mod: S$PBB,,, | Performed by: INTERNAL MEDICINE

## 2018-10-01 PROCEDURE — 1101F PT FALLS ASSESS-DOCD LE1/YR: CPT | Mod: CPTII,,, | Performed by: INTERNAL MEDICINE

## 2018-10-01 PROCEDURE — 99999 PR PBB SHADOW E&M-EST. PATIENT-LVL III: CPT | Mod: PBBFAC,,, | Performed by: INTERNAL MEDICINE

## 2018-10-01 RX ORDER — NAPROXEN SODIUM 220 MG
220 TABLET ORAL
COMMUNITY
End: 2020-11-02 | Stop reason: CLARIF

## 2018-10-01 NOTE — PROGRESS NOTES
Chief Complaint :   GIST    Hx of Present illness :  Patient is a 68 y.o. year old female who presents to the clinic today for   Oncology evaluation.  Noted to have blck stools. Hospitalised . Had transfusion. EGD showed mass.  Histology revealed GIST. Patient has undergone partial gastrectomy. Also documented to have Liver cyst      Allergies :    Review of patient's allergies indicates:   Allergen Reactions    Pcn [penicillins]        Occupation :  Retired from school system    Transfusion :  Yes    Menstrual & obstetric Hx :  1; para 1.  Age of menarche: 13  Age of first pregnancy: 32  Lactation history: None  Age of menopause:  Hysterectomy for fibroid tumors around age 45  HRT: None    Present Meds :      Medication List           Accurate as of 10/1/18  9:44 AM. If you have any questions, ask your nurse or doctor.               CONTINUE taking these medications    alendronate 70 MG tablet  Commonly known as:  FOSAMAX  Take 1 tablet (70 mg total) by mouth every 7 days.     ferrous sulfate 324 mg (65 mg iron) Tbec  Take 1 tablet (324 mg total) by mouth once daily.     lisinopril-hydrochlorothiazide 20-12.5 mg per tablet  Commonly known as:  PRINZIDE,ZESTORETIC  Take 1 tablet by mouth once daily.     pantoprazole 40 MG tablet  Commonly known as:  PROTONIX  Take 1 tablet (40 mg total) by mouth 2 (two) times daily.     STOOL SOFTENER ORAL     traMADol 50 mg tablet  Commonly known as:  ULTRAM  Take 1 tablet (50 mg total) by mouth every 6 (six) hours as needed for Pain.     VITAMIN B-12 100 MCG tablet  Generic drug:  cyanocobalamin            Past Medical Hx :   Recent Dx of GIST. Hypertension. No past Hx of DM, Hepatitis, Liver disease, thyroid dysfunction, TB, sarcoid, Lupus, rheumatoid artritis, seizure disorder, CVA , DVT or PE. No past Hx of Cancer, chemotherapy or radiation therapy    Past Medical Hx :  Past Medical History:   Diagnosis Date    Acute blood loss anemia 2018    Age-related  osteoporosis without current pathological fracture 4/13/2018    Allergy     Cataract     Hypertension        Travel Hx :  N/A    Immunization :  There is no immunization history for the selected administration types on file for this patient.    Family Hx :  Family History   Problem Relation Age of Onset    Dementia Mother     No Known Problems Father        Social Hx :  Social History     Socioeconomic History    Marital status: Single     Spouse name: Not on file    Number of children: Not on file    Years of education: Not on file    Highest education level: Not on file   Social Needs    Financial resource strain: Not on file    Food insecurity - worry: Not on file    Food insecurity - inability: Not on file    Transportation needs - medical: Not on file    Transportation needs - non-medical: Not on file   Occupational History    Not on file   Tobacco Use    Smoking status: Current Some Day Smoker     Packs/day: 0.50     Years: 25.00     Pack years: 12.50     Types: Cigarettes    Smokeless tobacco: Never Used    Tobacco comment: occassional cigarette   Substance and Sexual Activity    Alcohol use: No    Drug use: No    Sexual activity: Not Currently     Partners: Male   Other Topics Concern    Not on file   Social History Narrative    Not on file       Surgery :   Partial Gastrectomy; Umbilical hernia repair. Small Bowel obstruction due to adhesions. Intracranial aneurysm repair.  EGD. Has not had Colonoscopy    Symptoms :    Review of Systems   Constitutional: Positive for weight loss. Negative for chills, diaphoresis, fever and malaise/fatigue.   HENT: Negative for congestion, ear discharge, ear pain, hearing loss, nosebleeds, sinus pain, sore throat and tinnitus.    Eyes: Negative for blurred vision, double vision, photophobia, pain, discharge and redness.        Wears glasses   Respiratory: Negative for cough, hemoptysis, sputum production, shortness of breath, wheezing and stridor.     Cardiovascular: Negative for chest pain, palpitations, orthopnea, claudication, leg swelling and PND.   Gastrointestinal: Positive for blood in stool (Prior to surgery.). Negative for abdominal pain, constipation, diarrhea, heartburn, melena, nausea and vomiting.   Genitourinary: Negative for dysuria, flank pain, frequency, hematuria and urgency.   Musculoskeletal: Negative for back pain, falls, joint pain, myalgias and neck pain.   Skin: Negative for itching and rash.   Neurological: Negative for dizziness, tingling, tremors, sensory change, speech change, focal weakness, seizures, loss of consciousness, weakness and headaches.   Endo/Heme/Allergies: Negative for environmental allergies and polydipsia. Does not bruise/bleed easily.   Psychiatric/Behavioral: Negative for depression, hallucinations, memory loss, substance abuse and suicidal ideas. The patient is nervous/anxious (Brother  is seriously ill at [facility]). The patient does not have insomnia.        Physical Exam :   Physical Exam   Constitutional: She is oriented to person, place, and time and well-developed, well-nourished, and in no distress. Vital signs are normal. No distress.   HENT:   Head: Normocephalic and atraumatic.   Right Ear: External ear normal.   Left Ear: External ear normal.   Nose: Nose normal.   Mouth/Throat: Oropharynx is clear and moist. No oropharyngeal exudate.   Eyes: Conjunctivae, EOM and lids are normal. Pupils are equal, round, and reactive to light. Lids are everted and swept, no foreign bodies found. Right eye exhibits no discharge. Left eye exhibits no discharge.   Neck: Trachea normal, normal range of motion, full passive range of motion without pain and phonation normal. Neck supple. Normal carotid pulses, no hepatojugular reflux and no JVD present. No tracheal tenderness present. Carotid bruit is not present. No tracheal deviation present. No thyroid mass and no thyromegaly present.   Cardiovascular: Normal rate,  regular rhythm, normal heart sounds, intact distal pulses and normal pulses. PMI is not displaced. Exam reveals no gallop and no friction rub.   No murmur heard.  Pulmonary/Chest: Effort normal and breath sounds normal. No stridor. No respiratory distress. She has no wheezes. She has no rales. She exhibits no tenderness.   Abdominal: Soft. Normal appearance, normal aorta and bowel sounds are normal. She exhibits no distension, no ascites and no mass. There is no hepatosplenomegaly. There is no tenderness. There is no rebound, no guarding and no CVA tenderness. No hernia.   Genitourinary:   Genitourinary Comments: Not examined   Musculoskeletal: Normal range of motion. She exhibits no edema, tenderness or deformity.   Lymphadenopathy:        Head (right side): No submental, no submandibular, no tonsillar, no preauricular, no posterior auricular and no occipital adenopathy present.        Head (left side): No submental, no submandibular, no tonsillar, no preauricular, no posterior auricular and no occipital adenopathy present.     She has no cervical adenopathy.     She has no axillary adenopathy.        Right: No inguinal, no supraclavicular and no epitrochlear adenopathy present.        Left: No inguinal, no supraclavicular and no epitrochlear adenopathy present.   Neurological: She is alert and oriented to person, place, and time. She has normal motor skills, normal sensation, normal strength, normal reflexes and intact cranial nerves. She displays normal reflexes. No cranial nerve deficit. She exhibits normal muscle tone. Gait normal. Coordination normal. GCS score is 15.   Skin: Skin is warm, dry and intact. No rash noted. She is not diaphoretic. No cyanosis or erythema. No pallor. Nails show no clubbing.   Psychiatric: Mood, memory, affect and judgment normal.         Labs & Imaging :   Partial Gastrectomy. Gastric Mass showed features of Gastrointestinal stromal tumor.        Dx :  Gatrointestinal Stromal  tumor. S/P Partial Gastrectomy.      Assessment & Plan:  Spoke with pathologist . He stated tumor measured 2.4 x 2.0 cms. 4 mitotic figures, considered to be intermediate grade.  More leaning to low grade. Get Labs from . RTC 4 weeks. May Start on gleevec

## 2018-10-01 NOTE — LETTER
October 1, 2018      Chuck Palomares MD  [address] [facility] [address]  [address]  [facility] [address]           [facility]  [address] [facility] [address] [place] [address]  Phone: [phone]          Patient: Melba Pabon   MR Number: [id number]   YOB: 1950   Date of Visit: 10/1/2018       Dear Dr. Chuck Palomares:    Thank you for referring Melba Pabon to me for evaluation. Attached you will find relevant portions of my assessment and plan of care.    If you have questions, please do not hesitate to call me. I look forward to following Melba Pabon along with you.    Sincerely,    Barber Zimmer MD    Enclosure  CC:  No Recipients    If you would like to receive this communication electronically, please contact [email or web address] or [phone] to request more information on [employer] access.    For providers and/or their staff who would like to refer a patient to [facility], please contact us through our one-stop-shop provider referral line, Arac[facility] Perezge, at [phone].    If you feel you have received this communication in error or would no longer like to receive these types of communications, please e-mail [email or web address]

## 2018-10-15 ENCOUNTER — TELEPHONE (OUTPATIENT)
Dept: FAMILY MEDICINE | Facility: CLINIC | Age: 68
End: 2018-10-15

## 2018-10-15 NOTE — TELEPHONE ENCOUNTER
----- Message from Phil Trujillo sent at 10/15/2018  3:18 PM CDT -----  Contact: Self/[phone]  Patient called to request an appointment for a Flu/Pneumonia and TB Skin Test. Thank you.

## 2018-10-15 NOTE — TELEPHONE ENCOUNTER
Attempt to contact patient to schedule nurse visit. No answer. Unable to leave message due to mailbox full.

## 2018-10-16 ENCOUNTER — TELEPHONE (OUTPATIENT)
Dept: FAMILY MEDICINE | Facility: CLINIC | Age: 68
End: 2018-10-16

## 2018-10-16 NOTE — TELEPHONE ENCOUNTER
----- Message from Nae Wilkes sent at 10/16/2018 10:38 AM CDT -----  Contact: Self  Pt is returning a call to schedule nurse visit. Please call pt at [phone]

## 2018-10-16 NOTE — TELEPHONE ENCOUNTER
Spoke with patient requesting appoint for immunization flu and pneumonia , schedule at this time 10/17/18 @ 8:50am , patient verbalized understand .

## 2018-10-17 ENCOUNTER — CLINICAL SUPPORT (OUTPATIENT)
Dept: FAMILY MEDICINE | Facility: CLINIC | Age: 68
End: 2018-10-17
Payer: MEDICARE

## 2018-10-17 DIAGNOSIS — Z23 NEED FOR INFLUENZA VACCINATION: ICD-10-CM

## 2018-10-17 DIAGNOSIS — Z23 NEED FOR PNEUMOCOCCAL VACCINATION: ICD-10-CM

## 2018-10-17 DIAGNOSIS — Z11.1 ENCOUNTER FOR SCREENING FOR RESPIRATORY TUBERCULOSIS: Primary | ICD-10-CM

## 2018-10-17 PROCEDURE — 90670 PCV13 VACCINE IM: CPT | Mod: PBBFAC,PO

## 2018-10-17 PROCEDURE — 86580 TB INTRADERMAL TEST: CPT | Mod: PBBFAC,PO

## 2018-10-17 PROCEDURE — 90662 IIV NO PRSV INCREASED AG IM: CPT | Mod: PBBFAC,PO

## 2018-10-17 NOTE — PROGRESS NOTES
Pt tolerated flu vaccine to left deltoid without difficulty; no adverse reaction noted; VIS given  Pt tolerated pneumonia vaccine to right deltoid without difficulty; no adverse reaction noted; VIS given  Pt tolerated PPD to right forearm without difficulty; no adverse reaction noted; wheal noted; pt informed to return on Friday after 9am to have test read; verbalized understanding

## 2018-10-19 LAB
TB INDURATION - 48 HR READ: 0 MM
TB INDURATION - 72 HR READ: NORMAL MM
TB SKIN TEST - 48 HR READ: NEGATIVE
TB SKIN TEST - 72 HR READ: NORMAL

## 2018-11-01 ENCOUNTER — OFFICE VISIT (OUTPATIENT)
Dept: HEMATOLOGY/ONCOLOGY | Facility: CLINIC | Age: 68
End: 2018-11-01
Payer: MEDICARE

## 2018-11-01 VITALS
WEIGHT: 108.69 LBS | BODY MASS INDEX: 19.25 KG/M2 | SYSTOLIC BLOOD PRESSURE: 129 MMHG | HEART RATE: 82 BPM | TEMPERATURE: 98 F | OXYGEN SATURATION: 98 % | DIASTOLIC BLOOD PRESSURE: 79 MMHG

## 2018-11-01 DIAGNOSIS — I70.0 ATHEROSCLEROSIS OF AORTA: ICD-10-CM

## 2018-11-01 DIAGNOSIS — C49.A0 GASTROINTESTINAL STROMAL TUMOR (GIST): Primary | ICD-10-CM

## 2018-11-01 PROCEDURE — 99213 OFFICE O/P EST LOW 20 MIN: CPT | Mod: S$GLB,,, | Performed by: INTERNAL MEDICINE

## 2018-11-01 PROCEDURE — 99213 OFFICE O/P EST LOW 20 MIN: CPT | Mod: PBBFAC | Performed by: INTERNAL MEDICINE

## 2018-11-01 PROCEDURE — 1101F PT FALLS ASSESS-DOCD LE1/YR: CPT | Mod: CPTII,S$GLB,, | Performed by: INTERNAL MEDICINE

## 2018-11-01 PROCEDURE — 3078F DIAST BP <80 MM HG: CPT | Mod: CPTII,S$GLB,, | Performed by: INTERNAL MEDICINE

## 2018-11-01 PROCEDURE — 3074F SYST BP LT 130 MM HG: CPT | Mod: CPTII,S$GLB,, | Performed by: INTERNAL MEDICINE

## 2018-11-01 PROCEDURE — 99999 PR PBB SHADOW E&M-EST. PATIENT-LVL III: CPT | Mod: PBBFAC,,, | Performed by: INTERNAL MEDICINE

## 2018-11-01 RX ORDER — MULTIVITAMIN
1 TABLET ORAL DAILY
COMMUNITY

## 2018-11-01 NOTE — PROGRESS NOTES
Chief Complaint :  GIST    Hx of Present illness :  Patient is a 68 y.o. year old female who presents to the clinic today for  Oncology followup.. In 2018, hospitalised with dx of Bowel Obstruction. Evaluation led to Dx of gatric mass. Underwent partial gastrectomy. Final Dx consistent with GIST.      Allergies :    Review of patient's allergies indicates:   Allergen Reactions    Pcn [penicillins]        Occupation :   retired    Transfusion :  none    Menstrual & obstetric Hx :   1; para 1.  Age of menarche: 13  Age of first pregnancy: 32  Lactation history: None  Age of menopause: about age 45; following hysterectomy for fibroid tumors  HRT: None    Present Meds :      Medication List           Accurate as of 18  7:44 AM. If you have any questions, ask your nurse or doctor.               CONTINUE taking these medications    alendronate 70 MG tablet  Commonly known as:  FOSAMAX  Take 1 tablet (70 mg total) by mouth every 7 days.     ferrous sulfate 324 mg (65 mg iron) Tbec  Take 1 tablet (324 mg total) by mouth once daily.     lisinopril-hydrochlorothiazide 20-12.5 mg per tablet  Commonly known as:  PRINZIDE,ZESTORETIC  Take 1 tablet by mouth once daily.     naproxen sodium 220 MG tablet  Commonly known as:  ANAPROX     STOOL SOFTENER ORAL     VITAMIN B-12 100 MCG tablet  Generic drug:  cyanocobalamin            Past Medical Hx :   Recent dx of GIST involving stomach; hypertension; allergies; Osteoporosis.  No DM, hyperlipidemia, DVT or PE. No past hx of cancer, Chemotherapy or radiation.    Past Medical Hx :  Past Medical History:   Diagnosis Date    Acute blood loss anemia 2018    Age-related osteoporosis without current pathological fracture 2018    Allergy     Cataract     Hypertension        Travel Hx :  N/A    Immunization :  Immunization History   Administered Date(s) Administered    Influenza - High Dose 10/17/2018    PPD Test 10/17/2018    Pneumococcal Conjugate - 13  Jaleesa 10/17/2018       Family Hx :  Family History   Problem Relation Age of Onset    Dementia Mother     No Known Problems Father        Social Hx :  Social History     Socioeconomic History    Marital status: Single     Spouse name: Not on file    Number of children: Not on file    Years of education: Not on file    Highest education level: Not on file   Social Needs    Financial resource strain: Not on file    Food insecurity - worry: Not on file    Food insecurity - inability: Not on file    Transportation needs - medical: Not on file    Transportation needs - non-medical: Not on file   Occupational History    Not on file   Tobacco Use    Smoking status: Current Some Day Smoker     Packs/day: 0.50     Years: 25.00     Pack years: 12.50     Types: Cigarettes    Smokeless tobacco: Never Used    Tobacco comment: occassional cigarette   Substance and Sexual Activity    Alcohol use: No    Drug use: No    Sexual activity: Not Currently     Partners: Male   Other Topics Concern    Not on file   Social History Narrative    Not on file       Surgery :  Hystyerectomy.  Partial gastrectomy.  Hernia repai; Colon surgery. Eye surgery.    Symptoms :   Unchanged since initial visit on 10/01/18  ROS    Physical Exam :   Physical Exam   Constitutional: She is oriented to person, place, and time and well-developed, well-nourished, and in no distress. No distress.   HENT:   Head: Normocephalic and atraumatic.   Right Ear: External ear normal.   Left Ear: External ear normal.   Nose: Nose normal.   Mouth/Throat: Oropharynx is clear and moist. No oropharyngeal exudate.   Eyes: Conjunctivae and EOM are normal. Pupils are equal, round, and reactive to light. Right eye exhibits no discharge. Left eye exhibits no discharge. No scleral icterus.   Neck: Normal range of motion. Neck supple. No JVD present. No tracheal deviation present. No thyromegaly present.   Cardiovascular: Normal rate, regular rhythm, normal heart  sounds and intact distal pulses. Exam reveals no gallop and no friction rub.   No murmur heard.  Pulmonary/Chest: Effort normal and breath sounds normal. No stridor. No respiratory distress. She has no wheezes. She has no rales. She exhibits no tenderness.   Musculoskeletal: Normal range of motion. She exhibits no edema, tenderness or deformity.   Lymphadenopathy:     She has no cervical adenopathy.   Neurological: She is alert and oriented to person, place, and time. She has normal reflexes. She displays normal reflexes. No cranial nerve deficit. She exhibits normal muscle tone. Gait normal. Coordination normal. GCS score is 15.   Skin: Skin is warm and dry. No rash noted. She is not diaphoretic. No erythema. No pallor.   Psychiatric: Mood, memory, affect and judgment normal.         Labs & Imaging :  Xray abdomen 9/30/16 : x Ray abdomen showed  Atherosclerotic clacification present I abdominal aorta and proximal iliac arteries        Dx : Low grade GIST involving stomach.  S/P partial gastrectomy.      Assessment & Plan: reviewed slides again with pathologist. Low grade GIST.  Will place on Observation status.  Reviewed diet intake; frequent small feeds. RTC 3 months with CBC,CMP. Patient agreeable

## 2018-12-03 ENCOUNTER — TELEPHONE (OUTPATIENT)
Dept: ADMINISTRATIVE | Facility: HOSPITAL | Age: 68
End: 2018-12-03

## 2019-01-07 ENCOUNTER — OFFICE VISIT (OUTPATIENT)
Dept: FAMILY MEDICINE | Facility: CLINIC | Age: 69
End: 2019-01-07
Payer: MEDICARE

## 2019-01-07 VITALS
HEART RATE: 70 BPM | SYSTOLIC BLOOD PRESSURE: 108 MMHG | TEMPERATURE: 98 F | WEIGHT: 112 LBS | OXYGEN SATURATION: 96 % | BODY MASS INDEX: 19.84 KG/M2 | HEIGHT: 63 IN | RESPIRATION RATE: 20 BRPM | DIASTOLIC BLOOD PRESSURE: 70 MMHG

## 2019-01-07 DIAGNOSIS — I10 ESSENTIAL HYPERTENSION: Primary | ICD-10-CM

## 2019-01-07 DIAGNOSIS — I70.0 ATHEROSCLEROSIS OF AORTA: ICD-10-CM

## 2019-01-07 DIAGNOSIS — C49.A0 GASTROINTESTINAL STROMAL TUMOR (GIST): ICD-10-CM

## 2019-01-07 DIAGNOSIS — M81.0 AGE-RELATED OSTEOPOROSIS WITHOUT CURRENT PATHOLOGICAL FRACTURE: ICD-10-CM

## 2019-01-07 PROCEDURE — 99214 PR OFFICE/OUTPT VISIT, EST, LEVL IV, 30-39 MIN: ICD-10-PCS | Mod: S$GLB,,, | Performed by: FAMILY MEDICINE

## 2019-01-07 PROCEDURE — 3074F PR MOST RECENT SYSTOLIC BLOOD PRESSURE < 130 MM HG: ICD-10-PCS | Mod: CPTII,S$GLB,, | Performed by: FAMILY MEDICINE

## 2019-01-07 PROCEDURE — 3078F PR MOST RECENT DIASTOLIC BLOOD PRESSURE < 80 MM HG: ICD-10-PCS | Mod: CPTII,S$GLB,, | Performed by: FAMILY MEDICINE

## 2019-01-07 PROCEDURE — 99999 PR PBB SHADOW E&M-EST. PATIENT-LVL III: CPT | Mod: PBBFAC,,, | Performed by: FAMILY MEDICINE

## 2019-01-07 PROCEDURE — 3074F SYST BP LT 130 MM HG: CPT | Mod: CPTII,S$GLB,, | Performed by: FAMILY MEDICINE

## 2019-01-07 PROCEDURE — 99999 PR PBB SHADOW E&M-EST. PATIENT-LVL III: ICD-10-PCS | Mod: PBBFAC,,, | Performed by: FAMILY MEDICINE

## 2019-01-07 PROCEDURE — 99214 OFFICE O/P EST MOD 30 MIN: CPT | Mod: S$GLB,,, | Performed by: FAMILY MEDICINE

## 2019-01-07 PROCEDURE — 1101F PR PT FALLS ASSESS DOC 0-1 FALLS W/OUT INJ PAST YR: ICD-10-PCS | Mod: CPTII,S$GLB,, | Performed by: FAMILY MEDICINE

## 2019-01-07 PROCEDURE — 3078F DIAST BP <80 MM HG: CPT | Mod: CPTII,S$GLB,, | Performed by: FAMILY MEDICINE

## 2019-01-07 PROCEDURE — 1101F PT FALLS ASSESS-DOCD LE1/YR: CPT | Mod: CPTII,S$GLB,, | Performed by: FAMILY MEDICINE

## 2019-01-07 RX ORDER — ALENDRONATE SODIUM 70 MG/1
70 TABLET ORAL
Qty: 12 TABLET | Refills: 3 | Status: SHIPPED | OUTPATIENT
Start: 2019-01-07 | End: 2019-01-07 | Stop reason: SDUPTHER

## 2019-01-07 RX ORDER — LISINOPRIL AND HYDROCHLOROTHIAZIDE 12.5; 2 MG/1; MG/1
1 TABLET ORAL DAILY
Qty: 90 TABLET | Refills: 0 | Status: SHIPPED | OUTPATIENT
Start: 2019-01-07 | End: 2019-03-07 | Stop reason: SDUPTHER

## 2019-01-07 RX ORDER — ALENDRONATE SODIUM 70 MG/1
70 TABLET ORAL
Qty: 4 TABLET | Refills: 0 | Status: SHIPPED | OUTPATIENT
Start: 2019-01-07 | End: 2019-03-07 | Stop reason: SDUPTHER

## 2019-01-07 NOTE — PROGRESS NOTES
Chief Complaint   Patient presents with    Hypertension     follow up        Melba Pabon is a 68 y.o. female who presents per the Chief Complaint.  Pt is known to me and was last seen by me on 9/11/2018.  All known chronic medical issues have been documented.       Hypertension   This is a chronic problem. The current episode started more than 1 year ago. The problem is unchanged. The problem is controlled. Pertinent negatives include no anxiety, blurred vision, chest pain, headaches, malaise/fatigue, neck pain, orthopnea, palpitations, peripheral edema, PND, shortness of breath or sweats. There are no associated agents to hypertension. Risk factors for coronary artery disease include sedentary lifestyle and post-menopausal state. Past treatments include ACE inhibitors and diuretics. The current treatment provides significant improvement. There are no compliance problems.  There is no history of angina, kidney disease, CAD/MI, CVA, heart failure, left ventricular hypertrophy, PVD or retinopathy. There is no history of chronic renal disease, coarctation of the aorta, hyperaldosteronism, hypercortisolism, hyperparathyroidism, a hypertension causing med, pheochromocytoma, renovascular disease, sleep apnea or a thyroid problem.        ROS  Review of Systems   Constitutional: Negative for activity change, appetite change, chills, diaphoresis, fatigue, fever, malaise/fatigue and unexpected weight change.   HENT: Negative.  Negative for congestion, ear pain, hearing loss, nosebleeds, postnasal drip, rhinorrhea, sinus pressure, sneezing, sore throat and trouble swallowing.    Eyes: Negative for blurred vision, pain and visual disturbance.   Respiratory: Negative for cough, choking and shortness of breath.    Cardiovascular: Negative for chest pain, palpitations, orthopnea, leg swelling and PND.   Gastrointestinal: Negative for abdominal distention, abdominal pain, anal bleeding, blood in stool, constipation, diarrhea,  "nausea, rectal pain and vomiting.   Genitourinary: Negative for difficulty urinating, dysuria, frequency and urgency.   Musculoskeletal: Positive for gait problem (following surgery). Negative for arthralgias, back pain, joint swelling, myalgias and neck pain.   Skin: Negative.    Allergic/Immunologic: Negative for environmental allergies and food allergies.   Neurological: Positive for weakness (following surgery). Negative for dizziness, tremors, seizures, syncope, facial asymmetry, speech difficulty, light-headedness (improved), numbness and headaches.   Hematological: Negative for adenopathy. Does not bruise/bleed easily.   Psychiatric/Behavioral: Negative.  Negative for confusion, decreased concentration, dysphoric mood and sleep disturbance. The patient is not nervous/anxious.        Physical Exam  Vitals:    01/07/19 1355   BP: 108/70   Pulse: 70   Resp: 20   Temp: 98.3 °F (36.8 °C)    Body mass index is 19.84 kg/m².  Weight: 50.8 kg (111 lb 15.9 oz)   Height: 5' 3" (160 cm)     Physical Exam   Constitutional: She is oriented to person, place, and time. She appears well-developed and well-nourished. She is active and cooperative.  Non-toxic appearance. She does not have a sickly appearance. She does not appear ill. No distress.   HENT:   Head: Normocephalic and atraumatic.   Right Ear: Hearing and external ear normal. No decreased hearing is noted.   Left Ear: Hearing and external ear normal. No decreased hearing is noted.   Nose: Nose normal. No rhinorrhea or nasal deformity.   Mouth/Throat: Uvula is midline and oropharynx is clear and moist. She does not have dentures. Normal dentition.   Eyes: Conjunctivae, EOM and lids are normal. Pupils are equal, round, and reactive to light. Right eye exhibits no chemosis, no discharge and no exudate. No foreign body present in the right eye. Left eye exhibits no chemosis, no discharge and no exudate. No foreign body present in the left eye. No scleral icterus.   Neck: " Normal range of motion and full passive range of motion without pain. Neck supple.   Cardiovascular: Normal rate, regular rhythm, S1 normal, S2 normal and normal heart sounds. Exam reveals no gallop and no friction rub.   No murmur heard.  Pulmonary/Chest: Effort normal and breath sounds normal. No accessory muscle usage. No respiratory distress. She has no decreased breath sounds. She has no wheezes. She has no rhonchi. She has no rales.   Abdominal: Soft. Normal appearance. She exhibits no distension. There is no hepatosplenomegaly. There is no tenderness. There is no rigidity, no rebound and no guarding.   Musculoskeletal: Normal range of motion.   Neurological: She is alert and oriented to person, place, and time. She has normal strength. No cranial nerve deficit or sensory deficit. She exhibits normal muscle tone. She displays no seizure activity. Coordination and gait normal.   Skin: Skin is warm, dry and intact. No rash noted. She is not diaphoretic.        Psychiatric: She has a normal mood and affect. Her speech is normal and behavior is normal. Judgment and thought content normal. Cognition and memory are normal. She is attentive.       Assessment & Plan    1. Essential hypertension  Patient was counseled and encouraged to maintain a low sodium diet, as well as increasing physical activity.  Recommend random BP checks at home on a regular basis.  Repeat BP at end of visit was not necessary. Will continue medication at this time, and follow up in 3-6 months, or sooner if blood pressure begins to increase.     - lisinopril-hydrochlorothiazide (PRINZIDE,ZESTORETIC) 20-12.5 mg per tablet; Take 1 tablet by mouth once daily.  Dispense: 90 tablet; Refill: 0    2. Gastrointestinal stromal tumor (GIST)  Stable; no therapeutic changes at this time.  Surgically resected with no complications.  Managed by GI and Heme/Onc.    3. Age-related osteoporosis without current pathological fracture  The current medical regimen  is effective at this time and no changes to present plan or medications will be made at this visit.     - alendronate (FOSAMAX) 70 MG tablet; Take 1 tablet (70 mg total) by mouth every 7 days.  Dispense: 4 tablet; Refill: 0    4. Atherosclerosis of aorta  Chronic condition with no expected resolution or improvement.  Does not require further management.       Follow up documented    ACTIVE MEDICAL ISSUES:  Documented in Problem List    PAST MEDICAL HISTORY  Documented    PAST SURGICAL HISTORY:  Documented    SOCIAL HISTORY:  Documented    FAMILY HISTORY:  Documented    ALLERGIES AND MEDICATIONS: updated and reviewed.  Documented    Health Maintenance       Date Due Completion Date    TETANUS VACCINE 05/07/1969 ---    Colonoscopy 05/07/2001 ---    Mammogram 04/10/2019 4/10/2017    Pneumococcal Vaccine (65+ Low/Medium Risk) (2 of 2 - PPSV23) 10/17/2019 10/17/2018    DEXA SCAN 06/05/2021 6/5/2018    Lipid Panel 04/30/2023 4/30/2018

## 2019-02-12 ENCOUNTER — TELEPHONE (OUTPATIENT)
Dept: HEMATOLOGY/ONCOLOGY | Facility: CLINIC | Age: 69
End: 2019-02-12

## 2019-02-12 NOTE — TELEPHONE ENCOUNTER
I attempted to call the patient about her scheduled labs that she did not have done the for her MD visit. L/M that I called

## 2019-03-07 ENCOUNTER — OFFICE VISIT (OUTPATIENT)
Dept: FAMILY MEDICINE | Facility: CLINIC | Age: 69
End: 2019-03-07
Payer: MEDICARE

## 2019-03-07 VITALS
RESPIRATION RATE: 16 BRPM | HEART RATE: 63 BPM | HEIGHT: 63 IN | DIASTOLIC BLOOD PRESSURE: 70 MMHG | TEMPERATURE: 98 F | OXYGEN SATURATION: 99 % | SYSTOLIC BLOOD PRESSURE: 114 MMHG | WEIGHT: 106.06 LBS | BODY MASS INDEX: 18.79 KG/M2

## 2019-03-07 DIAGNOSIS — E83.52 HYPERCALCEMIA: ICD-10-CM

## 2019-03-07 DIAGNOSIS — I10 ESSENTIAL HYPERTENSION: Primary | ICD-10-CM

## 2019-03-07 DIAGNOSIS — M81.0 AGE-RELATED OSTEOPOROSIS WITHOUT CURRENT PATHOLOGICAL FRACTURE: ICD-10-CM

## 2019-03-07 DIAGNOSIS — Z12.39 SCREENING FOR BREAST CANCER: ICD-10-CM

## 2019-03-07 PROCEDURE — 1101F PR PT FALLS ASSESS DOC 0-1 FALLS W/OUT INJ PAST YR: ICD-10-PCS | Mod: CPTII,S$GLB,, | Performed by: FAMILY MEDICINE

## 2019-03-07 PROCEDURE — 3074F PR MOST RECENT SYSTOLIC BLOOD PRESSURE < 130 MM HG: ICD-10-PCS | Mod: CPTII,S$GLB,, | Performed by: FAMILY MEDICINE

## 2019-03-07 PROCEDURE — 99999 PR PBB SHADOW E&M-EST. PATIENT-LVL III: CPT | Mod: PBBFAC,,, | Performed by: FAMILY MEDICINE

## 2019-03-07 PROCEDURE — 1101F PT FALLS ASSESS-DOCD LE1/YR: CPT | Mod: CPTII,S$GLB,, | Performed by: FAMILY MEDICINE

## 2019-03-07 PROCEDURE — 3074F SYST BP LT 130 MM HG: CPT | Mod: CPTII,S$GLB,, | Performed by: FAMILY MEDICINE

## 2019-03-07 PROCEDURE — 99214 PR OFFICE/OUTPT VISIT, EST, LEVL IV, 30-39 MIN: ICD-10-PCS | Mod: S$GLB,,, | Performed by: FAMILY MEDICINE

## 2019-03-07 PROCEDURE — 99999 PR PBB SHADOW E&M-EST. PATIENT-LVL III: ICD-10-PCS | Mod: PBBFAC,,, | Performed by: FAMILY MEDICINE

## 2019-03-07 PROCEDURE — 99214 OFFICE O/P EST MOD 30 MIN: CPT | Mod: S$GLB,,, | Performed by: FAMILY MEDICINE

## 2019-03-07 PROCEDURE — 3078F DIAST BP <80 MM HG: CPT | Mod: CPTII,S$GLB,, | Performed by: FAMILY MEDICINE

## 2019-03-07 PROCEDURE — 3078F PR MOST RECENT DIASTOLIC BLOOD PRESSURE < 80 MM HG: ICD-10-PCS | Mod: CPTII,S$GLB,, | Performed by: FAMILY MEDICINE

## 2019-03-07 RX ORDER — ALENDRONATE SODIUM 70 MG/1
70 TABLET ORAL
Qty: 12 TABLET | Refills: 3 | Status: SHIPPED | OUTPATIENT
Start: 2019-03-07 | End: 2020-11-02 | Stop reason: CLARIF

## 2019-03-07 RX ORDER — LISINOPRIL AND HYDROCHLOROTHIAZIDE 12.5; 2 MG/1; MG/1
1 TABLET ORAL DAILY
Qty: 90 TABLET | Refills: 0 | Status: SHIPPED | OUTPATIENT
Start: 2019-03-07 | End: 2019-07-09 | Stop reason: SDUPTHER

## 2019-03-07 NOTE — PROGRESS NOTES
No chief complaint on file.      Melba Pabon is a 68 y.o. female who presents per the Chief Complaint.  Pt is known to me and was last seen by me on 1/7/2019.  All known chronic medical issues have been documented.  Patient complains of continued itching over her body, mostly arms.      Hypertension   This is a chronic problem. The current episode started more than 1 year ago. The problem is unchanged. The problem is controlled. Pertinent negatives include no anxiety, blurred vision, chest pain, headaches, malaise/fatigue, neck pain, orthopnea, palpitations, peripheral edema, PND, shortness of breath or sweats. There are no associated agents to hypertension. Risk factors for coronary artery disease include sedentary lifestyle and post-menopausal state. Past treatments include ACE inhibitors and diuretics. The current treatment provides significant improvement. There are no compliance problems.  There is no history of angina, kidney disease, CAD/MI, CVA, heart failure, left ventricular hypertrophy, PVD or retinopathy. There is no history of chronic renal disease, coarctation of the aorta, hyperaldosteronism, hypercortisolism, hyperparathyroidism, a hypertension causing med, pheochromocytoma, renovascular disease, sleep apnea or a thyroid problem.        ROS  Review of Systems   Constitutional: Negative.  Negative for activity change, appetite change, chills, diaphoresis, fatigue, fever, malaise/fatigue and unexpected weight change.   HENT: Negative.  Negative for congestion, ear pain, hearing loss, nosebleeds, postnasal drip, rhinorrhea, sinus pressure, sneezing, sore throat and trouble swallowing.    Eyes: Negative for blurred vision, pain and visual disturbance.   Respiratory: Negative for cough, choking and shortness of breath.    Cardiovascular: Negative for chest pain, palpitations, orthopnea, leg swelling and PND.   Gastrointestinal: Negative for abdominal distention, abdominal pain, anal bleeding, blood in  "stool, constipation, diarrhea, nausea, rectal pain and vomiting.   Genitourinary: Negative for difficulty urinating, dysuria, frequency and urgency.   Musculoskeletal: Positive for gait problem (following surgery). Negative for arthralgias, back pain, joint swelling, myalgias and neck pain.   Skin: Negative.         Itching over arms.   Allergic/Immunologic: Negative for environmental allergies and food allergies.   Neurological: Negative for dizziness, tremors, seizures, syncope, facial asymmetry, speech difficulty, weakness, light-headedness (improved), numbness and headaches.   Hematological: Negative for adenopathy. Does not bruise/bleed easily.   Psychiatric/Behavioral: Negative.  Negative for confusion, decreased concentration, dysphoric mood and sleep disturbance. The patient is not nervous/anxious.        Physical Exam  Vitals:    03/07/19 1505   BP: 114/70   Pulse: 63   Resp: 16   Temp: 97.9 °F (36.6 °C)    Body mass index is 18.78 kg/m².  Weight: 48.1 kg (106 lb 0.7 oz)   Height: 5' 3" (160 cm)     Physical Exam   Constitutional: She is oriented to person, place, and time. She appears well-developed and well-nourished. She is active and cooperative.  Non-toxic appearance. She does not have a sickly appearance. She does not appear ill. No distress.   HENT:   Head: Normocephalic and atraumatic.   Right Ear: Hearing and external ear normal. No decreased hearing is noted.   Left Ear: Hearing and external ear normal. No decreased hearing is noted.   Nose: Nose normal. No rhinorrhea or nasal deformity.   Mouth/Throat: Uvula is midline and oropharynx is clear and moist. She does not have dentures. Normal dentition.   Eyes: Conjunctivae, EOM and lids are normal. Pupils are equal, round, and reactive to light. Right eye exhibits no chemosis, no discharge and no exudate. No foreign body present in the right eye. Left eye exhibits no chemosis, no discharge and no exudate. No foreign body present in the left eye. No " scleral icterus.   Neck: Normal range of motion and full passive range of motion without pain. Neck supple.   Cardiovascular: Normal rate, regular rhythm, S1 normal, S2 normal and normal heart sounds. Exam reveals no gallop and no friction rub.   No murmur heard.  Pulmonary/Chest: Effort normal and breath sounds normal. No accessory muscle usage. No respiratory distress. She has no decreased breath sounds. She has no wheezes. She has no rhonchi. She has no rales.   Abdominal: Soft. Normal appearance. She exhibits no distension. There is no hepatosplenomegaly. There is no tenderness. There is no rigidity, no rebound and no guarding.   Musculoskeletal: Normal range of motion.   Neurological: She is alert and oriented to person, place, and time. She has normal strength. No cranial nerve deficit or sensory deficit. She exhibits normal muscle tone. She displays no seizure activity. Coordination and gait normal.   Skin: Skin is warm, dry and intact. No rash noted. She is not diaphoretic.   Psychiatric: She has a normal mood and affect. Her speech is normal and behavior is normal. Judgment and thought content normal. Cognition and memory are normal. She is attentive.       Assessment & Plan    1. Essential hypertension  Patient was counseled and encouraged to maintain a low sodium diet, as well as increasing physical activity.  Recommend random BP checks at home on a regular basis.  Repeat BP at end of visit was not necessary. Will continue medication at this time, and follow up in 3-6 months, or sooner if blood pressure begins to increase.     - lisinopril-hydrochlorothiazide (PRINZIDE,ZESTORETIC) 20-12.5 mg per tablet; Take 1 tablet by mouth once daily.  Dispense: 90 tablet; Refill: 0    2. Age-related osteoporosis without current pathological fracture  Will restart weekly therapy and follow up as needed.  - alendronate (FOSAMAX) 70 MG tablet; Take 1 tablet (70 mg total) by mouth every 7 days.  Dispense: 12 tablet;  Refill: 3    3. Hypercalcemia  Will monitor levels with upcoming blood test.    4. Screening for breast cancer  Patient is due for her annual mammogram.  Order placed in [facility] and patient will be notified of results once available.   - Mammo Digital Screening Bilat; Future      Follow up documented    ACTIVE MEDICAL ISSUES:  Documented in Problem List    PAST MEDICAL HISTORY  Documented    PAST SURGICAL HISTORY:  Documented    SOCIAL HISTORY:  Documented    FAMILY HISTORY:  Documented    ALLERGIES AND MEDICATIONS: updated and reviewed.  Documented    Health Maintenance       Date Due Completion Date    TETANUS VACCINE 05/07/1969 ---    High Dose Statin 05/07/1972 ---    Colonoscopy 05/07/2001 ---    Mammogram 04/10/2019 4/10/2017    Pneumococcal Vaccine (65+ Low/Medium Risk) (2 of 2 - PPSV23) 10/17/2019 10/17/2018    DEXA SCAN 06/05/2021 6/5/2018    Lipid Panel 04/30/2023 4/30/2018

## 2019-04-15 ENCOUNTER — HOSPITAL ENCOUNTER (OUTPATIENT)
Dept: RADIOLOGY | Facility: HOSPITAL | Age: 69
Discharge: HOME | End: 2019-04-15
Attending: FAMILY MEDICINE
Payer: MEDICARE

## 2019-04-15 DIAGNOSIS — Z12.39 SCREENING FOR BREAST CANCER: ICD-10-CM

## 2019-04-15 PROCEDURE — 77067 SCR MAMMO BI INCL CAD: CPT | Mod: 26,,, | Performed by: RADIOLOGY

## 2019-04-15 PROCEDURE — 77067 MAMMO DIGITAL SCREENING BILAT WITH TOMOSYNTHESIS_CAD: ICD-10-PCS | Mod: 26,,, | Performed by: RADIOLOGY

## 2019-04-15 PROCEDURE — 77067 SCR MAMMO BI INCL CAD: CPT | Mod: TC

## 2019-04-15 PROCEDURE — 77063 MAMMO DIGITAL SCREENING BILAT WITH TOMOSYNTHESIS_CAD: ICD-10-PCS | Mod: 26,,, | Performed by: RADIOLOGY

## 2019-04-15 PROCEDURE — 77063 BREAST TOMOSYNTHESIS BI: CPT | Mod: 26,,, | Performed by: RADIOLOGY

## 2019-07-09 DIAGNOSIS — I10 ESSENTIAL HYPERTENSION: ICD-10-CM

## 2019-07-09 RX ORDER — LISINOPRIL AND HYDROCHLOROTHIAZIDE 12.5; 2 MG/1; MG/1
1 TABLET ORAL DAILY
Qty: 90 TABLET | Refills: 0 | Status: SHIPPED | OUTPATIENT
Start: 2019-07-09 | End: 2019-09-30

## 2019-07-09 NOTE — TELEPHONE ENCOUNTER
----- Message from Theresa Lopez sent at 7/9/2019  9:25 AM CDT -----  Contact: Patient [phone]  Type: RX Refill Request    Who Called: Patient    Refill or New Rx: Refill    RX Name and Strength: lisinopril-hydrochlorothiazide (PRINZIDE,ZESTORETIC) 20-12.5 mg per tablet    Is this a 30 day or 90 day RX: 90 day    Preferred Pharmacy with phone number: .  [id number] - [place], [place] - [address] [place] [address]  Phone: [phone] Fax: [phone]    Local or Mail Order: Local    Would the patient rather a call back or a response via [facility]? Call back    Best Call Back Number: [phone]

## 2019-07-09 NOTE — TELEPHONE ENCOUNTER
Spoke with patient requesting refill lisinopril-hctz , refill sent to provider , also appoint schedule at this time for follow up 09/30/19 @ 3 pm . Patient verbalized understand .

## 2019-09-03 NOTE — PROGRESS NOTES
[facility]      General Surgery Progress Note    08/28/2018  7:31 AM      Patient: Melba Pabon  MRN: [id number]  Admit Date: 8/27/2018  LOS: 1        Subjective                                                                                                      POD1 open partial gastrectomy.   Doing well NGT inplace 750cc output.  Pain  Controlled.     Patient Active Problem List   Diagnosis    History of resection of small bowel    History of surgery for cerebral aneurysm    Essential hypertension    Age-related osteoporosis without current pathological fracture    Upper GI bleed    Acute blood loss anemia    Hypercalcemia    Gastric mass       No current facility-administered medications on file prior to encounter.      Current Outpatient Medications on File Prior to Encounter   Medication Sig Dispense Refill    DOCUSATE SODIUM (STOOL SOFTENER ORAL) Take by mouth once daily.      ferrous sulfate 324 mg (65 mg iron) TbEC Take 1 tablet (324 mg total) by mouth once daily.  0    lisinopril-hydrochlorothiazide (PRINZIDE,ZESTORETIC) 20-12.5 mg per tablet Take 1 tablet by mouth once daily. 30 tablet 2    alendronate (FOSAMAX) 70 MG tablet Take 1 tablet (70 mg total) by mouth every 7 days. 4 tablet 2       Objective                                                                                                          Vitals:    08/27/18 1927 08/27/18 2009 08/27/18 2319 08/28/18 0511   BP:  136/61 119/60 133/64   BP Location:  Right arm Right arm Right arm   Patient Position:  Lying Lying Lying   Pulse: (!) 55 (!) 52 62 62   Resp: 17 18 18 18   Temp:  98.2 °F (36.8 °C) 98.6 °F (37 °C) 98.9 °F (37.2 °C)   TempSrc:  Oral Oral Oral   SpO2: 97% (!) 94% 96% 96%   Weight:       Height:           CBC No results for input(s): WBC, HGB, HCT, PLT in the last 168 hours.    CHEM No results for input(s): NA, K, CL, CO2, BUN, CREATININE, GLU in the last 168 hours.    PHYSICAL EXAM:    GEN: NAD, sleeping  this am  HEENT: Atraumatic NGT in place   RESP: Nonlabored  CV: RRR  ABD: Soft, incision clean and dry. Appropriate tenderness, nondistended   EXT:  No peripheral edema     Assessment / Plan:                                                                                           A/P:  68 y.o. female POD1 open partial gastrectomy.   Doing well.  - Keep NGT to LIS, NPO   - protonix IV  - IVF @125  - Up and out of bed  - PRN pain meds    Karl Edward MD  PGY5 General Surgery      no

## 2019-09-30 ENCOUNTER — DOCUMENTATION ONLY (OUTPATIENT)
Dept: ADMINISTRATIVE | Facility: HOSPITAL | Age: 69
End: 2019-09-30

## 2019-09-30 ENCOUNTER — OFFICE VISIT (OUTPATIENT)
Dept: FAMILY MEDICINE | Facility: CLINIC | Age: 69
End: 2019-09-30
Payer: MEDICARE

## 2019-09-30 VITALS
DIASTOLIC BLOOD PRESSURE: 70 MMHG | HEART RATE: 68 BPM | OXYGEN SATURATION: 94 % | TEMPERATURE: 99 F | HEIGHT: 63 IN | WEIGHT: 111.56 LBS | BODY MASS INDEX: 19.77 KG/M2 | SYSTOLIC BLOOD PRESSURE: 96 MMHG | RESPIRATION RATE: 20 BRPM

## 2019-09-30 DIAGNOSIS — Z23 NEED FOR INFLUENZA VACCINATION: ICD-10-CM

## 2019-09-30 DIAGNOSIS — Z12.11 SCREEN FOR COLON CANCER: Primary | ICD-10-CM

## 2019-09-30 DIAGNOSIS — Z11.1 TUBERCULOSIS SCREENING: ICD-10-CM

## 2019-09-30 DIAGNOSIS — M81.0 AGE-RELATED OSTEOPOROSIS WITHOUT CURRENT PATHOLOGICAL FRACTURE: ICD-10-CM

## 2019-09-30 DIAGNOSIS — I10 ESSENTIAL HYPERTENSION: Primary | ICD-10-CM

## 2019-09-30 PROCEDURE — G0008 ADMIN INFLUENZA VIRUS VAC: HCPCS | Mod: S$GLB,,, | Performed by: FAMILY MEDICINE

## 2019-09-30 PROCEDURE — 99214 PR OFFICE/OUTPT VISIT, EST, LEVL IV, 30-39 MIN: ICD-10-PCS | Mod: 25,S$GLB,, | Performed by: FAMILY MEDICINE

## 2019-09-30 PROCEDURE — 3074F PR MOST RECENT SYSTOLIC BLOOD PRESSURE < 130 MM HG: ICD-10-PCS | Mod: CPTII,S$GLB,, | Performed by: FAMILY MEDICINE

## 2019-09-30 PROCEDURE — 1101F PR PT FALLS ASSESS DOC 0-1 FALLS W/OUT INJ PAST YR: ICD-10-PCS | Mod: CPTII,S$GLB,, | Performed by: FAMILY MEDICINE

## 2019-09-30 PROCEDURE — 90662 FLU VACCINE - HIGH DOSE (65+) PRESERVATIVE FREE IM: ICD-10-PCS | Mod: S$GLB,,, | Performed by: FAMILY MEDICINE

## 2019-09-30 PROCEDURE — G0008 FLU VACCINE - HIGH DOSE (65+) PRESERVATIVE FREE IM: ICD-10-PCS | Mod: S$GLB,,, | Performed by: FAMILY MEDICINE

## 2019-09-30 PROCEDURE — 3078F DIAST BP <80 MM HG: CPT | Mod: CPTII,S$GLB,, | Performed by: FAMILY MEDICINE

## 2019-09-30 PROCEDURE — 3078F PR MOST RECENT DIASTOLIC BLOOD PRESSURE < 80 MM HG: ICD-10-PCS | Mod: CPTII,S$GLB,, | Performed by: FAMILY MEDICINE

## 2019-09-30 PROCEDURE — 99214 OFFICE O/P EST MOD 30 MIN: CPT | Mod: 25,S$GLB,, | Performed by: FAMILY MEDICINE

## 2019-09-30 PROCEDURE — 90662 IIV NO PRSV INCREASED AG IM: CPT | Mod: S$GLB,,, | Performed by: FAMILY MEDICINE

## 2019-09-30 PROCEDURE — 3074F SYST BP LT 130 MM HG: CPT | Mod: CPTII,S$GLB,, | Performed by: FAMILY MEDICINE

## 2019-09-30 PROCEDURE — 86580 TB INTRADERMAL TEST: CPT | Mod: S$GLB,,, | Performed by: FAMILY MEDICINE

## 2019-09-30 PROCEDURE — 99999 PR PBB SHADOW E&M-EST. PATIENT-LVL III: CPT | Mod: PBBFAC,,, | Performed by: FAMILY MEDICINE

## 2019-09-30 PROCEDURE — 1101F PT FALLS ASSESS-DOCD LE1/YR: CPT | Mod: CPTII,S$GLB,, | Performed by: FAMILY MEDICINE

## 2019-09-30 PROCEDURE — 99999 PR PBB SHADOW E&M-EST. PATIENT-LVL III: ICD-10-PCS | Mod: PBBFAC,,, | Performed by: FAMILY MEDICINE

## 2019-09-30 PROCEDURE — 86580 POCT TB SKIN TEST: ICD-10-PCS | Mod: S$GLB,,, | Performed by: FAMILY MEDICINE

## 2019-09-30 RX ORDER — LISINOPRIL 10 MG/1
10 TABLET ORAL DAILY
Qty: 90 TABLET | Refills: 0 | Status: SHIPPED | OUTPATIENT
Start: 2019-09-30 | End: 2019-12-30

## 2019-09-30 RX ORDER — ASPIRIN 81 MG/1
81 TABLET ORAL DAILY
COMMUNITY
End: 2020-11-02 | Stop reason: CLARIF

## 2019-09-30 NOTE — PROGRESS NOTES
Chief Complaint   Patient presents with    Hypertension     follow up        Melba Pabon is a 69 y.o. female who presents per the Chief Complaint.  Pt is known to me and was last seen by me on 3/7/2019.  All known chronic medical issues have been documented.       Hypertension   This is a chronic problem. The current episode started more than 1 year ago. The problem is unchanged. The problem is controlled. Pertinent negatives include no anxiety, blurred vision, chest pain, headaches, malaise/fatigue, neck pain, orthopnea, palpitations, peripheral edema, PND, shortness of breath or sweats. There are no associated agents to hypertension. Risk factors for coronary artery disease include sedentary lifestyle and post-menopausal state. Past treatments include ACE inhibitors and diuretics. The current treatment provides significant improvement. There are no compliance problems.  There is no history of angina, kidney disease, CAD/MI, CVA, heart failure, left ventricular hypertrophy, PVD or retinopathy. There is no history of chronic renal disease, coarctation of the aorta, hyperaldosteronism, hypercortisolism, hyperparathyroidism, a hypertension causing med, pheochromocytoma, renovascular disease, sleep apnea or a thyroid problem.        ROS  Review of Systems   Constitutional: Positive for fatigue. Negative for activity change, appetite change, chills, diaphoresis, fever, malaise/fatigue and unexpected weight change.   HENT: Negative.  Negative for congestion, ear pain, hearing loss, nosebleeds, postnasal drip, rhinorrhea, sinus pressure, sneezing, sore throat and trouble swallowing.    Eyes: Negative for blurred vision, pain and visual disturbance.   Respiratory: Negative for cough, choking and shortness of breath.    Cardiovascular: Negative for chest pain, palpitations, orthopnea, leg swelling and PND.   Gastrointestinal: Negative for abdominal distention, abdominal pain, anal bleeding, blood in stool, constipation,  "diarrhea, nausea, rectal pain and vomiting.   Genitourinary: Negative for difficulty urinating, dysuria, frequency and urgency.   Musculoskeletal: Positive for gait problem (following surgery). Negative for arthralgias, back pain, joint swelling, myalgias and neck pain.   Skin: Negative.         Itching over arms.   Allergic/Immunologic: Negative for environmental allergies and food allergies.   Neurological: Negative for dizziness, tremors, seizures, syncope, facial asymmetry, speech difficulty, weakness, light-headedness, numbness and headaches.   Hematological: Negative for adenopathy. Does not bruise/bleed easily.   Psychiatric/Behavioral: Negative.  Negative for confusion, decreased concentration, dysphoric mood and sleep disturbance. The patient is not nervous/anxious.        Physical Exam  Vitals:    09/30/19 1521   BP: 96/70   Pulse: 68   Resp: 20   Temp: 98.7 °F (37.1 °C)    Body mass index is 19.76 kg/m².  Weight: 50.6 kg (111 lb 8.8 oz)   Height: 5' 3" (160 cm)     Physical Exam   Constitutional: She is oriented to person, place, and time. She appears well-developed and well-nourished. She is active and cooperative.  Non-toxic appearance. She does not have a sickly appearance. She does not appear ill. No distress.   HENT:   Head: Normocephalic and atraumatic.   Right Ear: Hearing and external ear normal. No decreased hearing is noted.   Left Ear: Hearing and external ear normal. No decreased hearing is noted.   Nose: Nose normal. No rhinorrhea or nasal deformity.   Mouth/Throat: Uvula is midline and oropharynx is clear and moist. She does not have dentures. Normal dentition.   Eyes: Pupils are equal, round, and reactive to light. Conjunctivae, EOM and lids are normal. Right eye exhibits no chemosis, no discharge and no exudate. No foreign body present in the right eye. Left eye exhibits no chemosis, no discharge and no exudate. No foreign body present in the left eye. No scleral icterus.   Neck: Normal " range of motion and full passive range of motion without pain. Neck supple.   Cardiovascular: Normal rate, regular rhythm, S1 normal, S2 normal and normal heart sounds. Exam reveals no gallop and no friction rub.   No murmur heard.  Pulmonary/Chest: Effort normal and breath sounds normal. No accessory muscle usage. No respiratory distress. She has no decreased breath sounds. She has no wheezes. She has no rhonchi. She has no rales.   Abdominal: Soft. Normal appearance. She exhibits no distension. There is no hepatosplenomegaly. There is no tenderness. There is no rigidity, no rebound and no guarding.   Musculoskeletal: Normal range of motion.   Neurological: She is alert and oriented to person, place, and time. She has normal strength. No cranial nerve deficit or sensory deficit. She exhibits normal muscle tone. She displays no seizure activity. Coordination and gait normal.   Skin: Skin is warm, dry and intact. No rash noted. She is not diaphoretic.   Psychiatric: She has a normal mood and affect. Her speech is normal and behavior is normal. Judgment and thought content normal. Cognition and memory are normal. She is attentive.       Assessment & Plan    Discussion of plan of care including treatment options regarding health and wellness were reviewed and discussed with patient.  Any changes to medication or treatment plan, as well as any screening blood test, imaging, or referrals to specialist, are documented.  Follow up as indicated.     1. Essential hypertension  Will discontinue diuretic and lower dose of ACEI due to hypotension and occasional lightheadedness.  Return in 2 weeks for BP check.  Screening test will be ordered and once results available patient will be notified of results and managed accordingly.    - lisinopril 10 MG tablet; Take 1 tablet (10 mg total) by mouth once daily.  Dispense: 90 tablet; Refill: 0  - Comprehensive metabolic panel; Future    2. Age-related osteoporosis without current  pathological fracture  Screening test will be ordered and once results available patient will be notified of results and managed accordingly.    - Vitamin D; Future    3. Need for influenza vaccination  Patient requested Flu vaccine, and was consented and vaccine given in office without complication.   - Influenza - High Dose (65+) (PF) (IM)    4. Tuberculosis screening  Test placed today; patient advised to return 48 - 72 hours to have test read.   - POCT TB Skin Test Read       Follow up in about 2 months (around 11/30/2019).        ACTIVE MEDICAL ISSUES:  Documented in Problem List    PAST MEDICAL HISTORY  Documented    PAST SURGICAL HISTORY:  Documented    SOCIAL HISTORY:  Documented    FAMILY HISTORY:  Documented    ALLERGIES AND MEDICATIONS: updated and reviewed.  Documented    Health Maintenance       Date Due Completion Date    TETANUS VACCINE 05/07/1969 ---    High Dose Statin 05/07/1972 ---    Shingles Vaccine (1 of 2) 05/07/2001 ---    Colonoscopy 05/07/2001 ---    Influenza Vaccine (1) 09/01/2019 10/17/2018    Pneumococcal Vaccine (65+ Low/Medium Risk) (2 of 2 - PPSV23) 10/17/2019 10/17/2018    Mammogram 04/15/2021 4/15/2019    DEXA SCAN 06/05/2021 6/5/2018    Lipid Panel 04/30/2023 4/30/2018

## 2019-09-30 NOTE — PROGRESS NOTES
Flu vaccine administered IM to right deltoid.Tolerated well. No adverse reaction noted.  PPD placed to right inner lower forearm. Tolerated well. No adverse reaction noted. Patient advised to return within 48-72 hours to have read. Verbalized understanding.

## 2019-10-03 LAB
TB INDURATION - 48 HR READ: NORMAL
TB INDURATION - 72 HR READ: 0 MM
TB SKIN TEST - 48 HR READ: NORMAL
TB SKIN TEST - 72 HR READ: NEGATIVE

## 2019-12-16 ENCOUNTER — PATIENT OUTREACH (OUTPATIENT)
Dept: ADMINISTRATIVE | Facility: HOSPITAL | Age: 69
End: 2019-12-16

## 2019-12-16 DIAGNOSIS — I10 ESSENTIAL HYPERTENSION: Primary | ICD-10-CM

## 2019-12-16 DIAGNOSIS — Z12.11 SCREEN FOR COLON CANCER: Primary | ICD-10-CM

## 2019-12-30 ENCOUNTER — OFFICE VISIT (OUTPATIENT)
Dept: FAMILY MEDICINE | Facility: CLINIC | Age: 69
End: 2019-12-30
Payer: MEDICARE

## 2019-12-30 ENCOUNTER — LAB VISIT (OUTPATIENT)
Dept: LAB | Facility: HOSPITAL | Age: 69
End: 2019-12-30
Attending: FAMILY MEDICINE
Payer: MEDICARE

## 2019-12-30 VITALS
DIASTOLIC BLOOD PRESSURE: 94 MMHG | WEIGHT: 108.69 LBS | SYSTOLIC BLOOD PRESSURE: 142 MMHG | HEIGHT: 63 IN | BODY MASS INDEX: 19.26 KG/M2 | HEART RATE: 77 BPM | RESPIRATION RATE: 16 BRPM | OXYGEN SATURATION: 97 % | TEMPERATURE: 98 F

## 2019-12-30 DIAGNOSIS — Z23 NEED FOR PNEUMOCOCCAL VACCINATION: Primary | ICD-10-CM

## 2019-12-30 DIAGNOSIS — I70.0 ATHEROSCLEROSIS OF AORTA: ICD-10-CM

## 2019-12-30 DIAGNOSIS — M81.0 AGE-RELATED OSTEOPOROSIS WITHOUT CURRENT PATHOLOGICAL FRACTURE: ICD-10-CM

## 2019-12-30 DIAGNOSIS — I10 ESSENTIAL HYPERTENSION: ICD-10-CM

## 2019-12-30 PROBLEM — D62 ACUTE BLOOD LOSS ANEMIA: Status: RESOLVED | Noted: 2018-07-28 | Resolved: 2019-12-30

## 2019-12-30 LAB
25(OH)D3+25(OH)D2 SERPL-MCNC: 38 NG/ML (ref 30–96)
ALBUMIN SERPL BCP-MCNC: 3.9 G/DL (ref 3.5–5.2)
ALP SERPL-CCNC: 91 U/L (ref 55–135)
ALT SERPL W/O P-5'-P-CCNC: 8 U/L (ref 10–44)
ANION GAP SERPL CALC-SCNC: 9 MMOL/L (ref 8–16)
AST SERPL-CCNC: 22 U/L (ref 10–40)
BASOPHILS # BLD AUTO: 0.02 K/UL (ref 0–0.2)
BASOPHILS NFR BLD: 0.7 % (ref 0–1.9)
BILIRUB SERPL-MCNC: 0.7 MG/DL (ref 0.1–1)
BUN SERPL-MCNC: 9 MG/DL (ref 8–23)
CALCIUM SERPL-MCNC: 11.7 MG/DL (ref 8.7–10.5)
CHLORIDE SERPL-SCNC: 104 MMOL/L (ref 95–110)
CHOLEST SERPL-MCNC: 175 MG/DL (ref 120–199)
CHOLEST/HDLC SERPL: 2.6 {RATIO} (ref 2–5)
CO2 SERPL-SCNC: 24 MMOL/L (ref 23–29)
CREAT SERPL-MCNC: 0.8 MG/DL (ref 0.5–1.4)
DIFFERENTIAL METHOD: ABNORMAL
EOSINOPHIL # BLD AUTO: 0 K/UL (ref 0–0.5)
EOSINOPHIL NFR BLD: 1 % (ref 0–8)
ERYTHROCYTE [DISTWIDTH] IN BLOOD BY AUTOMATED COUNT: 13.8 % (ref 11.5–14.5)
EST. GFR  (AFRICAN AMERICAN): >60 ML/MIN/1.73 M^2
EST. GFR  (NON AFRICAN AMERICAN): >60 ML/MIN/1.73 M^2
GLUCOSE SERPL-MCNC: 75 MG/DL (ref 70–110)
HCT VFR BLD AUTO: 48.7 % (ref 37–48.5)
HDLC SERPL-MCNC: 68 MG/DL (ref 40–75)
HDLC SERPL: 38.9 % (ref 20–50)
HGB BLD-MCNC: 16.3 G/DL (ref 12–16)
IMM GRANULOCYTES # BLD AUTO: 0 K/UL (ref 0–0.04)
IMM GRANULOCYTES NFR BLD AUTO: 0 % (ref 0–0.5)
LDLC SERPL CALC-MCNC: 96.8 MG/DL (ref 63–159)
LYMPHOCYTES # BLD AUTO: 1.4 K/UL (ref 1–4.8)
LYMPHOCYTES NFR BLD: 46 % (ref 18–48)
MCH RBC QN AUTO: 32.1 PG (ref 27–31)
MCHC RBC AUTO-ENTMCNC: 33.5 G/DL (ref 32–36)
MCV RBC AUTO: 96 FL (ref 82–98)
MONOCYTES # BLD AUTO: 0.3 K/UL (ref 0.3–1)
MONOCYTES NFR BLD: 9.3 % (ref 4–15)
NEUTROPHILS # BLD AUTO: 1.3 K/UL (ref 1.8–7.7)
NEUTROPHILS NFR BLD: 43 % (ref 38–73)
NONHDLC SERPL-MCNC: 107 MG/DL
NRBC BLD-RTO: 0 /100 WBC
PLATELET # BLD AUTO: 227 K/UL (ref 150–350)
PMV BLD AUTO: 10.3 FL (ref 9.2–12.9)
POTASSIUM SERPL-SCNC: 5.4 MMOL/L (ref 3.5–5.1)
PROT SERPL-MCNC: 8.3 G/DL (ref 6–8.4)
RBC # BLD AUTO: 5.07 M/UL (ref 4–5.4)
SODIUM SERPL-SCNC: 137 MMOL/L (ref 136–145)
TRIGL SERPL-MCNC: 51 MG/DL (ref 30–150)
WBC # BLD AUTO: 3 K/UL (ref 3.9–12.7)

## 2019-12-30 PROCEDURE — 1126F PR PAIN SEVERITY QUANTIFIED, NO PAIN PRESENT: ICD-10-PCS | Mod: S$GLB,,, | Performed by: FAMILY MEDICINE

## 2019-12-30 PROCEDURE — 36415 COLL VENOUS BLD VENIPUNCTURE: CPT | Mod: PO

## 2019-12-30 PROCEDURE — 82306 VITAMIN D 25 HYDROXY: CPT

## 2019-12-30 PROCEDURE — 90732 PNEUMOCOCCAL POLYSACCHARIDE VACCINE 23-VALENT =>2YO SQ IM: ICD-10-PCS | Mod: S$GLB,,, | Performed by: FAMILY MEDICINE

## 2019-12-30 PROCEDURE — 99999 PR PBB SHADOW E&M-EST. PATIENT-LVL III: CPT | Mod: PBBFAC,,, | Performed by: FAMILY MEDICINE

## 2019-12-30 PROCEDURE — 1101F PR PT FALLS ASSESS DOC 0-1 FALLS W/OUT INJ PAST YR: ICD-10-PCS | Mod: CPTII,S$GLB,, | Performed by: FAMILY MEDICINE

## 2019-12-30 PROCEDURE — G0009 PNEUMOCOCCAL POLYSACCHARIDE VACCINE 23-VALENT =>2YO SQ IM: ICD-10-PCS | Mod: S$GLB,,, | Performed by: FAMILY MEDICINE

## 2019-12-30 PROCEDURE — 99214 PR OFFICE/OUTPT VISIT, EST, LEVL IV, 30-39 MIN: ICD-10-PCS | Mod: S$GLB,,, | Performed by: FAMILY MEDICINE

## 2019-12-30 PROCEDURE — 90732 PPSV23 VACC 2 YRS+ SUBQ/IM: CPT | Mod: S$GLB,,, | Performed by: FAMILY MEDICINE

## 2019-12-30 PROCEDURE — 3077F PR MOST RECENT SYSTOLIC BLOOD PRESSURE >= 140 MM HG: ICD-10-PCS | Mod: CPTII,S$GLB,, | Performed by: FAMILY MEDICINE

## 2019-12-30 PROCEDURE — 80053 COMPREHEN METABOLIC PANEL: CPT

## 2019-12-30 PROCEDURE — G0009 ADMIN PNEUMOCOCCAL VACCINE: HCPCS | Mod: S$GLB,,, | Performed by: FAMILY MEDICINE

## 2019-12-30 PROCEDURE — 1101F PT FALLS ASSESS-DOCD LE1/YR: CPT | Mod: CPTII,S$GLB,, | Performed by: FAMILY MEDICINE

## 2019-12-30 PROCEDURE — 99214 OFFICE O/P EST MOD 30 MIN: CPT | Mod: S$GLB,,, | Performed by: FAMILY MEDICINE

## 2019-12-30 PROCEDURE — 1159F PR MEDICATION LIST DOCUMENTED IN MEDICAL RECORD: ICD-10-PCS | Mod: S$GLB,,, | Performed by: FAMILY MEDICINE

## 2019-12-30 PROCEDURE — 3080F PR MOST RECENT DIASTOLIC BLOOD PRESSURE >= 90 MM HG: ICD-10-PCS | Mod: CPTII,S$GLB,, | Performed by: FAMILY MEDICINE

## 2019-12-30 PROCEDURE — 99999 PR PBB SHADOW E&M-EST. PATIENT-LVL III: ICD-10-PCS | Mod: PBBFAC,,, | Performed by: FAMILY MEDICINE

## 2019-12-30 PROCEDURE — 1126F AMNT PAIN NOTED NONE PRSNT: CPT | Mod: S$GLB,,, | Performed by: FAMILY MEDICINE

## 2019-12-30 PROCEDURE — 1159F MED LIST DOCD IN RCRD: CPT | Mod: S$GLB,,, | Performed by: FAMILY MEDICINE

## 2019-12-30 PROCEDURE — 3080F DIAST BP >= 90 MM HG: CPT | Mod: CPTII,S$GLB,, | Performed by: FAMILY MEDICINE

## 2019-12-30 PROCEDURE — 3077F SYST BP >= 140 MM HG: CPT | Mod: CPTII,S$GLB,, | Performed by: FAMILY MEDICINE

## 2019-12-30 PROCEDURE — 80061 LIPID PANEL: CPT

## 2019-12-30 PROCEDURE — 85025 COMPLETE CBC W/AUTO DIFF WBC: CPT

## 2019-12-30 RX ORDER — LISINOPRIL AND HYDROCHLOROTHIAZIDE 10; 12.5 MG/1; MG/1
1 TABLET ORAL DAILY
Qty: 90 TABLET | Refills: 1 | Status: SHIPPED | OUTPATIENT
Start: 2019-12-30 | End: 2020-06-23 | Stop reason: SDUPTHER

## 2019-12-30 RX ORDER — ATORVASTATIN CALCIUM 20 MG/1
20 TABLET, FILM COATED ORAL DAILY
Qty: 90 TABLET | Refills: 1 | Status: SHIPPED | OUTPATIENT
Start: 2019-12-30 | End: 2020-06-23 | Stop reason: SDUPTHER

## 2019-12-30 NOTE — PROGRESS NOTES
.Pt received pneumococcal vaccine. Tolerated well. Pt instructed to wait 15mins to be assessed for adverse reactions. verbalized understanding.

## 2019-12-30 NOTE — PROGRESS NOTES
Chief Complaint   Patient presents with    Follow-up       Melba Pabon is a 69 y.o. female who presents per the Chief Complaint.  Pt is known to me and was last seen by me on 9/30/2019.  All known chronic medical issues have been documented.       HPI     ROS  Review of Systems   Constitutional: Negative.  Negative for activity change, appetite change, chills, diaphoresis, fatigue, fever and unexpected weight change.   HENT: Negative.  Negative for congestion, ear pain, hearing loss, nosebleeds, postnasal drip, rhinorrhea, sinus pressure, sneezing, sore throat and trouble swallowing.    Eyes: Negative for pain and visual disturbance.   Respiratory: Negative for cough, choking and shortness of breath.    Cardiovascular: Negative for chest pain, palpitations and leg swelling.   Gastrointestinal: Negative for abdominal distention, abdominal pain, anal bleeding, blood in stool, constipation, diarrhea, nausea, rectal pain and vomiting.   Genitourinary: Negative for difficulty urinating, dysuria, frequency and urgency.   Musculoskeletal: Positive for gait problem (following surgery). Negative for arthralgias, back pain, joint swelling, myalgias and neck pain.   Skin: Negative.         Itching over arms.   Allergic/Immunologic: Negative for environmental allergies and food allergies.   Neurological: Negative for dizziness, tremors, seizures, syncope, facial asymmetry, speech difficulty, weakness, light-headedness, numbness and headaches.   Hematological: Negative for adenopathy. Does not bruise/bleed easily.   Psychiatric/Behavioral: Positive for dysphoric mood (recent death of best friend). Negative for agitation, behavioral problems, confusion, decreased concentration, hallucinations, self-injury, sleep disturbance and suicidal ideas. The patient is not nervous/anxious and is not hyperactive.        Physical Exam  Vitals:    12/30/19 0807   BP: (!) 142/94   Pulse: 77   Resp: 16   Temp: 98.4 °F (36.9 °C)    Body mass  "index is 19.25 kg/m².  Weight: 49.3 kg (108 lb 11 oz)   Height: 5' 3" (160 cm)     Physical Exam   Constitutional: She is oriented to person, place, and time. She appears well-developed and well-nourished. She is active and cooperative.  Non-toxic appearance. She does not have a sickly appearance. She does not appear ill. No distress.   HENT:   Head: Normocephalic and atraumatic.   Right Ear: Hearing and external ear normal. No decreased hearing is noted.   Left Ear: Hearing and external ear normal. No decreased hearing is noted.   Nose: Nose normal. No rhinorrhea or nasal deformity.   Mouth/Throat: Uvula is midline and oropharynx is clear and moist. She does not have dentures. Normal dentition.   Eyes: Pupils are equal, round, and reactive to light. Conjunctivae, EOM and lids are normal. Right eye exhibits no chemosis, no discharge and no exudate. No foreign body present in the right eye. Left eye exhibits no chemosis, no discharge and no exudate. No foreign body present in the left eye. No scleral icterus.   Neck: Normal range of motion and full passive range of motion without pain. Neck supple.   Cardiovascular: Normal rate, regular rhythm, S1 normal, S2 normal and normal heart sounds. Exam reveals no gallop and no friction rub.   No murmur heard.  Pulmonary/Chest: Effort normal and breath sounds normal. No accessory muscle usage. No respiratory distress. She has no decreased breath sounds. She has no wheezes. She has no rhonchi. She has no rales.   Abdominal: Soft. Normal appearance. She exhibits no distension. There is no hepatosplenomegaly. There is no tenderness. There is no rigidity, no rebound and no guarding.   Musculoskeletal: Normal range of motion.   Neurological: She is alert and oriented to person, place, and time. She has normal strength. No cranial nerve deficit or sensory deficit. She exhibits normal muscle tone. She displays no seizure activity. Coordination and gait normal.   Skin: Skin is warm, " dry and intact. No rash noted. She is not diaphoretic.   Psychiatric: She has a normal mood and affect. Her speech is normal and behavior is normal. Judgment and thought content normal. Cognition and memory are normal. She is attentive.       Assessment & Plan    Discussion of plan of care including treatment options regarding health and wellness were reviewed and discussed with patient.  Any changes to medication or treatment plan, as well as any screening blood test, imaging, or referrals to specialist, are documented.  Follow up as indicated.     1. Essential hypertension  Patient was counseled and encouraged to maintain a low sodium diet, as well as increasing physical activity.  Recommend random BP checks at home on a regular basis.  Repeat BP at end of visit was not improved. Will continue medication at this time, and follow up in 4-6 weeks, or sooner if blood pressure does not improve over time.  Patient advised to return for nurse BP check in two weeks.   - lisinopril-hydrochlorothiazide (PRINZIDE,ZESTORETIC) 10-12.5 mg per tablet; Take 1 tablet by mouth once daily.  Dispense: 90 tablet; Refill: 1  - CBC auto differential; Future    2. Atherosclerosis of aorta  Will start statin therapy.  - atorvastatin (LIPITOR) 20 MG tablet; Take 1 tablet (20 mg total) by mouth once daily.  Dispense: 90 tablet; Refill: 1    3. Need for pneumococcal vaccination  Patient due for pneumonia vaccine; Patient agreed and vaccine was administered in clinic today without complications.   - Pneumococcal Polysaccharide Vaccine (23 Valent) (SQ/IM)       Follow up in about 3 months (around 3/30/2020), or if symptoms worsen or fail to improve.        ACTIVE MEDICAL ISSUES:  Documented in Problem List    PAST MEDICAL HISTORY  Documented    PAST SURGICAL HISTORY:  Documented    SOCIAL HISTORY:  Documented    FAMILY HISTORY:  Documented    ALLERGIES AND MEDICATIONS: updated and reviewed.  Documented    Health Maintenance       Date Due  Completion Date    TETANUS VACCINE 05/07/1969 ---    High Dose Statin 05/07/1972 ---    Shingles Vaccine (1 of 2) 05/07/2001 ---    Colonoscopy 05/07/2001 ---    Lipid Panel 04/30/2019 4/30/2018    Pneumococcal Vaccine (65+ Low/Medium Risk) (2 of 2 - PPSV23) 10/17/2019 10/17/2018    Mammogram 04/15/2021 4/15/2019    DEXA SCAN 06/05/2021 6/5/2018

## 2020-02-27 DIAGNOSIS — H02.431 PARALYTIC PTOSIS OF RIGHT EYELID: ICD-10-CM

## 2020-02-27 DIAGNOSIS — H16.213 EXPOSURE KERATOCONJUNCTIVITIS OF BOTH EYES: ICD-10-CM

## 2020-02-27 DIAGNOSIS — H18.519 ENDOTHELIAL CORNEAL DYSTROPHY: ICD-10-CM

## 2020-02-27 DIAGNOSIS — H25.13 NUCLEAR SCLEROTIC CATARACT OF BOTH EYES: Primary | ICD-10-CM

## 2020-03-05 ENCOUNTER — HOSPITAL ENCOUNTER (OUTPATIENT)
Dept: RADIOLOGY | Facility: HOSPITAL | Age: 70
Discharge: HOME | End: 2020-03-05
Attending: OPTOMETRIST
Payer: MEDICARE

## 2020-03-05 DIAGNOSIS — H16.213 EXPOSURE KERATOCONJUNCTIVITIS OF BOTH EYES: ICD-10-CM

## 2020-03-05 DIAGNOSIS — H02.431 PARALYTIC PTOSIS OF RIGHT EYELID: ICD-10-CM

## 2020-03-05 DIAGNOSIS — H25.13 NUCLEAR SCLEROTIC CATARACT OF BOTH EYES: ICD-10-CM

## 2020-03-05 DIAGNOSIS — H18.519 ENDOTHELIAL CORNEAL DYSTROPHY: ICD-10-CM

## 2020-03-05 PROCEDURE — 70480 CT ORBIT/EAR/FOSSA W/O DYE: CPT | Mod: 26,HCNC,, | Performed by: RADIOLOGY

## 2020-03-05 PROCEDURE — 70480 CT ORBITS SELLA POST FOSSA WITHOUT CONT: ICD-10-PCS | Mod: 26,HCNC,, | Performed by: RADIOLOGY

## 2020-03-05 PROCEDURE — 70480 CT ORBIT/EAR/FOSSA W/O DYE: CPT | Mod: TC,HCNC

## 2020-05-26 ENCOUNTER — LAB VISIT (OUTPATIENT)
Dept: PRIMARY CARE CLINIC | Facility: CLINIC | Age: 70
End: 2020-05-26
Payer: MEDICARE

## 2020-05-26 DIAGNOSIS — R05.9 COUGH: Primary | ICD-10-CM

## 2020-05-26 PROCEDURE — U0003 INFECTIOUS AGENT DETECTION BY NUCLEIC ACID (DNA OR RNA); SEVERE ACUTE RESPIRATORY SYNDROME CORONAVIRUS 2 (SARS-COV-2) (CORONAVIRUS DISEASE [COVID-19]), AMPLIFIED PROBE TECHNIQUE, MAKING USE OF HIGH THROUGHPUT TECHNOLOGIES AS DESCRIBED BY CMS-2020-01-R: HCPCS | Mod: HCNC

## 2020-05-27 LAB — SARS-COV-2 RNA RESP QL NAA+PROBE: NOT DETECTED

## 2020-05-29 ENCOUNTER — TELEPHONE (OUTPATIENT)
Dept: FAMILY MEDICINE | Facility: CLINIC | Age: 70
End: 2020-05-29

## 2020-06-16 ENCOUNTER — PATIENT OUTREACH (OUTPATIENT)
Dept: ADMINISTRATIVE | Facility: HOSPITAL | Age: 70
End: 2020-06-16

## 2020-06-23 DIAGNOSIS — I70.0 ATHEROSCLEROSIS OF AORTA: ICD-10-CM

## 2020-06-23 DIAGNOSIS — I10 ESSENTIAL HYPERTENSION: ICD-10-CM

## 2020-06-23 NOTE — TELEPHONE ENCOUNTER
----- Message from Kailey Chaudhary sent at 6/23/2020  2:13 PM CDT -----  Regarding: Self/  [phone]  Type: RX Refill Request    Who Called:   Patient    Refill or New Rx:  Refill    RX Name and Strength:  lisinopril-hydrochlorothiazide (PRINZIDE,ZESTORETIC) 10-12.5 mg per tablet                                          atorvastatin (LIPITOR) 20 MG tablet    Preferred Pharmacy with phone number:  [facility] - [place], [place] - [address]    Local or Mail Order:  Local    Ordering Provider:  Lombard, A    Would the patient rather a call back or a response via [facility]?   Call back    Best Call Back Number:   [phone]

## 2020-06-24 RX ORDER — ATORVASTATIN CALCIUM 20 MG/1
20 TABLET, FILM COATED ORAL DAILY
Qty: 90 TABLET | Refills: 1 | Status: SHIPPED | OUTPATIENT
Start: 2020-06-24 | End: 2021-03-24 | Stop reason: SDUPTHER

## 2020-06-24 RX ORDER — LISINOPRIL AND HYDROCHLOROTHIAZIDE 10; 12.5 MG/1; MG/1
1 TABLET ORAL DAILY
Qty: 90 TABLET | Refills: 1 | Status: SHIPPED | OUTPATIENT
Start: 2020-06-24 | End: 2020-12-10

## 2020-06-29 ENCOUNTER — TELEPHONE (OUTPATIENT)
Dept: FAMILY MEDICINE | Facility: CLINIC | Age: 70
End: 2020-06-29

## 2020-06-30 ENCOUNTER — TELEPHONE (OUTPATIENT)
Dept: OPHTHALMOLOGY | Facility: CLINIC | Age: 70
End: 2020-06-30

## 2020-07-01 ENCOUNTER — PATIENT OUTREACH (OUTPATIENT)
Dept: ADMINISTRATIVE | Facility: OTHER | Age: 70
End: 2020-07-01

## 2020-07-01 NOTE — PROGRESS NOTES
Requested updates within Care Everywhere.  Patient's chart was reviewed for overdue CORNELIA topics.  Immunizations reconciled.

## 2020-07-31 ENCOUNTER — PATIENT OUTREACH (OUTPATIENT)
Dept: ADMINISTRATIVE | Facility: OTHER | Age: 70
End: 2020-07-31

## 2020-07-31 NOTE — LETTER
AUTHORIZATION FOR RELEASE OF   CONFIDENTIAL INFORMATION    Dear Konstantin Shaver,    We are seeing Melba Pabon, date of birth 1950, in the clinic at [facility]. Azikiwe K Lombard, MD is the patient's PCP. Melba Pabon has an outstanding lab/procedure at the time we reviewed her chart. In order to help keep her health information updated, she has authorized us to request the following medical record(s):        (  )  MAMMOGRAM                                      (xx)  COLONOSCOPY      (  )  PAP SMEAR                                          (  )  OUTSIDE LAB RESULTS     (  )  DEXA SCAN                                          (  )  EYE EXAM            (  )  FOOT EXAM                                          (  )  ENTIRE RECORD     (  )  OUTSIDE IMMUNIZATIONS                 (  )  _______________         Please fax records to [facility], Azikiwe K Lombard, MD, FAX # [phone]  Any questions please contact Steff Umanzor at [phone]        Patient Name: Melba Pabon  : 1950  Patient Phone #: [phone]

## 2020-08-12 ENCOUNTER — PATIENT OUTREACH (OUTPATIENT)
Dept: ADMINISTRATIVE | Facility: HOSPITAL | Age: 70
End: 2020-08-12

## 2020-08-18 ENCOUNTER — TELEPHONE (OUTPATIENT)
Dept: FAMILY MEDICINE | Facility: CLINIC | Age: 70
End: 2020-08-18

## 2020-08-18 NOTE — TELEPHONE ENCOUNTER
----- Message from Charlotte Horn LPN sent at 8/18/2020  9:52 AM CDT -----  Regarding: constipation  Hi,    Patient called states she has been constipated times 3 days not sure if she can take stool softner had small bowel surgery couple of years ago wanted to know if it was ok to take exlax.    Charlotte

## 2020-08-19 NOTE — TELEPHONE ENCOUNTER
Stool softener ok, per PCP. Like dulcolax. Do not recommend laxatives. If pain occurs or no BM recommend ED

## 2020-08-26 ENCOUNTER — OFFICE VISIT (OUTPATIENT)
Dept: FAMILY MEDICINE | Facility: CLINIC | Age: 70
End: 2020-08-26
Payer: MEDICARE

## 2020-08-26 VITALS
WEIGHT: 94.56 LBS | HEART RATE: 84 BPM | RESPIRATION RATE: 16 BRPM | TEMPERATURE: 99 F | OXYGEN SATURATION: 97 % | SYSTOLIC BLOOD PRESSURE: 110 MMHG | BODY MASS INDEX: 16.75 KG/M2 | HEIGHT: 63 IN | DIASTOLIC BLOOD PRESSURE: 80 MMHG

## 2020-08-26 DIAGNOSIS — C49.A0 GASTROINTESTINAL STROMAL TUMOR (GIST): ICD-10-CM

## 2020-08-26 DIAGNOSIS — I10 ESSENTIAL HYPERTENSION: ICD-10-CM

## 2020-08-26 DIAGNOSIS — R63.4 UNEXPLAINED WEIGHT LOSS: Primary | ICD-10-CM

## 2020-08-26 PROCEDURE — 3074F PR MOST RECENT SYSTOLIC BLOOD PRESSURE < 130 MM HG: ICD-10-PCS | Mod: CPTII,S$GLB,, | Performed by: FAMILY MEDICINE

## 2020-08-26 PROCEDURE — 3079F PR MOST RECENT DIASTOLIC BLOOD PRESSURE 80-89 MM HG: ICD-10-PCS | Mod: CPTII,S$GLB,, | Performed by: FAMILY MEDICINE

## 2020-08-26 PROCEDURE — 1159F MED LIST DOCD IN RCRD: CPT | Mod: S$GLB,,, | Performed by: FAMILY MEDICINE

## 2020-08-26 PROCEDURE — 1126F AMNT PAIN NOTED NONE PRSNT: CPT | Mod: S$GLB,,, | Performed by: FAMILY MEDICINE

## 2020-08-26 PROCEDURE — 99214 PR OFFICE/OUTPT VISIT, EST, LEVL IV, 30-39 MIN: ICD-10-PCS | Mod: S$GLB,,, | Performed by: FAMILY MEDICINE

## 2020-08-26 PROCEDURE — 99999 PR PBB SHADOW E&M-EST. PATIENT-LVL III: CPT | Mod: PBBFAC,,, | Performed by: FAMILY MEDICINE

## 2020-08-26 PROCEDURE — 3079F DIAST BP 80-89 MM HG: CPT | Mod: CPTII,S$GLB,, | Performed by: FAMILY MEDICINE

## 2020-08-26 PROCEDURE — 99499 UNLISTED E&M SERVICE: CPT | Mod: S$GLB,,, | Performed by: FAMILY MEDICINE

## 2020-08-26 PROCEDURE — 99999 PR PBB SHADOW E&M-EST. PATIENT-LVL III: ICD-10-PCS | Mod: PBBFAC,,, | Performed by: FAMILY MEDICINE

## 2020-08-26 PROCEDURE — 1126F PR PAIN SEVERITY QUANTIFIED, NO PAIN PRESENT: ICD-10-PCS | Mod: S$GLB,,, | Performed by: FAMILY MEDICINE

## 2020-08-26 PROCEDURE — 1159F PR MEDICATION LIST DOCUMENTED IN MEDICAL RECORD: ICD-10-PCS | Mod: S$GLB,,, | Performed by: FAMILY MEDICINE

## 2020-08-26 PROCEDURE — 3008F BODY MASS INDEX DOCD: CPT | Mod: CPTII,S$GLB,, | Performed by: FAMILY MEDICINE

## 2020-08-26 PROCEDURE — 3008F PR BODY MASS INDEX (BMI) DOCUMENTED: ICD-10-PCS | Mod: CPTII,S$GLB,, | Performed by: FAMILY MEDICINE

## 2020-08-26 PROCEDURE — 1101F PR PT FALLS ASSESS DOC 0-1 FALLS W/OUT INJ PAST YR: ICD-10-PCS | Mod: CPTII,S$GLB,, | Performed by: FAMILY MEDICINE

## 2020-08-26 PROCEDURE — 1101F PT FALLS ASSESS-DOCD LE1/YR: CPT | Mod: CPTII,S$GLB,, | Performed by: FAMILY MEDICINE

## 2020-08-26 PROCEDURE — 3074F SYST BP LT 130 MM HG: CPT | Mod: CPTII,S$GLB,, | Performed by: FAMILY MEDICINE

## 2020-08-26 PROCEDURE — 99214 OFFICE O/P EST MOD 30 MIN: CPT | Mod: S$GLB,,, | Performed by: FAMILY MEDICINE

## 2020-08-26 PROCEDURE — 99499 RISK ADDL DX/OHS AUDIT: ICD-10-PCS | Mod: S$GLB,,, | Performed by: FAMILY MEDICINE

## 2020-08-26 RX ORDER — CYPROHEPTADINE HYDROCHLORIDE 4 MG/1
4 TABLET ORAL 3 TIMES DAILY PRN
Qty: 90 TABLET | Refills: 2 | Status: SHIPPED | OUTPATIENT
Start: 2020-08-26 | End: 2021-05-03 | Stop reason: SDUPTHER

## 2020-08-26 NOTE — PROGRESS NOTES
Chief Complaint   Patient presents with    Hypertension     follow up        Melba Pabon is a 70 y.o. female who presents per the Chief Complaint.  Pt is known to me and was last seen by me on 12/30/2019.  All known chronic medical issues have been documented.    Hypertension  This is a chronic problem. The current episode started more than 1 year ago. The problem is unchanged. The problem is controlled. Pertinent negatives include no anxiety, blurred vision, chest pain, headaches, malaise/fatigue, neck pain, orthopnea, palpitations, peripheral edema, PND, shortness of breath or sweats. There are no associated agents to hypertension. Risk factors for coronary artery disease include sedentary lifestyle and post-menopausal state. Past treatments include ACE inhibitors and diuretics. The current treatment provides significant improvement. There are no compliance problems.  There is no history of angina, kidney disease, CAD/MI, CVA, heart failure, left ventricular hypertrophy, PVD or retinopathy. There is no history of chronic renal disease, coarctation of the aorta, hyperaldosteronism, hypercortisolism, hyperparathyroidism, a hypertension causing med, pheochromocytoma, renovascular disease, sleep apnea or a thyroid problem.       ROS  Review of Systems   Constitutional: Positive for appetite change and unexpected weight change. Negative for activity change, chills, diaphoresis, fatigue, fever and malaise/fatigue.   HENT: Negative.  Negative for congestion, ear pain, hearing loss, nosebleeds, postnasal drip, rhinorrhea, sinus pressure, sneezing, sore throat and trouble swallowing.    Eyes: Negative for blurred vision, pain and visual disturbance.   Respiratory: Negative for cough, choking and shortness of breath.    Cardiovascular: Negative for chest pain, palpitations, orthopnea, leg swelling and PND.   Gastrointestinal: Positive for nausea. Negative for abdominal distention, abdominal pain, anal bleeding, blood in  "stool, constipation, diarrhea, rectal pain and vomiting.   Genitourinary: Negative for difficulty urinating, dysuria, frequency and urgency.   Musculoskeletal: Positive for gait problem (following surgery). Negative for arthralgias, back pain, joint swelling, myalgias and neck pain.   Skin: Negative.    Allergic/Immunologic: Negative for environmental allergies and food allergies.   Neurological: Positive for dizziness and light-headedness (improved). Negative for tremors, seizures, syncope, facial asymmetry, speech difficulty, weakness, numbness and headaches.   Hematological: Negative for adenopathy. Does not bruise/bleed easily.   Psychiatric/Behavioral: Negative.  Negative for confusion, decreased concentration, dysphoric mood and sleep disturbance. The patient is not nervous/anxious.        Physical Exam  Vitals:    08/26/20 1316   BP: 110/80   Pulse: 84   Resp: 16   Temp: 98.7 °F (37.1 °C)    Body mass index is 16.75 kg/m².  Weight: 42.9 kg (94 lb 9.2 oz)   Height: 5' 3" (160 cm)     Physical Exam  Constitutional:       General: She is not in acute distress.     Appearance: Normal appearance. She is well-developed. She is not ill-appearing, toxic-appearing or diaphoretic.   HENT:      Head: Normocephalic and atraumatic.      Right Ear: Hearing and external ear normal. No decreased hearing noted.      Left Ear: Hearing and external ear normal. No decreased hearing noted.      Nose: Nose normal. No nasal deformity or rhinorrhea.      Mouth/Throat:      Dentition: Normal dentition. Does not have dentures.      Pharynx: Uvula midline.   Eyes:      General: Lids are normal. No scleral icterus.        Right eye: No foreign body or discharge.         Left eye: No foreign body or discharge.      Conjunctiva/sclera: Conjunctivae normal.      Right eye: No chemosis or exudate.     Left eye: No chemosis or exudate.     Pupils: Pupils are equal, round, and reactive to light.   Neck:      Musculoskeletal: Full passive " range of motion without pain, normal range of motion and neck supple.   Cardiovascular:      Rate and Rhythm: Normal rate and regular rhythm.      Heart sounds: Normal heart sounds, S1 normal and S2 normal. No murmur. No friction rub. No gallop.    Pulmonary:      Effort: Pulmonary effort is normal. No accessory muscle usage or respiratory distress.      Breath sounds: Normal breath sounds. No decreased breath sounds, wheezing, rhonchi or rales.   Abdominal:      General: There is no distension.      Palpations: Abdomen is soft. Abdomen is not rigid.      Tenderness: There is no abdominal tenderness. There is no guarding or rebound.   Musculoskeletal: Normal range of motion.   Skin:     General: Skin is warm and dry.      Findings: No rash.   Neurological:      Mental Status: She is alert and oriented to person, place, and time.      Cranial Nerves: No cranial nerve deficit.      Sensory: No sensory deficit.      Motor: No abnormal muscle tone or seizure activity.      Coordination: Coordination normal.      Gait: Gait normal.   Psychiatric:         Attention and Perception: She is attentive.         Speech: Speech normal.         Behavior: Behavior normal. Behavior is cooperative.         Thought Content: Thought content normal.         Judgment: Judgment normal.         Assessment & Plan    Discussion of plan of care including treatment options regarding health and wellness were reviewed and discussed with patient.  Any changes to medication or treatment plan, as well as any screening blood test, imaging, or referrals to specialist, are documented.  Follow up as indicated.     1. Unexplained weight loss  Patient with significant weight loss following intestinal surgery in 2016 and again following gastric surgery in 2018.  Current weight loss may be related to decreased appetite; recommended increased protein intake and will start oral medication to stimulate appetite.  - cyproheptadine (PERIACTIN) 4 mg tablet;  Take 1 tablet (4 mg total) by mouth 3 (three) times daily as needed.  Dispense: 90 tablet; Refill: 2    2. Gastrointestinal stromal tumor (GIST)  Stable; no therapeutic changes at this time.     3. Essential hypertension  Patient was counseled and encouraged to maintain a low sodium diet, as well as increasing physical activity.  Recommend random BP checks at home on a regular basis.  Repeat BP at end of visit was not necessary. Will continue medication at this time, and follow up in 3-6 months, or sooner if blood pressure begins to increase.  Recommended to hold blood pressure medication; will return in one week for nurse BP check.  Symptoms of lightheadedness and dizziness may be due to hypotension.       Follow up in about 2 months (around 10/26/2020).      ACTIVE MEDICAL ISSUES:  Documented in Problem List    PAST MEDICAL HISTORY  Documented  .  PAST SURGICAL HISTORY:  Documented    SOCIAL HISTORY:  Documented    FAMILY HISTORY:  Documented    ALLERGIES AND MEDICATIONS: updated and reviewed.  Documented    Health Maintenance       Date Due Completion Date    TETANUS VACCINE 05/07/1969 ---    Shingles Vaccine (1 of 2) 05/07/2001 ---    Colorectal Cancer Screening 09/10/2017 9/10/2007    Influenza Vaccine (1) 09/01/2020 9/30/2019    Lipid Panel 12/30/2020 12/30/2019    Mammogram 04/15/2021 4/15/2019    DEXA SCAN 06/05/2021 6/5/2018    High Dose Statin 08/26/2021 8/26/2020

## 2020-08-26 NOTE — PROGRESS NOTES
Health Maintenance Due   Topic     Shingles Vaccine (1 of 2) hx chickenpox ; inform pt can get vaccine at pharmacy.      Colorectal Cancer Screening  Mailed out 8/24/2020 fitkit per patient

## 2020-09-01 ENCOUNTER — CLINICAL SUPPORT (OUTPATIENT)
Dept: FAMILY MEDICINE | Facility: CLINIC | Age: 70
End: 2020-09-01
Payer: MEDICARE

## 2020-09-01 VITALS — SYSTOLIC BLOOD PRESSURE: 100 MMHG | DIASTOLIC BLOOD PRESSURE: 64 MMHG

## 2020-09-01 DIAGNOSIS — I10 ESSENTIAL HYPERTENSION: Primary | ICD-10-CM

## 2020-09-01 NOTE — PROGRESS NOTES
Melba Pabon 70 y.o. female is here today for Blood Pressure check.   History of HTN yes.    Review of patient's allergies indicates:   Allergen Reactions    Pcn [penicillins]      Creatinine   Date Value Ref Range Status   03/05/2020 0.8 0.5 - 1.4 mg/dL Final     Sodium   Date Value Ref Range Status   12/30/2019 137 136 - 145 mmol/L Final     Potassium   Date Value Ref Range Status   12/30/2019 5.4 (H) 3.5 - 5.1 mmol/L Final     Comment:     *No Visible Hemolysis   ]  Patient denies taking blood pressure medications on a regular basis at the same time of the day.     Current Outpatient Medications:     alendronate (FOSAMAX) 70 MG tablet, Take 1 tablet (70 mg total) by mouth every 7 days. (Patient not taking: Reported on 8/26/2020), Disp: 12 tablet, Rfl: 3    aspirin (ECOTRIN) 81 MG EC tablet, Take 81 mg by mouth once daily., Disp: , Rfl:     atorvastatin (LIPITOR) 20 MG tablet, Take 1 tablet (20 mg total) by mouth once daily., Disp: 90 tablet, Rfl: 1    cyanocobalamin (VITAMIN B-12) 100 MCG tablet, Take 100 mcg by mouth once daily., Disp: , Rfl:     cyproheptadine (PERIACTIN) 4 mg tablet, Take 1 tablet (4 mg total) by mouth 3 (three) times daily as needed., Disp: 90 tablet, Rfl: 2    DOCUSATE SODIUM (STOOL SOFTENER ORAL), Take by mouth once daily., Disp: , Rfl:     lisinopriL-hydrochlorothiazide (PRINZIDE,ZESTORETIC) 10-12.5 mg per tablet, Take 1 tablet by mouth once daily., Disp: 90 tablet, Rfl: 1    multivitamin (ONE DAILY MULTIVITAMIN) per tablet, Take 1 tablet by mouth once daily., Disp: , Rfl:     naproxen sodium (ANAPROX) 220 MG tablet, Take 220 mg by mouth as needed., Disp: , Rfl:     Current Facility-Administered Medications:     lidocaine (PF) 10 mg/ml (1%) injection 10 mg, 1 mL, Intradermal, Once, Chuck Palomares MD  Does patient have record of home blood pressure readings no. Readings have been averaging not done.   Last dose of blood pressure medication was taken at medication  discontinued.  Patient is asymptomatic.   Complains of no complaints.    Vitals:    09/01/20 1015   BP: 100/64   BP Location: Left arm   Patient Position: Sitting   BP Method: Small (Manual)         Dr. Lombard informed of nurse visit.   pts medication was discontinued due to her low blood pressure, she is concerned because she has been on this medication for years, informed her BP too low to restart medication, she has OV scheduled for end of october

## 2020-09-02 ENCOUNTER — PATIENT OUTREACH (OUTPATIENT)
Dept: ADMINISTRATIVE | Facility: OTHER | Age: 70
End: 2020-09-02

## 2020-09-02 NOTE — PROGRESS NOTES
Health Maintenance Due   Topic Date Due    TETANUS VACCINE  05/07/1969    Shingles Vaccine (1 of 2) 05/07/2001    Colorectal Cancer Screening  09/10/2017    Influenza Vaccine (1) 09/01/2020     Updates were requested from care everywhere.  Chart was reviewed for overdue Proactive [facility] Encounters ([facility]) topics (CRS, Breast Cancer Screening, Eye exam)  Health Maintenance has been updated.  LINKS immunization registry triggered.  Immunizations were reconciled.

## 2020-09-03 ENCOUNTER — OFFICE VISIT (OUTPATIENT)
Dept: OPHTHALMOLOGY | Facility: CLINIC | Age: 70
End: 2020-09-03
Payer: MEDICARE

## 2020-09-03 DIAGNOSIS — H52.7 REFRACTIVE ERROR: ICD-10-CM

## 2020-09-03 DIAGNOSIS — H25.13 NUCLEAR SCLEROSIS OF BOTH EYES: Primary | ICD-10-CM

## 2020-09-03 DIAGNOSIS — I10 ESSENTIAL HYPERTENSION: ICD-10-CM

## 2020-09-03 DIAGNOSIS — H26.9 CORTICAL CATARACT OF BOTH EYES: ICD-10-CM

## 2020-09-03 PROCEDURE — 99999 PR PBB SHADOW E&M-EST. PATIENT-LVL III: CPT | Mod: PBBFAC,,, | Performed by: OPHTHALMOLOGY

## 2020-09-03 PROCEDURE — 92136 BIOMETRY: ICD-10-PCS | Mod: LT,S$GLB,, | Performed by: OPHTHALMOLOGY

## 2020-09-03 PROCEDURE — 92004 COMPRE OPH EXAM NEW PT 1/>: CPT | Mod: S$GLB,,, | Performed by: OPHTHALMOLOGY

## 2020-09-03 PROCEDURE — 92136 OPHTHALMIC BIOMETRY: CPT | Mod: LT,S$GLB,, | Performed by: OPHTHALMOLOGY

## 2020-09-03 PROCEDURE — 99999 PR PBB SHADOW E&M-EST. PATIENT-LVL III: ICD-10-PCS | Mod: PBBFAC,,, | Performed by: OPHTHALMOLOGY

## 2020-09-03 PROCEDURE — 92004 PR EYE EXAM, NEW PATIENT,COMPREHESV: ICD-10-PCS | Mod: S$GLB,,, | Performed by: OPHTHALMOLOGY

## 2020-09-03 RX ORDER — DUREZOL 0.5 MG/ML
1 EMULSION OPHTHALMIC 4 TIMES DAILY
Qty: 5 ML | Refills: 1 | Status: SHIPPED | OUTPATIENT
Start: 2020-11-05 | End: 2020-12-05

## 2020-09-03 RX ORDER — NEPAFENAC 3 MG/ML
1 SUSPENSION/ DROPS OPHTHALMIC DAILY
Qty: 3 ML | Refills: 1 | Status: SHIPPED | OUTPATIENT
Start: 2020-11-02 | End: 2020-12-02

## 2020-09-03 RX ORDER — OFLOXACIN 3 MG/ML
1 SOLUTION/ DROPS OPHTHALMIC 4 TIMES DAILY
Qty: 5 ML | Refills: 1 | Status: SHIPPED | OUTPATIENT
Start: 2020-11-02 | End: 2020-11-12

## 2020-09-03 NOTE — LETTER
September 3, 2020      Donn Liang, OD  [address]  [address]  [place] [address]           [facility]  [address]  [place] [address]  Phone: [phone]  Fax: [phone]          Patient: Melba Pabon   MR Number: [id number]   YOB: 1950   Date of Visit: 9/3/2020       Dear Dr. Donn Liang:    Thank you for referring Melba Pabon to me for evaluation. Attached you will find relevant portions of my assessment and plan of care.    If you have questions, please do not hesitate to call me. I look forward to following Melba Pabon along with you.    Sincerely,    Howard Pond MD    Enclosure  CC:  No Recipients    If you would like to receive this communication electronically, please contact [email or web address] or [phone] to request more information on [employer] Link access.    For providers and/or their staff who would like to refer a patient to [facility], please contact us through our one-stop-shop provider referral line, [facility], at [phone].    If you feel you have received this communication in error or would no longer like to receive these types of communications, please e-mail [email or web address]

## 2020-09-04 NOTE — PROGRESS NOTES
Subjective:       Patient ID: Melba Pabon is a 70 y.o. female.    Chief Complaint: Cataract    HPI     LO: about 4 month ago at [facility]   Pt states va is blurry with glasses. Pt states glare is a bother. Pt   harder to see small print and drive.   +Dry eyes     Pt would like recommendation for drops for dry eyes.     Last edited by Sylvia Hernandez on 9/3/2020  2:53 PM. (History)             Assessment:       1. Nuclear sclerosis of both eyes    2. Cortical cataract of both eyes    3. Essential hypertension    4. Refractive error        Plan:       Visually significant cataract OU -Pt. Wants Sx.     HTN-No retinopathy OU.  RE      Cataract Surgery Consent: Patient with a visually significant cataract with difficulties of ADLs, reading, driving, night vision, glare (any and all).  Discussed with Patient/Family/Caregiver: options, risks and benefits, expectations of cataract surgery, utilized an eye model with questions and answers to facilitate discussion.  Discussed lens options and patient understands that glasses may be required for optimal vision for distance and/or near vision after cataract surgery.  The Patient/Family/Caregiver  voice good understanding and patient wishes to proceed with surgery.  The patient will likely benefit from surgery and patient signed consent for Left Eye.  CE OS 11/5/2020 SN60WF 18.5,        OD 11/19/2020 SN60WF 18.0.  Control HTN.

## 2020-09-10 ENCOUNTER — TELEPHONE (OUTPATIENT)
Dept: FAMILY MEDICINE | Facility: CLINIC | Age: 70
End: 2020-09-10

## 2020-09-10 NOTE — TELEPHONE ENCOUNTER
----- Message from Joanne Tapia sent at 9/9/2020 12:54 PM CDT -----  Type: Patient Call Back    Who called: pt     What is the request in detail: pt asking for a call back to discuss flu vaccine.     Can the clinic reply by [facility]? No     Would the patient rather a call back or a response via [facility]? Call back     Best call back number: [phone]    Additional Information:

## 2020-09-15 ENCOUNTER — TELEPHONE (OUTPATIENT)
Dept: OPHTHALMOLOGY | Facility: CLINIC | Age: 70
End: 2020-09-15

## 2020-09-15 DIAGNOSIS — H25.12 NS (NUCLEAR SCLEROSIS), LEFT: Primary | ICD-10-CM

## 2020-09-21 ENCOUNTER — CLINICAL SUPPORT (OUTPATIENT)
Dept: FAMILY MEDICINE | Facility: CLINIC | Age: 70
End: 2020-09-21
Payer: MEDICARE

## 2020-09-21 DIAGNOSIS — Z23 NEED FOR INFLUENZA VACCINATION: Primary | ICD-10-CM

## 2020-09-21 PROCEDURE — G0008 ADMIN INFLUENZA VIRUS VAC: HCPCS | Mod: HCNC,S$GLB,, | Performed by: FAMILY MEDICINE

## 2020-09-21 PROCEDURE — G0008 FLU VACCINE - QUADRIVALENT - ADJUVANTED: ICD-10-PCS | Mod: HCNC,S$GLB,, | Performed by: FAMILY MEDICINE

## 2020-09-21 PROCEDURE — 90694 FLU VACCINE - QUADRIVALENT - ADJUVANTED: ICD-10-PCS | Mod: HCNC,S$GLB,, | Performed by: FAMILY MEDICINE

## 2020-09-21 PROCEDURE — 99499 NO LOS: ICD-10-PCS | Mod: HCNC,S$GLB,, | Performed by: FAMILY MEDICINE

## 2020-09-21 PROCEDURE — 99499 UNLISTED E&M SERVICE: CPT | Mod: HCNC,S$GLB,, | Performed by: FAMILY MEDICINE

## 2020-09-21 PROCEDURE — 90694 VACC AIIV4 NO PRSRV 0.5ML IM: CPT | Mod: HCNC,S$GLB,, | Performed by: FAMILY MEDICINE

## 2020-09-21 NOTE — PROGRESS NOTES
Pt tolerated injection well. Instructed to wait in the clinic for 15 minutes and report any adverse effects immediately to the nurse. Verbalized understanding.

## 2020-09-22 ENCOUNTER — TELEPHONE (OUTPATIENT)
Dept: OPHTHALMOLOGY | Facility: CLINIC | Age: 70
End: 2020-09-22

## 2020-09-22 DIAGNOSIS — H25.11 NUCLEAR SCLEROSIS, RIGHT: Primary | ICD-10-CM

## 2020-10-16 ENCOUNTER — PATIENT OUTREACH (OUTPATIENT)
Dept: ADMINISTRATIVE | Facility: HOSPITAL | Age: 70
End: 2020-10-16

## 2020-10-30 NOTE — H&P
[facility]  History & Physical    Subjective:      Chief Complaint/Reason for Admission:     Melba Pabon is a 70 y.o. female.    Past Medical History:   Diagnosis Date    Acute blood loss anemia 7/28/2018    Age-related osteoporosis without current pathological fracture 4/13/2018    Allergy     Cataract     Gastrointestinal stromal tumor (GIST)     Hypertension      Past Surgical History:   Procedure Laterality Date    BRAIN SURGERY      metal plate in head    BREAST BIOPSY Right     COLON SURGERY  2016    small bowel resection    EXCISION OF MASS OF ABDOMEN N/A 8/27/2018    Procedure: RESECTION GASTRIC MASS;  Surgeon: Chuck Palomares MD;  Location: [facility];  Service: General;  Laterality: N/A;  RN Pre Op 8-16-18    EYE SURGERY      HERNIA REPAIR  2016    HYSTERECTOMY      OOPHORECTOMY      SMALL INTESTINE SURGERY       Family History   Problem Relation Age of Onset    Dementia Mother     No Known Problems Father      Social History     Tobacco Use    Smoking status: Current Some Day Smoker     Packs/day: 0.50     Years: 20.00     Pack years: 10.00     Types: Cigarettes     Start date: 1999    Smokeless tobacco: Never Used    Tobacco comment: occassional cigarette   Substance Use Topics    Alcohol use: No    Drug use: No       No medications prior to admission.     Review of patient's allergies indicates:   Allergen Reactions    Pcn [penicillins]         Review of Systems   Eyes: Positive for blurred vision.   All other systems reviewed and are negative.      Objective:      Vital Signs (Most Recent)       Vital Signs Range (Last 24H):  BP: ()/()   Arterial Line BP: ()/()     Physical Exam  Constitutional:       Appearance: She is well-developed.   HENT:      Head: Normocephalic.   Eyes:      Conjunctiva/sclera: Conjunctivae normal.      Pupils: Pupils are equal, round, and reactive to light.   Neck:      Musculoskeletal: Normal range of motion and neck supple.    Cardiovascular:      Rate and Rhythm: Normal rate and regular rhythm.      Heart sounds: Normal heart sounds.   Pulmonary:      Effort: Pulmonary effort is normal.      Breath sounds: Normal breath sounds.   Abdominal:      General: Bowel sounds are normal.      Palpations: Abdomen is soft.   Musculoskeletal: Normal range of motion.   Skin:     General: Skin is warm.   Neurological:      Mental Status: She is alert and oriented to person, place, and time.         Data Review:    ECG:     Assessment:      Cataract OS.    Plan:    CE OS.

## 2020-11-02 ENCOUNTER — CLINICAL SUPPORT (OUTPATIENT)
Dept: FAMILY MEDICINE | Facility: CLINIC | Age: 70
End: 2020-11-02
Payer: MEDICARE

## 2020-11-02 VITALS
SYSTOLIC BLOOD PRESSURE: 125 MMHG | HEART RATE: 69 BPM | BODY MASS INDEX: 17.93 KG/M2 | DIASTOLIC BLOOD PRESSURE: 70 MMHG | WEIGHT: 101.19 LBS

## 2020-11-02 DIAGNOSIS — I10 ESSENTIAL HYPERTENSION: Primary | ICD-10-CM

## 2020-11-02 PROCEDURE — 99999 PR PBB SHADOW E&M-EST. PATIENT-LVL I: ICD-10-PCS | Mod: PBBFAC,HCNC,,

## 2020-11-02 PROCEDURE — 99999 PR PBB SHADOW E&M-EST. PATIENT-LVL I: CPT | Mod: PBBFAC,HCNC,,

## 2020-11-02 NOTE — PROGRESS NOTES
Melba MCCALL Cm 70 y.o. female is here today for Blood Pressure check.   History of HTN yes.    Review of patient's allergies indicates:   Allergen Reactions    Pcn [penicillins]      Creatinine   Date Value Ref Range Status   03/05/2020 0.8 0.5 - 1.4 mg/dL Final     Sodium   Date Value Ref Range Status   12/30/2019 137 136 - 145 mmol/L Final     Potassium   Date Value Ref Range Status   12/30/2019 5.4 (H) 3.5 - 5.1 mmol/L Final     Comment:     *No Visible Hemolysis   ]  Patient denies taking blood pressure medications on a regular basis at the same time of the day.     Current Outpatient Medications:     alendronate (FOSAMAX) 70 MG tablet, Take 1 tablet (70 mg total) by mouth every 7 days., Disp: 12 tablet, Rfl: 3    aspirin (ECOTRIN) 81 MG EC tablet, Take 81 mg by mouth once daily., Disp: , Rfl:     atorvastatin (LIPITOR) 20 MG tablet, Take 1 tablet (20 mg total) by mouth once daily., Disp: 90 tablet, Rfl: 1    cyanocobalamin (VITAMIN B-12) 100 MCG tablet, Take 100 mcg by mouth once daily., Disp: , Rfl:     cyproheptadine (PERIACTIN) 4 mg tablet, Take 1 tablet (4 mg total) by mouth 3 (three) times daily as needed., Disp: 90 tablet, Rfl: 2    [START ON 11/5/2020] difluprednate (DUREZOL) 0.05 % Drop ophthalmic solution, Place 1 drop into the left eye 4 (four) times daily. For 30 days, Disp: 5 mL, Rfl: 1    DOCUSATE SODIUM (STOOL SOFTENER ORAL), Take by mouth once daily., Disp: , Rfl:     ILEVRO 0.3 % DrpS, Place 1 drop into both eyes once daily. For 30 days, Disp: 3 mL, Rfl: 1    lisinopriL-hydrochlorothiazide (PRINZIDE,ZESTORETIC) 10-12.5 mg per tablet, Take 1 tablet by mouth once daily., Disp: 90 tablet, Rfl: 1    multivitamin (ONE DAILY MULTIVITAMIN) per tablet, Take 1 tablet by mouth once daily., Disp: , Rfl:     naproxen sodium (ANAPROX) 220 MG tablet, Take 220 mg by mouth as needed., Disp: , Rfl:     ofloxacin (OCUFLOX) 0.3 % ophthalmic solution, Place 1 drop into the left eye 4 (four) times daily.  for 10 days, Disp: 5 mL, Rfl: 1    Current Facility-Administered Medications:     lidocaine (PF) 10 mg/ml (1%) injection 10 mg, 1 mL, Intradermal, Once, Chuck Palomares MD  Does patient have record of home blood pressure readings no.    Last dose of blood pressure medication was taken at ? Pt states she no longer takes bp med.  Patient is asymptomatic.   Complains of ? No complaints.    BP: 125/70 , Pulse: 69 .    Dr. Lombard notified.

## 2020-11-02 NOTE — PRE ADMISSION SCREENING
RN Phone Pre Op.  Patient instructed to remain NPO after midnight prior t surgery.  For Covid screen 11-4-2020.  Expressed understanding of instructions. Surgery arrival 11:45 AM.

## 2020-11-04 ENCOUNTER — HOSPITAL ENCOUNTER (OUTPATIENT)
Dept: PREADMISSION TESTING | Facility: HOSPITAL | Age: 70
Discharge: HOME | End: 2020-11-04
Attending: OPHTHALMOLOGY
Payer: MEDICARE

## 2020-11-04 ENCOUNTER — ANESTHESIA EVENT (OUTPATIENT)
Dept: SURGERY | Facility: HOSPITAL | Age: 70
End: 2020-11-04
Payer: MEDICARE

## 2020-11-04 DIAGNOSIS — Z01.818 PRE-OP TESTING: ICD-10-CM

## 2020-11-04 LAB — SARS-COV-2 RDRP RESP QL NAA+PROBE: NEGATIVE

## 2020-11-04 PROCEDURE — U0002 COVID-19 LAB TEST NON-CDC: HCPCS | Mod: HCNC

## 2020-11-05 ENCOUNTER — ANESTHESIA (OUTPATIENT)
Dept: SURGERY | Facility: HOSPITAL | Age: 70
End: 2020-11-05
Payer: MEDICARE

## 2020-11-05 ENCOUNTER — HOSPITAL ENCOUNTER (OUTPATIENT)
Facility: HOSPITAL | Age: 70
Discharge: HOME | End: 2020-11-05
Attending: OPHTHALMOLOGY | Admitting: OPHTHALMOLOGY
Payer: MEDICARE

## 2020-11-05 VITALS
HEART RATE: 60 BPM | HEIGHT: 65 IN | TEMPERATURE: 98 F | OXYGEN SATURATION: 97 % | WEIGHT: 100 LBS | BODY MASS INDEX: 16.66 KG/M2 | SYSTOLIC BLOOD PRESSURE: 133 MMHG | RESPIRATION RATE: 20 BRPM | DIASTOLIC BLOOD PRESSURE: 73 MMHG

## 2020-11-05 DIAGNOSIS — H25.12 NS (NUCLEAR SCLEROSIS), LEFT: Primary | ICD-10-CM

## 2020-11-05 DIAGNOSIS — Z01.818 PRE-OP TESTING: ICD-10-CM

## 2020-11-05 PROCEDURE — 63600175 PHARM REV CODE 636 W HCPCS: Mod: HCNC | Performed by: NURSE ANESTHETIST, CERTIFIED REGISTERED

## 2020-11-05 PROCEDURE — 66984 PR REMOVAL, CATARACT, W/INSRT INTRAOC LENS, W/O ENDO CYCLO: ICD-10-PCS | Mod: HCNC,LT,, | Performed by: OPHTHALMOLOGY

## 2020-11-05 PROCEDURE — D9220A PRA ANESTHESIA: ICD-10-PCS | Mod: HCNC,ANES,, | Performed by: ANESTHESIOLOGY

## 2020-11-05 PROCEDURE — D9220A PRA ANESTHESIA: Mod: HCNC,ANES,, | Performed by: ANESTHESIOLOGY

## 2020-11-05 PROCEDURE — 36000706: Mod: HCNC | Performed by: OPHTHALMOLOGY

## 2020-11-05 PROCEDURE — 37000009 HC ANESTHESIA EA ADD 15 MINS: Mod: HCNC | Performed by: OPHTHALMOLOGY

## 2020-11-05 PROCEDURE — A4217 STERILE WATER/SALINE, 500 ML: HCPCS | Mod: HCNC | Performed by: OPHTHALMOLOGY

## 2020-11-05 PROCEDURE — 63600175 PHARM REV CODE 636 W HCPCS: Mod: HCNC | Performed by: OPHTHALMOLOGY

## 2020-11-05 PROCEDURE — 66984 XCAPSL CTRC RMVL W/O ECP: CPT | Mod: HCNC,LT,, | Performed by: OPHTHALMOLOGY

## 2020-11-05 PROCEDURE — V2632 POST CHMBR INTRAOCULAR LENS: HCPCS | Mod: HCNC | Performed by: OPHTHALMOLOGY

## 2020-11-05 PROCEDURE — D9220A PRA ANESTHESIA: ICD-10-PCS | Mod: HCNC,CRNA,, | Performed by: NURSE ANESTHETIST, CERTIFIED REGISTERED

## 2020-11-05 PROCEDURE — 36000707: Mod: HCNC | Performed by: OPHTHALMOLOGY

## 2020-11-05 PROCEDURE — 37000008 HC ANESTHESIA 1ST 15 MINUTES: Mod: HCNC | Performed by: OPHTHALMOLOGY

## 2020-11-05 PROCEDURE — D9220A PRA ANESTHESIA: Mod: HCNC,CRNA,, | Performed by: NURSE ANESTHETIST, CERTIFIED REGISTERED

## 2020-11-05 PROCEDURE — 25000003 PHARM REV CODE 250: Mod: HCNC

## 2020-11-05 PROCEDURE — 71000015 HC POSTOP RECOV 1ST HR: Mod: HCNC | Performed by: OPHTHALMOLOGY

## 2020-11-05 PROCEDURE — 25000003 PHARM REV CODE 250: Mod: HCNC | Performed by: OPHTHALMOLOGY

## 2020-11-05 PROCEDURE — 63600175 PHARM REV CODE 636 W HCPCS: Mod: HCNC | Performed by: ANESTHESIOLOGY

## 2020-11-05 DEVICE — LENS 18.5: Type: IMPLANTABLE DEVICE | Site: EYE | Status: FUNCTIONAL

## 2020-11-05 RX ORDER — HYDROCODONE BITARTRATE AND ACETAMINOPHEN 5; 325 MG/1; MG/1
1 TABLET ORAL EVERY 4 HOURS PRN
Status: CANCELLED | OUTPATIENT
Start: 2020-11-05

## 2020-11-05 RX ORDER — OFLOXACIN 3 MG/ML
1 SOLUTION/ DROPS OPHTHALMIC
Status: DISPENSED | OUTPATIENT
Start: 2020-11-05

## 2020-11-05 RX ORDER — SODIUM CHLORIDE 0.9 % (FLUSH) 0.9 %
10 SYRINGE (ML) INJECTION
Status: ACTIVE | OUTPATIENT
Start: 2020-11-05

## 2020-11-05 RX ORDER — TETRACAINE HYDROCHLORIDE 5 MG/ML
1 SOLUTION OPHTHALMIC
Status: COMPLETED | OUTPATIENT
Start: 2020-11-05 | End: 2020-11-05

## 2020-11-05 RX ORDER — PHENYLEPHRINE HYDROCHLORIDE 25 MG/ML
1 SOLUTION/ DROPS OPHTHALMIC
Status: COMPLETED | OUTPATIENT
Start: 2020-11-05 | End: 2020-11-05

## 2020-11-05 RX ORDER — PREDNISOLONE ACETATE 10 MG/ML
SUSPENSION/ DROPS OPHTHALMIC
Status: DISCONTINUED | OUTPATIENT
Start: 2020-11-05 | End: 2020-11-05 | Stop reason: HOSPADM

## 2020-11-05 RX ORDER — ACETAMINOPHEN 325 MG/1
650 TABLET ORAL EVERY 4 HOURS PRN
Status: CANCELLED | OUTPATIENT
Start: 2020-11-05

## 2020-11-05 RX ORDER — LIDOCAINE HYDROCHLORIDE 20 MG/ML
INJECTION, SOLUTION INFILTRATION; PERINEURAL
Status: DISCONTINUED | OUTPATIENT
Start: 2020-11-05 | End: 2020-11-05 | Stop reason: HOSPADM

## 2020-11-05 RX ORDER — TROPICAMIDE 10 MG/ML
1 SOLUTION/ DROPS OPHTHALMIC
Status: COMPLETED | OUTPATIENT
Start: 2020-11-05 | End: 2020-11-05

## 2020-11-05 RX ORDER — WATER 1 ML/ML
IRRIGANT IRRIGATION
Status: DISCONTINUED | OUTPATIENT
Start: 2020-11-05 | End: 2020-11-05 | Stop reason: HOSPADM

## 2020-11-05 RX ORDER — CYCLOPENTOLATE HYDROCHLORIDE 10 MG/ML
1 SOLUTION/ DROPS OPHTHALMIC
Status: DISPENSED | OUTPATIENT
Start: 2020-11-05

## 2020-11-05 RX ORDER — SODIUM CHLORIDE, SODIUM LACTATE, POTASSIUM CHLORIDE, CALCIUM CHLORIDE 600; 310; 30; 20 MG/100ML; MG/100ML; MG/100ML; MG/100ML
INJECTION, SOLUTION INTRAVENOUS CONTINUOUS
Status: DISCONTINUED | OUTPATIENT
Start: 2020-11-05 | End: 2020-11-05 | Stop reason: HOSPADM

## 2020-11-05 RX ORDER — POVIDONE-IODINE 5 %
SOLUTION, NON-ORAL OPHTHALMIC (EYE)
Status: DISCONTINUED | OUTPATIENT
Start: 2020-11-05 | End: 2020-11-05 | Stop reason: HOSPADM

## 2020-11-05 RX ORDER — FENTANYL CITRATE 50 UG/ML
INJECTION, SOLUTION INTRAMUSCULAR; INTRAVENOUS
Status: DISCONTINUED | OUTPATIENT
Start: 2020-11-05 | End: 2020-11-05

## 2020-11-05 RX ADMIN — FENTANYL CITRATE 50 MCG: 50 INJECTION INTRAMUSCULAR; INTRAVENOUS at 02:11

## 2020-11-05 RX ADMIN — PHENYLEPHRINE HYDROCHLORIDE 1 DROP: 25 SOLUTION/ DROPS OPHTHALMIC at 12:11

## 2020-11-05 RX ADMIN — FENTANYL CITRATE 25 MCG: 50 INJECTION INTRAMUSCULAR; INTRAVENOUS at 02:11

## 2020-11-05 RX ADMIN — TROPICAMIDE 1 DROP: 10 SOLUTION/ DROPS OPHTHALMIC at 12:11

## 2020-11-05 RX ADMIN — TETRACAINE HYDROCHLORIDE 1 DROP: 5 SOLUTION OPHTHALMIC at 12:11

## 2020-11-05 RX ADMIN — OFLOXACIN 1 DROP: 3 SOLUTION OPHTHALMIC at 12:11

## 2020-11-05 RX ADMIN — CYCLOPENTOLATE HYDROCHLORIDE 1 DROP: 10 SOLUTION/ DROPS OPHTHALMIC at 12:11

## 2020-11-05 RX ADMIN — SODIUM CHLORIDE, SODIUM LACTATE, POTASSIUM CHLORIDE, AND CALCIUM CHLORIDE: .6; .31; .03; .02 INJECTION, SOLUTION INTRAVENOUS at 12:11

## 2020-11-05 NOTE — DISCHARGE INSTRUCTIONS
ACTIVITY LEVEL:  If you received sedation or an anesthetic, you may feel sleepy for several hours. Rest until you are more awake. Gradually resume your normal activities. Normal activity will cause no undue risk to your eye. The white part of your eye might be red - this is nothing to worry about. Wear your old glasses or sunglasses that were given to you for protection during the day.    RESTRICTIONS - for the next 7 days  · Do not lift anything over 10 pounds.  · When bending, bend at the knees not the waist.  · Do not rub the eye.  · Do not get water in the eye.  · Do not sleep on the side that had surgery.  · Protect your eye at bedtime with the shield provided.    DIET: At home, continue with liquids. If there is no nausea, you may eat a soft diet and gradually resume your normal diet. Limit alcohol intake for 24 hours.    BATHING: You may shower or bathe as normal. You may take a warm wash cloth, which has been wrung out to remove excess water, and gently remove crusting on the lashes.    MEDICATIONS: You may take Extra Strength Tylenol every 4 hours for pain/headache.     Use your drops     Today: Pink- 5 pm, 7 pm, and 9 pm     Beige - 5 pm, 7 pm,  And 9 pm     Oates- 5 pm    Tomorrow:  Resume pink and beige cap drops four times a day.  Resume grey cap drops once a day.    Place one drop in the eye that had surgery from the first bottle. Wait 5 minutes and then use the second bottle. (It does not matter which bottle is used first.) CONTINUE all glaucoma drops.   No driving, alcoholic beverages or signing legal documents for next 24 hours or while taking pain medication      WHEN TO CALL THE DOCTOR:  · Redness that increases significantly  · Pain not relieved by Tylenol    RETURN APPOINTMENT:  You will need to see Dr. Pond on Friday at [facility] .  Bring your eye kit with you on your follow-up visit. Your kit contains sunglasses, eye shield, tape.    FOR EMERGENCIES:      If any unusual problems  or difficulties occur, contact Dr. Pond at the Clinic office at [phone] during normal business hours. If after hours, call [phone].    Fall Prevention  Millions of people fall every year and injure themselves. You may have had anesthesia or sedation which may increase your risk of falling. You may have health issues that put you at an increased risk of falling.     Here are ways to reduce your risk of falling.  ·   · Make your home safe by keeping walkways clear of objects you may trip over.  · Use non-slip pads under rugs. Do not use area rugs or small throw rugs.  · Use non-slip mats in bathtubs and showers.  · Install handrails and lights on staircases.  · Do not walk in poorly lit areas.  · Do not stand on chairs or wobbly ladders.  · Use caution when reaching overhead or looking upward. This position can cause a loss of balance.  · Be sure your shoes fit properly, have non-slip bottoms and are in good condition.   · Wear shoes both inside and out. Avoid going barefoot or wearing slippers.  · Be cautious when going up and down stairs, curbs, and when walking on uneven sidewalks.  · If your balance is poor, consider using a cane or walker.  · If your fall was related to alcohol use, stop or limit alcohol intake.   · If your fall was related to use of sleeping medicines, talk to your doctor about this. You may need to reduce your dosage at bedtime if you awaken during the night to go to the bathroom.    · To reduce the need for nighttime bathroom trips:  ¨ Avoid drinking fluids for several hours before going to bed  ¨ Empty your bladder before going to bed  ¨ Men can keep a urinal at the bedside  · Stay as active as you can. Balance, flexibility, strength, and endurance all come from exercise. They all play a role in preventing falls. Ask your healthcare provider which types of activity are right for you.  · Get your vision checked on a regular basis.  · If you have pets, know where they are  before you stand up or walk so you don't trip over them.  Use night lights.

## 2020-11-05 NOTE — ANESTHESIA PREPROCEDURE EVALUATION
11/05/2020  Melba Pabon is a 70 y.o., female.    Pre-op Assessment    I have reviewed the Patient Summary Reports.       I have reviewed the Medications.     Review of Systems  Anesthesia Hx:  No problems with previous Anesthesia    Social:  Smoker, No Alcohol Use    Hematology/Oncology:         -- Anemia: --  Cancer in past history:  surgery    Cardiovascular:   Exercise tolerance: good Hypertension    Renal/:  Renal/ Normal     Hepatic/GI:  Hepatic/GI Normal    Musculoskeletal:  Musculoskeletal Normal    Neurological:   Hx of cerebral aneurysm s/p clipping   Endocrine:  Endocrine Normal    Dermatological:  Skin Normal    Psych:  Psychiatric Normal           Physical Exam  General:  Well nourished    Airway/Jaw/Neck:  AIRWAY FINDINGS: Normal       Dental:  DENTAL FINDINGS: Normal   Chest/Lungs:  Chest/Lungs Clear    Heart/Vascular:  Heart Findings: Normal       Mental Status:  Mental Status Findings:  Cooperative, Alert and Oriented         Anesthesia Plan  Type of Anesthesia, risks & benefits discussed:  Anesthesia Type:  MAC  Patient's Preference:   Intra-op Monitoring Plan: standard ASA monitors  Intra-op Monitoring Plan Comments:   Post Op Pain Control Plan: multimodal analgesia, IV/PO Opioids PRN and per primary service following discharge from PACU  Post Op Pain Control Plan Comments:   Induction:   IV  Beta Blocker:  Patient is not currently on a Beta-Blocker (No further documentation required).       Informed Consent: Patient understands risks and agrees with Anesthesia plan.  Questions answered. Anesthesia consent signed with patient.  ASA Score: 2     Day of Surgery Review of History & Physical:    H&P update referred to the surgeon.         Ready For Surgery From Anesthesia Perspective.

## 2020-11-05 NOTE — OP NOTE
Operative Date:  11/05/2020    Discharge Date:  11/05/2020    Discharge Patient Home    Report Title: Operative Note      SURGEON: Howard Pond MD     ASSISTANT:     PREOPERATIVE DIAGNOSIS: Visually significant NSC cataract,  Left Eye.    POSTOPERATIVE DIAGNOSIS: Visually-significant NSC cataract,  Left Eye.    PROCEDURE PERFORMED: Phacoemulsification of the cataract with posterior chamber intraocular lens Left Eye.    ANESTHESIA: Topical with MAC     COMPLICATIONS: None    ESTIMATED BLOOD LOSS: Minimal    INDICATIONS FOR PROCEDURE:   The patient is a pleasant 70 year old woman with increasing difficulties with activities of daily living secondary to a dense visually significant cataract in the Left Eye.  Discussions have been carried out with this patient concerning the options to surgery, risks, benefits and expectations.  The patient voiced good understanding and wished to proceed with the above procedure.    PROCEDURE IN DETAIL: The patient was brought to the operating room and received topical anesthetic to the eye and then was prepped and draped in the usual sterile fashion.  Using the Arshad ring and the guarded josé luis blade set at 0.37 mm, a partial thickness clear cornea incision was made temporally.  The paracentesis site was made at the six o'clock position.  Omidria was injected into the anterior chamber through the paracentesis.  Viscoat was then injected into the anterior chamber.  The eye was then reentered at the primary surgical site with a 2.4 mm keratome followed by continuous capsulotomy, hydrodissection, hydrodelineation and phacoemulsification of the cataract.  Residual cortical material was removed using automated irrigation-aspiration technique.  Healon was injected into the posterior chamber and an SN60WF 18.5 diopter lens was placed in the bag without difficulty. Residual viscoelastic was removed using automated irrigation-aspiration technique. The eye was re-pressurized using  BSS solution and both the paracentesis site and the primary surgical site were demonstrated to be watertight at the end of the case with Weck--Adrienne manipulation.  One drop of Ofloxacin and one drop of Pred acetate 1% was applied to the Left Eye .The eye was closed, patched and a Art shield placed.  The patient was taken to the recovery room in good and stable condition.  The patient tolerated the procedure well.  The patient was instructed to refrain from any heavy lifting, bending, stooping or straining activities, discharged home  and to follow-up in the morning for routine postoperative care with Howard Pond MD.

## 2020-11-05 NOTE — BRIEF OP NOTE
[facility]  Brief Operative Note    Surgery Date: 11/5/2020     Surgeon(s) and Role:     * Howard Pond MD - Primary    Assisting Surgeon: None    Pre-op Diagnosis:  NS (nuclear sclerosis), left [H25.12]    Post-op Diagnosis:  Post-Op Diagnosis Codes:     * NS (nuclear sclerosis), left [H25.12]    Procedure(s) (LRB):  PHACOEMULSIFICATION, CATARACT (Left)  INSERTION, IOL PROSTHESIS (Left)    Anesthesia: Local MAC    Description of the findings of the procedure(s):     Estimated Blood Loss: * No values recorded between 11/5/2020  1:58 PM and 11/5/2020  2:30 PM *         Specimens:   Specimen (12h ago, onward)    None            Discharge Note    OUTCOME: Patient tolerated treatment/procedure well without complication and is now ready for discharge.    DISPOSITION: Home or Self Care    FINAL DIAGNOSIS:  NS (nuclear sclerosis), left    FOLLOWUP: In clinic    DISCHARGE INSTRUCTIONS:    Discharge Procedure Orders   Other restrictions (specify):   Order Comments: No heavy lifting or bending for 1 week.        Clinical Reference Documents Added to Patient Instructions       Document    CATARACTS, WHAT ARE (ENGLISH)    IMPLANTING A NEW LENS (IOL), CATARACT TREATMENT (ENGLISH)    REMOVING THE CLOUDY LENS, CATARACT TREATMENT (ENGLISH)

## 2020-11-05 NOTE — TRANSFER OF CARE
"Anesthesia Transfer of Care Note    Patient: Melba Pabon    Procedure(s) Performed: Procedure(s) (LRB):  PHACOEMULSIFICATION, CATARACT (Left)  INSERTION, IOL PROSTHESIS (Left)    Patient location: [facility]    Anesthesia Type: MAC    Transport from OR: Transported from OR on room air with adequate spontaneous ventilation    Post pain: adequate analgesia    Post assessment: no apparent anesthetic complications and tolerated procedure well    Post vital signs: stable    Level of consciousness: awake, alert and oriented    Nausea/Vomiting: no nausea/vomiting    Complications: none    Transfer of care protocol was followed      Last vitals:   Visit Vitals  /74   Pulse 72   Temp 36.7 °C (98.1 °F) (Oral)   Resp 16   Ht 5' 5" (1.651 m)   Wt 45.4 kg (100 lb)   SpO2 98%   Breastfeeding No   BMI 16.64 kg/m²     "

## 2020-11-06 ENCOUNTER — OFFICE VISIT (OUTPATIENT)
Dept: OPHTHALMOLOGY | Facility: CLINIC | Age: 70
End: 2020-11-06
Payer: MEDICARE

## 2020-11-06 DIAGNOSIS — Z98.890 POST-OPERATIVE STATE: Primary | ICD-10-CM

## 2020-11-06 PROCEDURE — 99999 PR PBB SHADOW E&M-EST. PATIENT-LVL II: ICD-10-PCS | Mod: PBBFAC,HCNC,, | Performed by: OPHTHALMOLOGY

## 2020-11-06 PROCEDURE — 99999 PR PBB SHADOW E&M-EST. PATIENT-LVL II: CPT | Mod: PBBFAC,HCNC,, | Performed by: OPHTHALMOLOGY

## 2020-11-06 PROCEDURE — 99024 PR POST-OP FOLLOW-UP VISIT: ICD-10-PCS | Mod: HCNC,S$GLB,, | Performed by: OPHTHALMOLOGY

## 2020-11-06 PROCEDURE — 99024 POSTOP FOLLOW-UP VISIT: CPT | Mod: HCNC,S$GLB,, | Performed by: OPHTHALMOLOGY

## 2020-11-06 NOTE — ANESTHESIA POSTPROCEDURE EVALUATION
Anesthesia Post Evaluation    Patient: Melba Pabon    Procedure(s) Performed: Procedure(s) (LRB):  PHACOEMULSIFICATION, CATARACT (Left)  INSERTION, IOL PROSTHESIS (Left)    Final Anesthesia Type: MAC    Patient location during evaluation: PACU  Patient participation: Yes- Able to Participate  Level of consciousness: awake and alert, oriented and awake  Post-procedure vital signs: reviewed and stable  Airway patency: patent    PONV status at discharge: No PONV  Anesthetic complications: no      Cardiovascular status: blood pressure returned to baseline  Respiratory status: unassisted, spontaneous ventilation and room air  Hydration status: euvolemic  Follow-up not needed.          Vitals Value Taken Time   /74 11/05/20 1443   Temp 36.7 °C (98.1 °F) 11/05/20 1443   Pulse 72 11/05/20 1443   Resp 16 11/05/20 1443   SpO2 98 % 11/05/20 1443         No case tracking events are documented in the log.      Pain/La Score: La Score: 10 (11/5/2020  4:15 PM)

## 2020-11-07 NOTE — PROGRESS NOTES
"Subjective:       Patient ID: Melba Pabon is a 70 y.o. female.    Chief Complaint: Post-op Evaluation    HPI     DLS 11/05/2020 POD 1 Cataract ext with IOL OS       Pt reports a little pain today in the left eye , pain scale " #1"  Took some Tylenol to help .   Pt says she is happy with her vision       Gtts    Ofloxacin QID OS   Durezol QID OS   Ilevro QD OS     Last edited by Laura Silva on 11/6/2020  2:23 PM. (History)             Assessment:       1. Post-operative state        Plan:       S/p CE OS- Doing well.           CPM OS.  RTC 1 wk.  "

## 2020-11-12 ENCOUNTER — OFFICE VISIT (OUTPATIENT)
Dept: OPHTHALMOLOGY | Facility: CLINIC | Age: 70
End: 2020-11-12
Payer: MEDICARE

## 2020-11-12 DIAGNOSIS — Z98.890 POST-OPERATIVE STATE: Primary | ICD-10-CM

## 2020-11-12 DIAGNOSIS — H25.11 NUCLEAR SCLEROSIS, RIGHT: ICD-10-CM

## 2020-11-12 PROCEDURE — 3288F PR FALLS RISK ASSESSMENT DOCUMENTED: ICD-10-PCS | Mod: HCNC,CPTII,S$GLB, | Performed by: OPHTHALMOLOGY

## 2020-11-12 PROCEDURE — 1126F PR PAIN SEVERITY QUANTIFIED, NO PAIN PRESENT: ICD-10-PCS | Mod: HCNC,S$GLB,, | Performed by: OPHTHALMOLOGY

## 2020-11-12 PROCEDURE — 1101F PT FALLS ASSESS-DOCD LE1/YR: CPT | Mod: HCNC,CPTII,S$GLB, | Performed by: OPHTHALMOLOGY

## 2020-11-12 PROCEDURE — 99024 POSTOP FOLLOW-UP VISIT: CPT | Mod: HCNC,S$GLB,, | Performed by: OPHTHALMOLOGY

## 2020-11-12 PROCEDURE — 1126F AMNT PAIN NOTED NONE PRSNT: CPT | Mod: HCNC,S$GLB,, | Performed by: OPHTHALMOLOGY

## 2020-11-12 PROCEDURE — 92136 PR OPHTHAL BIOMETRY,INTRAOC LENS POW CALC: ICD-10-PCS | Mod: 26,HCNC,RT,S$GLB | Performed by: OPHTHALMOLOGY

## 2020-11-12 PROCEDURE — 92136 OPHTHALMIC BIOMETRY: CPT | Mod: 26,HCNC,RT,S$GLB | Performed by: OPHTHALMOLOGY

## 2020-11-12 PROCEDURE — 1101F PR PT FALLS ASSESS DOC 0-1 FALLS W/OUT INJ PAST YR: ICD-10-PCS | Mod: HCNC,CPTII,S$GLB, | Performed by: OPHTHALMOLOGY

## 2020-11-12 PROCEDURE — 3288F FALL RISK ASSESSMENT DOCD: CPT | Mod: HCNC,CPTII,S$GLB, | Performed by: OPHTHALMOLOGY

## 2020-11-12 PROCEDURE — 99999 PR PBB SHADOW E&M-EST. PATIENT-LVL II: CPT | Mod: PBBFAC,HCNC,, | Performed by: OPHTHALMOLOGY

## 2020-11-12 PROCEDURE — 99999 PR PBB SHADOW E&M-EST. PATIENT-LVL II: ICD-10-PCS | Mod: PBBFAC,HCNC,, | Performed by: OPHTHALMOLOGY

## 2020-11-12 PROCEDURE — 99024 PR POST-OP FOLLOW-UP VISIT: ICD-10-PCS | Mod: HCNC,S$GLB,, | Performed by: OPHTHALMOLOGY

## 2020-11-12 NOTE — PROGRESS NOTES
Subjective:       Patient ID: Melba Pabon is a 70 y.o. female.    Chief Complaint: Post-op Evaluation    HPI     DLS 11/6/20    PO 1 week PHACO with IOL OS 11/5/20 (papers for OD 11/19/20)  Pt states os doing well no pain.   Pt states not ready to have sx for od wants to wait until completely   finished with os.       Eye meds  Ofloxacin QID OS   Durezol QID OS   Ilevro QD OU      Last edited by Shy Castañeda on 11/12/2020  9:53 AM. (History)             Assessment:       1. Post-operative state    2. Nuclear sclerosis, right        Plan:       S/p CE OS- Doing well.     Visually significant cataract OD -Pt. Wants to postpone Sx until 1/14/2021.      Taper gtts OS.  Cancel sx OD on 11/19/2020.  Cataract Surgery Consent: Patient with a visually significant cataract with difficulties of ADLs, reading, driving, night vision, glare (any and all).  Discussed with Patient/Family/Caregiver: options, risks and benefits, expectations of cataract surgery, utilized an eye model with questions and answers to facilitate discussion.  Discussed lens options and patient understands that glasses may be required for optimal vision for distance and/or near vision after cataract surgery.  The Patient/Family/Caregiver  voice good understanding and patient wishes to proceed with surgery.  The patient will likely benefit from surgery and patient signed consent for Right Eye.  CE OD 1/14/2021.  RTC 3 wks to recheck OS.

## 2020-12-10 ENCOUNTER — OFFICE VISIT (OUTPATIENT)
Dept: FAMILY MEDICINE | Facility: CLINIC | Age: 70
End: 2020-12-10
Payer: MEDICARE

## 2020-12-10 VITALS
RESPIRATION RATE: 16 BRPM | OXYGEN SATURATION: 96 % | DIASTOLIC BLOOD PRESSURE: 82 MMHG | SYSTOLIC BLOOD PRESSURE: 122 MMHG

## 2020-12-10 DIAGNOSIS — Z11.1 PPD SCREENING TEST: ICD-10-CM

## 2020-12-10 DIAGNOSIS — R63.6 UNDERWEIGHT DUE TO INADEQUATE CALORIC INTAKE: ICD-10-CM

## 2020-12-10 DIAGNOSIS — I10 ESSENTIAL HYPERTENSION: Primary | ICD-10-CM

## 2020-12-10 DIAGNOSIS — I70.0 ATHEROSCLEROSIS OF AORTA: ICD-10-CM

## 2020-12-10 DIAGNOSIS — Z12.12 ENCOUNTER FOR COLORECTAL CANCER SCREENING: ICD-10-CM

## 2020-12-10 DIAGNOSIS — Z12.11 ENCOUNTER FOR COLORECTAL CANCER SCREENING: ICD-10-CM

## 2020-12-10 PROCEDURE — 1159F PR MEDICATION LIST DOCUMENTED IN MEDICAL RECORD: ICD-10-PCS | Mod: HCNC,S$GLB,, | Performed by: FAMILY MEDICINE

## 2020-12-10 PROCEDURE — 3079F PR MOST RECENT DIASTOLIC BLOOD PRESSURE 80-89 MM HG: ICD-10-PCS | Mod: HCNC,CPTII,S$GLB, | Performed by: FAMILY MEDICINE

## 2020-12-10 PROCEDURE — 3074F SYST BP LT 130 MM HG: CPT | Mod: HCNC,CPTII,S$GLB, | Performed by: FAMILY MEDICINE

## 2020-12-10 PROCEDURE — 99214 OFFICE O/P EST MOD 30 MIN: CPT | Mod: HCNC,S$GLB,, | Performed by: FAMILY MEDICINE

## 2020-12-10 PROCEDURE — 99499 RISK ADDL DX/OHS AUDIT: ICD-10-PCS | Mod: S$GLB,,, | Performed by: FAMILY MEDICINE

## 2020-12-10 PROCEDURE — 99499 UNLISTED E&M SERVICE: CPT | Mod: S$GLB,,, | Performed by: FAMILY MEDICINE

## 2020-12-10 PROCEDURE — 99999 PR PBB SHADOW E&M-EST. PATIENT-LVL V: CPT | Mod: PBBFAC,HCNC,, | Performed by: FAMILY MEDICINE

## 2020-12-10 PROCEDURE — 99999 PR PBB SHADOW E&M-EST. PATIENT-LVL V: ICD-10-PCS | Mod: PBBFAC,HCNC,, | Performed by: FAMILY MEDICINE

## 2020-12-10 PROCEDURE — 99214 PR OFFICE/OUTPT VISIT, EST, LEVL IV, 30-39 MIN: ICD-10-PCS | Mod: HCNC,S$GLB,, | Performed by: FAMILY MEDICINE

## 2020-12-10 PROCEDURE — 3079F DIAST BP 80-89 MM HG: CPT | Mod: HCNC,CPTII,S$GLB, | Performed by: FAMILY MEDICINE

## 2020-12-10 PROCEDURE — 3074F PR MOST RECENT SYSTOLIC BLOOD PRESSURE < 130 MM HG: ICD-10-PCS | Mod: HCNC,CPTII,S$GLB, | Performed by: FAMILY MEDICINE

## 2020-12-10 PROCEDURE — 1159F MED LIST DOCD IN RCRD: CPT | Mod: HCNC,S$GLB,, | Performed by: FAMILY MEDICINE

## 2020-12-10 RX ORDER — LISINOPRIL 10 MG/1
10 TABLET ORAL DAILY
Qty: 90 TABLET | Refills: 1 | Status: SHIPPED | OUTPATIENT
Start: 2020-12-10 | End: 2021-03-24 | Stop reason: SDUPTHER

## 2020-12-10 NOTE — PROGRESS NOTES
Health Maintenance Due   Topic     Shingles Vaccine (1 of 2) hx chickenpox ; inform pt can get vaccine at pharmacy.    Colorectal Cancer Screening  Pending c scope

## 2020-12-10 NOTE — PROGRESS NOTES
No chief complaint on file.      Melba Pabon is a 70 y.o. female who presents per the Chief Complaint.  Pt is known to me and was last seen by me on 8/26/2020.  All known chronic medical issues have been documented.    Hypertension  This is a chronic problem. The current episode started more than 1 year ago. The problem is unchanged. The problem is controlled. Pertinent negatives include no anxiety, blurred vision, chest pain, headaches, malaise/fatigue, neck pain, orthopnea, palpitations, peripheral edema, PND, shortness of breath or sweats. There are no associated agents to hypertension. Risk factors for coronary artery disease include sedentary lifestyle and post-menopausal state. Past treatments include ACE inhibitors and diuretics. The current treatment provides significant improvement. There are no compliance problems.  There is no history of angina, kidney disease, CAD/MI, CVA, heart failure, left ventricular hypertrophy, PVD or retinopathy. There is no history of chronic renal disease, coarctation of the aorta, hyperaldosteronism, hypercortisolism, hyperparathyroidism, a hypertension causing med, pheochromocytoma, renovascular disease, sleep apnea or a thyroid problem.       ROS  Review of Systems   Constitutional: Positive for appetite change (improving) and unexpected weight change (increasing). Negative for activity change, chills, diaphoresis, fatigue, fever and malaise/fatigue.   HENT: Negative.  Negative for congestion, ear pain, hearing loss, nosebleeds, postnasal drip, rhinorrhea, sinus pressure, sneezing, sore throat and trouble swallowing.    Eyes: Negative for blurred vision, pain and visual disturbance.   Respiratory: Negative for cough, choking and shortness of breath.    Cardiovascular: Negative for chest pain, palpitations, orthopnea, leg swelling and PND.   Gastrointestinal: Positive for nausea (improved). Negative for abdominal distention, abdominal pain, anal bleeding, blood in stool,  "constipation, diarrhea, rectal pain and vomiting.   Genitourinary: Negative for difficulty urinating, dysuria, frequency and urgency.   Musculoskeletal: Negative for arthralgias, back pain, gait problem, joint swelling, myalgias and neck pain.   Skin: Negative.    Allergic/Immunologic: Negative for environmental allergies and food allergies.   Neurological: Negative for dizziness, tremors, seizures, syncope, facial asymmetry, speech difficulty, weakness, light-headedness, numbness and headaches.   Hematological: Negative for adenopathy. Does not bruise/bleed easily.   Psychiatric/Behavioral: Negative.  Negative for confusion, decreased concentration, dysphoric mood and sleep disturbance. The patient is not nervous/anxious.        Physical Exam  Vitals:    12/10/20 1038   BP: 122/82   Pulse: (P) 90   Resp: 16   Temp: (P) 98.5 °F (36.9 °C)    Body mass index is 16.69 kg/m² (pended).  Weight: (P) 45.5 kg (100 lb 5 oz)   Height: (P) 5' 5" (165.1 cm)     Physical Exam  Constitutional:       General: She is not in acute distress.     Appearance: Normal appearance. She is well-developed. She is not ill-appearing, toxic-appearing or diaphoretic.   HENT:      Head: Normocephalic and atraumatic.      Right Ear: Hearing and external ear normal. No decreased hearing noted.      Left Ear: Hearing and external ear normal. No decreased hearing noted.      Nose: Nose normal. No nasal deformity or rhinorrhea.      Mouth/Throat:      Dentition: Normal dentition. Does not have dentures.      Pharynx: Uvula midline.   Eyes:      General: Lids are normal. No scleral icterus.        Right eye: No foreign body or discharge.         Left eye: No foreign body or discharge.      Conjunctiva/sclera: Conjunctivae normal.      Right eye: No chemosis or exudate.     Left eye: No chemosis or exudate.     Pupils: Pupils are equal, round, and reactive to light.   Neck:      Musculoskeletal: Full passive range of motion without pain, normal range of " motion and neck supple.   Cardiovascular:      Rate and Rhythm: Normal rate and regular rhythm.      Heart sounds: Normal heart sounds, S1 normal and S2 normal. No murmur. No friction rub. No gallop.    Pulmonary:      Effort: Pulmonary effort is normal. No accessory muscle usage or respiratory distress.      Breath sounds: Normal breath sounds. No decreased breath sounds, wheezing, rhonchi or rales.   Abdominal:      General: There is no distension.      Palpations: Abdomen is soft. Abdomen is not rigid.      Tenderness: There is no abdominal tenderness. There is no guarding or rebound.   Musculoskeletal: Normal range of motion.   Skin:     General: Skin is warm and dry.      Findings: No rash.   Neurological:      Mental Status: She is alert and oriented to person, place, and time.      Cranial Nerves: No cranial nerve deficit.      Sensory: No sensory deficit.      Motor: No abnormal muscle tone or seizure activity.      Coordination: Coordination normal.      Gait: Gait normal.   Psychiatric:         Attention and Perception: She is attentive.         Speech: Speech normal.         Behavior: Behavior normal. Behavior is cooperative.         Thought Content: Thought content normal.         Judgment: Judgment normal.     Assessment & Plan    Discussion of plan of care including treatment options regarding health and wellness were reviewed and discussed with patient.  Any changes to medication or treatment plan, as well as any screening blood test, imaging, or referrals to specialist, are documented.  Follow up as indicated.     1. Essential hypertension  Patient was counseled and encouraged to maintain a low sodium diet, as well as increasing physical activity.  Recommend random BP checks at home on a regular basis.  Repeat BP at end of visit was not necessary. Will continue medication at this time, and follow up in 3-6 months, or sooner if blood pressure begins to increase.   Patient requested to restart ACEI due  to previous aneurysm rupture for piece of mind.  - lisinopriL 10 MG tablet; Take 1 tablet (10 mg total) by mouth once daily.  Dispense: 90 tablet; Refill: 1    2. Atherosclerosis of aorta  Stable; no therapeutic changes at this time.     3. Underweight due to inadequate caloric intake  Increasing with appetite improvement.  Patient now drinks high calorie milk shakes with protein powder.    4. Encounter for colorectal cancer screening  Patient is due for colonoscopy.  Order placed in [facility] and patient will be contacted to schedule appointment.  I will notify patient of results once available.    - Case Request Endoscopy: COLONOSCOPY    5. PPD screening test  Will return on Monday for test placement.  Patient advised to return 48 - 72 hours to have test read.   - POCT TB Skin Test Read; Future       Follow up in about 3 months (around 3/10/2021) for Chronic Disease Management.      ACTIVE MEDICAL ISSUES:  Documented in Problem List    PAST MEDICAL HISTORY  Documented    PAST SURGICAL HISTORY:  Documented    SOCIAL HISTORY:  Documented    FAMILY HISTORY:  Documented    ALLERGIES AND MEDICATIONS: updated and reviewed.  Documented    Health Maintenance       Date Due Completion Date    TETANUS VACCINE 05/07/1969 ---    Shingles Vaccine (1 of 2) 05/07/2001 ---    Colorectal Cancer Screening 09/10/2017 9/10/2007    Lipid Panel 12/30/2020 12/30/2019    Mammogram 04/15/2021 4/15/2019    DEXA SCAN 06/05/2021 6/5/2018    High Dose Statin 12/10/2021 12/10/2020

## 2020-12-14 ENCOUNTER — CLINICAL SUPPORT (OUTPATIENT)
Dept: FAMILY MEDICINE | Facility: CLINIC | Age: 70
End: 2020-12-14
Payer: MEDICARE

## 2020-12-14 DIAGNOSIS — Z11.1 PPD SCREENING TEST: ICD-10-CM

## 2020-12-14 PROCEDURE — 86580 POCT TB SKIN TEST: ICD-10-PCS | Mod: HCNC,S$GLB,, | Performed by: FAMILY MEDICINE

## 2020-12-14 PROCEDURE — 86580 TB INTRADERMAL TEST: CPT | Mod: HCNC,S$GLB,, | Performed by: FAMILY MEDICINE

## 2020-12-14 NOTE — PROGRESS NOTES
Ppd placed in lt arm. tolerated well. Pt instructed to come for ppd reading in 48-72 hrs. Verbalized understanding.

## 2020-12-16 LAB
TB INDURATION - 48 HR READ: 0 MM
TB INDURATION - 72 HR READ: NORMAL
TB SKIN TEST - 48 HR READ: NEGATIVE
TB SKIN TEST - 72 HR READ: NORMAL

## 2020-12-29 ENCOUNTER — TELEPHONE (OUTPATIENT)
Dept: OPHTHALMOLOGY | Facility: CLINIC | Age: 70
End: 2020-12-29

## 2020-12-29 NOTE — TELEPHONE ENCOUNTER
----- Message from Mitzi Bowen sent at 12/29/2020 10:32 AM CST -----  Regarding: PT  Contact: PT  PT needs to reschedule her post op appointment on 12/31 bc her father has covid so her family and herself at in quarantine. Please call back to reschedule     Callback: [phone]

## 2021-01-01 ENCOUNTER — CLINICAL SUPPORT (OUTPATIENT)
Dept: URGENT CARE | Facility: CLINIC | Age: 71
End: 2021-01-01
Payer: MEDICARE

## 2021-01-01 VITALS — TEMPERATURE: 97 F

## 2021-01-01 DIAGNOSIS — Z11.9 SCREENING EXAMINATION FOR UNSPECIFIED INFECTIOUS DISEASE: Primary | ICD-10-CM

## 2021-01-01 LAB
CTP QC/QA: YES
SARS-COV-2 RDRP RESP QL NAA+PROBE: NEGATIVE

## 2021-01-01 PROCEDURE — U0002 COVID-19 LAB TEST NON-CDC: HCPCS | Mod: QW,S$GLB,, | Performed by: PHYSICIAN ASSISTANT

## 2021-01-01 PROCEDURE — U0002: ICD-10-PCS | Mod: QW,S$GLB,, | Performed by: PHYSICIAN ASSISTANT

## 2021-01-07 ENCOUNTER — TELEPHONE (OUTPATIENT)
Dept: OPHTHALMOLOGY | Facility: CLINIC | Age: 71
End: 2021-01-07

## 2021-01-07 ENCOUNTER — ANESTHESIA EVENT (OUTPATIENT)
Dept: SURGERY | Facility: HOSPITAL | Age: 71
End: 2021-01-07
Payer: MEDICARE

## 2021-01-07 DIAGNOSIS — H25.11 NUCLEAR SCLEROTIC CATARACT OF RIGHT EYE: Primary | ICD-10-CM

## 2021-01-07 RX ORDER — DUREZOL 0.5 MG/ML
1 EMULSION OPHTHALMIC 4 TIMES DAILY
Qty: 5 ML | Refills: 1 | Status: SHIPPED | OUTPATIENT
Start: 2021-01-14 | End: 2021-02-13

## 2021-01-07 RX ORDER — OFLOXACIN 3 MG/ML
1 SOLUTION/ DROPS OPHTHALMIC 4 TIMES DAILY
Qty: 5 ML | Refills: 1 | Status: SHIPPED | OUTPATIENT
Start: 2021-01-11 | End: 2021-01-21

## 2021-01-07 RX ORDER — NEPAFENAC 3 MG/ML
1 SUSPENSION/ DROPS OPHTHALMIC DAILY
Qty: 3 ML | Refills: 1 | Status: SHIPPED | OUTPATIENT
Start: 2021-01-11 | End: 2021-02-10

## 2021-01-08 ENCOUNTER — OFFICE VISIT (OUTPATIENT)
Dept: OPHTHALMOLOGY | Facility: CLINIC | Age: 71
End: 2021-01-08
Payer: MEDICARE

## 2021-01-08 DIAGNOSIS — Z98.890 POST-OPERATIVE STATE: Primary | ICD-10-CM

## 2021-01-08 DIAGNOSIS — H25.11 NUCLEAR SCLEROTIC CATARACT OF RIGHT EYE: ICD-10-CM

## 2021-01-08 PROCEDURE — 92136 OPHTHALMIC BIOMETRY: CPT | Mod: 26,HCNC,RT,S$GLB | Performed by: OPHTHALMOLOGY

## 2021-01-08 PROCEDURE — 1101F PT FALLS ASSESS-DOCD LE1/YR: CPT | Mod: HCNC,CPTII,S$GLB, | Performed by: OPHTHALMOLOGY

## 2021-01-08 PROCEDURE — 99999 PR PBB SHADOW E&M-EST. PATIENT-LVL II: ICD-10-PCS | Mod: PBBFAC,HCNC,, | Performed by: OPHTHALMOLOGY

## 2021-01-08 PROCEDURE — 99999 PR PBB SHADOW E&M-EST. PATIENT-LVL II: CPT | Mod: PBBFAC,HCNC,, | Performed by: OPHTHALMOLOGY

## 2021-01-08 PROCEDURE — 3288F FALL RISK ASSESSMENT DOCD: CPT | Mod: HCNC,CPTII,S$GLB, | Performed by: OPHTHALMOLOGY

## 2021-01-08 PROCEDURE — 1126F PR PAIN SEVERITY QUANTIFIED, NO PAIN PRESENT: ICD-10-PCS | Mod: HCNC,S$GLB,, | Performed by: OPHTHALMOLOGY

## 2021-01-08 PROCEDURE — 99024 POSTOP FOLLOW-UP VISIT: CPT | Mod: HCNC,S$GLB,, | Performed by: OPHTHALMOLOGY

## 2021-01-08 PROCEDURE — 1126F AMNT PAIN NOTED NONE PRSNT: CPT | Mod: HCNC,S$GLB,, | Performed by: OPHTHALMOLOGY

## 2021-01-08 PROCEDURE — 3288F PR FALLS RISK ASSESSMENT DOCUMENTED: ICD-10-PCS | Mod: HCNC,CPTII,S$GLB, | Performed by: OPHTHALMOLOGY

## 2021-01-08 PROCEDURE — 1101F PR PT FALLS ASSESS DOC 0-1 FALLS W/OUT INJ PAST YR: ICD-10-PCS | Mod: HCNC,CPTII,S$GLB, | Performed by: OPHTHALMOLOGY

## 2021-01-08 PROCEDURE — 99024 PR POST-OP FOLLOW-UP VISIT: ICD-10-PCS | Mod: HCNC,S$GLB,, | Performed by: OPHTHALMOLOGY

## 2021-01-08 PROCEDURE — 92136 PR OPHTHAL BIOMETRY,INTRAOC LENS POW CALC: ICD-10-PCS | Mod: 26,HCNC,RT,S$GLB | Performed by: OPHTHALMOLOGY

## 2021-01-12 ENCOUNTER — HOSPITAL ENCOUNTER (OUTPATIENT)
Dept: PREADMISSION TESTING | Facility: HOSPITAL | Age: 71
Discharge: HOME | End: 2021-01-12
Attending: OPHTHALMOLOGY
Payer: MEDICARE

## 2021-01-12 DIAGNOSIS — Z11.52 ENCOUNTER FOR PREOPERATIVE SCREENING LABORATORY TESTING FOR COVID-19 VIRUS: ICD-10-CM

## 2021-01-12 DIAGNOSIS — Z01.812 ENCOUNTER FOR PREOPERATIVE SCREENING LABORATORY TESTING FOR COVID-19 VIRUS: ICD-10-CM

## 2021-01-12 LAB — SARS-COV-2 RDRP RESP QL NAA+PROBE: NEGATIVE

## 2021-01-12 PROCEDURE — U0002 COVID-19 LAB TEST NON-CDC: HCPCS | Mod: HCNC

## 2021-01-14 ENCOUNTER — HOSPITAL ENCOUNTER (OUTPATIENT)
Facility: HOSPITAL | Age: 71
Discharge: HOME | End: 2021-01-14
Attending: OPHTHALMOLOGY | Admitting: OPHTHALMOLOGY
Payer: MEDICARE

## 2021-01-14 ENCOUNTER — ANESTHESIA (OUTPATIENT)
Dept: SURGERY | Facility: HOSPITAL | Age: 71
End: 2021-01-14
Payer: MEDICARE

## 2021-01-14 VITALS
WEIGHT: 105 LBS | HEART RATE: 65 BPM | DIASTOLIC BLOOD PRESSURE: 77 MMHG | SYSTOLIC BLOOD PRESSURE: 161 MMHG | OXYGEN SATURATION: 97 % | HEIGHT: 65 IN | BODY MASS INDEX: 17.49 KG/M2 | TEMPERATURE: 99 F | RESPIRATION RATE: 18 BRPM

## 2021-01-14 DIAGNOSIS — H25.11 NUCLEAR SCLEROTIC CATARACT OF RIGHT EYE: Primary | ICD-10-CM

## 2021-01-14 DIAGNOSIS — Z01.812 ENCOUNTER FOR PREOPERATIVE SCREENING LABORATORY TESTING FOR COVID-19 VIRUS: ICD-10-CM

## 2021-01-14 DIAGNOSIS — Z11.52 ENCOUNTER FOR PREOPERATIVE SCREENING LABORATORY TESTING FOR COVID-19 VIRUS: ICD-10-CM

## 2021-01-14 PROCEDURE — 36000707: Mod: HCNC | Performed by: OPHTHALMOLOGY

## 2021-01-14 PROCEDURE — 63600175 PHARM REV CODE 636 W HCPCS: Mod: HCNC | Performed by: NURSE ANESTHETIST, CERTIFIED REGISTERED

## 2021-01-14 PROCEDURE — 36000706: Mod: HCNC | Performed by: OPHTHALMOLOGY

## 2021-01-14 PROCEDURE — D9220A PRA ANESTHESIA: ICD-10-PCS | Mod: HCNC,ANES,, | Performed by: ANESTHESIOLOGY

## 2021-01-14 PROCEDURE — 37000009 HC ANESTHESIA EA ADD 15 MINS: Mod: HCNC | Performed by: OPHTHALMOLOGY

## 2021-01-14 PROCEDURE — 71000016 HC POSTOP RECOV ADDL HR: Mod: HCNC | Performed by: OPHTHALMOLOGY

## 2021-01-14 PROCEDURE — V2632 POST CHMBR INTRAOCULAR LENS: HCPCS | Mod: HCNC | Performed by: OPHTHALMOLOGY

## 2021-01-14 PROCEDURE — 25000003 PHARM REV CODE 250: Mod: HCNC

## 2021-01-14 PROCEDURE — 25000003 PHARM REV CODE 250: Mod: HCNC | Performed by: OPHTHALMOLOGY

## 2021-01-14 PROCEDURE — 25000003 PHARM REV CODE 250: Mod: HCNC | Performed by: ANESTHESIOLOGY

## 2021-01-14 PROCEDURE — 63600175 PHARM REV CODE 636 W HCPCS: Mod: HCNC | Performed by: OPHTHALMOLOGY

## 2021-01-14 PROCEDURE — 71000015 HC POSTOP RECOV 1ST HR: Mod: HCNC | Performed by: OPHTHALMOLOGY

## 2021-01-14 PROCEDURE — D9220A PRA ANESTHESIA: Mod: HCNC,CRNA,, | Performed by: NURSE ANESTHETIST, CERTIFIED REGISTERED

## 2021-01-14 PROCEDURE — D9220A PRA ANESTHESIA: Mod: HCNC,ANES,, | Performed by: ANESTHESIOLOGY

## 2021-01-14 PROCEDURE — 66984 PR REMOVAL, CATARACT, W/INSRT INTRAOC LENS, W/O ENDO CYCLO: ICD-10-PCS | Mod: 79,HCNC,RT, | Performed by: OPHTHALMOLOGY

## 2021-01-14 PROCEDURE — D9220A PRA ANESTHESIA: ICD-10-PCS | Mod: HCNC,CRNA,, | Performed by: NURSE ANESTHETIST, CERTIFIED REGISTERED

## 2021-01-14 PROCEDURE — 37000008 HC ANESTHESIA 1ST 15 MINUTES: Mod: HCNC | Performed by: OPHTHALMOLOGY

## 2021-01-14 PROCEDURE — 66984 XCAPSL CTRC RMVL W/O ECP: CPT | Mod: 79,HCNC,RT, | Performed by: OPHTHALMOLOGY

## 2021-01-14 DEVICE — LENS 18.0: Type: IMPLANTABLE DEVICE | Site: EYE | Status: FUNCTIONAL

## 2021-01-14 RX ORDER — PREDNISOLONE ACETATE 10 MG/ML
SUSPENSION/ DROPS OPHTHALMIC
Status: DISCONTINUED | OUTPATIENT
Start: 2021-01-14 | End: 2021-01-14 | Stop reason: HOSPADM

## 2021-01-14 RX ORDER — OFLOXACIN 3 MG/ML
1 SOLUTION/ DROPS OPHTHALMIC
Status: DISPENSED | OUTPATIENT
Start: 2021-01-14

## 2021-01-14 RX ORDER — LIDOCAINE HYDROCHLORIDE 10 MG/ML
1 INJECTION, SOLUTION EPIDURAL; INFILTRATION; INTRACAUDAL; PERINEURAL ONCE
Status: DISCONTINUED | OUTPATIENT
Start: 2021-01-14 | End: 2021-01-14 | Stop reason: HOSPADM

## 2021-01-14 RX ORDER — SODIUM CHLORIDE 0.9 % (FLUSH) 0.9 %
10 SYRINGE (ML) INJECTION
Status: ACTIVE | OUTPATIENT
Start: 2021-01-14

## 2021-01-14 RX ORDER — CYCLOPENTOLATE HYDROCHLORIDE 10 MG/ML
1 SOLUTION/ DROPS OPHTHALMIC
Status: DISPENSED | OUTPATIENT
Start: 2021-01-14

## 2021-01-14 RX ORDER — FENTANYL CITRATE 50 UG/ML
INJECTION, SOLUTION INTRAMUSCULAR; INTRAVENOUS
Status: DISCONTINUED | OUTPATIENT
Start: 2021-01-14 | End: 2021-01-14

## 2021-01-14 RX ORDER — ACETAMINOPHEN 325 MG/1
650 TABLET ORAL EVERY 4 HOURS PRN
Status: DISCONTINUED | OUTPATIENT
Start: 2021-01-14 | End: 2021-01-14 | Stop reason: HOSPADM

## 2021-01-14 RX ORDER — TROPICAMIDE 10 MG/ML
1 SOLUTION/ DROPS OPHTHALMIC
Status: COMPLETED | OUTPATIENT
Start: 2021-01-14 | End: 2021-01-14

## 2021-01-14 RX ORDER — POVIDONE-IODINE 5 %
SOLUTION, NON-ORAL OPHTHALMIC (EYE)
Status: DISCONTINUED | OUTPATIENT
Start: 2021-01-14 | End: 2021-01-14 | Stop reason: HOSPADM

## 2021-01-14 RX ORDER — HYDROCODONE BITARTRATE AND ACETAMINOPHEN 5; 325 MG/1; MG/1
1 TABLET ORAL EVERY 4 HOURS PRN
Status: DISCONTINUED | OUTPATIENT
Start: 2021-01-14 | End: 2021-01-14 | Stop reason: HOSPADM

## 2021-01-14 RX ORDER — SODIUM CHLORIDE 9 MG/ML
INJECTION, SOLUTION INTRAVENOUS CONTINUOUS
Status: DISCONTINUED | OUTPATIENT
Start: 2021-01-14 | End: 2021-01-14 | Stop reason: HOSPADM

## 2021-01-14 RX ORDER — PHENYLEPHRINE HYDROCHLORIDE 25 MG/ML
1 SOLUTION/ DROPS OPHTHALMIC
Status: COMPLETED | OUTPATIENT
Start: 2021-01-14 | End: 2021-01-14

## 2021-01-14 RX ORDER — LIDOCAINE HYDROCHLORIDE 20 MG/ML
INJECTION, SOLUTION INFILTRATION; PERINEURAL
Status: DISCONTINUED | OUTPATIENT
Start: 2021-01-14 | End: 2021-01-14 | Stop reason: HOSPADM

## 2021-01-14 RX ORDER — TETRACAINE HYDROCHLORIDE 5 MG/ML
1 SOLUTION OPHTHALMIC
Status: COMPLETED | OUTPATIENT
Start: 2021-01-14 | End: 2021-01-14

## 2021-01-14 RX ADMIN — ACETAMINOPHEN 650 MG: 325 TABLET ORAL at 12:01

## 2021-01-14 RX ADMIN — PHENYLEPHRINE HYDROCHLORIDE 1 DROP: 25 SOLUTION/ DROPS OPHTHALMIC at 09:01

## 2021-01-14 RX ADMIN — SODIUM CHLORIDE 10 ML/HR: 0.9 INJECTION, SOLUTION INTRAVENOUS at 09:01

## 2021-01-14 RX ADMIN — TETRACAINE HYDROCHLORIDE 1 DROP: 5 SOLUTION OPHTHALMIC at 09:01

## 2021-01-14 RX ADMIN — OFLOXACIN 1 DROP: 3 SOLUTION OPHTHALMIC at 09:01

## 2021-01-14 RX ADMIN — FENTANYL CITRATE 25 MCG: 50 INJECTION, SOLUTION INTRAMUSCULAR; INTRAVENOUS at 11:01

## 2021-01-14 RX ADMIN — TROPICAMIDE 1 DROP: 10 SOLUTION/ DROPS OPHTHALMIC at 10:01

## 2021-01-14 RX ADMIN — CYCLOPENTOLATE HYDROCHLORIDE 1 DROP: 10 SOLUTION/ DROPS OPHTHALMIC at 09:01

## 2021-01-14 RX ADMIN — TETRACAINE HYDROCHLORIDE 1 DROP: 5 SOLUTION OPHTHALMIC at 10:01

## 2021-01-14 RX ADMIN — TROPICAMIDE 1 DROP: 10 SOLUTION/ DROPS OPHTHALMIC at 09:01

## 2021-01-14 RX ADMIN — PHENYLEPHRINE HYDROCHLORIDE 1 DROP: 25 SOLUTION/ DROPS OPHTHALMIC at 10:01

## 2021-01-14 RX ADMIN — CYCLOPENTOLATE HYDROCHLORIDE 1 DROP: 10 SOLUTION/ DROPS OPHTHALMIC at 10:01

## 2021-01-14 RX ADMIN — OFLOXACIN 1 DROP: 3 SOLUTION OPHTHALMIC at 10:01

## 2021-01-14 RX ADMIN — FENTANYL CITRATE 50 MCG: 50 INJECTION, SOLUTION INTRAMUSCULAR; INTRAVENOUS at 11:01

## 2021-01-15 ENCOUNTER — OFFICE VISIT (OUTPATIENT)
Dept: OPHTHALMOLOGY | Facility: CLINIC | Age: 71
End: 2021-01-15
Payer: MEDICARE

## 2021-01-15 VITALS — DIASTOLIC BLOOD PRESSURE: 90 MMHG | SYSTOLIC BLOOD PRESSURE: 135 MMHG | HEART RATE: 80 BPM

## 2021-01-15 DIAGNOSIS — Z98.890 POST-OPERATIVE STATE: Primary | ICD-10-CM

## 2021-01-15 PROCEDURE — 99024 POSTOP FOLLOW-UP VISIT: CPT | Mod: HCNC,S$GLB,, | Performed by: OPHTHALMOLOGY

## 2021-01-15 PROCEDURE — 3288F PR FALLS RISK ASSESSMENT DOCUMENTED: ICD-10-PCS | Mod: HCNC,CPTII,S$GLB, | Performed by: OPHTHALMOLOGY

## 2021-01-15 PROCEDURE — 1101F PT FALLS ASSESS-DOCD LE1/YR: CPT | Mod: HCNC,CPTII,S$GLB, | Performed by: OPHTHALMOLOGY

## 2021-01-15 PROCEDURE — 1101F PR PT FALLS ASSESS DOC 0-1 FALLS W/OUT INJ PAST YR: ICD-10-PCS | Mod: HCNC,CPTII,S$GLB, | Performed by: OPHTHALMOLOGY

## 2021-01-15 PROCEDURE — 1126F PR PAIN SEVERITY QUANTIFIED, NO PAIN PRESENT: ICD-10-PCS | Mod: HCNC,S$GLB,, | Performed by: OPHTHALMOLOGY

## 2021-01-15 PROCEDURE — 1126F AMNT PAIN NOTED NONE PRSNT: CPT | Mod: HCNC,S$GLB,, | Performed by: OPHTHALMOLOGY

## 2021-01-15 PROCEDURE — 99999 PR PBB SHADOW E&M-EST. PATIENT-LVL III: ICD-10-PCS | Mod: PBBFAC,HCNC,, | Performed by: OPHTHALMOLOGY

## 2021-01-15 PROCEDURE — 3288F FALL RISK ASSESSMENT DOCD: CPT | Mod: HCNC,CPTII,S$GLB, | Performed by: OPHTHALMOLOGY

## 2021-01-15 PROCEDURE — 99999 PR PBB SHADOW E&M-EST. PATIENT-LVL III: CPT | Mod: PBBFAC,HCNC,, | Performed by: OPHTHALMOLOGY

## 2021-01-15 PROCEDURE — 99024 PR POST-OP FOLLOW-UP VISIT: ICD-10-PCS | Mod: HCNC,S$GLB,, | Performed by: OPHTHALMOLOGY

## 2021-01-22 ENCOUNTER — OFFICE VISIT (OUTPATIENT)
Dept: OPHTHALMOLOGY | Facility: CLINIC | Age: 71
End: 2021-01-22
Payer: MEDICARE

## 2021-01-22 DIAGNOSIS — Z98.890 POST-OPERATIVE STATE: Primary | ICD-10-CM

## 2021-01-22 PROCEDURE — 99024 PR POST-OP FOLLOW-UP VISIT: ICD-10-PCS | Mod: HCNC,S$GLB,, | Performed by: OPHTHALMOLOGY

## 2021-01-22 PROCEDURE — 3288F FALL RISK ASSESSMENT DOCD: CPT | Mod: HCNC,CPTII,S$GLB, | Performed by: OPHTHALMOLOGY

## 2021-01-22 PROCEDURE — 99999 PR PBB SHADOW E&M-EST. PATIENT-LVL II: ICD-10-PCS | Mod: PBBFAC,HCNC,, | Performed by: OPHTHALMOLOGY

## 2021-01-22 PROCEDURE — 1101F PT FALLS ASSESS-DOCD LE1/YR: CPT | Mod: HCNC,CPTII,S$GLB, | Performed by: OPHTHALMOLOGY

## 2021-01-22 PROCEDURE — 3288F PR FALLS RISK ASSESSMENT DOCUMENTED: ICD-10-PCS | Mod: HCNC,CPTII,S$GLB, | Performed by: OPHTHALMOLOGY

## 2021-01-22 PROCEDURE — 1126F PR PAIN SEVERITY QUANTIFIED, NO PAIN PRESENT: ICD-10-PCS | Mod: HCNC,S$GLB,, | Performed by: OPHTHALMOLOGY

## 2021-01-22 PROCEDURE — 1126F AMNT PAIN NOTED NONE PRSNT: CPT | Mod: HCNC,S$GLB,, | Performed by: OPHTHALMOLOGY

## 2021-01-22 PROCEDURE — 1101F PR PT FALLS ASSESS DOC 0-1 FALLS W/OUT INJ PAST YR: ICD-10-PCS | Mod: HCNC,CPTII,S$GLB, | Performed by: OPHTHALMOLOGY

## 2021-01-22 PROCEDURE — 99999 PR PBB SHADOW E&M-EST. PATIENT-LVL II: CPT | Mod: PBBFAC,HCNC,, | Performed by: OPHTHALMOLOGY

## 2021-01-22 PROCEDURE — 99024 POSTOP FOLLOW-UP VISIT: CPT | Mod: HCNC,S$GLB,, | Performed by: OPHTHALMOLOGY

## 2021-02-08 ENCOUNTER — PATIENT OUTREACH (OUTPATIENT)
Dept: ADMINISTRATIVE | Facility: HOSPITAL | Age: 71
End: 2021-02-08

## 2021-02-08 DIAGNOSIS — Z12.11 SCREEN FOR COLON CANCER: Primary | ICD-10-CM

## 2021-02-17 ENCOUNTER — IMMUNIZATION (OUTPATIENT)
Dept: INTERNAL MEDICINE | Facility: CLINIC | Age: 71
End: 2021-02-17
Payer: MEDICARE

## 2021-02-17 DIAGNOSIS — Z23 NEED FOR VACCINATION: Primary | ICD-10-CM

## 2021-02-17 PROCEDURE — 91300 COVID-19, MRNA, LNP-S, PF, 30 MCG/0.3 ML DOSE VACCINE: CPT | Mod: HCNC,,, | Performed by: INTERNAL MEDICINE

## 2021-02-17 PROCEDURE — 91300 COVID-19, MRNA, LNP-S, PF, 30 MCG/0.3 ML DOSE VACCINE: ICD-10-PCS | Mod: HCNC,,, | Performed by: INTERNAL MEDICINE

## 2021-02-17 PROCEDURE — 0001A COVID-19, MRNA, LNP-S, PF, 30 MCG/0.3 ML DOSE VACCINE: CPT | Mod: HCNC,CV19,, | Performed by: INTERNAL MEDICINE

## 2021-02-17 PROCEDURE — 0001A COVID-19, MRNA, LNP-S, PF, 30 MCG/0.3 ML DOSE VACCINE: ICD-10-PCS | Mod: HCNC,CV19,, | Performed by: INTERNAL MEDICINE

## 2021-02-22 ENCOUNTER — PES CALL (OUTPATIENT)
Dept: ADMINISTRATIVE | Facility: CLINIC | Age: 71
End: 2021-02-22

## 2021-03-09 ENCOUNTER — TELEPHONE (OUTPATIENT)
Dept: FAMILY MEDICINE | Facility: CLINIC | Age: 71
End: 2021-03-09

## 2021-03-09 ENCOUNTER — PATIENT OUTREACH (OUTPATIENT)
Dept: ADMINISTRATIVE | Facility: HOSPITAL | Age: 71
End: 2021-03-09

## 2021-03-09 DIAGNOSIS — Z12.31 ENCOUNTER FOR SCREENING MAMMOGRAM FOR MALIGNANT NEOPLASM OF BREAST: Primary | ICD-10-CM

## 2021-03-09 NOTE — TELEPHONE ENCOUNTER
Pt on list regarding cologuard mail out for patient. Need to confirm if patient received orders. Need to get patient to get it done and mailed out.

## 2021-03-10 ENCOUNTER — IMMUNIZATION (OUTPATIENT)
Dept: INTERNAL MEDICINE | Facility: CLINIC | Age: 71
End: 2021-03-10
Payer: MEDICARE

## 2021-03-10 DIAGNOSIS — Z23 NEED FOR VACCINATION: Primary | ICD-10-CM

## 2021-03-10 PROCEDURE — 91300 COVID-19, MRNA, LNP-S, PF, 30 MCG/0.3 ML DOSE VACCINE: CPT | Mod: PBBFAC | Performed by: INTERNAL MEDICINE

## 2021-03-10 PROCEDURE — 0002A COVID-19, MRNA, LNP-S, PF, 30 MCG/0.3 ML DOSE VACCINE: CPT | Mod: PBBFAC | Performed by: INTERNAL MEDICINE

## 2021-03-18 ENCOUNTER — OFFICE VISIT (OUTPATIENT)
Dept: OPHTHALMOLOGY | Facility: CLINIC | Age: 71
End: 2021-03-18
Payer: MEDICARE

## 2021-03-18 DIAGNOSIS — Z98.890 POST-OPERATIVE STATE: Primary | ICD-10-CM

## 2021-03-18 PROCEDURE — 99024 POSTOP FOLLOW-UP VISIT: CPT | Mod: S$GLB,,, | Performed by: OPHTHALMOLOGY

## 2021-03-18 PROCEDURE — 99999 PR PBB SHADOW E&M-EST. PATIENT-LVL II: ICD-10-PCS | Mod: PBBFAC,,, | Performed by: OPHTHALMOLOGY

## 2021-03-18 PROCEDURE — 99999 PR PBB SHADOW E&M-EST. PATIENT-LVL II: CPT | Mod: PBBFAC,,, | Performed by: OPHTHALMOLOGY

## 2021-03-18 PROCEDURE — 1101F PR PT FALLS ASSESS DOC 0-1 FALLS W/OUT INJ PAST YR: ICD-10-PCS | Mod: CPTII,S$GLB,, | Performed by: OPHTHALMOLOGY

## 2021-03-18 PROCEDURE — 3288F PR FALLS RISK ASSESSMENT DOCUMENTED: ICD-10-PCS | Mod: CPTII,S$GLB,, | Performed by: OPHTHALMOLOGY

## 2021-03-18 PROCEDURE — 99024 PR POST-OP FOLLOW-UP VISIT: ICD-10-PCS | Mod: S$GLB,,, | Performed by: OPHTHALMOLOGY

## 2021-03-18 PROCEDURE — 1126F PR PAIN SEVERITY QUANTIFIED, NO PAIN PRESENT: ICD-10-PCS | Mod: S$GLB,,, | Performed by: OPHTHALMOLOGY

## 2021-03-18 PROCEDURE — 1126F AMNT PAIN NOTED NONE PRSNT: CPT | Mod: S$GLB,,, | Performed by: OPHTHALMOLOGY

## 2021-03-18 PROCEDURE — 3288F FALL RISK ASSESSMENT DOCD: CPT | Mod: CPTII,S$GLB,, | Performed by: OPHTHALMOLOGY

## 2021-03-18 PROCEDURE — 1101F PT FALLS ASSESS-DOCD LE1/YR: CPT | Mod: CPTII,S$GLB,, | Performed by: OPHTHALMOLOGY

## 2021-03-24 DIAGNOSIS — I70.0 ATHEROSCLEROSIS OF AORTA: ICD-10-CM

## 2021-03-24 DIAGNOSIS — I10 ESSENTIAL HYPERTENSION: ICD-10-CM

## 2021-03-29 RX ORDER — LISINOPRIL 10 MG/1
10 TABLET ORAL DAILY
Qty: 90 TABLET | Refills: 1 | Status: SHIPPED | OUTPATIENT
Start: 2021-03-29 | End: 2021-05-03 | Stop reason: SDUPTHER

## 2021-03-29 RX ORDER — ATORVASTATIN CALCIUM 20 MG/1
20 TABLET, FILM COATED ORAL DAILY
Qty: 90 TABLET | Refills: 1 | Status: SHIPPED | OUTPATIENT
Start: 2021-03-29 | End: 2021-05-03 | Stop reason: SDUPTHER

## 2021-04-29 ENCOUNTER — OFFICE VISIT (OUTPATIENT)
Dept: OPHTHALMOLOGY | Facility: CLINIC | Age: 71
End: 2021-04-29
Payer: MEDICARE

## 2021-04-29 DIAGNOSIS — Z98.890 POST-OPERATIVE STATE: Primary | ICD-10-CM

## 2021-04-29 PROCEDURE — 3288F FALL RISK ASSESSMENT DOCD: CPT | Mod: CPTII,S$GLB,, | Performed by: OPHTHALMOLOGY

## 2021-04-29 PROCEDURE — 99999 PR PBB SHADOW E&M-EST. PATIENT-LVL II: ICD-10-PCS | Mod: PBBFAC,,, | Performed by: OPHTHALMOLOGY

## 2021-04-29 PROCEDURE — 1126F PR PAIN SEVERITY QUANTIFIED, NO PAIN PRESENT: ICD-10-PCS | Mod: S$GLB,,, | Performed by: OPHTHALMOLOGY

## 2021-04-29 PROCEDURE — 99024 POSTOP FOLLOW-UP VISIT: CPT | Mod: S$GLB,,, | Performed by: OPHTHALMOLOGY

## 2021-04-29 PROCEDURE — 1101F PR PT FALLS ASSESS DOC 0-1 FALLS W/OUT INJ PAST YR: ICD-10-PCS | Mod: CPTII,S$GLB,, | Performed by: OPHTHALMOLOGY

## 2021-04-29 PROCEDURE — 1101F PT FALLS ASSESS-DOCD LE1/YR: CPT | Mod: CPTII,S$GLB,, | Performed by: OPHTHALMOLOGY

## 2021-04-29 PROCEDURE — 99999 PR PBB SHADOW E&M-EST. PATIENT-LVL II: CPT | Mod: PBBFAC,,, | Performed by: OPHTHALMOLOGY

## 2021-04-29 PROCEDURE — 3288F PR FALLS RISK ASSESSMENT DOCUMENTED: ICD-10-PCS | Mod: CPTII,S$GLB,, | Performed by: OPHTHALMOLOGY

## 2021-04-29 PROCEDURE — 99024 PR POST-OP FOLLOW-UP VISIT: ICD-10-PCS | Mod: S$GLB,,, | Performed by: OPHTHALMOLOGY

## 2021-04-29 PROCEDURE — 1126F AMNT PAIN NOTED NONE PRSNT: CPT | Mod: S$GLB,,, | Performed by: OPHTHALMOLOGY

## 2021-05-03 ENCOUNTER — HOSPITAL ENCOUNTER (OUTPATIENT)
Dept: RADIOLOGY | Facility: HOSPITAL | Age: 71
Discharge: HOME | End: 2021-05-03
Attending: FAMILY MEDICINE
Payer: MEDICARE

## 2021-05-03 ENCOUNTER — OFFICE VISIT (OUTPATIENT)
Dept: FAMILY MEDICINE | Facility: CLINIC | Age: 71
End: 2021-05-03
Payer: MEDICARE

## 2021-05-03 ENCOUNTER — TELEPHONE (OUTPATIENT)
Dept: ENDOSCOPY | Facility: HOSPITAL | Age: 71
End: 2021-05-03

## 2021-05-03 VITALS
HEIGHT: 65 IN | SYSTOLIC BLOOD PRESSURE: 138 MMHG | RESPIRATION RATE: 20 BRPM | OXYGEN SATURATION: 96 % | DIASTOLIC BLOOD PRESSURE: 86 MMHG | WEIGHT: 111.75 LBS | TEMPERATURE: 98 F | BODY MASS INDEX: 18.62 KG/M2 | HEART RATE: 77 BPM

## 2021-05-03 DIAGNOSIS — E55.9 VITAMIN D INSUFFICIENCY: ICD-10-CM

## 2021-05-03 DIAGNOSIS — I70.0 ATHEROSCLEROSIS OF AORTA: ICD-10-CM

## 2021-05-03 DIAGNOSIS — Z86.39 PERSONAL HISTORY OF OTHER ENDOCRINE, NUTRITIONAL AND METABOLIC DISEASE: ICD-10-CM

## 2021-05-03 DIAGNOSIS — C49.A0 GASTROINTESTINAL STROMAL TUMOR (GIST): ICD-10-CM

## 2021-05-03 DIAGNOSIS — R63.6 UNDERWEIGHT DUE TO INADEQUATE CALORIC INTAKE: ICD-10-CM

## 2021-05-03 DIAGNOSIS — Z12.31 ENCOUNTER FOR SCREENING MAMMOGRAM FOR MALIGNANT NEOPLASM OF BREAST: ICD-10-CM

## 2021-05-03 DIAGNOSIS — I10 ESSENTIAL HYPERTENSION: Primary | ICD-10-CM

## 2021-05-03 PROCEDURE — 1101F PR PT FALLS ASSESS DOC 0-1 FALLS W/OUT INJ PAST YR: ICD-10-PCS | Mod: CPTII,S$GLB,, | Performed by: FAMILY MEDICINE

## 2021-05-03 PROCEDURE — 99999 PR PBB SHADOW E&M-EST. PATIENT-LVL III: CPT | Mod: PBBFAC,,, | Performed by: FAMILY MEDICINE

## 2021-05-03 PROCEDURE — 77067 MAMMO DIGITAL SCREENING BILAT WITH TOMO: ICD-10-PCS | Mod: 26,,, | Performed by: RADIOLOGY

## 2021-05-03 PROCEDURE — 99499 UNLISTED E&M SERVICE: CPT | Mod: S$GLB,,, | Performed by: FAMILY MEDICINE

## 2021-05-03 PROCEDURE — 99214 PR OFFICE/OUTPT VISIT, EST, LEVL IV, 30-39 MIN: ICD-10-PCS | Mod: S$GLB,,, | Performed by: FAMILY MEDICINE

## 2021-05-03 PROCEDURE — 3008F PR BODY MASS INDEX (BMI) DOCUMENTED: ICD-10-PCS | Mod: CPTII,S$GLB,, | Performed by: FAMILY MEDICINE

## 2021-05-03 PROCEDURE — 1159F MED LIST DOCD IN RCRD: CPT | Mod: S$GLB,,, | Performed by: FAMILY MEDICINE

## 2021-05-03 PROCEDURE — 77067 SCR MAMMO BI INCL CAD: CPT | Mod: TC

## 2021-05-03 PROCEDURE — 3008F BODY MASS INDEX DOCD: CPT | Mod: CPTII,S$GLB,, | Performed by: FAMILY MEDICINE

## 2021-05-03 PROCEDURE — 3075F PR MOST RECENT SYSTOLIC BLOOD PRESS GE 130-139MM HG: ICD-10-PCS | Mod: CPTII,S$GLB,, | Performed by: FAMILY MEDICINE

## 2021-05-03 PROCEDURE — 99999 PR PBB SHADOW E&M-EST. PATIENT-LVL III: ICD-10-PCS | Mod: PBBFAC,,, | Performed by: FAMILY MEDICINE

## 2021-05-03 PROCEDURE — 77063 BREAST TOMOSYNTHESIS BI: CPT | Mod: 26,,, | Performed by: RADIOLOGY

## 2021-05-03 PROCEDURE — 3075F SYST BP GE 130 - 139MM HG: CPT | Mod: CPTII,S$GLB,, | Performed by: FAMILY MEDICINE

## 2021-05-03 PROCEDURE — 1159F PR MEDICATION LIST DOCUMENTED IN MEDICAL RECORD: ICD-10-PCS | Mod: S$GLB,,, | Performed by: FAMILY MEDICINE

## 2021-05-03 PROCEDURE — 77067 SCR MAMMO BI INCL CAD: CPT | Mod: 26,,, | Performed by: RADIOLOGY

## 2021-05-03 PROCEDURE — 77063 MAMMO DIGITAL SCREENING BILAT WITH TOMO: ICD-10-PCS | Mod: 26,,, | Performed by: RADIOLOGY

## 2021-05-03 PROCEDURE — 99214 OFFICE O/P EST MOD 30 MIN: CPT | Mod: S$GLB,,, | Performed by: FAMILY MEDICINE

## 2021-05-03 PROCEDURE — 3079F PR MOST RECENT DIASTOLIC BLOOD PRESSURE 80-89 MM HG: ICD-10-PCS | Mod: CPTII,S$GLB,, | Performed by: FAMILY MEDICINE

## 2021-05-03 PROCEDURE — 3079F DIAST BP 80-89 MM HG: CPT | Mod: CPTII,S$GLB,, | Performed by: FAMILY MEDICINE

## 2021-05-03 PROCEDURE — 1101F PT FALLS ASSESS-DOCD LE1/YR: CPT | Mod: CPTII,S$GLB,, | Performed by: FAMILY MEDICINE

## 2021-05-03 PROCEDURE — 3288F PR FALLS RISK ASSESSMENT DOCUMENTED: ICD-10-PCS | Mod: CPTII,S$GLB,, | Performed by: FAMILY MEDICINE

## 2021-05-03 PROCEDURE — 99499 RISK ADDL DX/OHS AUDIT: ICD-10-PCS | Mod: S$GLB,,, | Performed by: FAMILY MEDICINE

## 2021-05-03 PROCEDURE — 1126F PR PAIN SEVERITY QUANTIFIED, NO PAIN PRESENT: ICD-10-PCS | Mod: S$GLB,,, | Performed by: FAMILY MEDICINE

## 2021-05-03 PROCEDURE — 1126F AMNT PAIN NOTED NONE PRSNT: CPT | Mod: S$GLB,,, | Performed by: FAMILY MEDICINE

## 2021-05-03 PROCEDURE — 3288F FALL RISK ASSESSMENT DOCD: CPT | Mod: CPTII,S$GLB,, | Performed by: FAMILY MEDICINE

## 2021-05-03 RX ORDER — LISINOPRIL 10 MG/1
10 TABLET ORAL DAILY
Qty: 90 TABLET | Refills: 1 | Status: SHIPPED | OUTPATIENT
Start: 2021-05-03 | End: 2021-06-14 | Stop reason: SDUPTHER

## 2021-05-03 RX ORDER — ATORVASTATIN CALCIUM 20 MG/1
20 TABLET, FILM COATED ORAL DAILY
Qty: 90 TABLET | Refills: 1 | Status: SHIPPED | OUTPATIENT
Start: 2021-05-03 | End: 2022-03-22 | Stop reason: SDUPTHER

## 2021-05-03 RX ORDER — CYPROHEPTADINE HYDROCHLORIDE 4 MG/1
4 TABLET ORAL 3 TIMES DAILY PRN
Qty: 90 TABLET | Refills: 2 | Status: SHIPPED | OUTPATIENT
Start: 2021-05-03 | End: 2022-02-25 | Stop reason: SDUPTHER

## 2021-05-27 ENCOUNTER — OFFICE VISIT (OUTPATIENT)
Dept: OPHTHALMOLOGY | Facility: CLINIC | Age: 71
End: 2021-05-27
Payer: MEDICARE

## 2021-05-27 DIAGNOSIS — Z98.890 POST-OPERATIVE STATE: Primary | ICD-10-CM

## 2021-05-27 PROCEDURE — 3288F FALL RISK ASSESSMENT DOCD: CPT | Mod: CPTII,S$GLB,, | Performed by: OPHTHALMOLOGY

## 2021-05-27 PROCEDURE — 3288F PR FALLS RISK ASSESSMENT DOCUMENTED: ICD-10-PCS | Mod: CPTII,S$GLB,, | Performed by: OPHTHALMOLOGY

## 2021-05-27 PROCEDURE — 1126F AMNT PAIN NOTED NONE PRSNT: CPT | Mod: S$GLB,,, | Performed by: OPHTHALMOLOGY

## 2021-05-27 PROCEDURE — 99024 POSTOP FOLLOW-UP VISIT: CPT | Mod: S$GLB,,, | Performed by: OPHTHALMOLOGY

## 2021-05-27 PROCEDURE — 1101F PR PT FALLS ASSESS DOC 0-1 FALLS W/OUT INJ PAST YR: ICD-10-PCS | Mod: CPTII,S$GLB,, | Performed by: OPHTHALMOLOGY

## 2021-05-27 PROCEDURE — 99999 PR PBB SHADOW E&M-EST. PATIENT-LVL II: CPT | Mod: PBBFAC,,, | Performed by: OPHTHALMOLOGY

## 2021-05-27 PROCEDURE — 99024 PR POST-OP FOLLOW-UP VISIT: ICD-10-PCS | Mod: S$GLB,,, | Performed by: OPHTHALMOLOGY

## 2021-05-27 PROCEDURE — 99999 PR PBB SHADOW E&M-EST. PATIENT-LVL II: ICD-10-PCS | Mod: PBBFAC,,, | Performed by: OPHTHALMOLOGY

## 2021-05-27 PROCEDURE — 1101F PT FALLS ASSESS-DOCD LE1/YR: CPT | Mod: CPTII,S$GLB,, | Performed by: OPHTHALMOLOGY

## 2021-05-27 PROCEDURE — 1126F PR PAIN SEVERITY QUANTIFIED, NO PAIN PRESENT: ICD-10-PCS | Mod: S$GLB,,, | Performed by: OPHTHALMOLOGY

## 2021-06-14 DIAGNOSIS — I10 ESSENTIAL HYPERTENSION: ICD-10-CM

## 2021-06-14 RX ORDER — LISINOPRIL 10 MG/1
10 TABLET ORAL DAILY
Qty: 90 TABLET | Refills: 1 | Status: SHIPPED | OUTPATIENT
Start: 2021-06-14 | End: 2022-02-25 | Stop reason: SDUPTHER

## 2021-06-18 ENCOUNTER — OFFICE VISIT (OUTPATIENT)
Dept: OPHTHALMOLOGY | Facility: CLINIC | Age: 71
End: 2021-06-18
Payer: MEDICARE

## 2021-06-18 DIAGNOSIS — Z98.890 POST-OPERATIVE STATE: Primary | ICD-10-CM

## 2021-06-18 PROCEDURE — 3288F PR FALLS RISK ASSESSMENT DOCUMENTED: ICD-10-PCS | Mod: CPTII,S$GLB,, | Performed by: OPHTHALMOLOGY

## 2021-06-18 PROCEDURE — 99999 PR PBB SHADOW E&M-EST. PATIENT-LVL II: ICD-10-PCS | Mod: PBBFAC,,, | Performed by: OPHTHALMOLOGY

## 2021-06-18 PROCEDURE — 99024 PR POST-OP FOLLOW-UP VISIT: ICD-10-PCS | Mod: S$GLB,,, | Performed by: OPHTHALMOLOGY

## 2021-06-18 PROCEDURE — 1126F PR PAIN SEVERITY QUANTIFIED, NO PAIN PRESENT: ICD-10-PCS | Mod: S$GLB,,, | Performed by: OPHTHALMOLOGY

## 2021-06-18 PROCEDURE — 99999 PR PBB SHADOW E&M-EST. PATIENT-LVL II: CPT | Mod: PBBFAC,,, | Performed by: OPHTHALMOLOGY

## 2021-06-18 PROCEDURE — 1101F PT FALLS ASSESS-DOCD LE1/YR: CPT | Mod: CPTII,S$GLB,, | Performed by: OPHTHALMOLOGY

## 2021-06-18 PROCEDURE — 1101F PR PT FALLS ASSESS DOC 0-1 FALLS W/OUT INJ PAST YR: ICD-10-PCS | Mod: CPTII,S$GLB,, | Performed by: OPHTHALMOLOGY

## 2021-06-18 PROCEDURE — 1126F AMNT PAIN NOTED NONE PRSNT: CPT | Mod: S$GLB,,, | Performed by: OPHTHALMOLOGY

## 2021-06-18 PROCEDURE — 3288F FALL RISK ASSESSMENT DOCD: CPT | Mod: CPTII,S$GLB,, | Performed by: OPHTHALMOLOGY

## 2021-06-18 PROCEDURE — 99024 POSTOP FOLLOW-UP VISIT: CPT | Mod: S$GLB,,, | Performed by: OPHTHALMOLOGY

## 2021-06-22 ENCOUNTER — TELEPHONE (OUTPATIENT)
Dept: FAMILY MEDICINE | Facility: CLINIC | Age: 71
End: 2021-06-22

## 2021-08-02 ENCOUNTER — OFFICE VISIT (OUTPATIENT)
Dept: OPHTHALMOLOGY | Facility: CLINIC | Age: 71
End: 2021-08-02
Payer: MEDICARE

## 2021-08-02 ENCOUNTER — TELEPHONE (OUTPATIENT)
Dept: OPHTHALMOLOGY | Facility: CLINIC | Age: 71
End: 2021-08-02

## 2021-08-02 DIAGNOSIS — H52.7 REFRACTIVE ERROR: ICD-10-CM

## 2021-08-02 DIAGNOSIS — Z98.890 POST-OPERATIVE STATE: Primary | ICD-10-CM

## 2021-08-02 PROCEDURE — 1159F PR MEDICATION LIST DOCUMENTED IN MEDICAL RECORD: ICD-10-PCS | Mod: CPTII,S$GLB,, | Performed by: OPHTHALMOLOGY

## 2021-08-02 PROCEDURE — 1160F RVW MEDS BY RX/DR IN RCRD: CPT | Mod: CPTII,S$GLB,, | Performed by: OPHTHALMOLOGY

## 2021-08-02 PROCEDURE — 1126F PR PAIN SEVERITY QUANTIFIED, NO PAIN PRESENT: ICD-10-PCS | Mod: CPTII,S$GLB,, | Performed by: OPHTHALMOLOGY

## 2021-08-02 PROCEDURE — 1101F PT FALLS ASSESS-DOCD LE1/YR: CPT | Mod: CPTII,S$GLB,, | Performed by: OPHTHALMOLOGY

## 2021-08-02 PROCEDURE — 99999 PR PBB SHADOW E&M-EST. PATIENT-LVL II: ICD-10-PCS | Mod: PBBFAC,,, | Performed by: OPHTHALMOLOGY

## 2021-08-02 PROCEDURE — 99999 PR PBB SHADOW E&M-EST. PATIENT-LVL II: CPT | Mod: PBBFAC,,, | Performed by: OPHTHALMOLOGY

## 2021-08-02 PROCEDURE — 1160F PR REVIEW ALL MEDS BY PRESCRIBER/CLIN PHARMACIST DOCUMENTED: ICD-10-PCS | Mod: CPTII,S$GLB,, | Performed by: OPHTHALMOLOGY

## 2021-08-02 PROCEDURE — 99024 POSTOP FOLLOW-UP VISIT: CPT | Mod: S$GLB,,, | Performed by: OPHTHALMOLOGY

## 2021-08-02 PROCEDURE — 3044F HG A1C LEVEL LT 7.0%: CPT | Mod: CPTII,S$GLB,, | Performed by: OPHTHALMOLOGY

## 2021-08-02 PROCEDURE — 99024 PR POST-OP FOLLOW-UP VISIT: ICD-10-PCS | Mod: S$GLB,,, | Performed by: OPHTHALMOLOGY

## 2021-08-02 PROCEDURE — 1159F MED LIST DOCD IN RCRD: CPT | Mod: CPTII,S$GLB,, | Performed by: OPHTHALMOLOGY

## 2021-08-02 PROCEDURE — 3044F PR MOST RECENT HEMOGLOBIN A1C LEVEL <7.0%: ICD-10-PCS | Mod: CPTII,S$GLB,, | Performed by: OPHTHALMOLOGY

## 2021-08-02 PROCEDURE — 3288F PR FALLS RISK ASSESSMENT DOCUMENTED: ICD-10-PCS | Mod: CPTII,S$GLB,, | Performed by: OPHTHALMOLOGY

## 2021-08-02 PROCEDURE — 1101F PR PT FALLS ASSESS DOC 0-1 FALLS W/OUT INJ PAST YR: ICD-10-PCS | Mod: CPTII,S$GLB,, | Performed by: OPHTHALMOLOGY

## 2021-08-02 PROCEDURE — 3288F FALL RISK ASSESSMENT DOCD: CPT | Mod: CPTII,S$GLB,, | Performed by: OPHTHALMOLOGY

## 2021-08-02 PROCEDURE — 1126F AMNT PAIN NOTED NONE PRSNT: CPT | Mod: CPTII,S$GLB,, | Performed by: OPHTHALMOLOGY

## 2021-08-02 RX ORDER — PREDNISOLONE ACETATE 10 MG/ML
1 SUSPENSION/ DROPS OPHTHALMIC DAILY
COMMUNITY
End: 2022-02-25

## 2021-09-24 ENCOUNTER — TELEPHONE (OUTPATIENT)
Dept: FAMILY MEDICINE | Facility: CLINIC | Age: 71
End: 2021-09-24

## 2021-09-27 ENCOUNTER — IMMUNIZATION (OUTPATIENT)
Dept: OBSTETRICS AND GYNECOLOGY | Facility: CLINIC | Age: 71
End: 2021-09-27
Payer: MEDICARE

## 2021-09-27 DIAGNOSIS — Z23 NEED FOR VACCINATION: Primary | ICD-10-CM

## 2021-09-27 PROCEDURE — 0003A COVID-19, MRNA, LNP-S, PF, 30 MCG/0.3 ML DOSE VACCINE: CPT | Mod: PBBFAC | Performed by: FAMILY MEDICINE

## 2021-09-27 PROCEDURE — 91300 COVID-19, MRNA, LNP-S, PF, 30 MCG/0.3 ML DOSE VACCINE: CPT | Mod: PBBFAC | Performed by: FAMILY MEDICINE

## 2021-11-02 ENCOUNTER — TELEPHONE (OUTPATIENT)
Dept: FAMILY MEDICINE | Facility: CLINIC | Age: 71
End: 2021-11-02
Payer: MEDICARE

## 2021-11-02 ENCOUNTER — TELEPHONE (OUTPATIENT)
Dept: OPHTHALMOLOGY | Facility: CLINIC | Age: 71
End: 2021-11-02
Payer: MEDICARE

## 2021-11-03 ENCOUNTER — OFFICE VISIT (OUTPATIENT)
Dept: FAMILY MEDICINE | Facility: CLINIC | Age: 71
End: 2021-11-03
Payer: MEDICARE

## 2021-11-03 VITALS
HEIGHT: 65 IN | WEIGHT: 103.38 LBS | BODY MASS INDEX: 17.22 KG/M2 | HEART RATE: 80 BPM | TEMPERATURE: 98 F | DIASTOLIC BLOOD PRESSURE: 82 MMHG | SYSTOLIC BLOOD PRESSURE: 145 MMHG

## 2021-11-03 DIAGNOSIS — Z11.1 TUBERCULOSIS SCREENING: Primary | ICD-10-CM

## 2021-11-03 DIAGNOSIS — R63.4 UNINTENDED WEIGHT LOSS: ICD-10-CM

## 2021-11-03 DIAGNOSIS — I10 ESSENTIAL HYPERTENSION: ICD-10-CM

## 2021-11-03 PROCEDURE — 99214 PR OFFICE/OUTPT VISIT, EST, LEVL IV, 30-39 MIN: ICD-10-PCS | Mod: HCNC,S$GLB,, | Performed by: STUDENT IN AN ORGANIZED HEALTH CARE EDUCATION/TRAINING PROGRAM

## 2021-11-03 PROCEDURE — 3079F PR MOST RECENT DIASTOLIC BLOOD PRESSURE 80-89 MM HG: ICD-10-PCS | Mod: HCNC,CPTII,S$GLB, | Performed by: STUDENT IN AN ORGANIZED HEALTH CARE EDUCATION/TRAINING PROGRAM

## 2021-11-03 PROCEDURE — 3044F PR MOST RECENT HEMOGLOBIN A1C LEVEL <7.0%: ICD-10-PCS | Mod: HCNC,CPTII,S$GLB, | Performed by: STUDENT IN AN ORGANIZED HEALTH CARE EDUCATION/TRAINING PROGRAM

## 2021-11-03 PROCEDURE — 1160F PR REVIEW ALL MEDS BY PRESCRIBER/CLIN PHARMACIST DOCUMENTED: ICD-10-PCS | Mod: HCNC,CPTII,S$GLB, | Performed by: STUDENT IN AN ORGANIZED HEALTH CARE EDUCATION/TRAINING PROGRAM

## 2021-11-03 PROCEDURE — 3044F HG A1C LEVEL LT 7.0%: CPT | Mod: HCNC,CPTII,S$GLB, | Performed by: STUDENT IN AN ORGANIZED HEALTH CARE EDUCATION/TRAINING PROGRAM

## 2021-11-03 PROCEDURE — 3079F DIAST BP 80-89 MM HG: CPT | Mod: HCNC,CPTII,S$GLB, | Performed by: STUDENT IN AN ORGANIZED HEALTH CARE EDUCATION/TRAINING PROGRAM

## 2021-11-03 PROCEDURE — 1126F AMNT PAIN NOTED NONE PRSNT: CPT | Mod: HCNC,CPTII,S$GLB, | Performed by: STUDENT IN AN ORGANIZED HEALTH CARE EDUCATION/TRAINING PROGRAM

## 2021-11-03 PROCEDURE — 99999 PR PBB SHADOW E&M-EST. PATIENT-LVL III: CPT | Mod: PBBFAC,HCNC,, | Performed by: STUDENT IN AN ORGANIZED HEALTH CARE EDUCATION/TRAINING PROGRAM

## 2021-11-03 PROCEDURE — 4010F PR ACE/ARB THEARPY RXD/TAKEN: ICD-10-PCS | Mod: HCNC,CPTII,S$GLB, | Performed by: STUDENT IN AN ORGANIZED HEALTH CARE EDUCATION/TRAINING PROGRAM

## 2021-11-03 PROCEDURE — 99214 OFFICE O/P EST MOD 30 MIN: CPT | Mod: HCNC,S$GLB,, | Performed by: STUDENT IN AN ORGANIZED HEALTH CARE EDUCATION/TRAINING PROGRAM

## 2021-11-03 PROCEDURE — 1159F PR MEDICATION LIST DOCUMENTED IN MEDICAL RECORD: ICD-10-PCS | Mod: HCNC,CPTII,S$GLB, | Performed by: STUDENT IN AN ORGANIZED HEALTH CARE EDUCATION/TRAINING PROGRAM

## 2021-11-03 PROCEDURE — 3077F SYST BP >= 140 MM HG: CPT | Mod: HCNC,CPTII,S$GLB, | Performed by: STUDENT IN AN ORGANIZED HEALTH CARE EDUCATION/TRAINING PROGRAM

## 2021-11-03 PROCEDURE — 3077F PR MOST RECENT SYSTOLIC BLOOD PRESSURE >= 140 MM HG: ICD-10-PCS | Mod: HCNC,CPTII,S$GLB, | Performed by: STUDENT IN AN ORGANIZED HEALTH CARE EDUCATION/TRAINING PROGRAM

## 2021-11-03 PROCEDURE — 99999 PR PBB SHADOW E&M-EST. PATIENT-LVL III: ICD-10-PCS | Mod: PBBFAC,HCNC,, | Performed by: STUDENT IN AN ORGANIZED HEALTH CARE EDUCATION/TRAINING PROGRAM

## 2021-11-03 PROCEDURE — 1159F MED LIST DOCD IN RCRD: CPT | Mod: HCNC,CPTII,S$GLB, | Performed by: STUDENT IN AN ORGANIZED HEALTH CARE EDUCATION/TRAINING PROGRAM

## 2021-11-03 PROCEDURE — 1160F RVW MEDS BY RX/DR IN RCRD: CPT | Mod: HCNC,CPTII,S$GLB, | Performed by: STUDENT IN AN ORGANIZED HEALTH CARE EDUCATION/TRAINING PROGRAM

## 2021-11-03 PROCEDURE — 86580 TB INTRADERMAL TEST: CPT | Mod: HCNC,S$GLB,, | Performed by: STUDENT IN AN ORGANIZED HEALTH CARE EDUCATION/TRAINING PROGRAM

## 2021-11-03 PROCEDURE — 4010F ACE/ARB THERAPY RXD/TAKEN: CPT | Mod: HCNC,CPTII,S$GLB, | Performed by: STUDENT IN AN ORGANIZED HEALTH CARE EDUCATION/TRAINING PROGRAM

## 2021-11-03 PROCEDURE — 3008F PR BODY MASS INDEX (BMI) DOCUMENTED: ICD-10-PCS | Mod: HCNC,CPTII,S$GLB, | Performed by: STUDENT IN AN ORGANIZED HEALTH CARE EDUCATION/TRAINING PROGRAM

## 2021-11-03 PROCEDURE — 3008F BODY MASS INDEX DOCD: CPT | Mod: HCNC,CPTII,S$GLB, | Performed by: STUDENT IN AN ORGANIZED HEALTH CARE EDUCATION/TRAINING PROGRAM

## 2021-11-03 PROCEDURE — 99499 UNLISTED E&M SERVICE: CPT | Mod: HCNC,S$GLB,, | Performed by: STUDENT IN AN ORGANIZED HEALTH CARE EDUCATION/TRAINING PROGRAM

## 2021-11-03 PROCEDURE — 1126F PR PAIN SEVERITY QUANTIFIED, NO PAIN PRESENT: ICD-10-PCS | Mod: HCNC,CPTII,S$GLB, | Performed by: STUDENT IN AN ORGANIZED HEALTH CARE EDUCATION/TRAINING PROGRAM

## 2021-11-03 PROCEDURE — 86580 POCT TB SKIN TEST: ICD-10-PCS | Mod: HCNC,S$GLB,, | Performed by: STUDENT IN AN ORGANIZED HEALTH CARE EDUCATION/TRAINING PROGRAM

## 2021-11-03 PROCEDURE — 99499 RISK ADDL DX/OHS AUDIT: ICD-10-PCS | Mod: HCNC,S$GLB,, | Performed by: STUDENT IN AN ORGANIZED HEALTH CARE EDUCATION/TRAINING PROGRAM

## 2021-11-05 ENCOUNTER — CLINICAL SUPPORT (OUTPATIENT)
Dept: FAMILY MEDICINE | Facility: CLINIC | Age: 71
End: 2021-11-05
Payer: MEDICARE

## 2021-11-05 DIAGNOSIS — Z23 NEED FOR INFLUENZA VACCINATION: Primary | ICD-10-CM

## 2021-11-05 PROCEDURE — G0008 ADMIN INFLUENZA VIRUS VAC: HCPCS | Mod: HCNC,S$GLB,, | Performed by: FAMILY MEDICINE

## 2021-11-05 PROCEDURE — 90694 VACC AIIV4 NO PRSRV 0.5ML IM: CPT | Mod: HCNC,S$GLB,, | Performed by: FAMILY MEDICINE

## 2021-11-05 PROCEDURE — 99999 PR PBB SHADOW E&M-EST. PATIENT-LVL I: ICD-10-PCS | Mod: PBBFAC,HCNC,,

## 2021-11-05 PROCEDURE — 99999 PR PBB SHADOW E&M-EST. PATIENT-LVL I: CPT | Mod: PBBFAC,HCNC,,

## 2021-11-05 PROCEDURE — 90694 FLU VACCINE - QUADRIVALENT - ADJUVANTED: ICD-10-PCS | Mod: HCNC,S$GLB,, | Performed by: FAMILY MEDICINE

## 2021-11-05 PROCEDURE — G0008 FLU VACCINE - QUADRIVALENT - ADJUVANTED: ICD-10-PCS | Mod: HCNC,S$GLB,, | Performed by: FAMILY MEDICINE

## 2021-11-18 ENCOUNTER — CLINICAL SUPPORT (OUTPATIENT)
Dept: FAMILY MEDICINE | Facility: CLINIC | Age: 71
End: 2021-11-18
Payer: MEDICARE

## 2021-11-18 VITALS — OXYGEN SATURATION: 99 % | SYSTOLIC BLOOD PRESSURE: 136 MMHG | DIASTOLIC BLOOD PRESSURE: 80 MMHG | HEART RATE: 74 BPM

## 2021-11-18 DIAGNOSIS — I10 ELEVATED BLOOD PRESSURE READING IN OFFICE WITH DIAGNOSIS OF HYPERTENSION: Primary | ICD-10-CM

## 2021-11-18 PROCEDURE — 99999 PR PBB SHADOW E&M-EST. PATIENT-LVL II: CPT | Mod: PBBFAC,HCNC,,

## 2021-11-18 PROCEDURE — 99999 PR PBB SHADOW E&M-EST. PATIENT-LVL II: ICD-10-PCS | Mod: PBBFAC,HCNC,,

## 2021-11-23 ENCOUNTER — OFFICE VISIT (OUTPATIENT)
Dept: OPHTHALMOLOGY | Facility: CLINIC | Age: 71
End: 2021-11-23
Attending: OPHTHALMOLOGY
Payer: MEDICARE

## 2021-11-23 DIAGNOSIS — H18.20 CORNEAL EDEMA: Primary | ICD-10-CM

## 2021-11-23 DIAGNOSIS — H02.402 PTOSIS OF LEFT EYELID: ICD-10-CM

## 2021-11-23 PROCEDURE — 99214 OFFICE O/P EST MOD 30 MIN: CPT | Mod: HCNC,S$GLB,, | Performed by: OPHTHALMOLOGY

## 2021-11-23 PROCEDURE — 99999 PR PBB SHADOW E&M-EST. PATIENT-LVL III: ICD-10-PCS | Mod: PBBFAC,HCNC,, | Performed by: OPHTHALMOLOGY

## 2021-11-23 PROCEDURE — 99999 PR PBB SHADOW E&M-EST. PATIENT-LVL III: CPT | Mod: PBBFAC,HCNC,, | Performed by: OPHTHALMOLOGY

## 2021-11-23 PROCEDURE — 99214 PR OFFICE/OUTPT VISIT, EST, LEVL IV, 30-39 MIN: ICD-10-PCS | Mod: HCNC,S$GLB,, | Performed by: OPHTHALMOLOGY

## 2021-11-23 PROCEDURE — 4010F ACE/ARB THERAPY RXD/TAKEN: CPT | Mod: HCNC,CPTII,S$GLB, | Performed by: OPHTHALMOLOGY

## 2021-11-23 PROCEDURE — 4010F PR ACE/ARB THEARPY RXD/TAKEN: ICD-10-PCS | Mod: HCNC,CPTII,S$GLB, | Performed by: OPHTHALMOLOGY

## 2021-11-23 RX ORDER — MOXIFLOXACIN 5 MG/ML
1 SOLUTION/ DROPS OPHTHALMIC
Status: CANCELLED | OUTPATIENT
Start: 2021-11-23

## 2021-11-23 RX ORDER — SODIUM CHLORIDE 0.9 % (FLUSH) 0.9 %
10 SYRINGE (ML) INJECTION
Status: SHIPPED | OUTPATIENT
Start: 2021-11-23

## 2021-11-23 RX ORDER — TETRACAINE HYDROCHLORIDE 5 MG/ML
1 SOLUTION OPHTHALMIC
Status: CANCELLED | OUTPATIENT
Start: 2021-11-23

## 2021-12-15 ENCOUNTER — TELEPHONE (OUTPATIENT)
Dept: FAMILY MEDICINE | Facility: CLINIC | Age: 71
End: 2021-12-15
Payer: MEDICARE

## 2022-01-21 DIAGNOSIS — Z12.11 SCREEN FOR COLON CANCER: Primary | ICD-10-CM

## 2022-02-04 ENCOUNTER — TELEPHONE (OUTPATIENT)
Dept: OPHTHALMOLOGY | Facility: CLINIC | Age: 72
End: 2022-02-04
Payer: MEDICARE

## 2022-02-04 DIAGNOSIS — H02.402 PTOSIS OF LEFT EYELID: ICD-10-CM

## 2022-02-04 DIAGNOSIS — H18.20 CORNEAL EDEMA: Primary | ICD-10-CM

## 2022-02-17 ENCOUNTER — TELEPHONE (OUTPATIENT)
Dept: OPHTHALMOLOGY | Facility: CLINIC | Age: 72
End: 2022-02-17
Payer: MEDICARE

## 2022-02-17 DIAGNOSIS — H25.11 NUCLEAR SCLEROSIS, RIGHT: Primary | ICD-10-CM

## 2022-02-25 DIAGNOSIS — I10 ESSENTIAL HYPERTENSION: ICD-10-CM

## 2022-02-25 DIAGNOSIS — R63.6 UNDERWEIGHT DUE TO INADEQUATE CALORIC INTAKE: ICD-10-CM

## 2022-02-25 NOTE — TELEPHONE ENCOUNTER
No new care gaps identified.  Powered by [employer]. Reference number: [id number].   2/25/2022 10:39:38 AM CST

## 2022-02-25 NOTE — TELEPHONE ENCOUNTER
----- Message from Alvina Vaughn sent at 2/25/2022 10:08 AM CST -----  Refill : cyproheptadine (PERIACTIN) 4 mg tablet              lisinopriL 10 MG tablet        [id number] - [place], [place] - [address] [place] [address]  Phone: [phone] Fax: [phone]

## 2022-02-28 ENCOUNTER — TELEPHONE (OUTPATIENT)
Dept: OPHTHALMOLOGY | Facility: CLINIC | Age: 72
End: 2022-02-28
Payer: MEDICARE

## 2022-02-28 RX ORDER — LISINOPRIL 10 MG/1
10 TABLET ORAL DAILY
Qty: 90 TABLET | Refills: 1 | Status: SHIPPED | OUTPATIENT
Start: 2022-02-28 | End: 2022-03-22 | Stop reason: SDUPTHER

## 2022-02-28 RX ORDER — CYPROHEPTADINE HYDROCHLORIDE 4 MG/1
4 TABLET ORAL 3 TIMES DAILY PRN
Qty: 90 TABLET | Refills: 2 | Status: SHIPPED | OUTPATIENT
Start: 2022-02-28 | End: 2023-06-19 | Stop reason: SDUPTHER

## 2022-02-28 NOTE — TELEPHONE ENCOUNTER
Patient given arrival time of 11:30 am on Monday March 7 . Nothing to eat or drink after 9 pm.  Water, Gatorade after 9 pm until leaves home.  Start drops into the operative eye today. [address]

## 2022-03-04 ENCOUNTER — TELEPHONE (OUTPATIENT)
Dept: OPHTHALMOLOGY | Facility: CLINIC | Age: 72
End: 2022-03-04
Payer: MEDICARE

## 2022-03-04 NOTE — TELEPHONE ENCOUNTER
----- Message from Nichole Good sent at 3/4/2022 11:01 AM CST -----  Contact: PT @ [phone]  Patient is calling to obtain surgery instructions as well as arrival time.   I informed her that she will be contacted by the end of the day with this information.    She is also inquiring about her post op appointment. Please contact patient at [phone].

## 2022-03-07 ENCOUNTER — ANESTHESIA (OUTPATIENT)
Dept: SURGERY | Facility: OTHER | Age: 72
End: 2022-03-07
Payer: MEDICARE

## 2022-03-07 ENCOUNTER — HOSPITAL ENCOUNTER (OUTPATIENT)
Facility: OTHER | Age: 72
Discharge: HOME | End: 2022-03-07
Attending: OPHTHALMOLOGY | Admitting: OPHTHALMOLOGY
Payer: MEDICARE

## 2022-03-07 ENCOUNTER — ANESTHESIA EVENT (OUTPATIENT)
Dept: SURGERY | Facility: OTHER | Age: 72
End: 2022-03-07
Payer: MEDICARE

## 2022-03-07 VITALS
WEIGHT: 107 LBS | HEART RATE: 54 BPM | DIASTOLIC BLOOD PRESSURE: 62 MMHG | BODY MASS INDEX: 17.83 KG/M2 | TEMPERATURE: 98 F | SYSTOLIC BLOOD PRESSURE: 124 MMHG | HEIGHT: 65 IN | OXYGEN SATURATION: 95 % | RESPIRATION RATE: 16 BRPM

## 2022-03-07 DIAGNOSIS — H18.20 CORNEAL EDEMA: Primary | ICD-10-CM

## 2022-03-07 LAB
CTP QC/QA: YES
SARS-COV-2 AG RESP QL IA.RAPID: NEGATIVE

## 2022-03-07 PROCEDURE — 63600175 PHARM REV CODE 636 W HCPCS: Mod: HCNC | Performed by: OPHTHALMOLOGY

## 2022-03-07 PROCEDURE — 37000008 HC ANESTHESIA 1ST 15 MINUTES: Mod: HCNC | Performed by: OPHTHALMOLOGY

## 2022-03-07 PROCEDURE — 65756 PR CORNEAL TRANSPLANT,ENDOTHELIAL: ICD-10-PCS | Mod: HCNC,LT,, | Performed by: OPHTHALMOLOGY

## 2022-03-07 PROCEDURE — 71000015 HC POSTOP RECOV 1ST HR: Mod: HCNC | Performed by: OPHTHALMOLOGY

## 2022-03-07 PROCEDURE — V2785 CORNEAL TISSUE PROCESSING: HCPCS | Mod: HCNC | Performed by: OPHTHALMOLOGY

## 2022-03-07 PROCEDURE — 65756 CORNEAL TRNSPL ENDOTHELIAL: CPT | Mod: HCNC,LT,, | Performed by: OPHTHALMOLOGY

## 2022-03-07 PROCEDURE — 27201423 OPTIME MED/SURG SUP & DEVICES STERILE SUPPLY: Mod: HCNC | Performed by: OPHTHALMOLOGY

## 2022-03-07 PROCEDURE — 71000016 HC POSTOP RECOV ADDL HR: Mod: HCNC | Performed by: OPHTHALMOLOGY

## 2022-03-07 PROCEDURE — 63600175 PHARM REV CODE 636 W HCPCS: Mod: HCNC | Performed by: NURSE ANESTHETIST, CERTIFIED REGISTERED

## 2022-03-07 PROCEDURE — 65757 PREP CORNEAL ENDO ALLOGRAFT: CPT | Mod: HCNC,LT,, | Performed by: OPHTHALMOLOGY

## 2022-03-07 PROCEDURE — 65757 PR PREP CORNEAL ENDOTHEL ALLOGRAFT: ICD-10-PCS | Mod: HCNC,LT,, | Performed by: OPHTHALMOLOGY

## 2022-03-07 PROCEDURE — 25000003 PHARM REV CODE 250: Mod: HCNC | Performed by: OPHTHALMOLOGY

## 2022-03-07 PROCEDURE — 36000706: Mod: HCNC | Performed by: OPHTHALMOLOGY

## 2022-03-07 PROCEDURE — 36000707: Mod: HCNC | Performed by: OPHTHALMOLOGY

## 2022-03-07 PROCEDURE — 37000009 HC ANESTHESIA EA ADD 15 MINS: Mod: HCNC | Performed by: OPHTHALMOLOGY

## 2022-03-07 DEVICE — IMPLANTABLE DEVICE: Type: IMPLANTABLE DEVICE | Site: EYE | Status: FUNCTIONAL

## 2022-03-07 RX ORDER — FENTANYL CITRATE 50 UG/ML
INJECTION, SOLUTION INTRAMUSCULAR; INTRAVENOUS
Status: DISCONTINUED | OUTPATIENT
Start: 2022-03-07 | End: 2022-03-07

## 2022-03-07 RX ORDER — LIDOCAINE HYDROCHLORIDE 40 MG/ML
INJECTION, SOLUTION RETROBULBAR
Status: DISCONTINUED | OUTPATIENT
Start: 2022-03-07 | End: 2022-03-07 | Stop reason: HOSPADM

## 2022-03-07 RX ORDER — BUPIVACAINE HYDROCHLORIDE 7.5 MG/ML
INJECTION, SOLUTION EPIDURAL; RETROBULBAR
Status: DISCONTINUED | OUTPATIENT
Start: 2022-03-07 | End: 2022-03-07 | Stop reason: HOSPADM

## 2022-03-07 RX ORDER — LIDOCAINE HYDROCHLORIDE 20 MG/ML
INJECTION, SOLUTION INFILTRATION; PERINEURAL
Status: DISCONTINUED | OUTPATIENT
Start: 2022-03-07 | End: 2022-03-07 | Stop reason: HOSPADM

## 2022-03-07 RX ORDER — ACETAMINOPHEN 325 MG/1
650 TABLET ORAL EVERY 4 HOURS PRN
Status: DISCONTINUED | OUTPATIENT
Start: 2022-03-07 | End: 2022-03-07 | Stop reason: HOSPADM

## 2022-03-07 RX ORDER — GENTAMICIN SULFATE 40 MG/ML
INJECTION, SOLUTION INTRAMUSCULAR; INTRAVENOUS
Status: DISCONTINUED | OUTPATIENT
Start: 2022-03-07 | End: 2022-03-07 | Stop reason: HOSPADM

## 2022-03-07 RX ORDER — DEXAMETHASONE SODIUM PHOSPHATE 4 MG/ML
INJECTION, SOLUTION INTRA-ARTICULAR; INTRALESIONAL; INTRAMUSCULAR; INTRAVENOUS; SOFT TISSUE
Status: DISCONTINUED | OUTPATIENT
Start: 2022-03-07 | End: 2022-03-07 | Stop reason: HOSPADM

## 2022-03-07 RX ORDER — SODIUM CHLORIDE 0.9 % (FLUSH) 0.9 %
10 SYRINGE (ML) INJECTION
Status: DISCONTINUED | OUTPATIENT
Start: 2022-03-07 | End: 2022-03-07 | Stop reason: HOSPADM

## 2022-03-07 RX ORDER — TETRACAINE HYDROCHLORIDE 5 MG/ML
SOLUTION OPHTHALMIC
Status: DISCONTINUED | OUTPATIENT
Start: 2022-03-07 | End: 2022-03-07 | Stop reason: HOSPADM

## 2022-03-07 RX ORDER — MIDAZOLAM HYDROCHLORIDE 1 MG/ML
INJECTION INTRAMUSCULAR; INTRAVENOUS
Status: DISCONTINUED | OUTPATIENT
Start: 2022-03-07 | End: 2022-03-07

## 2022-03-07 RX ORDER — TETRACAINE HYDROCHLORIDE 5 MG/ML
1 SOLUTION OPHTHALMIC
Status: COMPLETED | OUTPATIENT
Start: 2022-03-07 | End: 2022-03-07

## 2022-03-07 RX ORDER — HYDROCODONE BITARTRATE AND ACETAMINOPHEN 5; 325 MG/1; MG/1
1 TABLET ORAL EVERY 4 HOURS PRN
Status: DISCONTINUED | OUTPATIENT
Start: 2022-03-07 | End: 2022-03-07 | Stop reason: HOSPADM

## 2022-03-07 RX ORDER — MOXIFLOXACIN 5 MG/ML
1 SOLUTION/ DROPS OPHTHALMIC
Status: COMPLETED | OUTPATIENT
Start: 2022-03-07 | End: 2022-03-07

## 2022-03-07 RX ADMIN — MIDAZOLAM HYDROCHLORIDE 1 MG: 1 INJECTION, SOLUTION INTRAMUSCULAR; INTRAVENOUS at 02:03

## 2022-03-07 RX ADMIN — TETRACAINE HYDROCHLORIDE 1 DROP: 5 SOLUTION OPHTHALMIC at 12:03

## 2022-03-07 RX ADMIN — MOXIFLOXACIN 1 DROP: 5 SOLUTION/ DROPS OPHTHALMIC at 12:03

## 2022-03-07 RX ADMIN — FENTANYL CITRATE 25 MCG: 50 INJECTION, SOLUTION INTRAMUSCULAR; INTRAVENOUS at 02:03

## 2022-03-07 RX ADMIN — FENTANYL CITRATE 50 MCG: 50 INJECTION, SOLUTION INTRAMUSCULAR; INTRAVENOUS at 02:03

## 2022-03-07 NOTE — PLAN OF CARE
Melba Pabon has met all discharge criteria from Phase II. Vital Signs are stable, ambulating  without difficulty. Discharge instructions given, patient verbalized understanding. Discharged from facility via wheelchair in stable condition.

## 2022-03-07 NOTE — OP NOTE
SURGEON:  Roopa Emmanuel M.D.    PREOPERATIVE DIAGNOSES:  Corneal edema    POSTOPERATIVE DIAGNOSES:    Corneal edema    PROCEDURES PERFORMED:  Descement's Membrane Endothelial Keratoplasty  left eye (DMEK) (72628)    03/07/2022    ANESTHESIA:  MAC with retrobulbar block.    GRAFT SIZE:  7.5 mm    COMPLICATIONS:  None.    INDICATIONS:    The patient has a history poor vision secondary to corneal edema.  After a thorough discussion of the risks, benefits and alternatives to corneal transplantation using the DMEK technique, the patient voices understanding of the risks and benefits and wishes to proceed with surgery.    PROCEDURE IN DETAIL:    The patient was brought to the operating room in the supine position, where the eye was prepped and draped in standard sterile fashion, after having received a retrobulbar block consisting of a 50/50 mixture of lidocaine and bupivacaine under conscious sedation.  The procedure was begun by the creation of a paracentesis incision, through which viscoelastic was used to fill the anterior chamber.    Next, a 3mm temporal limbal tunnel incision was constructed.  An 8 mm charlie was made in the center of the cornea and then reverse Sinskey used to score and remove Descemet's membrane.  An inferior surgical PI was created with 30G needle and all remaining viscoelastics were removed from the eye.    Attention was then directed to the side table, where the previously stripped donor tissue was trephined with a [place]-Lobo trephine, and stained with trypan blue. A modified injector system was used to implant this descemet's membrane tissue into the anterior chamber, and a 10-0 nylon suture placed in the wound. The graft was unfurled with a tapping technique, and 20% SF6 gas was used to fill the AC to the diameter of graft. The anterior chamber was reformed with BSS to a normal pressure.  The patient will remain supine in the postoperative recovery area for one hour to allow better adhesion of  the graft to the posterior aspect of the cornea.  The patient  will be seen tomorrow in the eye clinic.

## 2022-03-07 NOTE — DISCHARGE SUMMARY
Outcome: Successful outpatient ophthalmic surgical procedure  Preprinted Instructions given to patient.  Regular diet.  Activity: No restrictions  Meds: see Med Rec  Condition: stable  Follow up: 1 day with Dr Emmanuel  Disposition: Home  Diagnosis: s/p eye surgery

## 2022-03-07 NOTE — ANESTHESIA POSTPROCEDURE EVALUATION
Anesthesia Post Evaluation    Patient: Melba Pabon    Procedure(s) Performed: Procedure(s) (LRB):  DESCEMET'S MEMBRANE ENDOTHELIAL KERATOPLASTY (DMEK) (Left)    Final Anesthesia Type: MAC      Patient location during evaluation: [facility]  Patient participation: Yes- Able to Participate  Level of consciousness: awake and alert and oriented  Post-procedure vital signs: reviewed and stable  Pain management: adequate  Airway patency: patent    PONV status at discharge: No PONV  Anesthetic complications: no      Cardiovascular status: blood pressure returned to baseline and hemodynamically stable  Respiratory status: unassisted, spontaneous ventilation and room air  Hydration status: euvolemic  Follow-up not needed.          Vitals Value Taken Time   /69 03/07/22 1231   Temp 36.9 °C (98.4 °F) 03/07/22 1231   Pulse 68 03/07/22 1231   Resp 16 03/07/22 1231   SpO2 98 % 03/07/22 1231         No case tracking events are documented in the log.      Pain/La Score: No data recorded

## 2022-03-07 NOTE — ANESTHESIA POSTPROCEDURE EVALUATION
Anesthesia Post Evaluation    Patient: Melba Pabon    Procedure(s) Performed: Procedure(s) (LRB):  DESCEMET'S MEMBRANE ENDOTHELIAL KERATOPLASTY (DMEK) (Left)    Final Anesthesia Type: MAC      Patient location during evaluation: [facility]  Patient participation: Yes- Able to Participate  Level of consciousness: awake and alert  Post-procedure vital signs: reviewed and stable  Pain management: adequate  Airway patency: patent    PONV status at discharge: No PONV  Anesthetic complications: no      Cardiovascular status: blood pressure returned to baseline  Respiratory status: unassisted and spontaneous ventilation  Hydration status: euvolemic  Follow-up not needed.          Vitals Value Taken Time   /80 03/07/22 1231   Temp 36.9 °C (98.4 °F) 03/07/22 1231   Pulse 68 03/07/22 1231   Resp 16 03/07/22 1231   SpO2 98 % 03/07/22 1231         No case tracking events are documented in the log.      Pain/La Score: No data recorded

## 2022-03-07 NOTE — ANESTHESIA PREPROCEDURE EVALUATION
03/07/2022  Melba Pabon is a 71 y.o., female.      Pre-op Assessment    I have reviewed the Patient Summary Reports.     I have reviewed the Nursing Notes. I have reviewed the NPO Status.   I have reviewed the Medications.     Review of Systems  Anesthesia Hx:  No problems with previous Anesthesia  Denies Family Hx of Anesthesia complications.   Denies Personal Hx of Anesthesia complications.   Social:  Non-Smoker    Hematology/Oncology:  Hematology Normal   Oncology Normal     EENT/Dental:  EENT/Dental Normal Vocal cord palsy   Cardiovascular:   Exercise tolerance: good Hypertension    Pulmonary:  Pulmonary Normal    Renal/:  Renal/ Normal     Musculoskeletal:  Musculoskeletal Normal    Neurological:  Neurology Normal Cerebral aneurysm resected   Endocrine:  Endocrine Normal    Dermatological:  Skin Normal    Psych:  Psychiatric Normal           Physical Exam  General: Well nourished, Cooperative, Oriented and Alert    Airway:  Mouth Opening: Normal  TM Distance: Normal  Neck ROM: Normal ROM    Dental:  Intact        Anesthesia Plan  Type of Anesthesia, risks & benefits discussed:    Anesthesia Type: MAC  Intra-op Monitoring Plan: Standard ASA Monitors  Informed Consent: Informed consent signed with the Patient and all parties understand the risks and agree with anesthesia plan.  All questions answered.   ASA Score: 3    Ready For Surgery From Anesthesia Perspective.     .

## 2022-03-08 ENCOUNTER — OFFICE VISIT (OUTPATIENT)
Dept: OPHTHALMOLOGY | Facility: CLINIC | Age: 72
End: 2022-03-08
Attending: OPHTHALMOLOGY
Payer: MEDICARE

## 2022-03-08 DIAGNOSIS — H18.20 CORNEAL EDEMA: ICD-10-CM

## 2022-03-08 DIAGNOSIS — Z98.890 POST-OPERATIVE STATE: Primary | ICD-10-CM

## 2022-03-08 PROCEDURE — 99999 PR PBB SHADOW E&M-EST. PATIENT-LVL III: ICD-10-PCS | Mod: PBBFAC,HCNC,, | Performed by: OPHTHALMOLOGY

## 2022-03-08 PROCEDURE — 1126F AMNT PAIN NOTED NONE PRSNT: CPT | Mod: HCNC,CPTII,S$GLB, | Performed by: OPHTHALMOLOGY

## 2022-03-08 PROCEDURE — 4010F ACE/ARB THERAPY RXD/TAKEN: CPT | Mod: HCNC,CPTII,S$GLB, | Performed by: OPHTHALMOLOGY

## 2022-03-08 PROCEDURE — 3288F FALL RISK ASSESSMENT DOCD: CPT | Mod: HCNC,CPTII,S$GLB, | Performed by: OPHTHALMOLOGY

## 2022-03-08 PROCEDURE — 99999 PR PBB SHADOW E&M-EST. PATIENT-LVL III: CPT | Mod: PBBFAC,HCNC,, | Performed by: OPHTHALMOLOGY

## 2022-03-08 PROCEDURE — 1126F PR PAIN SEVERITY QUANTIFIED, NO PAIN PRESENT: ICD-10-PCS | Mod: HCNC,CPTII,S$GLB, | Performed by: OPHTHALMOLOGY

## 2022-03-08 PROCEDURE — 1101F PR PT FALLS ASSESS DOC 0-1 FALLS W/OUT INJ PAST YR: ICD-10-PCS | Mod: HCNC,CPTII,S$GLB, | Performed by: OPHTHALMOLOGY

## 2022-03-08 PROCEDURE — 1159F MED LIST DOCD IN RCRD: CPT | Mod: HCNC,CPTII,S$GLB, | Performed by: OPHTHALMOLOGY

## 2022-03-08 PROCEDURE — 99024 POSTOP FOLLOW-UP VISIT: CPT | Mod: HCNC,S$GLB,, | Performed by: OPHTHALMOLOGY

## 2022-03-08 PROCEDURE — 1160F RVW MEDS BY RX/DR IN RCRD: CPT | Mod: HCNC,CPTII,S$GLB, | Performed by: OPHTHALMOLOGY

## 2022-03-08 PROCEDURE — 4010F PR ACE/ARB THEARPY RXD/TAKEN: ICD-10-PCS | Mod: HCNC,CPTII,S$GLB, | Performed by: OPHTHALMOLOGY

## 2022-03-08 PROCEDURE — 99024 PR POST-OP FOLLOW-UP VISIT: ICD-10-PCS | Mod: HCNC,S$GLB,, | Performed by: OPHTHALMOLOGY

## 2022-03-08 PROCEDURE — 1101F PT FALLS ASSESS-DOCD LE1/YR: CPT | Mod: HCNC,CPTII,S$GLB, | Performed by: OPHTHALMOLOGY

## 2022-03-08 PROCEDURE — 1159F PR MEDICATION LIST DOCUMENTED IN MEDICAL RECORD: ICD-10-PCS | Mod: HCNC,CPTII,S$GLB, | Performed by: OPHTHALMOLOGY

## 2022-03-08 PROCEDURE — 3288F PR FALLS RISK ASSESSMENT DOCUMENTED: ICD-10-PCS | Mod: HCNC,CPTII,S$GLB, | Performed by: OPHTHALMOLOGY

## 2022-03-08 PROCEDURE — 1160F PR REVIEW ALL MEDS BY PRESCRIBER/CLIN PHARMACIST DOCUMENTED: ICD-10-PCS | Mod: HCNC,CPTII,S$GLB, | Performed by: OPHTHALMOLOGY

## 2022-03-08 NOTE — PROGRESS NOTES
HPI     71yr old patient presents with one day post OP DMEK OS. Patient notes no   pain, irritation, or redness. Paitient wore the eye shield into clinic.   Patient has drooping of the left eye.    Last edited by Idris Griggs on 3/8/2022  8:31 AM. (History)            Assessment /Plan     For exam results, see Encounter Report.    Post-operative state    Corneal edema      DMEK POD1: DMEK Graft attached. 2+ MCE. Wound stable. 50% bubble with good PI.  Supine 4-5hrs daily till Monday.

## 2022-03-11 ENCOUNTER — TELEPHONE (OUTPATIENT)
Dept: OPHTHALMOLOGY | Facility: CLINIC | Age: 72
End: 2022-03-11
Payer: MEDICARE

## 2022-03-11 NOTE — TELEPHONE ENCOUNTER
----- Message from Selene Wilson sent at 3/11/2022 11:13 AM CST -----  Contact: Melba @[phone]  Pt calling to r/s her appt due to 3 day notice for transportation. She would need something within that 1 week post op

## 2022-03-15 ENCOUNTER — OFFICE VISIT (OUTPATIENT)
Dept: OPHTHALMOLOGY | Facility: CLINIC | Age: 72
End: 2022-03-15
Attending: OPHTHALMOLOGY
Payer: MEDICARE

## 2022-03-15 DIAGNOSIS — H18.20 CORNEAL EDEMA: ICD-10-CM

## 2022-03-15 DIAGNOSIS — Z98.890 POST-OPERATIVE STATE: Primary | ICD-10-CM

## 2022-03-15 PROCEDURE — 99024 PR POST-OP FOLLOW-UP VISIT: ICD-10-PCS | Mod: HCNC,S$GLB,, | Performed by: OPHTHALMOLOGY

## 2022-03-15 PROCEDURE — 1159F PR MEDICATION LIST DOCUMENTED IN MEDICAL RECORD: ICD-10-PCS | Mod: HCNC,CPTII,S$GLB, | Performed by: OPHTHALMOLOGY

## 2022-03-15 PROCEDURE — 4010F ACE/ARB THERAPY RXD/TAKEN: CPT | Mod: HCNC,CPTII,S$GLB, | Performed by: OPHTHALMOLOGY

## 2022-03-15 PROCEDURE — 4010F PR ACE/ARB THEARPY RXD/TAKEN: ICD-10-PCS | Mod: HCNC,CPTII,S$GLB, | Performed by: OPHTHALMOLOGY

## 2022-03-15 PROCEDURE — 99999 PR PBB SHADOW E&M-EST. PATIENT-LVL III: CPT | Mod: PBBFAC,HCNC,, | Performed by: OPHTHALMOLOGY

## 2022-03-15 PROCEDURE — 1126F PR PAIN SEVERITY QUANTIFIED, NO PAIN PRESENT: ICD-10-PCS | Mod: HCNC,CPTII,S$GLB, | Performed by: OPHTHALMOLOGY

## 2022-03-15 PROCEDURE — 1160F RVW MEDS BY RX/DR IN RCRD: CPT | Mod: HCNC,CPTII,S$GLB, | Performed by: OPHTHALMOLOGY

## 2022-03-15 PROCEDURE — 1101F PT FALLS ASSESS-DOCD LE1/YR: CPT | Mod: HCNC,CPTII,S$GLB, | Performed by: OPHTHALMOLOGY

## 2022-03-15 PROCEDURE — 3288F FALL RISK ASSESSMENT DOCD: CPT | Mod: HCNC,CPTII,S$GLB, | Performed by: OPHTHALMOLOGY

## 2022-03-15 PROCEDURE — 3288F PR FALLS RISK ASSESSMENT DOCUMENTED: ICD-10-PCS | Mod: HCNC,CPTII,S$GLB, | Performed by: OPHTHALMOLOGY

## 2022-03-15 PROCEDURE — 1101F PR PT FALLS ASSESS DOC 0-1 FALLS W/OUT INJ PAST YR: ICD-10-PCS | Mod: HCNC,CPTII,S$GLB, | Performed by: OPHTHALMOLOGY

## 2022-03-15 PROCEDURE — 1126F AMNT PAIN NOTED NONE PRSNT: CPT | Mod: HCNC,CPTII,S$GLB, | Performed by: OPHTHALMOLOGY

## 2022-03-15 PROCEDURE — 1159F MED LIST DOCD IN RCRD: CPT | Mod: HCNC,CPTII,S$GLB, | Performed by: OPHTHALMOLOGY

## 2022-03-15 PROCEDURE — 99024 POSTOP FOLLOW-UP VISIT: CPT | Mod: HCNC,S$GLB,, | Performed by: OPHTHALMOLOGY

## 2022-03-15 PROCEDURE — 1160F PR REVIEW ALL MEDS BY PRESCRIBER/CLIN PHARMACIST DOCUMENTED: ICD-10-PCS | Mod: HCNC,CPTII,S$GLB, | Performed by: OPHTHALMOLOGY

## 2022-03-15 PROCEDURE — 99999 PR PBB SHADOW E&M-EST. PATIENT-LVL III: ICD-10-PCS | Mod: PBBFAC,HCNC,, | Performed by: OPHTHALMOLOGY

## 2022-03-15 RX ORDER — PREDNISOLONE ACETATE 10 MG/ML
1 SUSPENSION/ DROPS OPHTHALMIC 4 TIMES DAILY
Qty: 10 ML | Refills: 4 | Status: SHIPPED | OUTPATIENT
Start: 2022-03-15 | End: 2024-02-05

## 2022-03-15 NOTE — PROGRESS NOTES
HPI     71yr old patient presents with one week post OP DMEK OS. Patient notes no   pain, irritation, or redness.      Patient has drooping of the left eye.    Last edited by Idris Griggs on 3/15/2022  9:38 AM. (History)            Assessment /Plan     For exam results, see Encounter Report.    Post-operative state    Corneal edema    Other orders  -     prednisoLONE acetate (PRED FORTE) 1 % DrpS; Place 1 drop into the left eye 4 (four) times daily.  Dispense: 10 mL; Refill: 4      DMEK graft attached and clear. Signs and symptoms of graft rejection reviewed.  1 week, looks great!    PF qid OS    OK for lid eval with Kaylie

## 2022-03-22 DIAGNOSIS — I70.0 ATHEROSCLEROSIS OF AORTA: ICD-10-CM

## 2022-03-22 DIAGNOSIS — I10 ESSENTIAL HYPERTENSION: ICD-10-CM

## 2022-03-22 NOTE — TELEPHONE ENCOUNTER
Last Office Visit Info:   The patient's last visit with Azikiwe K Lombard, MD was on 5/3/2021.    The patient's last visit in current department was on 11/18/2021.        Last CBC Results:   Lab Results   Component Value Date    WBC 3.74 (L) 05/03/2021    HGB 15.3 05/03/2021    HCT 45.6 05/03/2021     05/03/2021       Last CMP Results  Lab Results   Component Value Date     05/03/2021    K 5.3 (H) 05/03/2021     05/03/2021    CO2 22 (L) 05/03/2021    BUN 10 05/03/2021    CREATININE 0.9 05/03/2021    CALCIUM 11.5 (H) 05/03/2021    ALBUMIN 4.2 05/03/2021    AST 26 05/03/2021    ALT 14 05/03/2021       Last Lipids  Lab Results   Component Value Date    CHOL 127 05/03/2021    TRIG 37 05/03/2021    HDL 66 05/03/2021    LDLCALC 53.6 (L) 05/03/2021       Last A1C  Lab Results   Component Value Date    HGBA1C 5.1 05/03/2021       Last TSH  Lab Results   Component Value Date    TSH 0.708 05/03/2021             Current Med Refills  Medication List with Changes/Refills   Current Medications    ATORVASTATIN (LIPITOR) 20 MG TABLET    Take 1 tablet (20 mg total) by mouth once daily.       Start Date: 5/3/2021  End Date: 5/3/2022    CYPROHEPTADINE (PERIACTIN) 4 MG TABLET    Take 1 tablet (4 mg total) by mouth 3 (three) times daily as needed (Appetite).       Start Date: 2/28/2022 End Date: --    LISINOPRIL 10 MG TABLET    Take 1 tablet (10 mg total) by mouth once daily.       Start Date: 2/28/2022 End Date: 2/28/2023    MULTIVITAMIN (THERAGRAN) PER TABLET    Take 1 tablet by mouth once daily.       Start Date: --        End Date: --    PREDNISOLONE ACETATE (PRED FORTE) 1 % DRPS    Place 1 drop into the left eye 4 (four) times daily.       Start Date: 3/15/2022 End Date: --    SODIUM CHLORIDE 2% () 2 % OPHTHALMIC SOLUTION    Place 1 drop into both eyes 3 (three) times daily.       Start Date: --        End Date: --       Order(s) placed per written order guidelines:    Please advise.

## 2022-03-22 NOTE — TELEPHONE ENCOUNTER
No new care gaps identified.  Powered by [employer]. Reference number: [id number].   3/22/2022 11:37:17 AM CDT

## 2022-03-23 RX ORDER — LISINOPRIL 10 MG/1
10 TABLET ORAL DAILY
Qty: 90 TABLET | Refills: 0 | Status: SHIPPED | OUTPATIENT
Start: 2022-03-23 | End: 2022-05-03 | Stop reason: SDUPTHER

## 2022-03-23 RX ORDER — ATORVASTATIN CALCIUM 20 MG/1
20 TABLET, FILM COATED ORAL DAILY
Qty: 90 TABLET | Refills: 0 | Status: SHIPPED | OUTPATIENT
Start: 2022-03-23 | End: 2022-05-03 | Stop reason: SDUPTHER

## 2022-05-03 ENCOUNTER — OFFICE VISIT (OUTPATIENT)
Dept: FAMILY MEDICINE | Facility: CLINIC | Age: 72
End: 2022-05-03
Payer: MEDICARE

## 2022-05-03 ENCOUNTER — IMMUNIZATION (OUTPATIENT)
Dept: OBSTETRICS AND GYNECOLOGY | Facility: CLINIC | Age: 72
End: 2022-05-03
Payer: MEDICARE

## 2022-05-03 ENCOUNTER — LAB VISIT (OUTPATIENT)
Dept: LAB | Facility: HOSPITAL | Age: 72
End: 2022-05-03
Attending: FAMILY MEDICINE
Payer: MEDICARE

## 2022-05-03 VITALS
OXYGEN SATURATION: 95 % | DIASTOLIC BLOOD PRESSURE: 86 MMHG | TEMPERATURE: 99 F | HEIGHT: 65 IN | HEART RATE: 83 BPM | WEIGHT: 104.5 LBS | BODY MASS INDEX: 17.41 KG/M2 | RESPIRATION RATE: 16 BRPM | SYSTOLIC BLOOD PRESSURE: 136 MMHG

## 2022-05-03 DIAGNOSIS — Z86.39 PERSONAL HISTORY OF OTHER ENDOCRINE, NUTRITIONAL AND METABOLIC DISEASE: ICD-10-CM

## 2022-05-03 DIAGNOSIS — I10 ESSENTIAL HYPERTENSION: Primary | ICD-10-CM

## 2022-05-03 DIAGNOSIS — Z23 NEED FOR VACCINATION: Primary | ICD-10-CM

## 2022-05-03 DIAGNOSIS — I10 ESSENTIAL HYPERTENSION: ICD-10-CM

## 2022-05-03 DIAGNOSIS — I70.0 ATHEROSCLEROSIS OF AORTA: ICD-10-CM

## 2022-05-03 DIAGNOSIS — C49.A0 GASTROINTESTINAL STROMAL TUMOR (GIST): ICD-10-CM

## 2022-05-03 DIAGNOSIS — E55.9 VITAMIN D INSUFFICIENCY: ICD-10-CM

## 2022-05-03 DIAGNOSIS — H18.20 CORNEAL EDEMA: ICD-10-CM

## 2022-05-03 LAB
25(OH)D3+25(OH)D2 SERPL-MCNC: 32 NG/ML (ref 30–96)
ALBUMIN SERPL BCP-MCNC: 3.8 G/DL (ref 3.5–5.2)
ALP SERPL-CCNC: 91 U/L (ref 55–135)
ALT SERPL W/O P-5'-P-CCNC: 10 U/L (ref 10–44)
ANION GAP SERPL CALC-SCNC: 8 MMOL/L (ref 8–16)
AST SERPL-CCNC: 22 U/L (ref 10–40)
BASOPHILS # BLD AUTO: 0.03 K/UL (ref 0–0.2)
BASOPHILS NFR BLD: 0.9 % (ref 0–1.9)
BILIRUB SERPL-MCNC: 0.8 MG/DL (ref 0.1–1)
BUN SERPL-MCNC: 11 MG/DL (ref 8–23)
CALCIUM SERPL-MCNC: 11.5 MG/DL (ref 8.7–10.5)
CHLORIDE SERPL-SCNC: 106 MMOL/L (ref 95–110)
CHOLEST SERPL-MCNC: 121 MG/DL (ref 120–199)
CHOLEST/HDLC SERPL: 2.1 {RATIO} (ref 2–5)
CO2 SERPL-SCNC: 24 MMOL/L (ref 23–29)
CREAT SERPL-MCNC: 0.8 MG/DL (ref 0.5–1.4)
DIFFERENTIAL METHOD: ABNORMAL
EOSINOPHIL # BLD AUTO: 0 K/UL (ref 0–0.5)
EOSINOPHIL NFR BLD: 1.2 % (ref 0–8)
ERYTHROCYTE [DISTWIDTH] IN BLOOD BY AUTOMATED COUNT: 14 % (ref 11.5–14.5)
EST. GFR  (AFRICAN AMERICAN): >60 ML/MIN/1.73 M^2
EST. GFR  (NON AFRICAN AMERICAN): >60 ML/MIN/1.73 M^2
ESTIMATED AVG GLUCOSE: 97 MG/DL (ref 68–131)
GLUCOSE SERPL-MCNC: 88 MG/DL (ref 70–110)
HBA1C MFR BLD: 5 % (ref 4–5.6)
HCT VFR BLD AUTO: 42.1 % (ref 37–48.5)
HDLC SERPL-MCNC: 59 MG/DL (ref 40–75)
HDLC SERPL: 48.8 % (ref 20–50)
HGB BLD-MCNC: 14.1 G/DL (ref 12–16)
IMM GRANULOCYTES # BLD AUTO: 0.01 K/UL (ref 0–0.04)
IMM GRANULOCYTES NFR BLD AUTO: 0.3 % (ref 0–0.5)
LDLC SERPL CALC-MCNC: 54.8 MG/DL (ref 63–159)
LYMPHOCYTES # BLD AUTO: 1.4 K/UL (ref 1–4.8)
LYMPHOCYTES NFR BLD: 40.9 % (ref 18–48)
MCH RBC QN AUTO: 31.4 PG (ref 27–31)
MCHC RBC AUTO-ENTMCNC: 33.5 G/DL (ref 32–36)
MCV RBC AUTO: 94 FL (ref 82–98)
MONOCYTES # BLD AUTO: 0.3 K/UL (ref 0.3–1)
MONOCYTES NFR BLD: 9.9 % (ref 4–15)
NEUTROPHILS # BLD AUTO: 1.6 K/UL (ref 1.8–7.7)
NEUTROPHILS NFR BLD: 46.8 % (ref 38–73)
NONHDLC SERPL-MCNC: 62 MG/DL
NRBC BLD-RTO: 0 /100 WBC
PLATELET # BLD AUTO: 195 K/UL (ref 150–450)
PMV BLD AUTO: 10.7 FL (ref 9.2–12.9)
POTASSIUM SERPL-SCNC: 4.4 MMOL/L (ref 3.5–5.1)
PROT SERPL-MCNC: 7.7 G/DL (ref 6–8.4)
RBC # BLD AUTO: 4.49 M/UL (ref 4–5.4)
SODIUM SERPL-SCNC: 138 MMOL/L (ref 136–145)
T4 FREE SERPL-MCNC: 1.04 NG/DL (ref 0.71–1.51)
TRIGL SERPL-MCNC: 36 MG/DL (ref 30–150)
TSH SERPL DL<=0.005 MIU/L-ACNC: 1.34 UIU/ML (ref 0.4–4)
WBC # BLD AUTO: 3.35 K/UL (ref 3.9–12.7)

## 2022-05-03 PROCEDURE — 3075F PR MOST RECENT SYSTOLIC BLOOD PRESS GE 130-139MM HG: ICD-10-PCS | Mod: CPTII,S$GLB,, | Performed by: FAMILY MEDICINE

## 2022-05-03 PROCEDURE — 3288F FALL RISK ASSESSMENT DOCD: CPT | Mod: CPTII,S$GLB,, | Performed by: FAMILY MEDICINE

## 2022-05-03 PROCEDURE — 99499 UNLISTED E&M SERVICE: CPT | Mod: HCNC,S$GLB,, | Performed by: FAMILY MEDICINE

## 2022-05-03 PROCEDURE — 99214 OFFICE O/P EST MOD 30 MIN: CPT | Mod: S$GLB,,, | Performed by: FAMILY MEDICINE

## 2022-05-03 PROCEDURE — 3079F PR MOST RECENT DIASTOLIC BLOOD PRESSURE 80-89 MM HG: ICD-10-PCS | Mod: CPTII,S$GLB,, | Performed by: FAMILY MEDICINE

## 2022-05-03 PROCEDURE — 1159F PR MEDICATION LIST DOCUMENTED IN MEDICAL RECORD: ICD-10-PCS | Mod: CPTII,S$GLB,, | Performed by: FAMILY MEDICINE

## 2022-05-03 PROCEDURE — 3075F SYST BP GE 130 - 139MM HG: CPT | Mod: CPTII,S$GLB,, | Performed by: FAMILY MEDICINE

## 2022-05-03 PROCEDURE — 99499 RISK ADDL DX/OHS AUDIT: ICD-10-PCS | Mod: HCNC,S$GLB,, | Performed by: FAMILY MEDICINE

## 2022-05-03 PROCEDURE — 84439 ASSAY OF FREE THYROXINE: CPT | Performed by: FAMILY MEDICINE

## 2022-05-03 PROCEDURE — 1160F RVW MEDS BY RX/DR IN RCRD: CPT | Mod: CPTII,S$GLB,, | Performed by: FAMILY MEDICINE

## 2022-05-03 PROCEDURE — 84443 ASSAY THYROID STIM HORMONE: CPT | Performed by: FAMILY MEDICINE

## 2022-05-03 PROCEDURE — 1160F PR REVIEW ALL MEDS BY PRESCRIBER/CLIN PHARMACIST DOCUMENTED: ICD-10-PCS | Mod: CPTII,S$GLB,, | Performed by: FAMILY MEDICINE

## 2022-05-03 PROCEDURE — 99999 PR PBB SHADOW E&M-EST. PATIENT-LVL III: ICD-10-PCS | Mod: PBBFAC,,, | Performed by: FAMILY MEDICINE

## 2022-05-03 PROCEDURE — 4010F ACE/ARB THERAPY RXD/TAKEN: CPT | Mod: CPTII,S$GLB,, | Performed by: FAMILY MEDICINE

## 2022-05-03 PROCEDURE — 85025 COMPLETE CBC W/AUTO DIFF WBC: CPT | Performed by: FAMILY MEDICINE

## 2022-05-03 PROCEDURE — 1101F PT FALLS ASSESS-DOCD LE1/YR: CPT | Mod: CPTII,S$GLB,, | Performed by: FAMILY MEDICINE

## 2022-05-03 PROCEDURE — 3288F PR FALLS RISK ASSESSMENT DOCUMENTED: ICD-10-PCS | Mod: CPTII,S$GLB,, | Performed by: FAMILY MEDICINE

## 2022-05-03 PROCEDURE — 83036 HEMOGLOBIN GLYCOSYLATED A1C: CPT | Performed by: FAMILY MEDICINE

## 2022-05-03 PROCEDURE — 3008F BODY MASS INDEX DOCD: CPT | Mod: CPTII,S$GLB,, | Performed by: FAMILY MEDICINE

## 2022-05-03 PROCEDURE — 1126F PR PAIN SEVERITY QUANTIFIED, NO PAIN PRESENT: ICD-10-PCS | Mod: CPTII,S$GLB,, | Performed by: FAMILY MEDICINE

## 2022-05-03 PROCEDURE — 99214 PR OFFICE/OUTPT VISIT, EST, LEVL IV, 30-39 MIN: ICD-10-PCS | Mod: S$GLB,,, | Performed by: FAMILY MEDICINE

## 2022-05-03 PROCEDURE — 80061 LIPID PANEL: CPT | Performed by: FAMILY MEDICINE

## 2022-05-03 PROCEDURE — 1126F AMNT PAIN NOTED NONE PRSNT: CPT | Mod: CPTII,S$GLB,, | Performed by: FAMILY MEDICINE

## 2022-05-03 PROCEDURE — 4010F PR ACE/ARB THEARPY RXD/TAKEN: ICD-10-PCS | Mod: CPTII,S$GLB,, | Performed by: FAMILY MEDICINE

## 2022-05-03 PROCEDURE — 3008F PR BODY MASS INDEX (BMI) DOCUMENTED: ICD-10-PCS | Mod: CPTII,S$GLB,, | Performed by: FAMILY MEDICINE

## 2022-05-03 PROCEDURE — 91305 COVID-19, MRNA, LNP-S, PF, 30 MCG/0.3 ML DOSE VACCINE (PFIZER): CPT | Mod: PBBFAC | Performed by: FAMILY MEDICINE

## 2022-05-03 PROCEDURE — 99999 PR PBB SHADOW E&M-EST. PATIENT-LVL III: CPT | Mod: PBBFAC,,, | Performed by: FAMILY MEDICINE

## 2022-05-03 PROCEDURE — 1101F PR PT FALLS ASSESS DOC 0-1 FALLS W/OUT INJ PAST YR: ICD-10-PCS | Mod: CPTII,S$GLB,, | Performed by: FAMILY MEDICINE

## 2022-05-03 PROCEDURE — 1159F MED LIST DOCD IN RCRD: CPT | Mod: CPTII,S$GLB,, | Performed by: FAMILY MEDICINE

## 2022-05-03 PROCEDURE — 3079F DIAST BP 80-89 MM HG: CPT | Mod: CPTII,S$GLB,, | Performed by: FAMILY MEDICINE

## 2022-05-03 PROCEDURE — 80053 COMPREHEN METABOLIC PANEL: CPT | Performed by: FAMILY MEDICINE

## 2022-05-03 PROCEDURE — 36415 COLL VENOUS BLD VENIPUNCTURE: CPT | Mod: PO | Performed by: FAMILY MEDICINE

## 2022-05-03 PROCEDURE — 82306 VITAMIN D 25 HYDROXY: CPT | Performed by: FAMILY MEDICINE

## 2022-05-03 RX ORDER — LISINOPRIL 10 MG/1
10 TABLET ORAL DAILY
Qty: 90 TABLET | Refills: 1 | Status: SHIPPED | OUTPATIENT
Start: 2022-05-03 | End: 2022-11-08 | Stop reason: SDUPTHER

## 2022-05-03 RX ORDER — ATORVASTATIN CALCIUM 20 MG/1
20 TABLET, FILM COATED ORAL DAILY
Qty: 90 TABLET | Refills: 1 | Status: SHIPPED | OUTPATIENT
Start: 2022-05-03 | End: 2022-11-08 | Stop reason: SDUPTHER

## 2022-05-03 NOTE — PROGRESS NOTES
Chief Complaint   Patient presents with    Hypertension     Follow up        Melba Pabon is a 71 y.o. female who presents per chief complain.  Chronic medical issues, if present, have been documented.  Acute medical issues, if present have been documented in the Chief Complaint.     Hypertension  This is a chronic problem. The current episode started more than 1 year ago. The problem is unchanged. The problem is controlled. Pertinent negatives include no anxiety, blurred vision, chest pain, headaches, malaise/fatigue, neck pain, orthopnea, palpitations, peripheral edema, PND, shortness of breath or sweats. There are no associated agents to hypertension. Risk factors for coronary artery disease include sedentary lifestyle and post-menopausal state. Past treatments include ACE inhibitors and diuretics. The current treatment provides significant improvement. There are no compliance problems.  There is no history of angina, kidney disease, CAD/MI, CVA, heart failure, left ventricular hypertrophy, PVD or retinopathy. There is no history of chronic renal disease, coarctation of the aorta, hyperaldosteronism, hypercortisolism, hyperparathyroidism, a hypertension causing med, pheochromocytoma, renovascular disease, sleep apnea or a thyroid problem.     ROS  Review of Systems   Constitutional: Positive for appetite change (improving) and unexpected weight change (increasing). Negative for activity change, chills, diaphoresis, fatigue, fever and malaise/fatigue.   HENT: Negative.  Negative for congestion, ear pain, hearing loss, nosebleeds, postnasal drip, rhinorrhea, sinus pressure, sneezing, sore throat and trouble swallowing.    Eyes: Positive for visual disturbance (left eyelid overhang; vision obstructed). Negative for blurred vision and pain.   Respiratory: Negative for cough, choking and shortness of breath.    Cardiovascular: Negative for chest pain, palpitations, orthopnea, leg swelling and PND.  "  Gastrointestinal: Positive for nausea (improved). Negative for abdominal distention, abdominal pain, anal bleeding, blood in stool, constipation, diarrhea, rectal pain and vomiting.   Genitourinary: Negative for difficulty urinating, dysuria, frequency and urgency.   Musculoskeletal: Negative for arthralgias, back pain, gait problem, joint swelling, myalgias and neck pain.   Skin: Negative.    Allergic/Immunologic: Negative for environmental allergies and food allergies.   Neurological: Negative for dizziness, tremors, seizures, syncope, facial asymmetry, speech difficulty, weakness, light-headedness, numbness and headaches.   Hematological: Negative for adenopathy. Does not bruise/bleed easily.   Psychiatric/Behavioral: Negative.  Negative for confusion, decreased concentration, dysphoric mood and sleep disturbance. The patient is not nervous/anxious.      Physical Exam  Vitals:    05/03/22 1435   BP: 136/86   Pulse: 83   Resp: 16   Temp: 98.9 °F (37.2 °C)    Body mass index is 17.39 kg/m².  Weight: 47.4 kg (104 lb 8 oz)   Height: 5' 5" (165.1 cm)     Physical Exam  Constitutional:       General: She is not in acute distress.     Appearance: Normal appearance. She is well-developed. She is not ill-appearing, toxic-appearing or diaphoretic.   HENT:      Head: Normocephalic and atraumatic.      Right Ear: Hearing and external ear normal. No decreased hearing noted.      Left Ear: Hearing and external ear normal. No decreased hearing noted.      Nose: Nose normal. No nasal deformity or rhinorrhea.      Mouth/Throat:      Dentition: Normal dentition. Does not have dentures.      Pharynx: Uvula midline.   Eyes:      General: Vision grossly intact. No allergic shiner, visual field deficit or scleral icterus.        Right eye: No foreign body or discharge.         Left eye: No foreign body or discharge.      Extraocular Movements: Extraocular movements intact.      Conjunctiva/sclera: Conjunctivae normal.      Right " eye: No chemosis or exudate.     Left eye: No chemosis or exudate.     Pupils: Pupils are equal, round, and reactive to light.     Cardiovascular:      Rate and Rhythm: Normal rate and regular rhythm.      Heart sounds: Normal heart sounds, S1 normal and S2 normal. No murmur heard.    No friction rub. No gallop.   Pulmonary:      Effort: Pulmonary effort is normal. No accessory muscle usage or respiratory distress.      Breath sounds: Normal breath sounds. No decreased breath sounds, wheezing, rhonchi or rales.   Abdominal:      General: There is no distension.      Palpations: Abdomen is soft. Abdomen is not rigid.      Tenderness: There is no abdominal tenderness. There is no guarding or rebound.   Musculoskeletal:         General: Normal range of motion.      Cervical back: Full passive range of motion without pain, normal range of motion and neck supple.   Skin:     General: Skin is warm and dry.      Findings: No rash.   Neurological:      Mental Status: She is alert and oriented to person, place, and time.      Cranial Nerves: No cranial nerve deficit.      Sensory: No sensory deficit.      Motor: No abnormal muscle tone or seizure activity.      Coordination: Coordination normal.      Gait: Gait normal.   Psychiatric:         Attention and Perception: She is attentive.         Speech: Speech normal.         Behavior: Behavior normal. Behavior is cooperative.         Thought Content: Thought content normal.         Judgment: Judgment normal.       Assessment & Plan    Discussion of plan of care including treatment options regarding health and wellness were reviewed and discussed with patient.  Any changes to medication or treatment plan, as well as any screening blood test, imaging, or referrals to specialist, are documented.  Follow up as indicated.     1. Essential hypertension  Patient was counseled and encouraged to maintain a low sodium diet, as well as increasing physical activity.  Recommend random BP  checks at home on a regular basis.  Repeat BP at end of visit was not necessary. Will continue medication at this time, and follow up in 3-6 months, or sooner if blood pressure begins to increase.     - lisinopriL 10 MG tablet; Take 1 tablet (10 mg total) by mouth once daily.  Dispense: 90 tablet; Refill: 1  - Comprehensive Metabolic Panel; Future  - Lipid Panel; Future    2. Gastrointestinal stromal tumor (GIST)  Stable; no therapeutic changes at this time.      3. Corneal edema  Managed by Ophthalmology; recent corneal transplant.  Patient will follow up with specialist for eyelid resection.    4. Atherosclerosis of aorta  Chronic condition; will continue to document and monitor.    - atorvastatin (LIPITOR) 20 MG tablet; Take 1 tablet (20 mg total) by mouth once daily.  Dispense: 90 tablet; Refill: 1  - Lipid Panel; Future    5. Personal history of other endocrine, nutritional and metabolic disease  Screening test will be ordered and once results available patient will be notified of results and managed accordingly.   - CBC Auto Differential; Future  - TSH; Future  - T4, Free; Future  - Hemoglobin A1C; Future    6. Vitamin D insufficiency  Screening test will be ordered and once results available patient will be notified of results and managed accordingly.   - Vitamin D; Future        Follow up in about 6 months (around 11/3/2022).      ACTIVE MEDICAL ISSUES:  Documented in Problem List    PAST MEDICAL HISTORY  Documented    PAST SURGICAL HISTORY:  Documented    SOCIAL HISTORY:  Documented    FAMILY HISTORY:  Documented    ALLERGIES AND MEDICATIONS: updated and reviewed.  Documented    Health Maintenance       Date Due Completion Date    TETANUS VACCINE Never done ---    Shingles Vaccine (1 of 2) Never done ---    Colorectal Cancer Screening 09/10/2017 9/10/2007    DEXA Scan 06/05/2021 6/5/2018    Lipid Panel 05/03/2022 5/3/2021    Mammogram 05/03/2022 5/3/2021    COVID-19 Vaccine (5 - Booster for Pfizer series)  09/03/2022 5/3/2022    High Dose Statin 03/23/2023 3/23/2022

## 2022-05-17 ENCOUNTER — OFFICE VISIT (OUTPATIENT)
Dept: OPHTHALMOLOGY | Facility: CLINIC | Age: 72
End: 2022-05-17
Attending: OPHTHALMOLOGY
Payer: MEDICARE

## 2022-05-17 DIAGNOSIS — Z94.7 STATUS POST CORNEAL TRANSPLANT: Primary | ICD-10-CM

## 2022-05-17 PROCEDURE — 1159F MED LIST DOCD IN RCRD: CPT | Mod: CPTII,S$GLB,, | Performed by: OPHTHALMOLOGY

## 2022-05-17 PROCEDURE — 3044F PR MOST RECENT HEMOGLOBIN A1C LEVEL <7.0%: ICD-10-PCS | Mod: CPTII,S$GLB,, | Performed by: OPHTHALMOLOGY

## 2022-05-17 PROCEDURE — 1159F PR MEDICATION LIST DOCUMENTED IN MEDICAL RECORD: ICD-10-PCS | Mod: CPTII,S$GLB,, | Performed by: OPHTHALMOLOGY

## 2022-05-17 PROCEDURE — 1101F PT FALLS ASSESS-DOCD LE1/YR: CPT | Mod: CPTII,S$GLB,, | Performed by: OPHTHALMOLOGY

## 2022-05-17 PROCEDURE — 3044F HG A1C LEVEL LT 7.0%: CPT | Mod: CPTII,S$GLB,, | Performed by: OPHTHALMOLOGY

## 2022-05-17 PROCEDURE — 1126F PR PAIN SEVERITY QUANTIFIED, NO PAIN PRESENT: ICD-10-PCS | Mod: CPTII,S$GLB,, | Performed by: OPHTHALMOLOGY

## 2022-05-17 PROCEDURE — 4010F PR ACE/ARB THEARPY RXD/TAKEN: ICD-10-PCS | Mod: CPTII,S$GLB,, | Performed by: OPHTHALMOLOGY

## 2022-05-17 PROCEDURE — 1126F AMNT PAIN NOTED NONE PRSNT: CPT | Mod: CPTII,S$GLB,, | Performed by: OPHTHALMOLOGY

## 2022-05-17 PROCEDURE — 3288F PR FALLS RISK ASSESSMENT DOCUMENTED: ICD-10-PCS | Mod: CPTII,S$GLB,, | Performed by: OPHTHALMOLOGY

## 2022-05-17 PROCEDURE — 99024 PR POST-OP FOLLOW-UP VISIT: ICD-10-PCS | Mod: S$GLB,,, | Performed by: OPHTHALMOLOGY

## 2022-05-17 PROCEDURE — 99999 PR PBB SHADOW E&M-EST. PATIENT-LVL II: CPT | Mod: PBBFAC,,, | Performed by: OPHTHALMOLOGY

## 2022-05-17 PROCEDURE — 3288F FALL RISK ASSESSMENT DOCD: CPT | Mod: CPTII,S$GLB,, | Performed by: OPHTHALMOLOGY

## 2022-05-17 PROCEDURE — 1101F PR PT FALLS ASSESS DOC 0-1 FALLS W/OUT INJ PAST YR: ICD-10-PCS | Mod: CPTII,S$GLB,, | Performed by: OPHTHALMOLOGY

## 2022-05-17 PROCEDURE — 99024 POSTOP FOLLOW-UP VISIT: CPT | Mod: S$GLB,,, | Performed by: OPHTHALMOLOGY

## 2022-05-17 PROCEDURE — 4010F ACE/ARB THERAPY RXD/TAKEN: CPT | Mod: CPTII,S$GLB,, | Performed by: OPHTHALMOLOGY

## 2022-05-17 PROCEDURE — 99999 PR PBB SHADOW E&M-EST. PATIENT-LVL II: ICD-10-PCS | Mod: PBBFAC,,, | Performed by: OPHTHALMOLOGY

## 2022-05-17 NOTE — PROGRESS NOTES
HPI     73 y/o female presents to clinic for 1 month DMEK OS post op    Pt states OS is very light sensitive. Sometimes it hurts and throbs    Pred QID OS    Last edited by Antonia Hernandez on 5/17/2022 10:42 AM. (History)            Assessment /Plan     For exam results, see Encounter Report.    Status post corneal transplant      DMEK graft attached and clear. Signs and symptoms of graft rejection reviewed.  1 month, looks perfect.

## 2022-05-19 ENCOUNTER — TELEPHONE (OUTPATIENT)
Dept: OPTOMETRY | Facility: CLINIC | Age: 72
End: 2022-05-19
Payer: MEDICARE

## 2022-05-19 NOTE — TELEPHONE ENCOUNTER
----- Message from Chelle Benoit sent at 5/19/2022  2:49 PM CDT -----  Contact: Melba @[phone]    ----- Message -----  From: Selene Wilson  Sent: 5/19/2022   2:42 PM CDT  To: [facility] Staff    Pt states she needs an appt for her eyelid as per Dr. Emmanuel

## 2022-06-23 ENCOUNTER — TELEPHONE (OUTPATIENT)
Dept: FAMILY MEDICINE | Facility: CLINIC | Age: 72
End: 2022-06-23
Payer: MEDICARE

## 2022-06-23 DIAGNOSIS — Z12.11 ENCOUNTER FOR SCREENING COLONOSCOPY: Primary | ICD-10-CM

## 2022-06-23 DIAGNOSIS — Z12.11 SCREENING FOR COLON CANCER: ICD-10-CM

## 2022-06-23 DIAGNOSIS — Z12.31 ENCOUNTER FOR SCREENING MAMMOGRAM FOR BREAST CANCER: ICD-10-CM

## 2022-06-23 NOTE — TELEPHONE ENCOUNTER
----- Message from Kathi August sent at 6/23/2022 10:10 AM CDT -----  Type: Patient Call Back    Who called: self     What is the request in detail: Patient requesting orders for a Colonoscopy.     Can the clinic reply by [facility]? No     Would the patient rather a call back or a response via [facility]? Call back     Best call back number: [phone]

## 2022-07-08 ENCOUNTER — HOSPITAL ENCOUNTER (OUTPATIENT)
Dept: RADIOLOGY | Facility: HOSPITAL | Age: 72
Discharge: HOME | End: 2022-07-08
Attending: FAMILY MEDICINE
Payer: MEDICARE

## 2022-07-08 VITALS — HEIGHT: 65 IN | BODY MASS INDEX: 17.41 KG/M2 | WEIGHT: 104.5 LBS

## 2022-07-08 DIAGNOSIS — Z12.31 ENCOUNTER FOR SCREENING MAMMOGRAM FOR BREAST CANCER: ICD-10-CM

## 2022-07-08 PROCEDURE — 77063 BREAST TOMOSYNTHESIS BI: CPT | Mod: TC

## 2022-07-08 PROCEDURE — 77067 MAMMO DIGITAL SCREENING BILAT WITH TOMO: ICD-10-PCS | Mod: 26,,, | Performed by: RADIOLOGY

## 2022-07-08 PROCEDURE — 77063 BREAST TOMOSYNTHESIS BI: CPT | Mod: 26,,, | Performed by: RADIOLOGY

## 2022-07-08 PROCEDURE — 77067 SCR MAMMO BI INCL CAD: CPT | Mod: TC

## 2022-07-08 PROCEDURE — 77067 SCR MAMMO BI INCL CAD: CPT | Mod: 26,,, | Performed by: RADIOLOGY

## 2022-07-08 PROCEDURE — 77063 MAMMO DIGITAL SCREENING BILAT WITH TOMO: ICD-10-PCS | Mod: 26,,, | Performed by: RADIOLOGY

## 2022-07-14 ENCOUNTER — TELEPHONE (OUTPATIENT)
Dept: OPHTHALMOLOGY | Facility: CLINIC | Age: 72
End: 2022-07-14
Payer: MEDICARE

## 2022-07-14 NOTE — TELEPHONE ENCOUNTER
Appt has been schedule for 9/16/2022 at 9:30 AM with Dr. Pond at the [facility].

## 2022-07-27 ENCOUNTER — PATIENT OUTREACH (OUTPATIENT)
Dept: ADMINISTRATIVE | Facility: HOSPITAL | Age: 72
End: 2022-07-27
Payer: MEDICARE

## 2022-07-27 NOTE — PROGRESS NOTES
Left message for pt in regards to her PCP leaving the [facility] practice and establishing care with a new primary care provider.

## 2022-09-06 ENCOUNTER — HOSPITAL ENCOUNTER (EMERGENCY)
Facility: HOSPITAL | Age: 72
Discharge: HOME | End: 2022-09-06
Attending: EMERGENCY MEDICINE
Payer: MEDICARE

## 2022-09-06 VITALS
BODY MASS INDEX: 18.33 KG/M2 | HEIGHT: 65 IN | WEIGHT: 110 LBS | SYSTOLIC BLOOD PRESSURE: 163 MMHG | HEART RATE: 75 BPM | DIASTOLIC BLOOD PRESSURE: 86 MMHG | TEMPERATURE: 99 F | RESPIRATION RATE: 25 BRPM | OXYGEN SATURATION: 97 %

## 2022-09-06 DIAGNOSIS — R07.9 CHEST PAIN: ICD-10-CM

## 2022-09-06 DIAGNOSIS — S63.91XA HAND SPRAIN, RIGHT, INITIAL ENCOUNTER: ICD-10-CM

## 2022-09-06 DIAGNOSIS — S00.83XA CONTUSION OF FACE, INITIAL ENCOUNTER: ICD-10-CM

## 2022-09-06 DIAGNOSIS — S00.81XA ABRASION OF FACE, INITIAL ENCOUNTER: Primary | ICD-10-CM

## 2022-09-06 DIAGNOSIS — K04.7 CHRONIC DENTAL INFECTION: ICD-10-CM

## 2022-09-06 PROCEDURE — 93010 ELECTROCARDIOGRAM REPORT: CPT | Mod: ,,, | Performed by: INTERNAL MEDICINE

## 2022-09-06 PROCEDURE — 25000003 PHARM REV CODE 250: Performed by: EMERGENCY MEDICINE

## 2022-09-06 PROCEDURE — 93005 ELECTROCARDIOGRAM TRACING: CPT

## 2022-09-06 PROCEDURE — 99284 EMERGENCY DEPT VISIT MOD MDM: CPT | Mod: 25

## 2022-09-06 PROCEDURE — 93010 EKG 12-LEAD: ICD-10-PCS | Mod: ,,, | Performed by: INTERNAL MEDICINE

## 2022-09-06 RX ORDER — CLINDAMYCIN HYDROCHLORIDE 150 MG/1
300 CAPSULE ORAL EVERY 8 HOURS
Qty: 42 CAPSULE | Refills: 0 | Status: SHIPPED | OUTPATIENT
Start: 2022-09-06 | End: 2022-09-13

## 2022-09-06 RX ORDER — HYDROCODONE BITARTRATE AND ACETAMINOPHEN 5; 325 MG/1; MG/1
1 TABLET ORAL
Status: COMPLETED | OUTPATIENT
Start: 2022-09-06 | End: 2022-09-06

## 2022-09-06 RX ORDER — DIPHENHYDRAMINE HCL 25 MG
25 CAPSULE ORAL EVERY 6 HOURS PRN
COMMUNITY

## 2022-09-06 RX ORDER — HYDROCODONE BITARTRATE AND ACETAMINOPHEN 5; 325 MG/1; MG/1
1 TABLET ORAL EVERY 6 HOURS PRN
Qty: 12 TABLET | Refills: 0 | Status: SHIPPED | OUTPATIENT
Start: 2022-09-06 | End: 2022-09-16

## 2022-09-06 RX ADMIN — HYDROCODONE BITARTRATE AND ACETAMINOPHEN 1 TABLET: 5; 325 TABLET ORAL at 10:09

## 2022-09-06 RX ADMIN — BACITRACIN ZINC, NEOMYCIN SULFATE, POLYMYXIN B SULFATE 1 EACH: 3.5; 5000; 4 OINTMENT TOPICAL at 11:09

## 2022-09-06 NOTE — ED NOTES
Skin tears to face and right hand cleansed and irrigated with normal saline.  Neomycin-bacitracin applied.

## 2022-09-06 NOTE — ED TRIAGE NOTES
Pt tripped and fell this am on her face.  Pt has skin tears to the bridge of her nose, and skin tears and bruising to right hand.

## 2022-09-06 NOTE — ED PROVIDER NOTES
Encounter Date: 9/6/2022    SCRIBE #1 NOTE: I, Michelle Juarez, am scribing for, and in the presence of,  Tye Mackey MD. I have scribed the following portions of the note - Other sections scribed: HPI, ROS, PE, MDM.     History     Chief Complaint   Patient presents with    Fall     EMS called to 71yo female that tripped and fell at work. Denied any LOC or blood thinners. C/o of right hand pain. Also has small lacs to her right hand and above her nose.      This 72 y.o female, with a medical history of Acute blood loss anemia, Age-related osteoporosis without current pathological fracture, Cerebral aneurysm, Gastrointestinal stromal tumor (GIST), Hypertension, and Brain aneurysm presents to the ED via EMS transportation accompanied by her daughter s/p a mechanical fall that occurred at 8:00 am this morning. Pt reports that she was walking to  her jacket off of the ground when she tripped on rocks and fell onto her face. No loss of consciousness. She presently c/o right hand pain, swelling and pain to the right thumb and abrasions to the nose, upper lip and bilateral hands. The symptoms are acute, constant and severe (8/10). No use of blood thinners. She denies headache, vision changes, neck pain, back pain, hip pain, knee pain, chest pain, or abdominal pain. No other associated symptoms.      The history is provided by the patient.   Review of patient's allergies indicates:   Allergen Reactions    Pcn [penicillins]      Past Medical History:   Diagnosis Date    Acute blood loss anemia 07/28/2018    Age-related osteoporosis without current pathological fracture 04/13/2018    Allergy     Cataract     Cerebral aneurysm     Gastrointestinal stromal tumor (GIST)     GI bleed     Hypertension      Past Surgical History:   Procedure Laterality Date    BRAIN SURGERY      metal plate in head    BREAST BIOPSY Right     CATARACT EXTRACTION Left 11/05/2020    Dr. Pond    CATARACT EXTRACTION W/  INTRAOCULAR  LENS IMPLANT Right 01/14/2021    Dr. Pond    COLON SURGERY  2016    small bowel resection    DESCEMET'S MEMBRANE ENDOTHELIAL KERATOPLASTY (DMEK) Left 3/7/2022    Procedure: DESCEMET'S MEMBRANE ENDOTHELIAL KERATOPLASTY (DMEK);  Surgeon: Roopa Emmanuel MD;  Location: [facility];  Service: Ophthalmology;  Laterality: Left;    EXCISION OF MASS OF ABDOMEN N/A 8/27/2018    Procedure: RESECTION GASTRIC MASS;  Surgeon: Chuck Palomares MD;  Location: [facility] OR;  Service: General;  Laterality: N/A;  RN Pre Op 8-16-18    EYE SURGERY      HERNIA REPAIR  2016    HYSTERECTOMY      INTRAOCULAR PROSTHESES INSERTION Left 11/5/2020    Procedure: INSERTION, IOL PROSTHESIS;  Surgeon: Howard Pond MD;  Location: [facility] OR;  Service: Ophthalmology;  Laterality: Left;    INTRAOCULAR PROSTHESES INSERTION Right 1/14/2021    Procedure: INSERTION, IOL PROSTHESIS;  Surgeon: Howard Pond MD;  Location: [facility] OR;  Service: Ophthalmology;  Laterality: Right;  RN Phone Pre OP 1-7-2020, Covid NEGATIVE ON  1-. Arrival 09:00 am  C A    OOPHORECTOMY      PHACOEMULSIFICATION OF CATARACT Left 11/5/2020    Procedure: PHACOEMULSIFICATION, CATARACT;  Surgeon: Howard Pond MD;  Location: [facility] OR;  Service: Ophthalmology;  Laterality: Left;  RN Phone Pre OP 11-2-2020. --COVID NEGATIVE ON  11-4-2020. Arrival 11:45 am.C A    PHACOEMULSIFICATION OF CATARACT Right 1/14/2021    Procedure: PHACOEMULSIFICATION, CATARACT;  Surgeon: Howard Pond MD;  Location: [facility];  Service: Ophthalmology;  Laterality: Right;    SMALL INTESTINE SURGERY       Family History   Problem Relation Age of Onset    Dementia Mother     No Known Problems Father     No Known Problems Sister     No Known Problems Brother     No Known Problems Maternal Aunt     No Known Problems Maternal Uncle     No Known Problems Paternal Aunt     No Known Problems Paternal Uncle     No Known Problems Maternal Grandmother     No Known Problems Maternal  Grandfather     No Known Problems Paternal Grandmother     No Known Problems Paternal Grandfather     Amblyopia Neg Hx     Blindness Neg Hx     Cancer Neg Hx     Cataracts Neg Hx     Diabetes Neg Hx     Glaucoma Neg Hx     Hypertension Neg Hx     Macular degeneration Neg Hx     Retinal detachment Neg Hx     Strabismus Neg Hx     Stroke Neg Hx     Thyroid disease Neg Hx      Social History     Tobacco Use    Smoking status: Some Days     Packs/day: 0.50     Years: 20.00     Pack years: 10.00     Types: Cigarettes     Start date: 1999    Smokeless tobacco: Never    Tobacco comments:     occassional cigarette   Substance Use Topics    Alcohol use: No    Drug use: No     Review of Systems   Constitutional:  Negative for fever.   HENT:  Negative for sore throat.    Eyes:  Negative for visual disturbance.   Respiratory:  Negative for shortness of breath.    Cardiovascular:  Negative for chest pain.   Gastrointestinal:  Negative for abdominal pain and nausea.   Genitourinary:  Negative for dysuria.   Musculoskeletal:  Negative for back pain and neck pain.        (+) right hand pain; (+) swelling and pain to the right thumb   Skin:  Negative for rash.        (+) abrasions to the nose, upper lip and bilateral hands   Neurological:  Negative for weakness and headaches.     Physical Exam     Initial Vitals [09/06/22 0938]   BP Pulse Resp Temp SpO2   (!) 164/86 76 18 98.8 °F (37.1 °C) 99 %      MAP       --         Physical Exam    Nursing note and vitals reviewed.  Constitutional: She appears well-developed and well-nourished. She is not diaphoretic. No distress.   HENT:   Head: Normocephalic.   Mouth/Throat: Oropharynx is clear and moist.   There is abrasions to the forehead, nose and upper lip. There is mild swelling to the upper lip, to the bridge of the nose and to the forehead.   Eyes: Conjunctivae are normal.   There is proptosis to the eyes.     Neck:   Normal range of motion.  Cardiovascular:  Normal rate and regular  rhythm.           Pulmonary/Chest: Breath sounds normal. No respiratory distress.   Abdominal: Abdomen is soft. There is no abdominal tenderness.   Musculoskeletal:         General: No edema.      Cervical back: Normal range of motion.     Neurological: She is alert and oriented to person, place, and time.   Skin: Skin is warm and dry.   Abrasions to the dorsum of the left hand. There is swelling and ecchymosis to the tip of the thumb of the right hand. There are various scrapes to the 2nd and 3rd finger on the right. No sutures are possible.   Psychiatric: She has a normal mood and affect.       ED Course   Procedures  Labs Reviewed - No data to display     ECG Results              EKG 12-lead (Final result)  Result time 09/07/22 15:38:19      Final result by Interface, Lab In [facility] (09/07/22 15:38:19)                   Narrative:    Test Reason : R07.9,    Vent. Rate : 063 BPM     Atrial Rate : 063 BPM     P-R Int : 156 ms          QRS Dur : 082 ms      QT Int : 390 ms       P-R-T Axes : 049 064 044 degrees     QTc Int : 399 ms    Normal sinus rhythm  LVH with repolarization abnormality  Abnormal ECG  When compared with ECG of 27-JUL-2018 17:54,  Significant changes have occurred  Confirmed by Mesfin Hannon MD ([id number]) on 9/7/2022 3:38:06 PM    Referred By: [email or web address]   SELF           Confirmed By:Mesfin Hannon MD                                     EKG 12-LEAD (Final result)  Result time 09/15/22 12:08:20      Final result by Unknown User (09/15/22 12:08:20)                                      Imaging Results              X-Ray Hand 3 view Right (Final result)  Result time 09/06/22 11:30:37      Final result by Robbie Hylton MD (09/06/22 11:30:37)                   Impression:      1. No acute displaced fracture, dislocation or suspicious osseous lesion.      Electronically signed by: Robbie Hylton  Date:    09/06/2022  Time:    11:30               Narrative:    EXAMINATION:  XR HAND COMPLETE 3 VIEW  RIGHT    CLINICAL HISTORY:  HAnd pain;    TECHNIQUE:  3 views of the right hand    COMPARISON:  None    FINDINGS:  No acute displaced fracture, dislocation or suspicious osseous lesion. Anatomic alignment is maintained.    Regional soft tissues are grossly unremarkable.                                       CT Head Without Contrast (Final result)  Result time 09/06/22 10:52:17      Final result by Stefan Garcia MD (09/06/22 10:52:17)                   Impression:      CT head:    1. No acute intracranial findings.  2. Frontal scalp hematoma without definite skull fracture.  3. Remote left-sided craniotomy for aneurysm clipping.  CT facial bones:    1. No acute facial bone fracture.  2. Dental caries and periapical lucencies, possible periapical abscesses, involving essentially all remaining untreated maxillary and mandibular teeth.  3. Suggested medialization of the right true vocal cord with dilatation of the ipsilateral piriform sinus, findings which may be seen in setting of right vocal cord paralysis/paresis.  Clinical correlation recommended.  4. Additional details, as provided in the body of the report.      Electronically signed by: Stefan Garcia  Date:    09/06/2022  Time:    10:52               Narrative:    EXAMINATION:  CT HEAD WITHOUT CONTRAST; CT MAXILLOFACIAL WITHOUT CONTRAST    CLINICAL HISTORY:  Facial trauma, blunt;    TECHNIQUE:  Low dose axial images were obtained through the head and facial bone.  Coronal and sagittal reformations were also performed. Contrast was not administered.    COMPARISON:  CT of the orbits performed 03/05/2020    FINDINGS:  Head:    Blood: No acute intracranial hemorrhage.    Parenchyma: No definite loss of gray-white differentiation to suggest acute or subacute transcortical infarct. Left frontal and temporal encephalomalacia and gliosis again noted deep to the craniotomy site.  Generalized pattern age-related parenchymal volume loss suggested elsewhere.   Nonspecific areas of white matter hypoattenuation could relate to sequela of chronic small vessel ischemic disease.    Ventricles/Extra-axial spaces: No abnormal extra-axial fluid collection. Basal cisterns are patent.    Vessels: Atherosclerosis.  Status post left-sided aneurysm clipping, in the anticipated asymmetry of the ICA.    Orbits: See below    Scalp: Midline frontal scalp hematoma/soft tissue contusion with interspersed foci of presumed posttraumatic air para    Skull: There are no depressed skull fractures or destructive bone lesions.    Sinuses and mastoids: See below    Other findings: None    Nasal bones:    Acute facial fractures: There are no acute facial bone fractures.    Pterygoid plates, Zygomatic arches, Sphenotemporal buttresses: Intact.    Nasal Bones: No acute nasal bone fracture.    Mandible: The mandible is intact.    Dentition: Dental caries and periapical lucencies, possible periapical abscesses, involving essentially all remaining untreated maxillary and mandibular teeth.    Sinuses: There are no fluid levels in the sinuses.    Imaged mastoids and middle ears: The imaged mastoid air cells and middle ear cavities are clear.  Nonspecific soft tissue attenuation within the external auditory canals could relate to cerumen.    Imaged upper cervical spine: Degenerative changes.    Facial soft tissues: Presumed posttraumatic soft tissue induration extends over the nasal bridge into the anterior facial soft tissues about the midline and paramedian maxilla and mandible.    Orbits: The bony orbits and orbital contents are atraumatic.  Status post bilateral lens replacements.    Imaged intracranial structures and upper aerodigestive tract: Intracranial findings as above.  Appears to be medialization of the right true vocal cord with dilatation of the ipsilateral piriform sinus, findings which may be seen in setting of right vocal cord paralysis/paresis.                                       CT  Maxillofacial Without Contrast (Final result)  Result time 09/06/22 10:52:17      Final result by Stefan Garcia MD (09/06/22 10:52:17)                   Impression:      CT head:    1. No acute intracranial findings.  2. Frontal scalp hematoma without definite skull fracture.  3. Remote left-sided craniotomy for aneurysm clipping.  CT facial bones:    1. No acute facial bone fracture.  2. Dental caries and periapical lucencies, possible periapical abscesses, involving essentially all remaining untreated maxillary and mandibular teeth.  3. Suggested medialization of the right true vocal cord with dilatation of the ipsilateral piriform sinus, findings which may be seen in setting of right vocal cord paralysis/paresis.  Clinical correlation recommended.  4. Additional details, as provided in the body of the report.      Electronically signed by: Stefan Garcia  Date:    09/06/2022  Time:    10:52               Narrative:    EXAMINATION:  CT HEAD WITHOUT CONTRAST; CT MAXILLOFACIAL WITHOUT CONTRAST    CLINICAL HISTORY:  Facial trauma, blunt;    TECHNIQUE:  Low dose axial images were obtained through the head and facial bone.  Coronal and sagittal reformations were also performed. Contrast was not administered.    COMPARISON:  CT of the orbits performed 03/05/2020    FINDINGS:  Head:    Blood: No acute intracranial hemorrhage.    Parenchyma: No definite loss of gray-white differentiation to suggest acute or subacute transcortical infarct. Left frontal and temporal encephalomalacia and gliosis again noted deep to the craniotomy site.  Generalized pattern age-related parenchymal volume loss suggested elsewhere.  Nonspecific areas of white matter hypoattenuation could relate to sequela of chronic small vessel ischemic disease.    Ventricles/Extra-axial spaces: No abnormal extra-axial fluid collection. Basal cisterns are patent.    Vessels: Atherosclerosis.  Status post left-sided aneurysm clipping, in the anticipated  asymmetry of the ICA.    Orbits: See below    Scalp: Midline frontal scalp hematoma/soft tissue contusion with interspersed foci of presumed posttraumatic air para    Skull: There are no depressed skull fractures or destructive bone lesions.    Sinuses and mastoids: See below    Other findings: None    Nasal bones:    Acute facial fractures: There are no acute facial bone fractures.    Pterygoid plates, Zygomatic arches, Sphenotemporal buttresses: Intact.    Nasal Bones: No acute nasal bone fracture.    Mandible: The mandible is intact.    Dentition: Dental caries and periapical lucencies, possible periapical abscesses, involving essentially all remaining untreated maxillary and mandibular teeth.    Sinuses: There are no fluid levels in the sinuses.    Imaged mastoids and middle ears: The imaged mastoid air cells and middle ear cavities are clear.  Nonspecific soft tissue attenuation within the external auditory canals could relate to cerumen.    Imaged upper cervical spine: Degenerative changes.    Facial soft tissues: Presumed posttraumatic soft tissue induration extends over the nasal bridge into the anterior facial soft tissues about the midline and paramedian maxilla and mandible.    Orbits: The bony orbits and orbital contents are atraumatic.  Status post bilateral lens replacements.    Imaged intracranial structures and upper aerodigestive tract: Intracranial findings as above.  Appears to be medialization of the right true vocal cord with dilatation of the ipsilateral piriform sinus, findings which may be seen in setting of right vocal cord paralysis/paresis.                                       Medications   HYDROcodone-acetaminophen 5-325 mg per tablet 1 tablet (1 tablet Oral Given 9/6/22 1016)   neomycin-bacitracnZn-polymyxnB packet (1 each Topical (Top) Given 9/6/22 1120)     Medical Decision Making:   ED Management:  This is an emergent evaluation of a 72 y.o. female who presents c/o right hand pain  and abrasions to the nose, lips and bilateral hands s/p a mechanical fall. The patient was seen and examined. The history and physical exam was obtained. The nursing notes and vital signs were reviewed. Secondary to symptoms and examination findings, I ordered imaging. Will treat with Hydrocodone-acetaminophen and Neomycin-BacitracnZn-PolymyxnB in the ED.  Will discharge home on [place] and [place].        Scribe Attestation:   Scribe #1: I performed the above scribed service and the documentation accurately describes the services I performed. I attest to the accuracy of the note.               Clinical Impression:   Final diagnoses:  [S00.81XA] Abrasion of face, initial encounter (Primary)  [S00.83XA] Contusion of face, initial encounter  [S63.91XA] Hand sprain, right, initial encounter  [K04.7] Chronic dental infection  [R07.9] Chest pain      ED Disposition Condition    Discharge Stable          ED Prescriptions       Medication Sig Dispense Start Date End Date Auth. Provider    HYDROcodone-acetaminophen (NORCO) 5-325 mg per tablet () Take 1 tablet by mouth every 6 (six) hours as needed for Pain. 12 tablet 2022 Mart Gale MD    clindamycin (CLEOCIN) 150 MG capsule () Take 2 capsules (300 mg total) by mouth every 8 (eight) hours. for 7 days 42 capsule 2022 Mart Gale MD          Follow-up Information       Follow up With Specialties Details Why Contact Info    Azikiwe K. Lombard, MD Family Medicine Schedule an appointment as soon as possible for a visit  for recheck. [address] [place] [address]  [phone]          follow up with a dentist or oral surgeon as the broken teeth you have need to be reoved.            I, mart gale, personally performed the services described in this documentation. All medical record entries made by the scribe were at my direction and in my presence. I have reviewed the chart and agree that the record reflects  my personal performance and is accurate and complete.      Tye Mackey MD  09/26/22 [id number]

## 2022-09-06 NOTE — Clinical Note
"Melba Saeeda"Cm was seen and treated in our emergency department on 9/6/2022.  She may return to work on 09/12/2022.       If you have any questions or concerns, please don't hesitate to call.      Kailey Galarza RN    "

## 2022-09-06 NOTE — Clinical Note
"Melba Saeeda"Cm was seen and treated in our emergency department on 9/6/2022.  She may return to work on 09/12/2022.  Verbal order Dr. Mackey     If you have any questions or concerns, please don't hesitate to call.      Kailey Galarza RN    "

## 2022-09-08 ENCOUNTER — TELEPHONE (OUTPATIENT)
Dept: FAMILY MEDICINE | Facility: CLINIC | Age: 72
End: 2022-09-08
Payer: MEDICARE

## 2022-09-08 DIAGNOSIS — I10 ESSENTIAL HYPERTENSION: ICD-10-CM

## 2022-09-08 NOTE — TELEPHONE ENCOUNTER
----- Message from Mahi Malcolm sent at 9/8/2022  9:07 AM CDT -----  Regarding: refill  Type: RX Refill Request    Who Called: Melba     Refill or New Rx: refill     RX Name and Strength: lisinopriL 10 MG tablet    Is this a 30 day or 90 day RX: 90 day     Preferred Pharmacy with phone number:[facility] ([facility]) - [place], [place] - [address]      Would the patient rather a call back or a response via [facility]? Call back     Best Call Back Number:[phone]

## 2022-10-04 ENCOUNTER — IMMUNIZATION (OUTPATIENT)
Dept: OBSTETRICS AND GYNECOLOGY | Facility: CLINIC | Age: 72
End: 2022-10-04
Payer: MEDICARE

## 2022-10-04 DIAGNOSIS — Z23 NEED FOR VACCINATION: Primary | ICD-10-CM

## 2022-10-04 PROCEDURE — 0124A COVID-19, MRNA, LNP-S, BIVALENT BOOSTER, PF, 30 MCG/0.3 ML DOSE: CPT | Mod: PBBFAC | Performed by: FAMILY MEDICINE

## 2022-10-04 PROCEDURE — 91312 COVID-19, MRNA, LNP-S, BIVALENT BOOSTER, PF, 30 MCG/0.3 ML DOSE: CPT | Mod: S$GLB,,, | Performed by: FAMILY MEDICINE

## 2022-10-04 PROCEDURE — 91312 COVID-19, MRNA, LNP-S, BIVALENT BOOSTER, PF, 30 MCG/0.3 ML DOSE: ICD-10-PCS | Mod: S$GLB,,, | Performed by: FAMILY MEDICINE

## 2022-11-08 ENCOUNTER — OFFICE VISIT (OUTPATIENT)
Dept: FAMILY MEDICINE | Facility: CLINIC | Age: 72
End: 2022-11-08
Payer: MEDICARE

## 2022-11-08 VITALS
OXYGEN SATURATION: 96 % | WEIGHT: 108.94 LBS | DIASTOLIC BLOOD PRESSURE: 70 MMHG | HEART RATE: 82 BPM | TEMPERATURE: 98 F | BODY MASS INDEX: 18.15 KG/M2 | RESPIRATION RATE: 16 BRPM | HEIGHT: 65 IN | SYSTOLIC BLOOD PRESSURE: 128 MMHG

## 2022-11-08 DIAGNOSIS — Z12.11 SCREENING FOR COLON CANCER: ICD-10-CM

## 2022-11-08 DIAGNOSIS — Z78.0 POSTMENOPAUSAL: ICD-10-CM

## 2022-11-08 DIAGNOSIS — I70.0 ATHEROSCLEROSIS OF AORTA: ICD-10-CM

## 2022-11-08 DIAGNOSIS — Z76.89 ESTABLISHING CARE WITH NEW DOCTOR, ENCOUNTER FOR: Primary | ICD-10-CM

## 2022-11-08 DIAGNOSIS — M85.80 OSTEOPENIA, UNSPECIFIED LOCATION: ICD-10-CM

## 2022-11-08 DIAGNOSIS — Z11.1 SCREENING FOR TUBERCULOSIS: ICD-10-CM

## 2022-11-08 DIAGNOSIS — Z98.890 HISTORY OF SURGERY FOR CEREBRAL ANEURYSM: ICD-10-CM

## 2022-11-08 DIAGNOSIS — Z23 NEED FOR IMMUNIZATION AGAINST INFLUENZA: ICD-10-CM

## 2022-11-08 DIAGNOSIS — Z88.0 ALLERGY TO PENICILLIN: ICD-10-CM

## 2022-11-08 DIAGNOSIS — R63.4 UNINTENDED WEIGHT LOSS: ICD-10-CM

## 2022-11-08 DIAGNOSIS — C49.A0 GASTROINTESTINAL STROMAL TUMOR (GIST): ICD-10-CM

## 2022-11-08 DIAGNOSIS — I10 ESSENTIAL HYPERTENSION: ICD-10-CM

## 2022-11-08 PROCEDURE — G0008 ADMIN INFLUENZA VIRUS VAC: HCPCS | Mod: S$GLB,,, | Performed by: INTERNAL MEDICINE

## 2022-11-08 PROCEDURE — 90694 FLU VACCINE - QUADRIVALENT - ADJUVANTED: ICD-10-PCS | Mod: S$GLB,,, | Performed by: INTERNAL MEDICINE

## 2022-11-08 PROCEDURE — 3288F FALL RISK ASSESSMENT DOCD: CPT | Mod: CPTII,S$GLB,, | Performed by: INTERNAL MEDICINE

## 2022-11-08 PROCEDURE — 3044F PR MOST RECENT HEMOGLOBIN A1C LEVEL <7.0%: ICD-10-PCS | Mod: CPTII,S$GLB,, | Performed by: INTERNAL MEDICINE

## 2022-11-08 PROCEDURE — 1160F RVW MEDS BY RX/DR IN RCRD: CPT | Mod: CPTII,S$GLB,, | Performed by: INTERNAL MEDICINE

## 2022-11-08 PROCEDURE — 99214 PR OFFICE/OUTPT VISIT, EST, LEVL IV, 30-39 MIN: ICD-10-PCS | Mod: S$GLB,,, | Performed by: INTERNAL MEDICINE

## 2022-11-08 PROCEDURE — 1101F PT FALLS ASSESS-DOCD LE1/YR: CPT | Mod: CPTII,S$GLB,, | Performed by: INTERNAL MEDICINE

## 2022-11-08 PROCEDURE — 1159F MED LIST DOCD IN RCRD: CPT | Mod: CPTII,S$GLB,, | Performed by: INTERNAL MEDICINE

## 2022-11-08 PROCEDURE — 1101F PR PT FALLS ASSESS DOC 0-1 FALLS W/OUT INJ PAST YR: ICD-10-PCS | Mod: CPTII,S$GLB,, | Performed by: INTERNAL MEDICINE

## 2022-11-08 PROCEDURE — 3074F PR MOST RECENT SYSTOLIC BLOOD PRESSURE < 130 MM HG: ICD-10-PCS | Mod: CPTII,S$GLB,, | Performed by: INTERNAL MEDICINE

## 2022-11-08 PROCEDURE — 4010F ACE/ARB THERAPY RXD/TAKEN: CPT | Mod: CPTII,S$GLB,, | Performed by: INTERNAL MEDICINE

## 2022-11-08 PROCEDURE — 99499 RISK ADDL DX/OHS AUDIT: ICD-10-PCS | Mod: HCNC,S$GLB,, | Performed by: INTERNAL MEDICINE

## 2022-11-08 PROCEDURE — 3078F DIAST BP <80 MM HG: CPT | Mod: CPTII,S$GLB,, | Performed by: INTERNAL MEDICINE

## 2022-11-08 PROCEDURE — 4010F PR ACE/ARB THEARPY RXD/TAKEN: ICD-10-PCS | Mod: CPTII,S$GLB,, | Performed by: INTERNAL MEDICINE

## 2022-11-08 PROCEDURE — 3078F PR MOST RECENT DIASTOLIC BLOOD PRESSURE < 80 MM HG: ICD-10-PCS | Mod: CPTII,S$GLB,, | Performed by: INTERNAL MEDICINE

## 2022-11-08 PROCEDURE — 3008F PR BODY MASS INDEX (BMI) DOCUMENTED: ICD-10-PCS | Mod: CPTII,S$GLB,, | Performed by: INTERNAL MEDICINE

## 2022-11-08 PROCEDURE — 90694 VACC AIIV4 NO PRSRV 0.5ML IM: CPT | Mod: S$GLB,,, | Performed by: INTERNAL MEDICINE

## 2022-11-08 PROCEDURE — 1160F PR REVIEW ALL MEDS BY PRESCRIBER/CLIN PHARMACIST DOCUMENTED: ICD-10-PCS | Mod: CPTII,S$GLB,, | Performed by: INTERNAL MEDICINE

## 2022-11-08 PROCEDURE — 3288F PR FALLS RISK ASSESSMENT DOCUMENTED: ICD-10-PCS | Mod: CPTII,S$GLB,, | Performed by: INTERNAL MEDICINE

## 2022-11-08 PROCEDURE — 3074F SYST BP LT 130 MM HG: CPT | Mod: CPTII,S$GLB,, | Performed by: INTERNAL MEDICINE

## 2022-11-08 PROCEDURE — 3008F BODY MASS INDEX DOCD: CPT | Mod: CPTII,S$GLB,, | Performed by: INTERNAL MEDICINE

## 2022-11-08 PROCEDURE — 99999 PR PBB SHADOW E&M-EST. PATIENT-LVL IV: ICD-10-PCS | Mod: PBBFAC,,, | Performed by: INTERNAL MEDICINE

## 2022-11-08 PROCEDURE — G0008 FLU VACCINE - QUADRIVALENT - ADJUVANTED: ICD-10-PCS | Mod: S$GLB,,, | Performed by: INTERNAL MEDICINE

## 2022-11-08 PROCEDURE — 99499 UNLISTED E&M SERVICE: CPT | Mod: HCNC,S$GLB,, | Performed by: INTERNAL MEDICINE

## 2022-11-08 PROCEDURE — 86580 POCT TB SKIN TEST: ICD-10-PCS | Mod: S$GLB,,, | Performed by: INTERNAL MEDICINE

## 2022-11-08 PROCEDURE — 86580 TB INTRADERMAL TEST: CPT | Mod: S$GLB,,, | Performed by: INTERNAL MEDICINE

## 2022-11-08 PROCEDURE — 1159F PR MEDICATION LIST DOCUMENTED IN MEDICAL RECORD: ICD-10-PCS | Mod: CPTII,S$GLB,, | Performed by: INTERNAL MEDICINE

## 2022-11-08 PROCEDURE — 99999 PR PBB SHADOW E&M-EST. PATIENT-LVL IV: CPT | Mod: PBBFAC,,, | Performed by: INTERNAL MEDICINE

## 2022-11-08 PROCEDURE — 3044F HG A1C LEVEL LT 7.0%: CPT | Mod: CPTII,S$GLB,, | Performed by: INTERNAL MEDICINE

## 2022-11-08 PROCEDURE — 99214 OFFICE O/P EST MOD 30 MIN: CPT | Mod: S$GLB,,, | Performed by: INTERNAL MEDICINE

## 2022-11-08 RX ORDER — LISINOPRIL 10 MG/1
10 TABLET ORAL DAILY
Qty: 90 TABLET | Refills: 1 | Status: SHIPPED | OUTPATIENT
Start: 2022-11-08 | End: 2023-03-13 | Stop reason: SDUPTHER

## 2022-11-08 RX ORDER — ATORVASTATIN CALCIUM 20 MG/1
20 TABLET, FILM COATED ORAL DAILY
Qty: 90 TABLET | Refills: 1 | Status: SHIPPED | OUTPATIENT
Start: 2022-11-08 | End: 2023-04-07

## 2022-11-08 NOTE — PROGRESS NOTES
Subjective:       Patient ID: Melba Pabon is a pleasant 72 y.o. Black or  female patient    Chief Complaint: Follow-up      Patient is a new pt to me but was established pt from Dr. Lombard. Lat visit with him on 05/03/2022. See his last notes and the list of problems below.    HPI     Pt with PMH as per list of problems below who comes today for a regular f-up visit and to establish care with a new PCP and for a regular f-up visit. Her daughter is my patient too (Ellyn Pabon).  From last visit with Dr. Lombard:  - Ophthalmology  - colonoscopy  - 09/06/2022 ED for abrasion of the face.  She reports feeling globally fine. She gained 4 pounds from last visit. She does not take Periactin anymore. Reports former [facility] to her GI surgery (small intestine) for GIST.  She is going to see her eye doctor soon, may need lid surgery on L eye.  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  She needs to have tb skin test.  She is interested in the flu shot today  She is also interested in colono and DEXA.      Patient Active Problem List   Diagnosis    History of resection of small bowel    History of surgery for cerebral aneurysm    Essential hypertension    Age-related osteoporosis without current pathological fracture    Hypercalcemia    Gastric mass    Gastrointestinal stromal tumor (GIST)    Atherosclerosis of aorta    NS (nuclear sclerosis), left    Nuclear sclerotic cataract of right eye    Unintended weight loss    Parathyroid adenoma    Vocal cord palsy    Aneurysm    Corneal edema          ACTIVE MEDICAL ISSUES:  Documented in Problem List     PAST MEDICAL HISTORY  Documented     PAST SURGICAL HISTORY:  Documented     SOCIAL HISTORY:  Documented     FAMILY HISTORY:  Documented     ALLERGIES AND MEDICATIONS: updated and reviewed.  Documented    Review of Systems   Constitutional:  Positive for appetite change (improving) and unexpected weight change  (increasing). Negative for activity change, chills, diaphoresis, fatigue and fever.   HENT: Negative.  Negative for congestion, ear pain, hearing loss, nosebleeds, postnasal drip, rhinorrhea, sinus pressure, sneezing, sore throat and trouble swallowing.    Eyes:  Positive for visual disturbance (left eyelid overhang; vision obstructed). Negative for pain.   Respiratory:  Negative for cough, choking and shortness of breath.    Cardiovascular:  Negative for chest pain, palpitations and leg swelling.   Gastrointestinal:  Negative for abdominal distention, abdominal pain, anal bleeding, blood in stool, constipation, diarrhea, nausea, rectal pain and vomiting.   Genitourinary:  Negative for difficulty urinating, dysuria, frequency and urgency.   Musculoskeletal:  Negative for arthralgias, back pain, gait problem, joint swelling, myalgias and neck pain.   Skin: Negative.    Allergic/Immunologic: Negative for environmental allergies and food allergies.   Neurological:  Negative for dizziness, tremors, seizures, syncope, facial asymmetry, speech difficulty, weakness, light-headedness, numbness and headaches.   Hematological:  Negative for adenopathy. Does not bruise/bleed easily.   Psychiatric/Behavioral: Negative.  Negative for confusion, decreased concentration, dysphoric mood and sleep disturbance. The patient is not nervous/anxious.      Objective:      Physical Exam  Constitutional:       General: She is not in acute distress.     Appearance: Normal appearance. She is well-developed. She is not ill-appearing, toxic-appearing or diaphoretic.   HENT:      Head: Normocephalic and atraumatic.      Right Ear: Hearing and external ear normal. No decreased hearing noted.      Left Ear: Hearing and external ear normal. No decreased hearing noted.      Nose: Nose normal. No nasal deformity or rhinorrhea.      Mouth/Throat:      Dentition: Normal dentition. Does not have dentures.      Pharynx: Uvula midline.   Eyes:       General: Vision grossly intact. No allergic shiner, visual field deficit or scleral icterus.        Right eye: No foreign body or discharge.         Left eye: No foreign body or discharge.      Extraocular Movements: Extraocular movements intact.      Conjunctiva/sclera: Conjunctivae normal.      Right eye: No chemosis or exudate.     Left eye: No chemosis or exudate.     Pupils: Pupils are equal, round, and reactive to light.     Cardiovascular:      Rate and Rhythm: Normal rate and regular rhythm.      Heart sounds: Normal heart sounds, S1 normal and S2 normal. No murmur heard.    No friction rub. No gallop.   Pulmonary:      Effort: Pulmonary effort is normal. No accessory muscle usage or respiratory distress.      Breath sounds: Normal breath sounds. No decreased breath sounds, wheezing, rhonchi or rales.   Abdominal:      General: There is no distension.      Palpations: Abdomen is soft. Abdomen is not rigid.      Tenderness: There is no abdominal tenderness. There is no guarding or rebound.   Musculoskeletal:         General: Normal range of motion.      Cervical back: Full passive range of motion without pain, normal range of motion and neck supple.   Skin:     General: Skin is warm and dry.      Findings: No rash.   Neurological:      Mental Status: She is alert and oriented to person, place, and time.      Cranial Nerves: No cranial nerve deficit.      Sensory: No sensory deficit.      Motor: No abnormal muscle tone or seizure activity.      Coordination: Coordination normal.      Gait: Gait normal.   Psychiatric:         Attention and Perception: She is attentive.         Speech: Speech normal.         Behavior: Behavior normal. Behavior is cooperative.         Thought Content: Thought content normal.         Judgment: Judgment normal.       Vitals:    11/08/22 1422   BP: 128/70   BP Location: Left arm   Patient Position: Sitting   BP Method: Small (Manual)   Pulse: 82   Resp: 16   Temp: 98.3 °F (36.8 °C)  "  TempSrc: Oral   SpO2: 96%   Weight: 49.4 kg (108 lb 14.5 oz)   Height: 5' 5" (1.651 m)     Body mass index is 18.12 kg/m².    RESULTS: Reviewed labs from last 12 months    Last Lab Results:     Lab Results   Component Value Date    WBC 3.35 (L) 05/03/2022    HGB 14.1 05/03/2022    HCT 42.1 05/03/2022     05/03/2022     05/03/2022    K 4.4 05/03/2022     05/03/2022    CO2 24 05/03/2022    BUN 11 05/03/2022    CREATININE 0.8 05/03/2022    CALCIUM 11.5 (H) 05/03/2022    ALBUMIN 3.8 05/03/2022    AST 22 05/03/2022    ALT 10 05/03/2022    CHOL 121 05/03/2022    TRIG 36 05/03/2022    HDL 59 05/03/2022    LDLCALC 54.8 (L) 05/03/2022    HGBA1C 5.0 05/03/2022    TSH 1.340 05/03/2022         Assessment:       1. Establishing care with new doctor, encounter for    2. Essential hypertension    3. Unintended weight loss    4. Gastrointestinal stromal tumor (GIST)    5. Osteopenia, unspecified location    6. History of surgery for cerebral aneurysm    7. Atherosclerosis of aorta    8. Allergy to penicillin    9. Need for immunization against influenza    10. Screening for tuberculosis    11. Screening for colon cancer    12. Postmenopausal        Plan:   Melba was seen today for follow-up.    Diagnoses and all orders for this visit:    Establishing care with new doctor, encounter for    Discussed the importance of a good pt-doctor trust relationship, provided the pt with my contact.    Essential hypertension  -     lisinopriL 10 MG tablet; Take 1 tablet (10 mg total) by mouth once daily.    BP at goal, same treatment. Review of most recent blood work.    Unintended weight loss    Regained 4 pounds from last visit.    Gastrointestinal stromal tumor (GIST)    Remote, had surgery (small intestine).    Osteopenia, unspecified location    Will monitor, last DEXA in 2018.    History of surgery for cerebral aneurysm    Remote. No suspicion of recurrence.    Atherosclerosis of aorta  -     atorvastatin (LIPITOR) 20 " MG tablet; Take 1 tablet (20 mg total) by mouth once daily.    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    Screening for colon cancer  -     Ambulatory referral/consult to Endo Procedure ; Future    Postmenopausal  -     DXA Bone Density Spine And Hip; Future    Follow up in about 6 months (around 5/8/2023) for f-up.    This note was created by combination of typed  and M-Modal dictation.  Transcription errors may be present.  If there are any questions, please contact me.

## 2022-11-08 NOTE — PROGRESS NOTES
Health Maintenance Due   Topic     TETANUS VACCINE      Shingles Vaccine (1 of 2) hx chickenpox ; inform pt can get vaccine at pharmacy.      Colorectal Cancer Screening      DEXA Scan  Pt will call to schedule when ready       Influenza Vaccine (1)

## 2022-11-08 NOTE — PROGRESS NOTES
Pt tolerated flu vaccine to right deltoid without difficulty; no adverse reaction noted; VIS given    Pt tolerated PPD to right forearm without difficulty; wheal noted;pt informed to return in 48-72 hours to have test read

## 2023-01-03 NOTE — PLAN OF CARE
Bedrest, laying flat, completed after 1 hour; patient remains drowsy, responsive to voice.   Fluconazole Pregnancy And Lactation Text: This medication is Pregnancy Category C and it isn't know if it is safe during pregnancy. It is also excreted in breast milk.

## 2023-03-13 DIAGNOSIS — I10 ESSENTIAL HYPERTENSION: ICD-10-CM

## 2023-03-13 NOTE — TELEPHONE ENCOUNTER
----- Message from Kalyani Norwood sent at 3/13/2023 12:39 PM CDT -----  Type: RX Refill Request    Who Called:  self    Have you contacted your pharmacy:  no    Refill or New Rx: refill    RX Name and Strength: lisinopriL 10 MG tablet      Preferred Pharmacy with phone number: [facility] - [place], [place] - [address]   Phone:  [phone]  Fax:  [phone]    Local or Mail Order: [facility] - [place], [place] - [address]   Phone:  [phone]  Fax:  [phone]    Would the patient rather a call back or a response via [facility]?  call    Best Call Back Number:.[phone] (home)

## 2023-03-13 NOTE — TELEPHONE ENCOUNTER
No new care gaps identified.  [facility] Embedded Care Gaps. Reference number: [id number]. 3/13/2023   12:46:43 PM CDT

## 2023-03-13 NOTE — TELEPHONE ENCOUNTER
Last Office Visit Info:   The patient's last visit with Anel Sosa MD was on 11/8/2022.    The patient's last visit in current department was on 11/8/2022.        Last CBC Results:   Lab Results   Component Value Date    WBC 3.35 (L) 05/03/2022    HGB 14.1 05/03/2022    HCT 42.1 05/03/2022     05/03/2022       Last CMP Results  Lab Results   Component Value Date     05/03/2022    K 4.4 05/03/2022     05/03/2022    CO2 24 05/03/2022    BUN 11 05/03/2022    CREATININE 0.8 05/03/2022    CALCIUM 11.5 (H) 05/03/2022    ALBUMIN 3.8 05/03/2022    AST 22 05/03/2022    ALT 10 05/03/2022       Last Lipids  Lab Results   Component Value Date    CHOL 121 05/03/2022    TRIG 36 05/03/2022    HDL 59 05/03/2022    LDLCALC 54.8 (L) 05/03/2022       Last A1C  Lab Results   Component Value Date    HGBA1C 5.0 05/03/2022       Last TSH  Lab Results   Component Value Date    TSH 1.340 05/03/2022             Current Med Refills  Medication List with Changes/Refills   Current Medications    ATORVASTATIN (LIPITOR) 20 MG TABLET    Take 1 tablet (20 mg total) by mouth once daily.       Start Date: 11/8/2022 End Date: --    CYPROHEPTADINE (PERIACTIN) 4 MG TABLET    Take 1 tablet (4 mg total) by mouth 3 (three) times daily as needed (Appetite).       Start Date: 2/28/2022 End Date: --    DIPHENHYDRAMINE (BENADRYL) 25 MG CAPSULE    Take 25 mg by mouth every 6 (six) hours as needed for Allergies or Insomnia.       Start Date: --        End Date: --    LISINOPRIL 10 MG TABLET    Take 1 tablet (10 mg total) by mouth once daily.       Start Date: 11/8/2022 End Date: --    MULTIVITAMIN (THERAGRAN) PER TABLET    Take 1 tablet by mouth once daily.       Start Date: --        End Date: --    PREDNISOLONE ACETATE (PRED FORTE) 1 % DRPS    Place 1 drop into the left eye 4 (four) times daily.       Start Date: 3/15/2022 End Date: --       Order(s) placed per written order guidelines: none    Please advise.

## 2023-03-15 RX ORDER — LISINOPRIL 10 MG/1
10 TABLET ORAL DAILY
Qty: 90 TABLET | Refills: 1 | Status: SHIPPED | OUTPATIENT
Start: 2023-03-15 | End: 2023-06-16 | Stop reason: SDUPTHER

## 2023-03-20 ENCOUNTER — PATIENT OUTREACH (OUTPATIENT)
Dept: ADMINISTRATIVE | Facility: HOSPITAL | Age: 73
End: 2023-03-20
Payer: MEDICARE

## 2023-04-20 ENCOUNTER — OFFICE VISIT (OUTPATIENT)
Dept: OPHTHALMOLOGY | Facility: CLINIC | Age: 73
End: 2023-04-20
Payer: MEDICARE

## 2023-04-20 DIAGNOSIS — H02.402 PTOSIS OF LEFT EYELID: Primary | ICD-10-CM

## 2023-04-20 DIAGNOSIS — H02.401 PTOSIS OF RIGHT EYELID: ICD-10-CM

## 2023-04-20 DIAGNOSIS — H52.7 REFRACTIVE ERROR: ICD-10-CM

## 2023-04-20 DIAGNOSIS — Z96.1 PSEUDOPHAKIA: ICD-10-CM

## 2023-04-20 DIAGNOSIS — Z94.7 STATUS POST CORNEAL TRANSPLANT: ICD-10-CM

## 2023-04-20 PROCEDURE — 92014 PR EYE EXAM, EST PATIENT,COMPREHESV: ICD-10-PCS | Mod: HCNC,S$GLB,, | Performed by: OPHTHALMOLOGY

## 2023-04-20 PROCEDURE — 1160F RVW MEDS BY RX/DR IN RCRD: CPT | Mod: HCNC,CPTII,S$GLB, | Performed by: OPHTHALMOLOGY

## 2023-04-20 PROCEDURE — 3288F PR FALLS RISK ASSESSMENT DOCUMENTED: ICD-10-PCS | Mod: HCNC,CPTII,S$GLB, | Performed by: OPHTHALMOLOGY

## 2023-04-20 PROCEDURE — 99999 PR PBB SHADOW E&M-EST. PATIENT-LVL III: ICD-10-PCS | Mod: PBBFAC,HCNC,, | Performed by: OPHTHALMOLOGY

## 2023-04-20 PROCEDURE — 3288F FALL RISK ASSESSMENT DOCD: CPT | Mod: HCNC,CPTII,S$GLB, | Performed by: OPHTHALMOLOGY

## 2023-04-20 PROCEDURE — 4010F ACE/ARB THERAPY RXD/TAKEN: CPT | Mod: HCNC,CPTII,S$GLB, | Performed by: OPHTHALMOLOGY

## 2023-04-20 PROCEDURE — 1100F PR PT FALLS ASSESS DOC 2+ FALLS/FALL W/INJURY/YR: ICD-10-PCS | Mod: HCNC,CPTII,S$GLB, | Performed by: OPHTHALMOLOGY

## 2023-04-20 PROCEDURE — 1159F MED LIST DOCD IN RCRD: CPT | Mod: HCNC,CPTII,S$GLB, | Performed by: OPHTHALMOLOGY

## 2023-04-20 PROCEDURE — 4010F PR ACE/ARB THEARPY RXD/TAKEN: ICD-10-PCS | Mod: HCNC,CPTII,S$GLB, | Performed by: OPHTHALMOLOGY

## 2023-04-20 PROCEDURE — 1100F PTFALLS ASSESS-DOCD GE2>/YR: CPT | Mod: HCNC,CPTII,S$GLB, | Performed by: OPHTHALMOLOGY

## 2023-04-20 PROCEDURE — 1160F PR REVIEW ALL MEDS BY PRESCRIBER/CLIN PHARMACIST DOCUMENTED: ICD-10-PCS | Mod: HCNC,CPTII,S$GLB, | Performed by: OPHTHALMOLOGY

## 2023-04-20 PROCEDURE — 92014 COMPRE OPH EXAM EST PT 1/>: CPT | Mod: HCNC,S$GLB,, | Performed by: OPHTHALMOLOGY

## 2023-04-20 PROCEDURE — 1159F PR MEDICATION LIST DOCUMENTED IN MEDICAL RECORD: ICD-10-PCS | Mod: HCNC,CPTII,S$GLB, | Performed by: OPHTHALMOLOGY

## 2023-04-20 PROCEDURE — 99999 PR PBB SHADOW E&M-EST. PATIENT-LVL III: CPT | Mod: PBBFAC,HCNC,, | Performed by: OPHTHALMOLOGY

## 2023-04-20 NOTE — PROGRESS NOTES
Subjective:       Patient ID: Melba Pabon is a 72 y.o. female.    Chief Complaint: Concerns About Ocular Health (Patient Melba Pabon is a 72 year old female.)    HPI     Concerns About Ocular Health     Additional comments: Patient Melba Pabon is a 72 year old female.           Comments    Pt here for annual eye exam.  Pt states that she would like a referral to Dr. Lott for droopy lid   evaluation.  Pt states that [facility] is good in glasses.  Pt states that eyes are dry with 5-6/10 eye pain, Whitney 128 not helping.  Pt denies any flashes, or floaters.  Pt states that she cannot have MRI testing due to having a plate in her   head.  Pt declines MRX today, would like to wait until after lid evaluation.    DLS: 05/17/2022 with Dr. Emmanuel  Last Visit (Dr. Pond): 08/02/2021    S/p DMEK OS: 03/07/2022 - Dr. Emmanuel  S/p Phaco w/IOL OD: 01/14/2021  S/p Phaco w/IOL OS: 11/05/2020    Gtts:  1. Whitney 128 BID-TID OU    POHx:  1. Pseudophakia, bilateral          Last edited by Leela Norwood on 4/20/2023  1:46 PM.             Assessment:       1. Ptosis of left eyelid    2. Ptosis of right eyelid    3. Status post corneal transplant    4. Refractive error    5. Pseudophakia        Plan:       Ptosis SERGEI>RUL-Pt wants referral for sx.  S/p DMEK OS-Doing well.  RE-Pt wants MRx after sx for ptosis.      Refer to Christine Johnson & Jose Cruz for ptosis eval/tx.  RTC me after ptosis sx for MRx.

## 2023-05-16 ENCOUNTER — PES CALL (OUTPATIENT)
Dept: ADMINISTRATIVE | Facility: CLINIC | Age: 73
End: 2023-05-16
Payer: MEDICARE

## 2023-05-29 ENCOUNTER — TELEPHONE (OUTPATIENT)
Dept: FAMILY MEDICINE | Facility: CLINIC | Age: 73
End: 2023-05-29
Payer: MEDICARE

## 2023-05-29 NOTE — TELEPHONE ENCOUNTER
----- Message from Lucy Navaror sent at 5/29/2023 12:18 PM CDT -----  .Type: Patient Call Back    Who called:    What is the request in detail: Laying in bed for 2 days, chest congestion, coughing can you call her in some antibiotics   .  [facility] [address]  [address] [place] [address]  Phone: [phone] Fax: [phone]    Can the clinic reply by [facility]? No     Would the patient rather a call back or a response via [facility]? Call Back     Best call back number:.[phone] (home)       Additional Information:

## 2023-05-30 NOTE — TELEPHONE ENCOUNTER
----- Message from Yee Landin sent at 5/30/2023  2:04 PM CDT -----  Regarding: self .[phone]  .Type:  Patient Returning Call    Who Called:  self     Who Left Message for Patient:  Tyra     Does the patient know what this is regarding? Yes, pt needs an appt asap     Would the patient rather a call back or a response via [facility]? Call back   Best Call Back Number .[phone]

## 2023-05-30 NOTE — TELEPHONE ENCOUNTER
Left message informing patient, per Dr. Miller, that patient needs to be seen by a provider for evaluation and/or treatment.

## 2023-05-30 NOTE — TELEPHONE ENCOUNTER
----- Message from Khari Coleman sent at 5/30/2023 12:01 PM CDT -----  Regarding: Patient Call Back  .Type: Patient Call Back    Who called: Self     What is the request in detail: Called to check on the status of the antibiotics she requested from provider for her cold. States she's coughing up mucus and the cough wont go away. Please advise.    Can the clinic reply by [facility]? No    Would the patient rather a call back or a response via [facility]? Call back    Best call back number: [phone]     Additional Information:

## 2023-06-16 ENCOUNTER — OFFICE VISIT (OUTPATIENT)
Dept: FAMILY MEDICINE | Facility: CLINIC | Age: 73
End: 2023-06-16
Payer: MEDICARE

## 2023-06-16 VITALS
HEIGHT: 65 IN | WEIGHT: 96.31 LBS | OXYGEN SATURATION: 97 % | TEMPERATURE: 98 F | DIASTOLIC BLOOD PRESSURE: 78 MMHG | BODY MASS INDEX: 16.05 KG/M2 | SYSTOLIC BLOOD PRESSURE: 152 MMHG | HEART RATE: 72 BPM

## 2023-06-16 DIAGNOSIS — C49.A0 GASTROINTESTINAL STROMAL TUMOR (GIST): ICD-10-CM

## 2023-06-16 DIAGNOSIS — Z90.49 HISTORY OF RESECTION OF SMALL BOWEL: ICD-10-CM

## 2023-06-16 DIAGNOSIS — I10 ESSENTIAL HYPERTENSION: ICD-10-CM

## 2023-06-16 DIAGNOSIS — R63.4 UNINTENDED WEIGHT LOSS: ICD-10-CM

## 2023-06-16 DIAGNOSIS — I70.0 ATHEROSCLEROSIS OF AORTA: ICD-10-CM

## 2023-06-16 DIAGNOSIS — B96.89 ACUTE BACTERIAL SINUSITIS: Primary | ICD-10-CM

## 2023-06-16 DIAGNOSIS — J01.90 ACUTE BACTERIAL SINUSITIS: Primary | ICD-10-CM

## 2023-06-16 DIAGNOSIS — J01.90 ACUTE SINUSITIS WITH SYMPTOMS GREATER THAN 10 DAYS: ICD-10-CM

## 2023-06-16 PROCEDURE — 1126F AMNT PAIN NOTED NONE PRSNT: CPT | Mod: HCNC,CPTII,S$GLB, | Performed by: NURSE PRACTITIONER

## 2023-06-16 PROCEDURE — 1159F PR MEDICATION LIST DOCUMENTED IN MEDICAL RECORD: ICD-10-PCS | Mod: HCNC,CPTII,S$GLB, | Performed by: NURSE PRACTITIONER

## 2023-06-16 PROCEDURE — 4010F ACE/ARB THERAPY RXD/TAKEN: CPT | Mod: HCNC,CPTII,S$GLB, | Performed by: NURSE PRACTITIONER

## 2023-06-16 PROCEDURE — 99499 RISK ADDL DX/OHS AUDIT: ICD-10-PCS | Mod: HCNC,S$GLB,, | Performed by: NURSE PRACTITIONER

## 2023-06-16 PROCEDURE — 3077F PR MOST RECENT SYSTOLIC BLOOD PRESSURE >= 140 MM HG: ICD-10-PCS | Mod: HCNC,CPTII,S$GLB, | Performed by: NURSE PRACTITIONER

## 2023-06-16 PROCEDURE — 3078F DIAST BP <80 MM HG: CPT | Mod: HCNC,CPTII,S$GLB, | Performed by: NURSE PRACTITIONER

## 2023-06-16 PROCEDURE — 99999 PR PBB SHADOW E&M-EST. PATIENT-LVL IV: ICD-10-PCS | Mod: PBBFAC,HCNC,, | Performed by: NURSE PRACTITIONER

## 2023-06-16 PROCEDURE — 1159F MED LIST DOCD IN RCRD: CPT | Mod: HCNC,CPTII,S$GLB, | Performed by: NURSE PRACTITIONER

## 2023-06-16 PROCEDURE — 3288F PR FALLS RISK ASSESSMENT DOCUMENTED: ICD-10-PCS | Mod: HCNC,CPTII,S$GLB, | Performed by: NURSE PRACTITIONER

## 2023-06-16 PROCEDURE — 3078F PR MOST RECENT DIASTOLIC BLOOD PRESSURE < 80 MM HG: ICD-10-PCS | Mod: HCNC,CPTII,S$GLB, | Performed by: NURSE PRACTITIONER

## 2023-06-16 PROCEDURE — 3288F FALL RISK ASSESSMENT DOCD: CPT | Mod: HCNC,CPTII,S$GLB, | Performed by: NURSE PRACTITIONER

## 2023-06-16 PROCEDURE — 1101F PT FALLS ASSESS-DOCD LE1/YR: CPT | Mod: HCNC,CPTII,S$GLB, | Performed by: NURSE PRACTITIONER

## 2023-06-16 PROCEDURE — 1126F PR PAIN SEVERITY QUANTIFIED, NO PAIN PRESENT: ICD-10-PCS | Mod: HCNC,CPTII,S$GLB, | Performed by: NURSE PRACTITIONER

## 2023-06-16 PROCEDURE — 3008F BODY MASS INDEX DOCD: CPT | Mod: HCNC,CPTII,S$GLB, | Performed by: NURSE PRACTITIONER

## 2023-06-16 PROCEDURE — 99499 UNLISTED E&M SERVICE: CPT | Mod: HCNC,S$GLB,, | Performed by: NURSE PRACTITIONER

## 2023-06-16 PROCEDURE — 4010F PR ACE/ARB THEARPY RXD/TAKEN: ICD-10-PCS | Mod: HCNC,CPTII,S$GLB, | Performed by: NURSE PRACTITIONER

## 2023-06-16 PROCEDURE — 3008F PR BODY MASS INDEX (BMI) DOCUMENTED: ICD-10-PCS | Mod: HCNC,CPTII,S$GLB, | Performed by: NURSE PRACTITIONER

## 2023-06-16 PROCEDURE — 99214 PR OFFICE/OUTPT VISIT, EST, LEVL IV, 30-39 MIN: ICD-10-PCS | Mod: HCNC,S$GLB,, | Performed by: NURSE PRACTITIONER

## 2023-06-16 PROCEDURE — 99214 OFFICE O/P EST MOD 30 MIN: CPT | Mod: HCNC,S$GLB,, | Performed by: NURSE PRACTITIONER

## 2023-06-16 PROCEDURE — 99999 PR PBB SHADOW E&M-EST. PATIENT-LVL IV: CPT | Mod: PBBFAC,HCNC,, | Performed by: NURSE PRACTITIONER

## 2023-06-16 PROCEDURE — 3077F SYST BP >= 140 MM HG: CPT | Mod: HCNC,CPTII,S$GLB, | Performed by: NURSE PRACTITIONER

## 2023-06-16 PROCEDURE — 1101F PR PT FALLS ASSESS DOC 0-1 FALLS W/OUT INJ PAST YR: ICD-10-PCS | Mod: HCNC,CPTII,S$GLB, | Performed by: NURSE PRACTITIONER

## 2023-06-16 RX ORDER — AZITHROMYCIN 250 MG/1
TABLET, FILM COATED ORAL
Qty: 6 TABLET | Refills: 0 | Status: SHIPPED | OUTPATIENT
Start: 2023-06-16 | End: 2023-06-21

## 2023-06-16 RX ORDER — LISINOPRIL 10 MG/1
10 TABLET ORAL DAILY
Qty: 90 TABLET | Refills: 1 | Status: SHIPPED | OUTPATIENT
Start: 2023-06-16 | End: 2023-12-08 | Stop reason: SDUPTHER

## 2023-06-16 RX ORDER — ATORVASTATIN CALCIUM 20 MG/1
20 TABLET, FILM COATED ORAL DAILY
Qty: 90 TABLET | Refills: 0 | Status: SHIPPED | OUTPATIENT
Start: 2023-06-16 | End: 2023-12-08 | Stop reason: SDUPTHER

## 2023-06-16 NOTE — PROGRESS NOTES
Chief Complaint  Chief Complaint   Patient presents with    Chest Congestion    Sinus Problem     Started a week ago, started mucinex        HPI    HPI   MsMelida Pabon is a 73 y.o. female with medical problems as listed below. The patient presents to clinic with c/o chest congestion. She states that the has been taking mucinex but has not had any relief. She first got the symptoms at the end of may and has really had no relief of symptoms    She also c/o unintended weight loss. Hx of GIST. Has not had follow up in a while. Recently switched PCP providers (11/08/2022). Has follow up with PCP on Monday 6/19/2022. She wants to discuss possibly being prescribed something to help her gain weight.     PAST MEDICAL HISTORY:  Past Medical History:   Diagnosis Date    Acute blood loss anemia 07/28/2018    Age-related osteoporosis without current pathological fracture 04/13/2018    Allergy     Cataract     Cerebral aneurysm     Gastrointestinal stromal tumor (GIST)     GI bleed     Hypertension        PAST SURGICAL HISTORY:  Past Surgical History:   Procedure Laterality Date    BRAIN SURGERY      metal plate in head    BREAST BIOPSY Right     CATARACT EXTRACTION Left 11/05/2020    Dr. Pond    CATARACT EXTRACTION W/  INTRAOCULAR LENS IMPLANT Right 01/14/2021    Dr. Pond    COLON SURGERY  2016    small bowel resection    DESCEMET'S MEMBRANE ENDOTHELIAL KERATOPLASTY (DMEK) Left 3/7/2022    Procedure: DESCEMET'S MEMBRANE ENDOTHELIAL KERATOPLASTY (DMEK);  Surgeon: Roopa Emmanuel MD;  Location: [facility] OR;  Service: Ophthalmology;  Laterality: Left;    EXCISION OF MASS OF ABDOMEN N/A 8/27/2018    Procedure: RESECTION GASTRIC MASS;  Surgeon: Chuck Palomares MD;  Location: [facility] OR;  Service: General;  Laterality: N/A;  RN Pre Op 8-16-18    EYE SURGERY      HERNIA REPAIR  2016    HYSTERECTOMY      INTRAOCULAR PROSTHESES INSERTION Left 11/5/2020    Procedure: INSERTION, IOL PROSTHESIS;  Surgeon: Howard Pond,  MD;  Location: [facility] OR;  Service: Ophthalmology;  Laterality: Left;    INTRAOCULAR PROSTHESES INSERTION Right 1/14/2021    Procedure: INSERTION, IOL PROSTHESIS;  Surgeon: Howard Pond MD;  Location: [facility] OR;  Service: Ophthalmology;  Laterality: Right;  RN Phone Pre OP 1-7-2020, Covid NEGATIVE ON  1-. Arrival 09:00 am  C A    OOPHORECTOMY      PHACOEMULSIFICATION OF CATARACT Left 11/5/2020    Procedure: PHACOEMULSIFICATION, CATARACT;  Surgeon: Howard Pond MD;  Location: [facility] OR;  Service: Ophthalmology;  Laterality: Left;  RN Phone Pre OP 11-2-2020. --COVID NEGATIVE ON  11-4-2020. Arrival 11:45 am.C A    PHACOEMULSIFICATION OF CATARACT Right 1/14/2021    Procedure: PHACOEMULSIFICATION, CATARACT;  Surgeon: Howard Pond MD;  Location: [facility] OR;  Service: Ophthalmology;  Laterality: Right;    SMALL INTESTINE SURGERY         SOCIAL HISTORY:  Social History     Socioeconomic History    Marital status: Single   Tobacco Use    Smoking status: Some Days     Packs/day: 0.50     Years: 20.00     Pack years: 10.00     Types: Cigarettes     Start date: 1999    Smokeless tobacco: Never    Tobacco comments:     occassional cigarette   Substance and Sexual Activity    Alcohol use: No    Drug use: No    Sexual activity: Not Currently     Partners: Male       FAMILY HISTORY:  Family History   Problem Relation Age of Onset    Dementia Mother     No Known Problems Father     No Known Problems Sister     No Known Problems Brother     No Known Problems Maternal Aunt     No Known Problems Maternal Uncle     No Known Problems Paternal Aunt     No Known Problems Paternal Uncle     No Known Problems Maternal Grandmother     No Known Problems Maternal Grandfather     No Known Problems Paternal Grandmother     No Known Problems Paternal Grandfather     Amblyopia Neg Hx     Blindness Neg Hx     Cancer Neg Hx     Cataracts Neg Hx     Diabetes Neg Hx     Glaucoma Neg Hx     Hypertension Neg Hx     Macular  degeneration Neg Hx     Retinal detachment Neg Hx     Strabismus Neg Hx     Stroke Neg Hx     Thyroid disease Neg Hx        ALLERGIES AND MEDICATIONS: updated and reviewed.  Review of patient's allergies indicates:   Allergen Reactions    Pcn [penicillins]      Current Outpatient Medications   Medication Sig Dispense Refill    diphenhydrAMINE (BENADRYL) 25 mg capsule Take 25 mg by mouth every 6 (six) hours as needed for Allergies or Insomnia.      multivitamin (THERAGRAN) per tablet Take 1 tablet by mouth once daily.      atorvastatin (LIPITOR) 20 MG tablet Take 1 tablet (20 mg total) by mouth once daily. 90 tablet 0    azithromycin (Z-CRISTIAN) 250 MG tablet Take 2 tablets by mouth on day 1; Take 1 tablet by mouth on days 2-5 6 tablet 0    cyproheptadine (PERIACTIN) 4 mg tablet Take 1 tablet (4 mg total) by mouth 3 (three) times daily as needed (Appetite). (Patient not taking: Reported on 6/16/2023) 90 tablet 2    lisinopriL 10 MG tablet Take 1 tablet (10 mg total) by mouth once daily. 90 tablet 1    prednisoLONE acetate (PRED FORTE) 1 % DrpS Place 1 drop into the left eye 4 (four) times daily. (Patient not taking: Reported on 6/16/2023) 10 mL 4     Current Facility-Administered Medications   Medication Dose Route Frequency Provider Last Rate Last Admin    lidocaine (PF) 10 mg/ml (1%) injection 10 mg  1 mL Intradermal Once Chuck Palomares MD        sodium chloride 0.9% flush 10 mL  10 mL Intravenous PRN Roopa Emmanuel MD         Facility-Administered Medications Ordered in Other Visits   Medication Dose Route Frequency Provider Last Rate Last Admin    cyclopentolate 1% ophthalmic solution 1 drop  1 drop Left Eye On Call Procedure Howard Pond MD   1 drop at 11/05/20 1237    cyclopentolate 1% ophthalmic solution 1 drop  1 drop Right Eye On Call Procedure Howard Pond MD   1 drop at 01/14/21 1007    ofloxacin 0.3 % ophthalmic solution 1 drop  1 drop Left Eye On Call Procedure Howard Pond  "MD   2 drop at 11/05/20 1411    ofloxacin 0.3 % ophthalmic solution 1 drop  1 drop Right Eye On Call Procedure Howard Pond MD   1 drop at 01/14/21 1007    sodium chloride 0.9% flush 10 mL  10 mL Intravenous PRN Howard Pond MD        sodium chloride 0.9% flush 10 mL  10 mL Intravenous PRN Howard Pond MD           Patient Care Team:  Anel Sosa MD as PCP - General (Family Medicine)  [facility] (Gastroenterology)  Darwin Enriquez MA as Care Coordinator    ROS  Review of Systems   Constitutional:  Negative for chills, fatigue, fever and unexpected weight change.   HENT:  Positive for congestion, sinus pressure and sinus pain. Negative for ear discharge, ear pain and postnasal drip.    Eyes:  Negative for photophobia, pain and visual disturbance.   Respiratory:  Positive for cough and chest tightness. Negative for shortness of breath and wheezing.    Cardiovascular:  Negative for chest pain, palpitations and leg swelling.   Gastrointestinal:  Negative for abdominal pain, constipation, diarrhea, nausea and vomiting.   Genitourinary:  Negative for dysuria, frequency, urgency and vaginal discharge.   Musculoskeletal:  Negative for back pain, joint swelling and neck stiffness.   Skin:  Negative for rash.   Neurological:  Negative for weakness and headaches.   Psychiatric/Behavioral:  Negative for dysphoric mood and sleep disturbance. The patient is not nervous/anxious.          Physical Exam  Vitals:    06/16/23 1055   BP: (!) 152/78   BP Location: Right arm   Patient Position: Sitting   BP Method: Medium (Manual)   Pulse: 72   Temp: 98.2 °F (36.8 °C)   TempSrc: Oral   SpO2: 97%   Weight: 43.7 kg (96 lb 5.5 oz)   Height: 5' 5" (1.651 m)    Body mass index is 16.03 kg/m².  Weight: 43.7 kg (96 lb 5.5 oz)   Height: 5' 5" (165.1 cm)     Physical Exam  Constitutional:       Appearance: She is well-developed.   HENT:      Head: " Normocephalic and atraumatic.      Salivary Glands: Right salivary gland is tender. Left salivary gland is tender.      Right Ear: External ear normal. There is impacted cerumen.      Left Ear: External ear normal. There is impacted cerumen.      Nose: Congestion and rhinorrhea present.      Right Turbinates: Pale.      Left Turbinates: Pale.      Right Sinus: Maxillary sinus tenderness and frontal sinus tenderness present.      Left Sinus: Maxillary sinus tenderness and frontal sinus tenderness present.      Mouth/Throat:      Pharynx: No posterior oropharyngeal erythema.   Cardiovascular:      Rate and Rhythm: Normal rate and regular rhythm.   Pulmonary:      Effort: Pulmonary effort is normal.      Breath sounds: Normal breath sounds.   Musculoskeletal:         General: Normal range of motion.      Cervical back: Normal range of motion and neck supple.   Skin:     General: Skin is warm and dry.   Neurological:      Mental Status: She is alert and oriented to person, place, and time.   Psychiatric:         Behavior: Behavior normal.           Health Maintenance         Date Due Completion Date    TETANUS VACCINE Never done ---    Shingles Vaccine (1 of 2) Never done ---    Colorectal Cancer Screening 09/10/2017 9/10/2007    DEXA Scan 06/05/2021 6/5/2018    Lipid Panel 05/03/2023 5/3/2022    Mammogram 07/08/2023 7/8/2022    High Dose Statin 06/16/2024 6/16/2023          Health maintenance reviewed at this time.    Assessment & Plan  Acute bacterial sinusitis  -     azithromycin (Z-CRISTIAN) 250 MG tablet; Take 2 tablets by mouth on day 1; Take 1 tablet by mouth on days 2-5  Dispense: 6 tablet; Refill: 0    Acute sinusitis with symptoms greater than 10 days  -     azithromycin (Z-CRISTIAN) 250 MG tablet; Take 2 tablets by mouth on day 1; Take 1 tablet by mouth on days 2-5  Dispense: 6 tablet; Refill: 0    Can continue the mucinex    Essential hypertension  -     lisinopriL 10 MG tablet; Take 1 tablet (10 mg total) by mouth  once daily.  Dispense: 90 tablet; Refill: 1    Elevated in clinic. Has appointment with PCP on Monday.    Atherosclerosis of aorta  -     atorvastatin (LIPITOR) 20 MG tablet; Take 1 tablet (20 mg total) by mouth once daily.  Dispense: 90 tablet; Refill: 0  The current medical regimen is effective;  continue present plan and medications.    Gastrointestinal stromal tumor (GIST)/History of resection of small bowel/Unintended weight loss  Patient has follow up with PCP. Will defer.         Follow-up: Follow up if symptoms worsen or fail to improve.

## 2023-06-19 ENCOUNTER — LAB VISIT (OUTPATIENT)
Dept: LAB | Facility: HOSPITAL | Age: 73
End: 2023-06-19
Attending: INTERNAL MEDICINE
Payer: MEDICARE

## 2023-06-19 ENCOUNTER — OFFICE VISIT (OUTPATIENT)
Dept: FAMILY MEDICINE | Facility: CLINIC | Age: 73
End: 2023-06-19
Payer: MEDICARE

## 2023-06-19 DIAGNOSIS — C49.A0 GASTROINTESTINAL STROMAL TUMOR (GIST): ICD-10-CM

## 2023-06-19 DIAGNOSIS — R73.9 ELEVATED BLOOD SUGAR: ICD-10-CM

## 2023-06-19 DIAGNOSIS — R63.4 UNINTENDED WEIGHT LOSS: ICD-10-CM

## 2023-06-19 DIAGNOSIS — D70.9 NEUTROPENIA, UNSPECIFIED TYPE: ICD-10-CM

## 2023-06-19 DIAGNOSIS — I10 ESSENTIAL HYPERTENSION: ICD-10-CM

## 2023-06-19 DIAGNOSIS — H25.9 AGE-RELATED CATARACT OF BOTH EYES, UNSPECIFIED AGE-RELATED CATARACT TYPE: ICD-10-CM

## 2023-06-19 DIAGNOSIS — R63.6 UNDERWEIGHT DUE TO INADEQUATE CALORIC INTAKE: ICD-10-CM

## 2023-06-19 DIAGNOSIS — I10 ESSENTIAL HYPERTENSION: Primary | ICD-10-CM

## 2023-06-19 DIAGNOSIS — Z90.49 HISTORY OF RESECTION OF SMALL BOWEL: ICD-10-CM

## 2023-06-19 DIAGNOSIS — K59.00 CONSTIPATION, UNSPECIFIED CONSTIPATION TYPE: ICD-10-CM

## 2023-06-19 LAB
ALBUMIN SERPL BCP-MCNC: 3.5 G/DL (ref 3.5–5.2)
ALP SERPL-CCNC: 96 U/L (ref 55–135)
ALT SERPL W/O P-5'-P-CCNC: 14 U/L (ref 10–44)
ANION GAP SERPL CALC-SCNC: 7 MMOL/L (ref 8–16)
AST SERPL-CCNC: 21 U/L (ref 10–40)
BASOPHILS # BLD AUTO: 0.04 K/UL (ref 0–0.2)
BASOPHILS NFR BLD: 1.3 % (ref 0–1.9)
BILIRUB SERPL-MCNC: 0.6 MG/DL (ref 0.1–1)
BUN SERPL-MCNC: 12 MG/DL (ref 8–23)
CALCIUM SERPL-MCNC: 11.1 MG/DL (ref 8.7–10.5)
CHLORIDE SERPL-SCNC: 104 MMOL/L (ref 95–110)
CO2 SERPL-SCNC: 24 MMOL/L (ref 23–29)
CREAT SERPL-MCNC: 0.9 MG/DL (ref 0.5–1.4)
DIFFERENTIAL METHOD: ABNORMAL
EOSINOPHIL # BLD AUTO: 0.1 K/UL (ref 0–0.5)
EOSINOPHIL NFR BLD: 1.6 % (ref 0–8)
ERYTHROCYTE [DISTWIDTH] IN BLOOD BY AUTOMATED COUNT: 14.4 % (ref 11.5–14.5)
EST. GFR  (NO RACE VARIABLE): >60 ML/MIN/1.73 M^2
ESTIMATED AVG GLUCOSE: 103 MG/DL (ref 68–131)
GLUCOSE SERPL-MCNC: 128 MG/DL (ref 70–110)
HBA1C MFR BLD: 5.2 % (ref 4–5.6)
HCT VFR BLD AUTO: 38.9 % (ref 37–48.5)
HGB BLD-MCNC: 13.1 G/DL (ref 12–16)
IMM GRANULOCYTES # BLD AUTO: 0 K/UL (ref 0–0.04)
IMM GRANULOCYTES NFR BLD AUTO: 0 % (ref 0–0.5)
LYMPHOCYTES # BLD AUTO: 1.6 K/UL (ref 1–4.8)
LYMPHOCYTES NFR BLD: 50.8 % (ref 18–48)
MCH RBC QN AUTO: 31 PG (ref 27–31)
MCHC RBC AUTO-ENTMCNC: 33.7 G/DL (ref 32–36)
MCV RBC AUTO: 92 FL (ref 82–98)
MONOCYTES # BLD AUTO: 0.3 K/UL (ref 0.3–1)
MONOCYTES NFR BLD: 8.4 % (ref 4–15)
NEUTROPHILS # BLD AUTO: 1.2 K/UL (ref 1.8–7.7)
NEUTROPHILS NFR BLD: 37.9 % (ref 38–73)
NRBC BLD-RTO: 0 /100 WBC
PLATELET # BLD AUTO: 168 K/UL (ref 150–450)
PMV BLD AUTO: 10.5 FL (ref 9.2–12.9)
POTASSIUM SERPL-SCNC: 3.9 MMOL/L (ref 3.5–5.1)
PROT SERPL-MCNC: 7.3 G/DL (ref 6–8.4)
RBC # BLD AUTO: 4.22 M/UL (ref 4–5.4)
SODIUM SERPL-SCNC: 135 MMOL/L (ref 136–145)
T3FREE SERPL-MCNC: 2.5 PG/ML (ref 2.3–4.2)
T4 FREE SERPL-MCNC: 1.15 NG/DL (ref 0.71–1.51)
TSH SERPL DL<=0.005 MIU/L-ACNC: 1.88 UIU/ML (ref 0.4–4)
WBC # BLD AUTO: 3.11 K/UL (ref 3.9–12.7)

## 2023-06-19 PROCEDURE — 99499 RISK ADDL DX/OHS AUDIT: ICD-10-PCS | Mod: HCNC,S$GLB,, | Performed by: INTERNAL MEDICINE

## 2023-06-19 PROCEDURE — 3288F PR FALLS RISK ASSESSMENT DOCUMENTED: ICD-10-PCS | Mod: HCNC,CPTII,S$GLB, | Performed by: INTERNAL MEDICINE

## 2023-06-19 PROCEDURE — 99999 PR PBB SHADOW E&M-EST. PATIENT-LVL IV: CPT | Mod: PBBFAC,HCNC,, | Performed by: INTERNAL MEDICINE

## 2023-06-19 PROCEDURE — 3008F PR BODY MASS INDEX (BMI) DOCUMENTED: ICD-10-PCS | Mod: HCNC,CPTII,S$GLB, | Performed by: INTERNAL MEDICINE

## 2023-06-19 PROCEDURE — 84443 ASSAY THYROID STIM HORMONE: CPT | Mod: HCNC | Performed by: INTERNAL MEDICINE

## 2023-06-19 PROCEDURE — 99999 PR PBB SHADOW E&M-EST. PATIENT-LVL IV: ICD-10-PCS | Mod: PBBFAC,HCNC,, | Performed by: INTERNAL MEDICINE

## 2023-06-19 PROCEDURE — 99499 UNLISTED E&M SERVICE: CPT | Mod: HCNC,S$GLB,, | Performed by: INTERNAL MEDICINE

## 2023-06-19 PROCEDURE — 80053 COMPREHEN METABOLIC PANEL: CPT | Mod: HCNC | Performed by: INTERNAL MEDICINE

## 2023-06-19 PROCEDURE — 83036 HEMOGLOBIN GLYCOSYLATED A1C: CPT | Mod: HCNC | Performed by: INTERNAL MEDICINE

## 2023-06-19 PROCEDURE — 4010F PR ACE/ARB THEARPY RXD/TAKEN: ICD-10-PCS | Mod: HCNC,CPTII,S$GLB, | Performed by: INTERNAL MEDICINE

## 2023-06-19 PROCEDURE — 1101F PR PT FALLS ASSESS DOC 0-1 FALLS W/OUT INJ PAST YR: ICD-10-PCS | Mod: HCNC,CPTII,S$GLB, | Performed by: INTERNAL MEDICINE

## 2023-06-19 PROCEDURE — 3079F PR MOST RECENT DIASTOLIC BLOOD PRESSURE 80-89 MM HG: ICD-10-PCS | Mod: HCNC,CPTII,S$GLB, | Performed by: INTERNAL MEDICINE

## 2023-06-19 PROCEDURE — 3075F PR MOST RECENT SYSTOLIC BLOOD PRESS GE 130-139MM HG: ICD-10-PCS | Mod: HCNC,CPTII,S$GLB, | Performed by: INTERNAL MEDICINE

## 2023-06-19 PROCEDURE — 3288F FALL RISK ASSESSMENT DOCD: CPT | Mod: HCNC,CPTII,S$GLB, | Performed by: INTERNAL MEDICINE

## 2023-06-19 PROCEDURE — 84481 FREE ASSAY (FT-3): CPT | Mod: HCNC | Performed by: INTERNAL MEDICINE

## 2023-06-19 PROCEDURE — 3044F PR MOST RECENT HEMOGLOBIN A1C LEVEL <7.0%: ICD-10-PCS | Mod: HCNC,CPTII,S$GLB, | Performed by: INTERNAL MEDICINE

## 2023-06-19 PROCEDURE — 1159F MED LIST DOCD IN RCRD: CPT | Mod: HCNC,CPTII,S$GLB, | Performed by: INTERNAL MEDICINE

## 2023-06-19 PROCEDURE — 99214 OFFICE O/P EST MOD 30 MIN: CPT | Mod: HCNC,S$GLB,, | Performed by: INTERNAL MEDICINE

## 2023-06-19 PROCEDURE — 1159F PR MEDICATION LIST DOCUMENTED IN MEDICAL RECORD: ICD-10-PCS | Mod: HCNC,CPTII,S$GLB, | Performed by: INTERNAL MEDICINE

## 2023-06-19 PROCEDURE — 1160F RVW MEDS BY RX/DR IN RCRD: CPT | Mod: HCNC,CPTII,S$GLB, | Performed by: INTERNAL MEDICINE

## 2023-06-19 PROCEDURE — 1160F PR REVIEW ALL MEDS BY PRESCRIBER/CLIN PHARMACIST DOCUMENTED: ICD-10-PCS | Mod: HCNC,CPTII,S$GLB, | Performed by: INTERNAL MEDICINE

## 2023-06-19 PROCEDURE — 84439 ASSAY OF FREE THYROXINE: CPT | Mod: HCNC | Performed by: INTERNAL MEDICINE

## 2023-06-19 PROCEDURE — 3008F BODY MASS INDEX DOCD: CPT | Mod: HCNC,CPTII,S$GLB, | Performed by: INTERNAL MEDICINE

## 2023-06-19 PROCEDURE — 36415 COLL VENOUS BLD VENIPUNCTURE: CPT | Mod: HCNC,PO | Performed by: INTERNAL MEDICINE

## 2023-06-19 PROCEDURE — 4010F ACE/ARB THERAPY RXD/TAKEN: CPT | Mod: HCNC,CPTII,S$GLB, | Performed by: INTERNAL MEDICINE

## 2023-06-19 PROCEDURE — 99214 PR OFFICE/OUTPT VISIT, EST, LEVL IV, 30-39 MIN: ICD-10-PCS | Mod: HCNC,S$GLB,, | Performed by: INTERNAL MEDICINE

## 2023-06-19 PROCEDURE — 3075F SYST BP GE 130 - 139MM HG: CPT | Mod: HCNC,CPTII,S$GLB, | Performed by: INTERNAL MEDICINE

## 2023-06-19 PROCEDURE — 3044F HG A1C LEVEL LT 7.0%: CPT | Mod: HCNC,CPTII,S$GLB, | Performed by: INTERNAL MEDICINE

## 2023-06-19 PROCEDURE — 1101F PT FALLS ASSESS-DOCD LE1/YR: CPT | Mod: HCNC,CPTII,S$GLB, | Performed by: INTERNAL MEDICINE

## 2023-06-19 PROCEDURE — 3079F DIAST BP 80-89 MM HG: CPT | Mod: HCNC,CPTII,S$GLB, | Performed by: INTERNAL MEDICINE

## 2023-06-19 PROCEDURE — 85025 COMPLETE CBC W/AUTO DIFF WBC: CPT | Mod: HCNC | Performed by: INTERNAL MEDICINE

## 2023-06-19 RX ORDER — CYPROHEPTADINE HYDROCHLORIDE 4 MG/1
4 TABLET ORAL 3 TIMES DAILY PRN
Qty: 90 TABLET | Refills: 2 | Status: SHIPPED | OUTPATIENT
Start: 2023-06-19 | End: 2023-12-08 | Stop reason: SDUPTHER

## 2023-06-19 RX ORDER — POLYETHYLENE GLYCOL 3350 17 G/17G
17 POWDER, FOR SOLUTION ORAL DAILY
Qty: 30 PACKET | Refills: 0 | Status: SHIPPED | OUTPATIENT
Start: 2023-06-19

## 2023-06-19 NOTE — PROGRESS NOTES
Subjective:       Patient ID: Melba Pabon is a pleasant 73 y.o. Black or  female patient    Chief Complaint: Follow-up      Patient is a pt I saw last on 11/08/2022, see my last notes.    HPI    Pt with past medical history as per list of problems below coming today for regular follow-up visit.    From our last visit:  - 04/20/2023 , Ophthalmology, for ptosis of L lid.  - 06/16/2023 Dr. John for bacterial sinusitis.  Pt reports feeling fine. However lost 13 pounds from last visit?  She reports constipation due to her taking Centrum vitamins.  Patient reports eating small portions, has no specific symptoms, was concerned to have a new GI issue due to H/O surgery.  She denies any nausea vomiting.  She feels better regarding sinusitis.      Patient asks for refill of Periactin.   She would also like to follow-up with Optometry.   Of note patient is still a heavy smoker 1 pack a day. She has been a smoker for years.      Patient Active Problem List   Diagnosis    History of resection of small bowel    History of surgery for cerebral aneurysm    Essential hypertension    Age-related osteoporosis without current pathological fracture    Hypercalcemia    Gastric mass    Gastrointestinal stromal tumor (GIST)    Atherosclerosis of aorta    NS (nuclear sclerosis), left    Nuclear sclerotic cataract of right eye    Unintended weight loss    Parathyroid adenoma    Vocal cord palsy    Aneurysm    Corneal edema    Neutropenia, unspecified type          ACTIVE MEDICAL ISSUES:  Documented in Problem List     PAST MEDICAL HISTORY  Documented     PAST SURGICAL HISTORY:  Documented     SOCIAL HISTORY:  Documented     FAMILY HISTORY:  Documented     ALLERGIES AND MEDICATIONS: updated and reviewed.  Documented    Review of Systems    Objective:      Physical Exam  Constitutional:       Appearance: She is well-developed.      Comments: BMI 15   HENT:      Head: Normocephalic and atraumatic.       "Salivary Glands: Right salivary gland is not tender. Left salivary gland is not tender.      Right Ear: Tympanic membrane and external ear normal. There is no impacted cerumen.      Left Ear: Tympanic membrane and external ear normal. There is no impacted cerumen.      Nose: No congestion.      Right Turbinates: Not pale.      Right Sinus: No maxillary sinus tenderness or frontal sinus tenderness.      Left Sinus: No maxillary sinus tenderness or frontal sinus tenderness.      Mouth/Throat:      Pharynx: No posterior oropharyngeal erythema.   Cardiovascular:      Rate and Rhythm: Normal rate and regular rhythm.   Pulmonary:      Effort: Pulmonary effort is normal.      Breath sounds: Normal breath sounds.   Musculoskeletal:         General: Normal range of motion.      Cervical back: Normal range of motion and neck supple.   Skin:     General: Skin is warm and dry.   Neurological:      Mental Status: She is alert and oriented to person, place, and time.   Psychiatric:         Behavior: Behavior normal.       Vitals:    06/19/23 1445 06/19/23 1508   BP: (!) 144/78 138/80   BP Location: Right arm Right arm   Patient Position: Sitting Sitting   BP Method: Large (Manual) Small (Manual)   Pulse: 85    Resp: 16    Temp: 98.4 °F (36.9 °C)    TempSrc: Oral    SpO2: 96%    Weight: 43.5 kg (95 lb 14.4 oz)    Height: 5' 5" (1.651 m)      Body mass index is 15.96 kg/m².    RESULTS: Reviewed labs from last 12 months    Last Lab Results:     Lab Results   Component Value Date    WBC 3.11 (L) 06/19/2023    HGB 13.1 06/19/2023    HCT 38.9 06/19/2023     06/19/2023     (L) 06/19/2023    K 3.9 06/19/2023     06/19/2023    CO2 24 06/19/2023    BUN 12 06/19/2023    CREATININE 0.9 06/19/2023    CALCIUM 11.1 (H) 06/19/2023    ALBUMIN 3.5 06/19/2023    AST 21 06/19/2023    ALT 14 06/19/2023    CHOL 121 05/03/2022    TRIG 36 05/03/2022    HDL 59 05/03/2022    LDLCALC 54.8 (L) 05/03/2022    HGBA1C 5.2 06/19/2023    TSH " 1.879 06/19/2023       Assessment:       1. Essential hypertension    2. Unintended weight loss    3. Underweight due to inadequate caloric intake    4. History of resection of small bowel    5. Gastrointestinal stromal tumor (GIST)    6. Constipation, unspecified constipation type    7. Neutropenia, unspecified type    8. Age-related cataract of both eyes, unspecified age-related cataract type    9. Elevated blood sugar        Plan:   Melba was seen today for follow-up.    Diagnoses and all orders for this visit:    Essential hypertension  -     CBC Auto Differential; Future  -     Comprehensive Metabolic Panel; Future    BP at goal, same treatment.    Unintended weight loss  -     TSH; Future  -     T3, Free; Future  -     T4, FREE; Future  -     cyproheptadine (PERIACTIN) 4 mg tablet; Take 1 tablet (4 mg total) by mouth 3 (three) times daily as needed (Appetite).    Concerning. Will do blood work, reviewed diet and advised at this regard. Will give Periactin. Will monitor.    History of resection of small bowel    PE benign.    Gastrointestinal stromal tumor (GIST)    S/p surgery.    Constipation, unspecified constipation type  -     polyethylene glycol (GLYCOLAX) 17 gram PwPk; Take 17 g by mouth once daily.    Will give Miralax.    Neutropenia, unspecified type    Will monitor.    Age-related cataract of both eyes, unspecified age-related cataract type  -     Ambulatory referral/consult to Ophthalmology; Future    Referral placed. Cataract R eye and implant L eye.    Elevated blood sugar  -     Hemoglobin A1C; Future      Follow up in about 3 months (around 9/19/2023) for f-up.    This note was created by combination of typed  and M-Modal dictation.  Transcription errors may be present.  If there are any questions, please contact me.

## 2023-06-20 VITALS
BODY MASS INDEX: 15.97 KG/M2 | TEMPERATURE: 98 F | HEART RATE: 85 BPM | HEIGHT: 65 IN | WEIGHT: 95.88 LBS | OXYGEN SATURATION: 96 % | DIASTOLIC BLOOD PRESSURE: 80 MMHG | SYSTOLIC BLOOD PRESSURE: 138 MMHG | RESPIRATION RATE: 16 BRPM

## 2023-06-20 PROBLEM — D70.9 NEUTROPENIA, UNSPECIFIED TYPE: Status: ACTIVE | Noted: 2023-06-20

## 2023-07-19 RX ORDER — OFLOXACIN 3 MG/ML
1 SOLUTION/ DROPS OPHTHALMIC 4 TIMES DAILY
Qty: 5 ML | Refills: 1 | Status: SHIPPED | OUTPATIENT
Start: 2023-07-19 | End: 2023-07-26

## 2023-07-24 ENCOUNTER — PES CALL (OUTPATIENT)
Dept: ADMINISTRATIVE | Facility: CLINIC | Age: 73
End: 2023-07-24
Payer: MEDICARE

## 2023-09-01 ENCOUNTER — TELEPHONE (OUTPATIENT)
Dept: FAMILY MEDICINE | Facility: CLINIC | Age: 73
End: 2023-09-01

## 2023-09-01 NOTE — TELEPHONE ENCOUNTER
----- Message from Silva Taylor MA sent at 9/1/2023  2:45 PM CDT -----  Type: Patient Call Back    Who called:Self    What is the request in detail:pt. States her medication was adjusted and now her feet & ankle are swelling pretty bad  .. She would rather have the blood pressure medication that helped her with her fluid build up also..     Can the clinic reply by [facility]?NO    Would the patient rather a call back or a response via [facility]? Yes, call     Best call back number: [phone]

## 2023-09-08 ENCOUNTER — OFFICE VISIT (OUTPATIENT)
Dept: FAMILY MEDICINE | Facility: CLINIC | Age: 73
End: 2023-09-08
Payer: MEDICARE

## 2023-09-08 VITALS
HEART RATE: 80 BPM | RESPIRATION RATE: 16 BRPM | WEIGHT: 105.81 LBS | SYSTOLIC BLOOD PRESSURE: 138 MMHG | TEMPERATURE: 99 F | BODY MASS INDEX: 17.63 KG/M2 | DIASTOLIC BLOOD PRESSURE: 82 MMHG | HEIGHT: 65 IN | OXYGEN SATURATION: 99 %

## 2023-09-08 DIAGNOSIS — Z12.31 ENCOUNTER FOR SCREENING MAMMOGRAM FOR MALIGNANT NEOPLASM OF BREAST: ICD-10-CM

## 2023-09-08 DIAGNOSIS — Z23 NEED FOR IMMUNIZATION AGAINST INFLUENZA: ICD-10-CM

## 2023-09-08 DIAGNOSIS — R63.6 UNDERWEIGHT: ICD-10-CM

## 2023-09-08 DIAGNOSIS — I10 ESSENTIAL HYPERTENSION: Primary | ICD-10-CM

## 2023-09-08 PROCEDURE — 1159F PR MEDICATION LIST DOCUMENTED IN MEDICAL RECORD: ICD-10-PCS | Mod: HCNC,CPTII,S$GLB, | Performed by: INTERNAL MEDICINE

## 2023-09-08 PROCEDURE — G0008 FLU VACCINE - QUADRIVALENT - ADJUVANTED: ICD-10-PCS | Mod: HCNC,S$GLB,, | Performed by: INTERNAL MEDICINE

## 2023-09-08 PROCEDURE — 3079F PR MOST RECENT DIASTOLIC BLOOD PRESSURE 80-89 MM HG: ICD-10-PCS | Mod: HCNC,CPTII,S$GLB, | Performed by: INTERNAL MEDICINE

## 2023-09-08 PROCEDURE — 1160F PR REVIEW ALL MEDS BY PRESCRIBER/CLIN PHARMACIST DOCUMENTED: ICD-10-PCS | Mod: HCNC,CPTII,S$GLB, | Performed by: INTERNAL MEDICINE

## 2023-09-08 PROCEDURE — 1101F PR PT FALLS ASSESS DOC 0-1 FALLS W/OUT INJ PAST YR: ICD-10-PCS | Mod: HCNC,CPTII,S$GLB, | Performed by: INTERNAL MEDICINE

## 2023-09-08 PROCEDURE — 3008F BODY MASS INDEX DOCD: CPT | Mod: HCNC,CPTII,S$GLB, | Performed by: INTERNAL MEDICINE

## 2023-09-08 PROCEDURE — 4010F ACE/ARB THERAPY RXD/TAKEN: CPT | Mod: HCNC,CPTII,S$GLB, | Performed by: INTERNAL MEDICINE

## 2023-09-08 PROCEDURE — 3044F HG A1C LEVEL LT 7.0%: CPT | Mod: HCNC,CPTII,S$GLB, | Performed by: INTERNAL MEDICINE

## 2023-09-08 PROCEDURE — 90694 VACC AIIV4 NO PRSRV 0.5ML IM: CPT | Mod: HCNC,S$GLB,, | Performed by: INTERNAL MEDICINE

## 2023-09-08 PROCEDURE — 99214 PR OFFICE/OUTPT VISIT, EST, LEVL IV, 30-39 MIN: ICD-10-PCS | Mod: HCNC,25,S$GLB, | Performed by: INTERNAL MEDICINE

## 2023-09-08 PROCEDURE — 3008F PR BODY MASS INDEX (BMI) DOCUMENTED: ICD-10-PCS | Mod: HCNC,CPTII,S$GLB, | Performed by: INTERNAL MEDICINE

## 2023-09-08 PROCEDURE — 1101F PT FALLS ASSESS-DOCD LE1/YR: CPT | Mod: HCNC,CPTII,S$GLB, | Performed by: INTERNAL MEDICINE

## 2023-09-08 PROCEDURE — G0008 ADMIN INFLUENZA VIRUS VAC: HCPCS | Mod: HCNC,S$GLB,, | Performed by: INTERNAL MEDICINE

## 2023-09-08 PROCEDURE — 3079F DIAST BP 80-89 MM HG: CPT | Mod: HCNC,CPTII,S$GLB, | Performed by: INTERNAL MEDICINE

## 2023-09-08 PROCEDURE — 3044F PR MOST RECENT HEMOGLOBIN A1C LEVEL <7.0%: ICD-10-PCS | Mod: HCNC,CPTII,S$GLB, | Performed by: INTERNAL MEDICINE

## 2023-09-08 PROCEDURE — 4010F PR ACE/ARB THEARPY RXD/TAKEN: ICD-10-PCS | Mod: HCNC,CPTII,S$GLB, | Performed by: INTERNAL MEDICINE

## 2023-09-08 PROCEDURE — 3075F PR MOST RECENT SYSTOLIC BLOOD PRESS GE 130-139MM HG: ICD-10-PCS | Mod: HCNC,CPTII,S$GLB, | Performed by: INTERNAL MEDICINE

## 2023-09-08 PROCEDURE — 90694 FLU VACCINE - QUADRIVALENT - ADJUVANTED: ICD-10-PCS | Mod: HCNC,S$GLB,, | Performed by: INTERNAL MEDICINE

## 2023-09-08 PROCEDURE — 1160F RVW MEDS BY RX/DR IN RCRD: CPT | Mod: HCNC,CPTII,S$GLB, | Performed by: INTERNAL MEDICINE

## 2023-09-08 PROCEDURE — 3288F FALL RISK ASSESSMENT DOCD: CPT | Mod: HCNC,CPTII,S$GLB, | Performed by: INTERNAL MEDICINE

## 2023-09-08 PROCEDURE — 3075F SYST BP GE 130 - 139MM HG: CPT | Mod: HCNC,CPTII,S$GLB, | Performed by: INTERNAL MEDICINE

## 2023-09-08 PROCEDURE — 99214 OFFICE O/P EST MOD 30 MIN: CPT | Mod: HCNC,25,S$GLB, | Performed by: INTERNAL MEDICINE

## 2023-09-08 PROCEDURE — 99999 PR PBB SHADOW E&M-EST. PATIENT-LVL IV: CPT | Mod: PBBFAC,HCNC,, | Performed by: INTERNAL MEDICINE

## 2023-09-08 PROCEDURE — 1159F MED LIST DOCD IN RCRD: CPT | Mod: HCNC,CPTII,S$GLB, | Performed by: INTERNAL MEDICINE

## 2023-09-08 PROCEDURE — 99999 PR PBB SHADOW E&M-EST. PATIENT-LVL IV: ICD-10-PCS | Mod: PBBFAC,HCNC,, | Performed by: INTERNAL MEDICINE

## 2023-09-08 PROCEDURE — 3288F PR FALLS RISK ASSESSMENT DOCUMENTED: ICD-10-PCS | Mod: HCNC,CPTII,S$GLB, | Performed by: INTERNAL MEDICINE

## 2023-09-08 NOTE — PROGRESS NOTES
Health Maintenance Due   Topic     TETANUS VACCINE      Shingles Vaccine (1 of 2) hx chickenpox ; inform pt can get vaccine at pharmacy.    Colorectal Cancer Screening      DEXA Scan      COVID-19 Vaccine (6 - Pfizer risk series)     Lipid Panel      Mammogram      Influenza Vaccine (1)

## 2023-09-08 NOTE — PROGRESS NOTES
Subjective:       Patient ID: Melba Pabon is a pleasant 73 y.o. Black or  female patient    Chief Complaint: Joint Swelling      Patient is a pt I saw last on 06/19/2023, see my last notes.     HPI     Patient with past medical history as per list of problems below coming today for regular follow-up visit and due to joint swelling.    No interval visit.  She reports feeling globally fine, she is concerned to have swelling in the ankles and wonders if she needs to take a fluid pill that she received in the past.    She is pleased as she gained 10 lb from last visit thanks to cyproheptadine.    Patient Active Problem List   Diagnosis    History of resection of small bowel    History of surgery for cerebral aneurysm    Essential hypertension    Age-related osteoporosis without current pathological fracture    Hypercalcemia    Gastric mass    Gastrointestinal stromal tumor (GIST)    Atherosclerosis of aorta    NS (nuclear sclerosis), left    Nuclear sclerotic cataract of right eye    Unintended weight loss    Parathyroid adenoma    Vocal cord palsy    Aneurysm    Corneal edema    Neutropenia, unspecified type          ACTIVE MEDICAL ISSUES:  Documented in Problem List     PAST MEDICAL HISTORY  Documented     PAST SURGICAL HISTORY:  Documented     SOCIAL HISTORY:  Documented     FAMILY HISTORY:  Documented     ALLERGIES AND MEDICATIONS: updated and reviewed.  Documented    Review of Systems   Constitutional:  Negative for chills, fatigue, fever and unexpected weight change.   HENT:  Negative for congestion, ear discharge, ear pain, postnasal drip, sinus pressure and sinus pain.    Eyes:  Negative for photophobia, pain and visual disturbance.   Respiratory:  Negative for cough, chest tightness, shortness of breath and wheezing.    Cardiovascular:  Negative for chest pain, palpitations and leg swelling.   Gastrointestinal:  Negative for abdominal pain, constipation, diarrhea, nausea and vomiting.    Genitourinary:  Negative for dysuria, frequency, urgency and vaginal discharge.   Musculoskeletal:  Negative for back pain, joint swelling and neck stiffness.   Skin:  Negative for rash.   Neurological:  Negative for weakness and headaches.   Psychiatric/Behavioral:  Negative for dysphoric mood and sleep disturbance. The patient is not nervous/anxious.        Objective:      Physical Exam  Constitutional:       Appearance: Normal appearance. She is well-developed.      Comments: BMI 15   HENT:      Head: Normocephalic and atraumatic.      Salivary Glands: Right salivary gland is not tender. Left salivary gland is not tender.      Right Ear: Tympanic membrane and external ear normal. There is no impacted cerumen.      Left Ear: Tympanic membrane and external ear normal. There is no impacted cerumen.      Nose: No congestion.      Right Turbinates: Not pale.      Right Sinus: No maxillary sinus tenderness or frontal sinus tenderness.      Left Sinus: No maxillary sinus tenderness or frontal sinus tenderness.      Mouth/Throat:      Pharynx: No posterior oropharyngeal erythema.   Eyes:      Conjunctiva/sclera: Conjunctivae normal.   Cardiovascular:      Rate and Rhythm: Normal rate and regular rhythm.      Pulses: Normal pulses.      Heart sounds: Normal heart sounds.   Pulmonary:      Effort: Pulmonary effort is normal.      Breath sounds: Normal breath sounds.   Abdominal:      General: Bowel sounds are normal.   Musculoskeletal:         General: Normal range of motion.      Cervical back: Normal range of motion and neck supple.   Skin:     General: Skin is warm and dry.   Neurological:      Mental Status: She is alert and oriented to person, place, and time.   Psychiatric:         Mood and Affect: Mood normal.         Behavior: Behavior normal.         Thought Content: Thought content normal.         Judgment: Judgment normal.         Vitals:    09/08/23 0832   BP: 138/82   BP Location: Right arm   Patient  "Position: Sitting   BP Method: Large (Manual)   Pulse: 80   Resp: 16   Temp: 98.6 °F (37 °C)   TempSrc: Oral   SpO2: 99%   Weight: 48 kg (105 lb 13.1 oz)   Height: 5' 5" (1.651 m)     Body mass index is 17.61 kg/m².    RESULTS: Reviewed labs from last 12 months    Last Lab Results:     Lab Results   Component Value Date    WBC 3.11 (L) 06/19/2023    HGB 13.1 06/19/2023    HCT 38.9 06/19/2023     06/19/2023     (L) 06/19/2023    K 3.9 06/19/2023     06/19/2023    CO2 24 06/19/2023    BUN 12 06/19/2023    CREATININE 0.9 06/19/2023    CALCIUM 11.1 (H) 06/19/2023    ALBUMIN 3.5 06/19/2023    AST 21 06/19/2023    ALT 14 06/19/2023    CHOL 121 05/03/2022    TRIG 36 05/03/2022    HDL 59 05/03/2022    LDLCALC 54.8 (L) 05/03/2022    HGBA1C 5.2 06/19/2023    TSH 1.879 06/19/2023     Assessment:       1. Essential hypertension    2. Underweight    3. Need for immunization against influenza    4. Encounter for screening mammogram for malignant neoplasm of breast        Plan:   Melba was seen today for joint swelling.    Diagnoses and all orders for this visit:    Essential hypertension    BP at goal, same treatment, patient was concerned to have swelling of the lower extremity, she has no pitting edema, no other issue, we will not add a fluid pill to her regimen.      Underweight    Regained weight with Periactin.  Very pleased of this.    Need for immunization against influenza  -     Influenza (FLUAD) - Quadrivalent (Adjuvanted) *Preferred* (65+) (PF)    Encounter for screening mammogram for malignant neoplasm of breast  -     Mammo Digital Screening Bilat; Future      Follow up in about 3 months (around 12/8/2023) for f-up.    This note was created by combination of typed  and M-Modal dictation.  Transcription errors may be present.  If there are any questions, please contact me.    "

## 2023-10-10 ENCOUNTER — HOSPITAL ENCOUNTER (OUTPATIENT)
Dept: RADIOLOGY | Facility: HOSPITAL | Age: 73
Discharge: HOME | End: 2023-10-10
Attending: INTERNAL MEDICINE
Payer: MEDICARE

## 2023-10-10 DIAGNOSIS — Z12.31 ENCOUNTER FOR SCREENING MAMMOGRAM FOR MALIGNANT NEOPLASM OF BREAST: ICD-10-CM

## 2023-10-10 PROCEDURE — 77067 SCR MAMMO BI INCL CAD: CPT | Mod: TC,HCNC

## 2023-10-10 PROCEDURE — 77063 BREAST TOMOSYNTHESIS BI: CPT | Mod: 26,HCNC,, | Performed by: RADIOLOGY

## 2023-10-10 PROCEDURE — 77067 MAMMO DIGITAL SCREENING BILAT WITH TOMO: ICD-10-PCS | Mod: 26,HCNC,, | Performed by: RADIOLOGY

## 2023-10-10 PROCEDURE — 77067 SCR MAMMO BI INCL CAD: CPT | Mod: 26,HCNC,, | Performed by: RADIOLOGY

## 2023-10-10 PROCEDURE — 77063 MAMMO DIGITAL SCREENING BILAT WITH TOMO: ICD-10-PCS | Mod: 26,HCNC,, | Performed by: RADIOLOGY

## 2023-12-07 NOTE — PROGRESS NOTES
Health Maintenance Due   Topic     TETANUS VACCINE      Shingles Vaccine (1 of 2)     Colorectal Cancer Screening      DEXA Scan      Lipid Panel      COVID-19 Vaccine (6 - 2023-24 season)

## 2023-12-08 ENCOUNTER — OFFICE VISIT (OUTPATIENT)
Dept: FAMILY MEDICINE | Facility: CLINIC | Age: 73
End: 2023-12-08
Payer: MEDICARE

## 2023-12-08 VITALS
HEART RATE: 71 BPM | HEIGHT: 65 IN | DIASTOLIC BLOOD PRESSURE: 88 MMHG | TEMPERATURE: 99 F | RESPIRATION RATE: 16 BRPM | WEIGHT: 103.81 LBS | SYSTOLIC BLOOD PRESSURE: 130 MMHG | BODY MASS INDEX: 17.3 KG/M2

## 2023-12-08 DIAGNOSIS — I70.0 ATHEROSCLEROSIS OF AORTA: ICD-10-CM

## 2023-12-08 DIAGNOSIS — R63.6 UNDERWEIGHT DUE TO INADEQUATE CALORIC INTAKE: ICD-10-CM

## 2023-12-08 DIAGNOSIS — I10 ESSENTIAL HYPERTENSION: Primary | ICD-10-CM

## 2023-12-08 PROCEDURE — 3079F DIAST BP 80-89 MM HG: CPT | Mod: HCNC,CPTII,S$GLB, | Performed by: INTERNAL MEDICINE

## 2023-12-08 PROCEDURE — 99214 OFFICE O/P EST MOD 30 MIN: CPT | Mod: HCNC,S$GLB,, | Performed by: INTERNAL MEDICINE

## 2023-12-08 PROCEDURE — 99999 PR PBB SHADOW E&M-EST. PATIENT-LVL III: ICD-10-PCS | Mod: PBBFAC,HCNC,, | Performed by: INTERNAL MEDICINE

## 2023-12-08 PROCEDURE — 1126F AMNT PAIN NOTED NONE PRSNT: CPT | Mod: HCNC,CPTII,S$GLB, | Performed by: INTERNAL MEDICINE

## 2023-12-08 PROCEDURE — 3008F BODY MASS INDEX DOCD: CPT | Mod: HCNC,CPTII,S$GLB, | Performed by: INTERNAL MEDICINE

## 2023-12-08 PROCEDURE — 4010F ACE/ARB THERAPY RXD/TAKEN: CPT | Mod: HCNC,CPTII,S$GLB, | Performed by: INTERNAL MEDICINE

## 2023-12-08 PROCEDURE — 3079F PR MOST RECENT DIASTOLIC BLOOD PRESSURE 80-89 MM HG: ICD-10-PCS | Mod: HCNC,CPTII,S$GLB, | Performed by: INTERNAL MEDICINE

## 2023-12-08 PROCEDURE — 3288F PR FALLS RISK ASSESSMENT DOCUMENTED: ICD-10-PCS | Mod: HCNC,CPTII,S$GLB, | Performed by: INTERNAL MEDICINE

## 2023-12-08 PROCEDURE — 3075F SYST BP GE 130 - 139MM HG: CPT | Mod: HCNC,CPTII,S$GLB, | Performed by: INTERNAL MEDICINE

## 2023-12-08 PROCEDURE — 1101F PT FALLS ASSESS-DOCD LE1/YR: CPT | Mod: HCNC,CPTII,S$GLB, | Performed by: INTERNAL MEDICINE

## 2023-12-08 PROCEDURE — 1101F PR PT FALLS ASSESS DOC 0-1 FALLS W/OUT INJ PAST YR: ICD-10-PCS | Mod: HCNC,CPTII,S$GLB, | Performed by: INTERNAL MEDICINE

## 2023-12-08 PROCEDURE — 3075F PR MOST RECENT SYSTOLIC BLOOD PRESS GE 130-139MM HG: ICD-10-PCS | Mod: HCNC,CPTII,S$GLB, | Performed by: INTERNAL MEDICINE

## 2023-12-08 PROCEDURE — 1159F MED LIST DOCD IN RCRD: CPT | Mod: HCNC,CPTII,S$GLB, | Performed by: INTERNAL MEDICINE

## 2023-12-08 PROCEDURE — 1160F RVW MEDS BY RX/DR IN RCRD: CPT | Mod: HCNC,CPTII,S$GLB, | Performed by: INTERNAL MEDICINE

## 2023-12-08 PROCEDURE — 99999 PR PBB SHADOW E&M-EST. PATIENT-LVL III: CPT | Mod: PBBFAC,HCNC,, | Performed by: INTERNAL MEDICINE

## 2023-12-08 PROCEDURE — 1126F PR PAIN SEVERITY QUANTIFIED, NO PAIN PRESENT: ICD-10-PCS | Mod: HCNC,CPTII,S$GLB, | Performed by: INTERNAL MEDICINE

## 2023-12-08 PROCEDURE — 1159F PR MEDICATION LIST DOCUMENTED IN MEDICAL RECORD: ICD-10-PCS | Mod: HCNC,CPTII,S$GLB, | Performed by: INTERNAL MEDICINE

## 2023-12-08 PROCEDURE — 3288F FALL RISK ASSESSMENT DOCD: CPT | Mod: HCNC,CPTII,S$GLB, | Performed by: INTERNAL MEDICINE

## 2023-12-08 PROCEDURE — 1160F PR REVIEW ALL MEDS BY PRESCRIBER/CLIN PHARMACIST DOCUMENTED: ICD-10-PCS | Mod: HCNC,CPTII,S$GLB, | Performed by: INTERNAL MEDICINE

## 2023-12-08 PROCEDURE — 3044F HG A1C LEVEL LT 7.0%: CPT | Mod: HCNC,CPTII,S$GLB, | Performed by: INTERNAL MEDICINE

## 2023-12-08 PROCEDURE — 3044F PR MOST RECENT HEMOGLOBIN A1C LEVEL <7.0%: ICD-10-PCS | Mod: HCNC,CPTII,S$GLB, | Performed by: INTERNAL MEDICINE

## 2023-12-08 PROCEDURE — 4010F PR ACE/ARB THEARPY RXD/TAKEN: ICD-10-PCS | Mod: HCNC,CPTII,S$GLB, | Performed by: INTERNAL MEDICINE

## 2023-12-08 PROCEDURE — 99214 PR OFFICE/OUTPT VISIT, EST, LEVL IV, 30-39 MIN: ICD-10-PCS | Mod: HCNC,S$GLB,, | Performed by: INTERNAL MEDICINE

## 2023-12-08 PROCEDURE — 3008F PR BODY MASS INDEX (BMI) DOCUMENTED: ICD-10-PCS | Mod: HCNC,CPTII,S$GLB, | Performed by: INTERNAL MEDICINE

## 2023-12-08 RX ORDER — LISINOPRIL 10 MG/1
10 TABLET ORAL DAILY
Qty: 90 TABLET | Refills: 1 | Status: SHIPPED | OUTPATIENT
Start: 2023-12-08

## 2023-12-08 RX ORDER — ATORVASTATIN CALCIUM 20 MG/1
20 TABLET, FILM COATED ORAL DAILY
Qty: 90 TABLET | Refills: 0 | Status: SHIPPED | OUTPATIENT
Start: 2023-12-08 | End: 2024-02-22

## 2023-12-08 RX ORDER — CYPROHEPTADINE HYDROCHLORIDE 4 MG/1
4 TABLET ORAL 3 TIMES DAILY PRN
Qty: 90 TABLET | Refills: 2 | Status: SHIPPED | OUTPATIENT
Start: 2023-12-08

## 2023-12-08 NOTE — PROGRESS NOTES
Subjective:       Patient ID: Melba Pabon is a pleasant 73 y.o. Black or  female patient    Chief Complaint: Follow-up      Patient is a pt I saw last on 09/08/2023, see my last notes.    HPI     Pt with PMH as per list of problems below coming today for a regular f-up visit.  She reports feeling globally fine.  She has an upcoming appt with her eye doctor in 01/2023  She would like for all of her medications to be sent to the mail order.    Patient Active Problem List   Diagnosis    History of resection of small bowel    History of surgery for cerebral aneurysm    Essential hypertension    Age-related osteoporosis without current pathological fracture    Hypercalcemia    Gastric mass    Gastrointestinal stromal tumor (GIST)    Atherosclerosis of aorta    NS (nuclear sclerosis), left    Nuclear sclerotic cataract of right eye    Unintended weight loss    Parathyroid adenoma    Vocal cord palsy    Aneurysm    Corneal edema    Neutropenia, unspecified type    Underweight due to inadequate caloric intake          ACTIVE MEDICAL ISSUES:  Documented in Problem List     PAST MEDICAL HISTORY  Documented     PAST SURGICAL HISTORY:  Documented     SOCIAL HISTORY:  Documented     FAMILY HISTORY:  Documented     ALLERGIES AND MEDICATIONS: updated and reviewed.  Documented    Review of Systems   Constitutional:  Negative for chills, fatigue, fever and unexpected weight change.   HENT:  Negative for congestion, ear discharge, ear pain, postnasal drip, sinus pressure and sinus pain.    Eyes:  Negative for photophobia, pain and visual disturbance.   Respiratory:  Negative for cough, chest tightness, shortness of breath and wheezing.    Cardiovascular:  Negative for chest pain, palpitations and leg swelling.   Gastrointestinal:  Negative for abdominal pain, constipation, diarrhea, nausea and vomiting.   Genitourinary:  Negative for dysuria, frequency, urgency and vaginal discharge.   Musculoskeletal:  Negative for  back pain, joint swelling and neck stiffness.   Skin:  Negative for rash.   Neurological:  Negative for weakness and headaches.   Psychiatric/Behavioral:  Negative for dysphoric mood and sleep disturbance. The patient is not nervous/anxious.        Objective:      Physical Exam  Constitutional:       Appearance: Normal appearance. She is well-developed.      Comments: BMI 17.28   HENT:      Head: Normocephalic and atraumatic.      Salivary Glands: Right salivary gland is not tender. Left salivary gland is not tender.      Right Ear: Tympanic membrane and external ear normal. There is no impacted cerumen.      Left Ear: Tympanic membrane and external ear normal. There is no impacted cerumen.      Nose: No congestion.      Right Turbinates: Not pale.      Right Sinus: No maxillary sinus tenderness or frontal sinus tenderness.      Left Sinus: No maxillary sinus tenderness or frontal sinus tenderness.      Mouth/Throat:      Pharynx: No posterior oropharyngeal erythema.   Eyes:      Conjunctiva/sclera: Conjunctivae normal.   Cardiovascular:      Rate and Rhythm: Normal rate and regular rhythm.      Pulses: Normal pulses.      Heart sounds: Normal heart sounds.   Pulmonary:      Effort: Pulmonary effort is normal.      Breath sounds: Normal breath sounds.   Abdominal:      General: Bowel sounds are normal.   Musculoskeletal:         General: Normal range of motion.      Cervical back: Normal range of motion and neck supple.   Skin:     General: Skin is warm and dry.   Neurological:      Mental Status: She is alert and oriented to person, place, and time.   Psychiatric:         Mood and Affect: Mood normal.         Behavior: Behavior normal.         Thought Content: Thought content normal.         Judgment: Judgment normal.         Vitals:    12/08/23 1432 12/08/23 1445   BP: (!) 140/90 130/88   BP Location: Right arm Left arm   Patient Position: Sitting Sitting   BP Method: Small (Manual)    Pulse: 71    Resp: 16   "  Temp: 98.7 °F (37.1 °C)    TempSrc: Oral    Weight: 47.1 kg (103 lb 13.4 oz)    Height: 5' 5" (1.651 m)      Body mass index is 17.28 kg/m².    RESULTS: Reviewed labs from last 12 months    Last Lab Results:     Lab Results   Component Value Date    WBC 3.11 (L) 06/19/2023    HGB 13.1 06/19/2023    HCT 38.9 06/19/2023     06/19/2023     (L) 06/19/2023    K 3.9 06/19/2023     06/19/2023    CO2 24 06/19/2023    BUN 12 06/19/2023    CREATININE 0.9 06/19/2023    CALCIUM 11.1 (H) 06/19/2023    ALBUMIN 3.5 06/19/2023    AST 21 06/19/2023    ALT 14 06/19/2023    CHOL 121 05/03/2022    TRIG 36 05/03/2022    HDL 59 05/03/2022    LDLCALC 54.8 (L) 05/03/2022    HGBA1C 5.2 06/19/2023    TSH 1.879 06/19/2023     Assessment:       1. Essential hypertension    2. Atherosclerosis of aorta    3. Underweight due to inadequate caloric intake        Plan:   Melba was seen today for follow-up.    Diagnoses and all orders for this visit:    Essential hypertension  -     lisinopriL 10 MG tablet; Take 1 tablet (10 mg total) by mouth once daily.    BP at goal at recheck, same treatment.     Atherosclerosis of aorta  -     atorvastatin (LIPITOR) 20 MG tablet; Take 1 tablet (20 mg total) by mouth once daily.    Underweight due to inadequate caloric intake  -     cyproheptadine (PERIACTIN) 4 mg tablet; Take 1 tablet (4 mg total) by mouth 3 (three) times daily as needed (Appetite).    Refill provided.    No follow-ups on file.    This note was created by combination of typed  and M-Modal dictation.  Transcription errors may be present.  If there are any questions, please contact me.  "

## 2024-01-12 ENCOUNTER — OFFICE VISIT (OUTPATIENT)
Dept: OPHTHALMOLOGY | Facility: CLINIC | Age: 74
End: 2024-01-12
Payer: MEDICARE

## 2024-01-12 DIAGNOSIS — H02.402 PTOSIS OF LEFT EYELID: Primary | ICD-10-CM

## 2024-01-12 DIAGNOSIS — Z94.7 STATUS POST CORNEAL TRANSPLANT: ICD-10-CM

## 2024-01-12 DIAGNOSIS — Z96.1 PSEUDOPHAKIA: ICD-10-CM

## 2024-01-12 DIAGNOSIS — H52.7 REFRACTIVE ERROR: ICD-10-CM

## 2024-01-12 DIAGNOSIS — H02.401 PTOSIS OF RIGHT EYELID: ICD-10-CM

## 2024-01-12 PROCEDURE — 1160F RVW MEDS BY RX/DR IN RCRD: CPT | Mod: HCNC,CPTII,S$GLB, | Performed by: OPHTHALMOLOGY

## 2024-01-12 PROCEDURE — 1159F MED LIST DOCD IN RCRD: CPT | Mod: HCNC,CPTII,S$GLB, | Performed by: OPHTHALMOLOGY

## 2024-01-12 PROCEDURE — 92014 COMPRE OPH EXAM EST PT 1/>: CPT | Mod: HCNC,S$GLB,, | Performed by: OPHTHALMOLOGY

## 2024-01-12 PROCEDURE — 3288F FALL RISK ASSESSMENT DOCD: CPT | Mod: HCNC,CPTII,S$GLB, | Performed by: OPHTHALMOLOGY

## 2024-01-12 PROCEDURE — 1101F PT FALLS ASSESS-DOCD LE1/YR: CPT | Mod: HCNC,CPTII,S$GLB, | Performed by: OPHTHALMOLOGY

## 2024-01-12 PROCEDURE — 99999 PR PBB SHADOW E&M-EST. PATIENT-LVL III: CPT | Mod: PBBFAC,HCNC,, | Performed by: OPHTHALMOLOGY

## 2024-01-12 PROCEDURE — 1126F AMNT PAIN NOTED NONE PRSNT: CPT | Mod: HCNC,CPTII,S$GLB, | Performed by: OPHTHALMOLOGY

## 2024-01-12 NOTE — PROGRESS NOTES
Subjective:       Patient ID: Melba Pabon is a 73 y.o. female.    Chief Complaint: Annual Exam    HPI    Melba Pabon is a 72 y.o. female is here for a routine eye exam. Wants to   check about her eyelids, can't see with the OS, no pain or discomfort, no   f/f  No gtts  Last edited by Kalyan Garcia on 1/12/2024  8:49 AM.             Assessment:       1. Ptosis of left eyelid    2. Ptosis of right eyelid    3. Status post corneal transplant    4. Refractive error    5. Pseudophakia        Plan:       SERGEI>RUL ptosis-Pt wants sx. Pt saw Dr Johnson & was told that he could not do her sx due to possible thyroid dz. Pt states that Dr Johnson was out of network for her insurance plan.  S/P DMEK OS-Doing well.  RE-Will need repeat MRx after ptosis repair OU.      Refer to Dr Lott for ptosis eval/sx.  RTC me in 1 yr or sooner for MRx.

## 2024-01-17 ENCOUNTER — TELEPHONE (OUTPATIENT)
Dept: OPHTHALMOLOGY | Facility: CLINIC | Age: 74
End: 2024-01-17
Payer: MEDICARE

## 2024-01-17 NOTE — TELEPHONE ENCOUNTER
----- Message from ABHINAV King sent at 1/12/2024 12:51 PM CST -----  Regarding: Ptosis  Good afternoon,     Please schedule patient for Ptosis OS >OD per Dr. Pond.     Myrtle

## 2024-02-02 ENCOUNTER — TELEPHONE (OUTPATIENT)
Dept: FAMILY MEDICINE | Facility: CLINIC | Age: 74
End: 2024-02-02
Payer: MEDICARE

## 2024-02-02 NOTE — TELEPHONE ENCOUNTER
----- Message from Eleazar Mcqueen sent at 2/2/2024  2:31 PM CST -----  Regarding: self  Type: Patient Call Back    Who called:self    What is the request in detail:pt is wanting to speak with this office about having a UTI and possibly getting seen sooner     Can the clinic reply by [facility]?no    Would the patient rather a call back or a response via [facility]? callback    Best call back number:[phone]    Additional Information:

## 2024-02-02 NOTE — TELEPHONE ENCOUNTER
I am out of town from today. I can unfortunately not do an e visit or other. I am sorry about this.

## 2024-02-05 ENCOUNTER — OFFICE VISIT (OUTPATIENT)
Dept: FAMILY MEDICINE | Facility: CLINIC | Age: 74
End: 2024-02-05
Payer: MEDICARE

## 2024-02-05 VITALS
TEMPERATURE: 98 F | HEART RATE: 99 BPM | OXYGEN SATURATION: 95 % | SYSTOLIC BLOOD PRESSURE: 140 MMHG | BODY MASS INDEX: 16.97 KG/M2 | DIASTOLIC BLOOD PRESSURE: 84 MMHG | WEIGHT: 101.88 LBS | RESPIRATION RATE: 16 BRPM | HEIGHT: 65 IN

## 2024-02-05 DIAGNOSIS — G89.29 CHRONIC HIP PAIN, RIGHT: ICD-10-CM

## 2024-02-05 DIAGNOSIS — M25.551 CHRONIC HIP PAIN, RIGHT: ICD-10-CM

## 2024-02-05 DIAGNOSIS — R30.0 DYSURIA: Primary | ICD-10-CM

## 2024-02-05 DIAGNOSIS — H25.12 NS (NUCLEAR SCLEROSIS), LEFT: ICD-10-CM

## 2024-02-05 LAB
BACTERIA #/AREA URNS AUTO: ABNORMAL /HPF
BILIRUB UR QL STRIP: NEGATIVE
CAOX CRY UR QL COMP ASSIST: ABNORMAL
CLARITY UR REFRACT.AUTO: ABNORMAL
COLOR UR AUTO: YELLOW
GLUCOSE UR QL STRIP: NEGATIVE
HGB UR QL STRIP: ABNORMAL
KETONES UR QL STRIP: NEGATIVE
LEUKOCYTE ESTERASE UR QL STRIP: ABNORMAL
MICROSCOPIC COMMENT: ABNORMAL
NITRITE UR QL STRIP: NEGATIVE
PH UR STRIP: 7 [PH] (ref 5–8)
PROT UR QL STRIP: ABNORMAL
RBC #/AREA URNS AUTO: 4 /HPF (ref 0–4)
SP GR UR STRIP: 1.02 (ref 1–1.03)
SQUAMOUS #/AREA URNS AUTO: 6 /HPF
URN SPEC COLLECT METH UR: ABNORMAL
WBC #/AREA URNS AUTO: 47 /HPF (ref 0–5)

## 2024-02-05 PROCEDURE — 99214 OFFICE O/P EST MOD 30 MIN: CPT | Mod: HCNC,S$GLB,, | Performed by: INTERNAL MEDICINE

## 2024-02-05 PROCEDURE — 3077F SYST BP >= 140 MM HG: CPT | Mod: HCNC,CPTII,S$GLB, | Performed by: INTERNAL MEDICINE

## 2024-02-05 PROCEDURE — 3288F FALL RISK ASSESSMENT DOCD: CPT | Mod: HCNC,CPTII,S$GLB, | Performed by: INTERNAL MEDICINE

## 2024-02-05 PROCEDURE — 87086 URINE CULTURE/COLONY COUNT: CPT | Mod: HCNC | Performed by: INTERNAL MEDICINE

## 2024-02-05 PROCEDURE — 3079F DIAST BP 80-89 MM HG: CPT | Mod: HCNC,CPTII,S$GLB, | Performed by: INTERNAL MEDICINE

## 2024-02-05 PROCEDURE — 1101F PT FALLS ASSESS-DOCD LE1/YR: CPT | Mod: HCNC,CPTII,S$GLB, | Performed by: INTERNAL MEDICINE

## 2024-02-05 PROCEDURE — 3008F BODY MASS INDEX DOCD: CPT | Mod: HCNC,CPTII,S$GLB, | Performed by: INTERNAL MEDICINE

## 2024-02-05 PROCEDURE — 1159F MED LIST DOCD IN RCRD: CPT | Mod: HCNC,CPTII,S$GLB, | Performed by: INTERNAL MEDICINE

## 2024-02-05 PROCEDURE — 99999 PR PBB SHADOW E&M-EST. PATIENT-LVL III: CPT | Mod: PBBFAC,HCNC,, | Performed by: INTERNAL MEDICINE

## 2024-02-05 PROCEDURE — 81001 URINALYSIS AUTO W/SCOPE: CPT | Mod: HCNC | Performed by: INTERNAL MEDICINE

## 2024-02-05 PROCEDURE — 1160F RVW MEDS BY RX/DR IN RCRD: CPT | Mod: HCNC,CPTII,S$GLB, | Performed by: INTERNAL MEDICINE

## 2024-02-05 RX ORDER — NITROFURANTOIN 25; 75 MG/1; MG/1
100 CAPSULE ORAL 2 TIMES DAILY
Qty: 10 CAPSULE | Refills: 0 | Status: SHIPPED | OUTPATIENT
Start: 2024-02-05 | End: 2024-02-10

## 2024-02-05 NOTE — ASSESSMENT & PLAN NOTE
Leads to issues walking and balance.     - tylenol and advil PRN   - form filled   - may need PT referral if issues persist

## 2024-02-05 NOTE — ASSESSMENT & PLAN NOTE
With blindness of left eye. Refer to HPI     - continue follow up with ophthal   - filled patient's forms

## 2024-02-05 NOTE — PROGRESS NOTES
Chief Complaint: Urinary Tract Infection      Melba Pabon  is a 73 y.o. year old patient who presents today for UTI and form filling     Dysuria for two weeks, intermittent. Reports that she used to be picked up by [employer] bus service for many years. This is due to her visual impairment s/p ICH which caused ptosis (L>R). Ultimately patient lost vision to her left eye. Follows ophthalmology. Also reports issues with walking and balance due to right hip pain.     Past Surgical History:   Procedure Laterality Date    BRAIN SURGERY      metal plate in head    BREAST BIOPSY Right     CATARACT EXTRACTION Left 11/05/2020    Dr. Pond    CATARACT EXTRACTION W/  INTRAOCULAR LENS IMPLANT Right 01/14/2021    Dr. Pond    COLON SURGERY  2016    small bowel resection    DESCEMET'S MEMBRANE ENDOTHELIAL KERATOPLASTY (DMEK) Left 3/7/2022    Procedure: DESCEMET'S MEMBRANE ENDOTHELIAL KERATOPLASTY (DMEK);  Surgeon: Roopa Emmanuel MD;  Location: [facility] OR;  Service: Ophthalmology;  Laterality: Left;    EXCISION OF MASS OF ABDOMEN N/A 8/27/2018    Procedure: RESECTION GASTRIC MASS;  Surgeon: Chuck Palomares MD;  Location: [facility] OR;  Service: General;  Laterality: N/A;  RN Pre Op 8-16-18    EYE SURGERY      HERNIA REPAIR  2016    HYSTERECTOMY      INTRAOCULAR PROSTHESES INSERTION Left 11/5/2020    Procedure: INSERTION, IOL PROSTHESIS;  Surgeon: Howard Pond MD;  Location: [facility] OR;  Service: Ophthalmology;  Laterality: Left;    INTRAOCULAR PROSTHESES INSERTION Right 1/14/2021    Procedure: INSERTION, IOL PROSTHESIS;  Surgeon: Howard Pond MD;  Location: [facility] OR;  Service: Ophthalmology;  Laterality: Right;  RN Phone Pre OP 1-7-2020, Covid NEGATIVE ON  1-. Arrival 09:00 am  C A    OOPHORECTOMY      PHACOEMULSIFICATION OF CATARACT Left 11/5/2020    Procedure: PHACOEMULSIFICATION, CATARACT;  Surgeon: Howard Pond MD;  Location: [facility] OR;  Service: Ophthalmology;  Laterality: Left;  RN Phone Pre OP  11-2-2020. --COVID NEGATIVE ON  11-4-2020. Arrival 11:45 am.C A    PHACOEMULSIFICATION OF CATARACT Right 1/14/2021    Procedure: PHACOEMULSIFICATION, CATARACT;  Surgeon: Howard Pond MD;  Location: [facility];  Service: Ophthalmology;  Laterality: Right;    SMALL INTESTINE SURGERY          Family History   Problem Relation Age of Onset    Dementia Mother     No Known Problems Father     No Known Problems Sister     No Known Problems Brother     No Known Problems Maternal Aunt     No Known Problems Maternal Uncle     No Known Problems Paternal Aunt     No Known Problems Paternal Uncle     No Known Problems Maternal Grandmother     No Known Problems Maternal Grandfather     No Known Problems Paternal Grandmother     No Known Problems Paternal Grandfather     Amblyopia Neg Hx     Blindness Neg Hx     Cancer Neg Hx     Cataracts Neg Hx     Diabetes Neg Hx     Glaucoma Neg Hx     Hypertension Neg Hx     Macular degeneration Neg Hx     Retinal detachment Neg Hx     Strabismus Neg Hx     Stroke Neg Hx     Thyroid disease Neg Hx         Social History     Socioeconomic History    Marital status: Single   Tobacco Use    Smoking status: Some Days     Current packs/day: 0.50     Average packs/day: 0.5 packs/day for 25.1 years (12.5 ttl pk-yrs)     Types: Cigarettes     Start date: 1999    Smokeless tobacco: Never    Tobacco comments:     occassional cigarette   Substance and Sexual Activity    Alcohol use: No    Drug use: No    Sexual activity: Not Currently     Partners: Male         Current Outpatient Medications:     atorvastatin (LIPITOR) 20 MG tablet, Take 1 tablet (20 mg total) by mouth once daily., Disp: 90 tablet, Rfl: 0    cyproheptadine (PERIACTIN) 4 mg tablet, Take 1 tablet (4 mg total) by mouth 3 (three) times daily as needed (Appetite)., Disp: 90 tablet, Rfl: 2    diphenhydrAMINE (BENADRYL) 25 mg capsule, Take 25 mg by mouth every 6 (six) hours as needed for Allergies or Insomnia., Disp: , Rfl:      lisinopriL 10 MG tablet, Take 1 tablet (10 mg total) by mouth once daily., Disp: 90 tablet, Rfl: 1    multivitamin (THERAGRAN) per tablet, Take 1 tablet by mouth once daily., Disp: , Rfl:     polyethylene glycol (GLYCOLAX) 17 gram PwPk, Take 17 g by mouth once daily., Disp: 30 packet, Rfl: 0    nitrofurantoin, macrocrystal-monohydrate, (MACROBID) 100 MG capsule, Take 1 capsule (100 mg total) by mouth 2 (two) times daily. for 5 days, Disp: 10 capsule, Rfl: 0    Current Facility-Administered Medications:     lidocaine (PF) 10 mg/ml (1%) injection 10 mg, 1 mL, Intradermal, Once, Chuck Palomares MD    sodium chloride 0.9% flush 10 mL, 10 mL, Intravenous, PRN, Roopa Emmanuel MD    Facility-Administered Medications Ordered in Other Visits:     cyclopentolate 1% ophthalmic solution 1 drop, 1 drop, Left Eye, On Call Procedure, Howard Pond MD, 1 drop at 11/05/20 1237    cyclopentolate 1% ophthalmic solution 1 drop, 1 drop, Right Eye, On Call Procedure, Howard Pond MD, 1 drop at 01/14/21 1007    ofloxacin 0.3 % ophthalmic solution 1 drop, 1 drop, Left Eye, On Call Procedure, Howard Pond MD, 2 drop at 11/05/20 1411    ofloxacin 0.3 % ophthalmic solution 1 drop, 1 drop, Right Eye, On Call Procedure, Howard Pond MD, 1 drop at 01/14/21 1007    sodium chloride 0.9% flush 10 mL, 10 mL, Intravenous, PRN, Howard Pond MD    sodium chloride 0.9% flush 10 mL, 10 mL, Intravenous, PRN, Howard Pond MD     Review of Systems   Eyes:  Positive for blurred vision.        Loss of vision on the left   Musculoskeletal:  Positive for joint pain and myalgias.        Objective:      Vitals:    02/05/24 0802   BP: (!) 140/84   Pulse: 99   Resp: 16   Temp: 98 °F (36.7 °C)       Physical Exam  Eyes:      Comments: Bilateral exophthalmos   Neurological:      General: No focal deficit present.      Mental Status: She is oriented to person, place, and time.      Cranial Nerves:  Cranial nerve deficit present.      Sensory: No sensory deficit.      Motor: No weakness.      Coordination: Coordination abnormal.      Gait: Gait abnormal.      Comments: Bilateral ptosis (L>R)          Assessment:       1. Dysuria    2. NS (nuclear sclerosis), left    3. Chronic hip pain, right          Plan:   1. Dysuria  Assessment & Plan:  Symptoms for two weeks     - macrobid   - UA and urine culture     Orders:  -     Urinalysis  -     Urine culture  -     nitrofurantoin, macrocrystal-monohydrate, (MACROBID) 100 MG capsule; Take 1 capsule (100 mg total) by mouth 2 (two) times daily. for 5 days  Dispense: 10 capsule; Refill: 0    2. NS (nuclear sclerosis), left  Assessment & Plan:  With blindness of left eye. Refer to HPI     - continue follow up with ophthal   - filled patient's forms       3. Chronic hip pain, right  Assessment & Plan:  Leads to issues walking and balance.     - tylenol and advil PRN   - form filled   - may need PT referral if issues persist           No follow-ups on file.

## 2024-02-06 LAB
BACTERIA UR CULT: NORMAL
BACTERIA UR CULT: NORMAL

## 2024-02-09 ENCOUNTER — TELEPHONE (OUTPATIENT)
Dept: FAMILY MEDICINE | Facility: CLINIC | Age: 74
End: 2024-02-09
Payer: MEDICARE

## 2024-02-09 NOTE — TELEPHONE ENCOUNTER
----- Message from Fannie Chaudhry MD sent at 2/9/2024  2:15 PM CST -----  Please contact the patient and let her know that her results were inconclusive but likely she had a UTI. She should complete her antibiotics. Thank you.

## 2024-02-22 DIAGNOSIS — I70.0 ATHEROSCLEROSIS OF AORTA: ICD-10-CM

## 2024-02-22 RX ORDER — ATORVASTATIN CALCIUM 20 MG/1
20 TABLET, FILM COATED ORAL
Qty: 90 TABLET | Refills: 0 | Status: SHIPPED | OUTPATIENT
Start: 2024-02-22 | End: 2024-05-06

## 2024-02-22 NOTE — TELEPHONE ENCOUNTER
Care Due:                  Date            Visit Type   Department     Provider  --------------------------------------------------------------------------------                                EP -                              PRIMARY      [facility]  Last Visit: 02-      CARE ([facility])   MEDICINE       Fannie Chaudhry                              EP -                              PRIMARY      [facility]  Next Visit: 04-      CARE ([facility])   MEDICINE       Anel Sosa                                                            Last  Test          Frequency    Reason                     Performed    Due Date  --------------------------------------------------------------------------------    Lipid Panel.  12 months..  atorvastatin.............  05- 04-    Health [facility] Embedded Care Due Messages. Reference number: [id number].   2/22/2024 2:20:31 AM CST

## 2024-02-22 NOTE — TELEPHONE ENCOUNTER
Refill Routing Note   Medication(s) are not appropriate for processing by [facility] for the following reason(s):        Required labs outdated    [facility] action(s):  Defer     Requires labs : Yes             Appointments  past 12m or future 3m with PCP    Date Provider   Last Visit   12/8/2023 Anel Sosa MD   Next Visit   4/5/2024 Anel Sosa MD   ED visits in past 90 days: 0        Note composed:5:13 AM 02/22/2024

## 2024-03-18 ENCOUNTER — PATIENT OUTREACH (OUTPATIENT)
Dept: ADMINISTRATIVE | Facility: HOSPITAL | Age: 74
End: 2024-03-18
Payer: MEDICARE

## 2024-03-18 NOTE — PROGRESS NOTES
Population Health Chart Review & Patient Outreach Details      Additional [facility] Health Notes:    Due for overdue HM below , need to get recent records if already done. If not done, orders/referrals need to be place and schedule. Please forward messages to me.     VBHM Score: 3     Colon Cancer Screening  Osteoporosis Screening  Uncontrolled BP   Left message for patient to return call.          Updates Requested / Reviewed:      Updated Care Coordination Note and Care Everywhere         Health Maintenance Topics Overdue:      VBHM Score: 3     Colon Cancer Screening  Osteoporosis Screening  Uncontrolled BP    Tetanus Vaccine  Shingles/Zoster Vaccine                  Health Maintenance Topic(s) Outreach Outcomes & Actions Taken:    Colorectal Cancer Screening - Outreach Outcomes & Actions Taken  : LM    Osteoporosis Screening - Outreach Outcomes & Actions Taken  : LM    Lab(s) - Outreach Outcomes & Actions Taken  : LM

## 2024-03-28 ENCOUNTER — TELEPHONE (OUTPATIENT)
Dept: FAMILY MEDICINE | Facility: CLINIC | Age: 74
End: 2024-03-28
Payer: MEDICARE

## 2024-03-28 NOTE — TELEPHONE ENCOUNTER
Spoke with patient's daughter regarding appointment needing to be r/s due to provider being out of office. LVM on patient's voicemail.

## 2024-05-06 DIAGNOSIS — I10 ESSENTIAL HYPERTENSION: ICD-10-CM

## 2024-05-06 DIAGNOSIS — I70.0 ATHEROSCLEROSIS OF AORTA: ICD-10-CM

## 2024-05-06 RX ORDER — ATORVASTATIN CALCIUM 20 MG/1
20 TABLET, FILM COATED ORAL
Qty: 90 TABLET | Refills: 0 | Status: SHIPPED | OUTPATIENT
Start: 2024-05-06

## 2024-05-06 RX ORDER — LISINOPRIL 10 MG/1
10 TABLET ORAL
Qty: 90 TABLET | Refills: 0 | Status: SHIPPED | OUTPATIENT
Start: 2024-05-06

## 2024-05-06 NOTE — TELEPHONE ENCOUNTER
Care Due:                  Date            Visit Type   Department     Provider  --------------------------------------------------------------------------------                                EP -                              PRIMARY      [facility]  Last Visit: 02-      CARE ([facility])   MEDICINE       Fannie Chaudhry                              EP -                              PRIMARY      [facility]  Next Visit: 06-      CARE ([facility])   MEDICINE       Anel Sosa                                                            Last  Test          Frequency    Reason                     Performed    Due Date  --------------------------------------------------------------------------------    CMP.........  12 months..  atorvastatin, lisinopriL.  06- 06-    Lipid Panel.  12 months..  atorvastatin.............  05- 04-    Health [facility] Embedded Care Due Messages. Reference number: [id number].   5/06/2024 4:54:19 AM CDT

## 2024-05-06 NOTE — TELEPHONE ENCOUNTER
Refill Routing Note   Medication(s) are not appropriate for processing by [facility] for the following reason(s):        Required vitals abnormal  Required labs outdated    [facility] action(s):  Defer     Requires labs : Yes             Appointments  past 12m or future 3m with PCP    Date Provider   Last Visit   12/8/2023 Anel Sosa MD   Next Visit   6/4/2024 Anel Sosa MD   ED visits in past 90 days: 0        Note composed:10:49 AM 05/06/2024

## 2024-05-08 ENCOUNTER — OFFICE VISIT (OUTPATIENT)
Dept: OPHTHALMOLOGY | Facility: CLINIC | Age: 74
End: 2024-05-08
Payer: MEDICARE

## 2024-05-08 DIAGNOSIS — H05.20 EXOPHTHALMOS OF BOTH EYES: ICD-10-CM

## 2024-05-08 DIAGNOSIS — H02.89 ACQUIRED SCLERAL SHOW DUE TO EYELID DEFORMITY: ICD-10-CM

## 2024-05-08 DIAGNOSIS — H02.432: Primary | ICD-10-CM

## 2024-05-08 DIAGNOSIS — H02.403 PTOSIS OF BOTH UPPER EYELIDS: ICD-10-CM

## 2024-05-08 PROCEDURE — 4010F ACE/ARB THERAPY RXD/TAKEN: CPT | Mod: CPTII,S$GLB,, | Performed by: OPHTHALMOLOGY

## 2024-05-08 PROCEDURE — 1126F AMNT PAIN NOTED NONE PRSNT: CPT | Mod: CPTII,S$GLB,, | Performed by: OPHTHALMOLOGY

## 2024-05-08 PROCEDURE — 1101F PT FALLS ASSESS-DOCD LE1/YR: CPT | Mod: CPTII,S$GLB,, | Performed by: OPHTHALMOLOGY

## 2024-05-08 PROCEDURE — 1160F RVW MEDS BY RX/DR IN RCRD: CPT | Mod: CPTII,S$GLB,, | Performed by: OPHTHALMOLOGY

## 2024-05-08 PROCEDURE — 1159F MED LIST DOCD IN RCRD: CPT | Mod: CPTII,S$GLB,, | Performed by: OPHTHALMOLOGY

## 2024-05-08 PROCEDURE — 3288F FALL RISK ASSESSMENT DOCD: CPT | Mod: CPTII,S$GLB,, | Performed by: OPHTHALMOLOGY

## 2024-05-08 PROCEDURE — 99999 PR PBB SHADOW E&M-EST. PATIENT-LVL II: CPT | Mod: PBBFAC,,, | Performed by: OPHTHALMOLOGY

## 2024-05-08 PROCEDURE — 99214 OFFICE O/P EST MOD 30 MIN: CPT | Mod: S$GLB,,, | Performed by: OPHTHALMOLOGY

## 2024-05-08 NOTE — PROGRESS NOTES
HPI    Melba Pabon is a/an 74 y.o. female here for ptosis.  Referred by: Dr. Pond  How long have eyelid(s) been droopy? 30 yrs s/p brain aneurysm   Any h/o wearing hard CLs? no  Do eyelids feel heavy? yes  Does the height of the eyelid(s) fluctuate throughout the day? no  Do eyelid(s) interfere with daily activities such as driving, reading,   working? yes  Spontaneous orbital pain? no  Orbital pain w eye movement? no  Red eyes? yes  Red eyelids? no  Eyelid swelling? no  History of cardiac pacemaker? no    Pt had aneurysm 30 yrs ago which resulted in significant ptosis of SERGEI.Pt   has a lot of dryness she is using OTC ATS daily. She wakes up with   crustiness in her eyes daily. She is bothered by the fact she cannot see   out of left eye without manually holding it open. It was suggested by Dr. Johnson that she may have graves disease and he was not willing to do her   surgery after that.   Last edited by Miranda Fry on 5/8/2024  1:49 PM.            Assessment /Plan     For exam results, see Encounter Report.    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 female patient here to address long-standing left-sided ptosis. Patient reports a history of brain aneurysm around 30 years ago for which she received an intervention at the time. Since then, her left eye has been droopy.     On exam, patient has bilateral ptosis worse on the left than the right. She has normal levator function (13 mm) on the right but decreased on the left (8 mm). She also has bilateral symmetric exophthalmos with significant inferior scleral show in both eyes. She has no lagophthalmos and the surface of her cornea does not appear dry. She reports a family history of prominent eyes and denies any changes in the  appearance of her eyes over the years. She denies a history of thyroid disease (normal thyroid function tests in 2023). She has never had any imaging of the orbits performed.     Patient is interested in surgical correction of the ptosis in the left upper eyelid. Discussed findings with the patient including the ptosis on the right side as well and that she may benefit from bilateral upper eyelid surgery. Discussed that given the weakness of the levator on the left, the left eyelid will likely continue to be more droopy than the right even after surgery. Moreover, given she has significant exophthalmos with inferior scleral show, she may have significant lagophthalmos after upper eyelid surgery. Therefore, we recommended surgically lifting the lower eyelids along with the cheek muscles. This would address the inferior scleral show as well and potential exposure. These would likely be two separate surgeries, and patient would prefer to start with the upper eyelid surgery.    Patient will come back for visual field testing with taped vs untaped lids, as well as probing since she will be scheduled for lower eyelid surgery in the future too. May consider imaging of the orbits though it seems patient simply has prominent eyes and it is unlikely that orbital decompression would be considered.

## 2024-06-04 ENCOUNTER — OFFICE VISIT (OUTPATIENT)
Dept: FAMILY MEDICINE | Facility: CLINIC | Age: 74
End: 2024-06-04
Payer: MEDICARE

## 2024-06-04 VITALS
HEART RATE: 78 BPM | DIASTOLIC BLOOD PRESSURE: 80 MMHG | WEIGHT: 103.19 LBS | TEMPERATURE: 98 F | HEIGHT: 65 IN | BODY MASS INDEX: 17.19 KG/M2 | RESPIRATION RATE: 16 BRPM | OXYGEN SATURATION: 96 % | SYSTOLIC BLOOD PRESSURE: 140 MMHG

## 2024-06-04 DIAGNOSIS — R63.6 UNDERWEIGHT DUE TO INADEQUATE CALORIC INTAKE: ICD-10-CM

## 2024-06-04 DIAGNOSIS — C49.A0 GASTROINTESTINAL STROMAL TUMOR (GIST): ICD-10-CM

## 2024-06-04 DIAGNOSIS — F17.200 SMOKER: ICD-10-CM

## 2024-06-04 DIAGNOSIS — I10 ESSENTIAL HYPERTENSION: Primary | ICD-10-CM

## 2024-06-04 DIAGNOSIS — I70.0 ATHEROSCLEROSIS OF AORTA: ICD-10-CM

## 2024-06-04 DIAGNOSIS — D70.9 NEUTROPENIA, UNSPECIFIED TYPE: ICD-10-CM

## 2024-06-04 PROCEDURE — 99499 UNLISTED E&M SERVICE: CPT | Mod: S$GLB,,, | Performed by: INTERNAL MEDICINE

## 2024-06-04 PROCEDURE — 99999 PR PBB SHADOW E&M-EST. PATIENT-LVL III: CPT | Mod: PBBFAC,,, | Performed by: INTERNAL MEDICINE

## 2024-06-04 RX ORDER — CYPROHEPTADINE HYDROCHLORIDE 4 MG/1
4 TABLET ORAL 3 TIMES DAILY PRN
Qty: 90 TABLET | Refills: 2 | Status: SHIPPED | OUTPATIENT
Start: 2024-06-04

## 2024-06-04 NOTE — PROGRESS NOTES
Subjective:       Patient ID: Melba Pabon is a pleasant 74 y.o. Black or  female patient    Chief Complaint: Follow-up      Patient is a pt I saw last on 09/09/2023, see my last notes.    HPI:     Patient with past medical history as per list of problems below coming today for regular follow-up visit.    From our last encounter   - Dr. Chaudhry for dysuria in February   - 05/08/2024 Dr. Lott, Ophthalmology for paralytic ptosis left eye.  See he is excellent notes.  This is a 74 y.o. female patient here to address long-standing left-sided ptosis. Patient reports a history of brain aneurysm around 30 years ago for which she received an intervention at the time. Since then, her left eye has been droopy.      On exam, patient has bilateral ptosis worse on the left than the right. She has normal levator function (13 mm) on the right but decreased on the left (8 mm). She also has bilateral symmetric exophthalmos with significant inferior scleral show in both eyes. She has no lagophthalmos and the surface of her cornea does not appear dry. She reports a family history of prominent eyes and denies any changes in the appearance of her eyes over the years. She denies a history of thyroid disease (normal thyroid function tests in 2023). She has never had any imaging of the orbits performed.      Patient is interested in surgical correction of the ptosis in the left upper eyelid. Discussed findings with the patient including the ptosis on the right side as well and that she may benefit from bilateral upper eyelid surgery. Discussed that given the weakness of the levator on the left, the left eyelid will likely continue to be more droopy than the right even after surgery. Moreover, given she has significant exophthalmos with inferior scleral show, she may have significant lagophthalmos after upper eyelid surgery. Therefore, we recommended surgically lifting the lower eyelids along with the cheek muscles. This would  address the inferior scleral show as well and potential exposure. These would likely be two separate surgeries, and patient would prefer to start with the upper eyelid surgery.     Patient will come back for visual field testing with taped vs untaped lids, as well as probing since she will be scheduled for lower eyelid surgery in the future too. May consider imaging of the orbits though it seems patient simply has prominent eyes and it is unlikely that orbital decompression would be considered.   06    RTA    No Mri due to metal  Will need to contact her eye doctor.       Patient Active Problem List   Diagnosis    History of resection of small bowel    History of surgery for cerebral aneurysm    Essential hypertension    Age-related osteoporosis without current pathological fracture    Hypercalcemia    Gastric mass    Gastrointestinal stromal tumor (GIST)    Atherosclerosis of aorta    NS (nuclear sclerosis), left    Nuclear sclerotic cataract of right eye    Unintended weight loss    Parathyroid adenoma    Vocal cord palsy    Aneurysm    Corneal edema    Neutropenia, unspecified type    Underweight due to inadequate caloric intake    Dysuria    Chronic hip pain, right         PAST MEDICAL HISTORY  Past Medical History:   Diagnosis Date    Acute blood loss anemia 07/28/2018    Age-related osteoporosis without current pathological fracture 04/13/2018    Allergy     Cataract     Cerebral aneurysm     Gastrointestinal stromal tumor (GIST)     GI bleed     Hypertension         PAST SURGICAL HISTORY:  Past Surgical History:   Procedure Laterality Date    BRAIN SURGERY      metal plate in head    BREAST BIOPSY Right     CATARACT EXTRACTION Left 11/05/2020    Dr. Pond    CATARACT EXTRACTION W/  INTRAOCULAR LENS IMPLANT Right 01/14/2021    Dr. Pond    COLON SURGERY  2016    small bowel resection    DESCEMET'S MEMBRANE ENDOTHELIAL KERATOPLASTY (DMEK) Left 3/7/2022    Procedure: DESCEMET'S MEMBRANE ENDOTHELIAL  KERATOPLASTY (DMEK);  Surgeon: Roopa Emmanuel MD;  Location: [facility] OR;  Service: Ophthalmology;  Laterality: Left;    EXCISION OF MASS OF ABDOMEN N/A 8/27/2018    Procedure: RESECTION GASTRIC MASS;  Surgeon: Chuck Palomares MD;  Location: [facility] OR;  Service: General;  Laterality: N/A;  RN Pre Op 8-16-18    EYE SURGERY      HERNIA REPAIR  2016    HYSTERECTOMY      INTRAOCULAR PROSTHESES INSERTION Left 11/5/2020    Procedure: INSERTION, IOL PROSTHESIS;  Surgeon: Howard Pond MD;  Location: [facility] OR;  Service: Ophthalmology;  Laterality: Left;    INTRAOCULAR PROSTHESES INSERTION Right 1/14/2021    Procedure: INSERTION, IOL PROSTHESIS;  Surgeon: Howard Pond MD;  Location: [facility] OR;  Service: Ophthalmology;  Laterality: Right;  RN Phone Pre OP 1-7-2020, Covid NEGATIVE ON  1-. Arrival 09:00 am  C A    OOPHORECTOMY      PHACOEMULSIFICATION OF CATARACT Left 11/5/2020    Procedure: PHACOEMULSIFICATION, CATARACT;  Surgeon: Howard Pond MD;  Location: [facility];  Service: Ophthalmology;  Laterality: Left;  RN Phone Pre OP 11-2-2020. --COVID NEGATIVE ON  11-4-2020. Arrival 11:45 am.C A    PHACOEMULSIFICATION OF CATARACT Right 1/14/2021    Procedure: PHACOEMULSIFICATION, CATARACT;  Surgeon: Howard Pond MD;  Location: [facility];  Service: Ophthalmology;  Laterality: Right;    SMALL INTESTINE SURGERY          SOCIAL HISTORY:   reports that she has been smoking cigarettes. She started smoking about 25 years ago. She has a 12.7 pack-year smoking history. She has never used smokeless tobacco. She reports that she does not drink alcohol and does not use drugs.     FAMILY HISTORY:  Family History   Problem Relation Name Age of Onset    Dementia Mother      No Known Problems Father      No Known Problems Sister      No Known Problems Brother      No Known Problems Maternal Aunt      No Known Problems Maternal Uncle      No Known Problems Paternal Aunt      No Known Problems Paternal Uncle       No Known Problems Maternal Grandmother      No Known Problems Maternal Grandfather      No Known Problems Paternal Grandmother      No Known Problems Paternal Grandfather      Amblyopia Neg Hx      Blindness Neg Hx      Cancer Neg Hx      Cataracts Neg Hx      Diabetes Neg Hx      Glaucoma Neg Hx      Hypertension Neg Hx      Macular degeneration Neg Hx      Retinal detachment Neg Hx      Strabismus Neg Hx      Stroke Neg Hx      Thyroid disease Neg Hx          ALLERGIES:   Review of patient's allergies indicates:   Allergen Reactions    Pcn [penicillins]        MEDICATIONS:    Current Outpatient Medications:     atorvastatin (LIPITOR) 20 MG tablet, TAKE 1 TABLET EVERY DAY, Disp: 90 tablet, Rfl: 0    cyproheptadine (PERIACTIN) 4 mg tablet, Take 1 tablet (4 mg total) by mouth 3 (three) times daily as needed (Appetite)., Disp: 90 tablet, Rfl: 2    diphenhydrAMINE (BENADRYL) 25 mg capsule, Take 25 mg by mouth every 6 (six) hours as needed for Allergies or Insomnia., Disp: , Rfl:     lisinopriL 10 MG tablet, TAKE 1 TABLET ONE TIME DAILY, Disp: 90 tablet, Rfl: 0    multivitamin (THERAGRAN) per tablet, Take 1 tablet by mouth once daily., Disp: , Rfl:     polyethylene glycol (GLYCOLAX) 17 gram PwPk, Take 17 g by mouth once daily. (Patient not taking: Reported on 6/4/2024), Disp: 30 packet, Rfl: 0    Current Facility-Administered Medications:     lidocaine (PF) 10 mg/ml (1%) injection 10 mg, 1 mL, Intradermal, Once, Chuck Palomares MD    sodium chloride 0.9% flush 10 mL, 10 mL, Intravenous, PRN, Roopa Emmanuel MD    Facility-Administered Medications Ordered in Other Visits:     cyclopentolate 1% ophthalmic solution 1 drop, 1 drop, Left Eye, On Call Procedure, Howard Pond MD, 1 drop at 11/05/20 1237    cyclopentolate 1% ophthalmic solution 1 drop, 1 drop, Right Eye, On Call Procedure, Howard Pond MD, 1 drop at 01/14/21 1007    ofloxacin 0.3 % ophthalmic solution 1 drop, 1 drop, Left Eye, On Call  "Procedure, Howard Pond MD, 2 drop at 11/05/20 1411    ofloxacin 0.3 % ophthalmic solution 1 drop, 1 drop, Right Eye, On Call Procedure, Howard Pond MD, 1 drop at 01/14/21 1007    sodium chloride 0.9% flush 10 mL, 10 mL, Intravenous, PRN, Howard Pond MD    sodium chloride 0.9% flush 10 mL, 10 mL, Intravenous, PRN, Howard Pond MD    Review of Systems    Objective:      Physical Exam    Vitals:    06/04/24 1442   BP: (!) 140/80   BP Location: Left arm   Patient Position: Sitting   BP Method: Medium (Manual)   Pulse: 78   Resp: 16   Temp: 98.3 °F (36.8 °C)   TempSrc: Oral   SpO2: 96%   Weight: 46.8 kg (103 lb 2.8 oz)   Height: 5' 5" (1.651 m)     Body mass index is 17.17 kg/m².    RESULTS: Reviewed labs from last 12 months    Assessment & Plan:       {There are no diagnoses linked to this encounter. (Refresh or delete this SmartLink)}   No follow-ups on file.    This note was created by combination of typed  and M-Modal dictation.  Transcription errors may be present.  If there are any questions, please contact me.  "

## 2024-06-04 NOTE — PROGRESS NOTES
Health Maintenance Due   Topic     TETANUS VACCINE      Shingles Vaccine (1 of 2)     Colorectal Cancer Screening      DEXA Scan      Lipid Panel      COVID-19 Vaccine (6 - 2023-24 season) Not offered at this facility.

## 2024-06-06 ENCOUNTER — TELEPHONE (OUTPATIENT)
Dept: OPHTHALMOLOGY | Facility: CLINIC | Age: 74
End: 2024-06-06
Payer: MEDICARE

## 2024-06-06 NOTE — TELEPHONE ENCOUNTER
----- Message from Chelle Benoit sent at 6/6/2024  1:51 PM CDT -----    ----- Message -----  From: Lily Fortune  Sent: 6/6/2024   1:34 PM CDT  To: Kaylie Rodriguez Staff    Type:  Test Time     Who Called: Melba   Name of Test (Lab/Mammo/Etc):   Date of Test:   Ordering Provider:   Where the test was performed:   Would the patient rather a call back or a response via [facility]? Call   Best Call Back Number: [phone]   Additional Information:  Patient is calling to ask how long will her testing take so she can inform transportation

## 2024-06-26 ENCOUNTER — OFFICE VISIT (OUTPATIENT)
Dept: OPHTHALMOLOGY | Facility: CLINIC | Age: 74
End: 2024-06-26
Payer: MEDICARE

## 2024-06-26 ENCOUNTER — CLINICAL SUPPORT (OUTPATIENT)
Dept: OPHTHALMOLOGY | Facility: CLINIC | Age: 74
End: 2024-06-26
Payer: MEDICARE

## 2024-06-26 DIAGNOSIS — H02.432: ICD-10-CM

## 2024-06-26 DIAGNOSIS — H02.532 LOWER EYELID RETRACTION OF RIGHT EYE: ICD-10-CM

## 2024-06-26 DIAGNOSIS — H02.535 LOWER EYELID RETRACTION OF LEFT EYE: ICD-10-CM

## 2024-06-26 DIAGNOSIS — H05.20 EXOPHTHALMOS OF BOTH EYES: ICD-10-CM

## 2024-06-26 DIAGNOSIS — H02.421 ACQUIRED MYOGENIC PTOSIS OF EYELID, RIGHT: ICD-10-CM

## 2024-06-26 DIAGNOSIS — H02.432: Primary | ICD-10-CM

## 2024-06-26 PROCEDURE — 1159F MED LIST DOCD IN RCRD: CPT | Mod: CPTII,S$GLB,, | Performed by: OPHTHALMOLOGY

## 2024-06-26 PROCEDURE — 1160F RVW MEDS BY RX/DR IN RCRD: CPT | Mod: CPTII,S$GLB,, | Performed by: OPHTHALMOLOGY

## 2024-06-26 PROCEDURE — 4010F ACE/ARB THERAPY RXD/TAKEN: CPT | Mod: CPTII,S$GLB,, | Performed by: OPHTHALMOLOGY

## 2024-06-26 PROCEDURE — 99214 OFFICE O/P EST MOD 30 MIN: CPT | Mod: 25,S$GLB,, | Performed by: OPHTHALMOLOGY

## 2024-06-26 PROCEDURE — 99999 PR PBB SHADOW E&M-EST. PATIENT-LVL I: CPT | Mod: PBBFAC,,, | Performed by: OPHTHALMOLOGY

## 2024-06-26 PROCEDURE — 68840 EXPLORE/IRRIGATE TEAR DUCTS: CPT | Mod: 50,S$GLB,, | Performed by: OPHTHALMOLOGY

## 2024-06-26 NOTE — PROGRESS NOTES
HPI    Melba Pabon is a/an 74 y.o. female here for ptosis.  Referred by: Dr. Pond  How long have eyelid(s) been droopy? 30 yrs s/p brain aneurysm   Any h/o wearing hard CLs? no  Do eyelids feel heavy? yes  Does the height of the eyelid(s) fluctuate throughout the day? no  Do eyelid(s) interfere with daily activities such as driving, reading,   working? yes  Spontaneous orbital pain? no  Orbital pain w eye movement? no  Red eyes? yes  Red eyelids? no  Eyelid swelling? no  History of cardiac pacemaker? no    She states she is having trouble seeing, especially when walking  Last edited by Davis Arce on 6/26/2024 11:19 AM.            Assessment /Plan     For exam results, see Encounter Report.    Paralytic ptosis, left    Acquired myogenic ptosis of eyelid, right    Lower eyelid retraction of right eye    Lower eyelid retraction of left eye    Exophthalmos of both eyes             74 y.o. female with bilateral upper eyelid myogenic ptosis left greater than right and bilateral lower eyelid retraction.     On exam, patient has bilateral ptosis worse on the left than the right. She has normal levator function (13 mm) on the right but decreased on the left (8 mm). She also has bilateral symmetric exophthalmos with significant inferior scleral show in both eyes. She has no lagophthalmos and the surface of her cornea does not appear dry. She reports a family history of prominent eyes and denies any changes in the appearance of her eyes over the years. She denies a history of thyroid disease (normal thyroid function tests in 2023). She has never had any imaging of the orbits performed.     Irrigation:  OD:patent lower punctum, canaliculus, and nld with 100% flow to the nose  OS:patent lower punctum, canaliculus, and nld with 100% flow to the nose     Patient is interested in surgical correction of the ptosis in the left upper eyelid. Discussed findings with the patient including the ptosis on the right side as well  and that she may benefit from bilateral upper eyelid surgery. Discussed that given the weakness of the levator on the left, the left eyelid will likely continue to be more droopy than the right even after surgery. Moreover, given she has significant exophthalmos with inferior scleral show, she may have significant lagophthalmos after upper eyelid surgery. Therefore, we recommended surgically lifting the lower eyelids along with the cheek muscles. This would address the inferior scleral show as well and potential exposure.     Plan for canthoplasty and midface lift first, and then bilateral external levator three months after initial procedure.     Informed consent obtained after extensive risks/benefits/alternatives were discussed with the patient including but not limited to pain, bleeding, infection, ocular injury, loss of the eye, asymmetry, need for revision in future, scarring.  Alternatives such as waiting were discussed.  All questions were answered.     Hold ASA, NSAIDS, fish oil, Vitamin E and Multivitamins 5 to 7 days prior to procedure.      Return for surgery

## 2024-06-28 ENCOUNTER — TELEPHONE (OUTPATIENT)
Dept: OPHTHALMOLOGY | Facility: CLINIC | Age: 74
End: 2024-06-28
Payer: MEDICARE

## 2024-06-28 DIAGNOSIS — H02.535 LOWER EYELID RETRACTION OF LEFT EYE: ICD-10-CM

## 2024-06-28 DIAGNOSIS — H02.403 PTOSIS OF BOTH UPPER EYELIDS: Primary | ICD-10-CM

## 2024-06-28 DIAGNOSIS — H02.532 LOWER EYELID RETRACTION OF RIGHT EYE: ICD-10-CM

## 2024-07-11 ENCOUNTER — TELEPHONE (OUTPATIENT)
Dept: OPHTHALMOLOGY | Facility: CLINIC | Age: 74
End: 2024-07-11
Payer: MEDICARE

## 2024-07-11 ENCOUNTER — TELEPHONE (OUTPATIENT)
Dept: FAMILY MEDICINE | Facility: CLINIC | Age: 74
End: 2024-07-11
Payer: MEDICARE

## 2024-07-11 DIAGNOSIS — H02.423 ACQUIRED MYOGENIC PTOSIS OF EYELID, BILATERAL: Primary | ICD-10-CM

## 2024-07-11 DIAGNOSIS — H02.403 PTOSIS OF BOTH UPPER EYELIDS: ICD-10-CM

## 2024-07-11 NOTE — TELEPHONE ENCOUNTER
Patient wanted to drop off paperwork for eye surgery scheduled for 9/24/2024.  Advised patient that a pre-op appointment would be needed.  Patient scheduled appointment for 9/5/2024.  Patient will bring paperwork in when she comes for visit.

## 2024-07-11 NOTE — TELEPHONE ENCOUNTER
----- Message from Sharmin Guillermo sent at 7/11/2024 10:29 AM CDT -----  Regarding: self [phone]  .Type: Patient Call Back    Who called:self    What is the request in detail: patient requesting to drop paperwork off for eye surgery to MD. She would like to speak with the office.    Can the clinic reply by [facility]?no    Would the patient rather a call back or a response via [facility]?call back    Best call back number [phone]

## 2024-07-23 NOTE — ASSESSMENT & PLAN NOTE
On CT abdomen 2016: Atherosclerotic calcification is present within the abdominal aorta which tapers normally without aneurysmal dilatation   Pt on statin

## 2024-09-05 ENCOUNTER — OFFICE VISIT (OUTPATIENT)
Dept: FAMILY MEDICINE | Facility: CLINIC | Age: 74
End: 2024-09-05
Payer: MEDICARE

## 2024-09-05 VITALS
OXYGEN SATURATION: 98 % | RESPIRATION RATE: 16 BRPM | HEIGHT: 65 IN | HEART RATE: 81 BPM | WEIGHT: 98.31 LBS | BODY MASS INDEX: 16.38 KG/M2 | SYSTOLIC BLOOD PRESSURE: 150 MMHG | TEMPERATURE: 98 F | DIASTOLIC BLOOD PRESSURE: 84 MMHG

## 2024-09-05 DIAGNOSIS — Z23 NEED FOR IMMUNIZATION AGAINST INFLUENZA: ICD-10-CM

## 2024-09-05 DIAGNOSIS — Z01.818 PREOPERATIVE CLEARANCE: Primary | ICD-10-CM

## 2024-09-05 DIAGNOSIS — I70.0 ATHEROSCLEROSIS OF AORTA: ICD-10-CM

## 2024-09-05 DIAGNOSIS — I10 ESSENTIAL HYPERTENSION: ICD-10-CM

## 2024-09-05 DIAGNOSIS — R63.4 UNINTENDED WEIGHT LOSS: ICD-10-CM

## 2024-09-05 DIAGNOSIS — J30.1 SEASONAL ALLERGIC RHINITIS DUE TO POLLEN: ICD-10-CM

## 2024-09-05 DIAGNOSIS — F17.200 SMOKER: ICD-10-CM

## 2024-09-05 DIAGNOSIS — H02.403 PTOSIS OF BOTH EYELIDS: ICD-10-CM

## 2024-09-05 PROCEDURE — 93010 ELECTROCARDIOGRAM REPORT: CPT | Mod: HCNC,S$GLB,, | Performed by: INTERNAL MEDICINE

## 2024-09-05 PROCEDURE — 99999 PR PBB SHADOW E&M-EST. PATIENT-LVL III: CPT | Mod: PBBFAC,HCNC,, | Performed by: INTERNAL MEDICINE

## 2024-09-05 RX ORDER — LISINOPRIL 10 MG/1
10 TABLET ORAL DAILY
Qty: 90 TABLET | Refills: 0 | Status: SHIPPED | OUTPATIENT
Start: 2024-09-05

## 2024-09-05 RX ORDER — ATORVASTATIN CALCIUM 20 MG/1
20 TABLET, FILM COATED ORAL NIGHTLY
Qty: 90 TABLET | Refills: 0 | Status: SHIPPED | OUTPATIENT
Start: 2024-09-05

## 2024-09-05 RX ORDER — LEVOCETIRIZINE DIHYDROCHLORIDE 5 MG/1
5 TABLET, FILM COATED ORAL NIGHTLY
Qty: 90 TABLET | Refills: 1 | Status: SHIPPED | OUTPATIENT
Start: 2024-09-05 | End: 2025-09-05

## 2024-09-05 NOTE — PROGRESS NOTES
Health Maintenance Due   Topic     TETANUS VACCINE  CONSULT WITH PCP    Shingles Vaccine (1 of 2) CONSULT WITH PCP    Colorectal Cancer Screening  CONSULT WITH PCP    DEXA Scan  CONSULT WITH PCP    Lipid Panel  CONSULT WITH PCP    Influenza Vaccine (1) CONSULT WITH PCP    COVID-19 Vaccine (6 - 2023-24 season) Not offered at this facility.    Mammogram  CONSULT WITH PCP

## 2024-09-05 NOTE — PROGRESS NOTES
Subjective:       Patient ID: Mebla Pabon is a pleasant 74 y.o. Black or  female patient    Chief Complaint: Pre-op Exam      Patient is a patient I saw last on June 4th 1024, see my last notes.    HPI:      Patient with past medical history as per list of problems below coming today to obtain clearance before blepharotomy by Dr. Lott, scheduled for 09/24/2024. It will be bilateral tarsal stripping and midface lift, with general anesthesia for 2 hours.  See notes from Dr. Lott from 06/26/2024.  Dx of:  -Paralytic ptosis, left  - Acquired myogenic ptosis of eyelid, right  - Lower eyelid retraction of right eye  - Lower eyelid retraction of left eye  Pt is then going to have repair of blepharoptosis on 12/17/2024.  Consent already obtained, as well as discussion of need to hold ASA, NSAIDS, fish oil, Vitamin E and Multivitamins 5 to 7 days prior to procedure.    She is here on her own, She is looking forward to have the surgery as she feels self conscious due to ptosis, also discomfort due to heaviness of the upper eyelids that also interferes with vision.  She has no specific issue to report today. No CP, palpitations or SOB, no LE swelling.  She states that she takes her medications as directed.  She unfortunately is still a smoker, 1 pack a day.     Patient Active Problem List   Diagnosis    History of resection of small bowel    History of surgery for cerebral aneurysm    Essential hypertension    Age-related osteoporosis without current pathological fracture    Hypercalcemia    Gastric mass    Gastrointestinal stromal tumor (GIST)    Atherosclerosis of aorta    NS (nuclear sclerosis), left    Nuclear sclerotic cataract of right eye    Unintended weight loss    Parathyroid adenoma    Vocal cord palsy    Aneurysm    Corneal edema    Neutropenia, unspecified type    Underweight due to inadequate caloric intake    Dysuria    Chronic hip pain, right         PAST MEDICAL HISTORY  Past Medical History:    Diagnosis Date    Acute blood loss anemia 07/28/2018    Age-related osteoporosis without current pathological fracture 04/13/2018    Allergy     Cataract     Cerebral aneurysm     Gastrointestinal stromal tumor (GIST)     GI bleed     Hypertension         PAST SURGICAL HISTORY:  Past Surgical History:   Procedure Laterality Date    BRAIN SURGERY      metal plate in head    BREAST BIOPSY Right     CATARACT EXTRACTION Left 11/05/2020    Dr. Pond    CATARACT EXTRACTION W/  INTRAOCULAR LENS IMPLANT Right 01/14/2021    Dr. Pond    COLON SURGERY  2016    small bowel resection    DESCEMET'S MEMBRANE ENDOTHELIAL KERATOPLASTY (DMEK) Left 3/7/2022    Procedure: DESCEMET'S MEMBRANE ENDOTHELIAL KERATOPLASTY (DMEK);  Surgeon: Roopa Emmanuel MD;  Location: [facility] OR;  Service: Ophthalmology;  Laterality: Left;    EXCISION OF MASS OF ABDOMEN N/A 8/27/2018    Procedure: RESECTION GASTRIC MASS;  Surgeon: Chuck Palomares MD;  Location: [facility] OR;  Service: General;  Laterality: N/A;  RN Pre Op 8-16-18    EYE SURGERY      HERNIA REPAIR  2016    HYSTERECTOMY      INTRAOCULAR PROSTHESES INSERTION Left 11/5/2020    Procedure: INSERTION, IOL PROSTHESIS;  Surgeon: Howard Pond MD;  Location: [facility] OR;  Service: Ophthalmology;  Laterality: Left;    INTRAOCULAR PROSTHESES INSERTION Right 1/14/2021    Procedure: INSERTION, IOL PROSTHESIS;  Surgeon: Howard Pond MD;  Location: [facility] OR;  Service: Ophthalmology;  Laterality: Right;  RN Phone Pre OP 1-7-2020, Covid NEGATIVE ON  1-. Arrival 09:00 am  C A    OOPHORECTOMY      PHACOEMULSIFICATION OF CATARACT Left 11/5/2020    Procedure: PHACOEMULSIFICATION, CATARACT;  Surgeon: Howard Pond MD;  Location: [facility] OR;  Service: Ophthalmology;  Laterality: Left;  RN Phone Pre OP 11-2-2020. --COVID NEGATIVE ON  11-4-2020. Arrival 11:45 am.C A    PHACOEMULSIFICATION OF CATARACT Right 1/14/2021    Procedure: PHACOEMULSIFICATION, CATARACT;  Surgeon: Howard MARTINEZ  MD Salty;  Location: [facility];  Service: Ophthalmology;  Laterality: Right;    SMALL INTESTINE SURGERY          SOCIAL HISTORY:   reports that she has been smoking cigarettes. She started smoking about 25 years ago. She has a 12.8 pack-year smoking history. She has never used smokeless tobacco. She reports that she does not drink alcohol and does not use drugs.     FAMILY HISTORY:  Family History   Problem Relation Name Age of Onset    Dementia Mother      No Known Problems Father      No Known Problems Sister      No Known Problems Brother      No Known Problems Maternal Aunt      No Known Problems Maternal Uncle      No Known Problems Paternal Aunt      No Known Problems Paternal Uncle      No Known Problems Maternal Grandmother      No Known Problems Maternal Grandfather      No Known Problems Paternal Grandmother      No Known Problems Paternal Grandfather      Amblyopia Neg Hx      Blindness Neg Hx      Cancer Neg Hx      Cataracts Neg Hx      Diabetes Neg Hx      Glaucoma Neg Hx      Hypertension Neg Hx      Macular degeneration Neg Hx      Retinal detachment Neg Hx      Strabismus Neg Hx      Stroke Neg Hx      Thyroid disease Neg Hx          ALLERGIES:   Review of patient's allergies indicates:   Allergen Reactions    Pcn [penicillins]        MEDICATIONS:    Current Outpatient Medications:     multivitamin (THERAGRAN) per tablet, Take 1 tablet by mouth once daily., Disp: , Rfl:     atorvastatin (LIPITOR) 20 MG tablet, Take 1 tablet (20 mg total) by mouth every evening., Disp: 90 tablet, Rfl: 0    cyproheptadine (PERIACTIN) 4 mg tablet, Take 1 tablet (4 mg total) by mouth 3 (three) times daily as needed (Appetite). (Patient not taking: Reported on 9/5/2024), Disp: 90 tablet, Rfl: 2    levocetirizine (XYZAL) 5 MG tablet, Take 1 tablet (5 mg total) by mouth every evening., Disp: 90 tablet, Rfl: 1    lisinopriL 10 MG tablet, Take 1 tablet (10 mg total) by mouth once daily., Disp: 90 tablet, Rfl:  0    Current Facility-Administered Medications:     influenza (Fluad) 45 mcg/0.5 mL vaccine 0.5 mL, 0.5 mL, Intramuscular, 1 time in Clinic/HOD,     lidocaine (PF) 10 mg/ml (1%) injection 10 mg, 1 mL, Intradermal, Once, Chuck Palomares MD    Facility-Administered Medications Ordered in Other Visits:     cyclopentolate 1% ophthalmic solution 1 drop, 1 drop, Right Eye, On Call Procedure, Howard Pond MD, 1 drop at 01/14/21 1007    Review of Systems   Constitutional:  Negative for chills, fatigue, fever and unexpected weight change.   HENT:  Negative for congestion, ear discharge, ear pain, postnasal drip, sinus pressure and sinus pain.    Eyes:  Negative for photophobia, pain and visual disturbance.        Bilateral ptosis   Respiratory:  Negative for cough, chest tightness, shortness of breath and wheezing.    Cardiovascular:  Negative for chest pain, palpitations and leg swelling.   Gastrointestinal:  Negative for abdominal pain, constipation, diarrhea, nausea and vomiting.   Genitourinary:  Negative for dysuria, frequency, urgency and vaginal discharge.   Musculoskeletal:  Negative for back pain, joint swelling and neck stiffness.   Skin:  Negative for rash.   Neurological:  Negative for weakness and headaches.   Psychiatric/Behavioral:  Negative for dysphoric mood and sleep disturbance. The patient is not nervous/anxious.        Objective:      Physical Exam  Vitals reviewed.   Constitutional:       Comments: Frail looking and underweight   HENT:      Right Ear: Tympanic membrane normal.      Left Ear: Tympanic membrane normal.   Eyes:      Comments: Bilateral exophthalmos and eyelid ptosis   Cardiovascular:      Rate and Rhythm: Normal rate and regular rhythm.      Pulses: Normal pulses.      Heart sounds: Normal heart sounds.   Pulmonary:      Effort: Pulmonary effort is normal.      Breath sounds: Normal breath sounds.   Abdominal:      General: Bowel sounds are normal.   Skin:     General: Skin  "is warm and dry.   Neurological:      General: No focal deficit present.      Mental Status: She is alert and oriented to person, place, and time.      Cranial Nerves: No cranial nerve deficit.      Sensory: No sensory deficit.      Motor: No weakness.      Coordination: Coordination normal.      Gait: Gait normal.      Comments: Bilateral ptosis (L>R)   Psychiatric:         Mood and Affect: Mood normal.         Behavior: Behavior normal.         Thought Content: Thought content normal.         Judgment: Judgment normal.         Vitals:    09/05/24 1522   BP: (!) 150/84   BP Location: Right arm   Patient Position: Sitting   BP Method: Medium (Manual)   Pulse: 81   Resp: 16   Temp: 98.1 °F (36.7 °C)   TempSrc: Oral   SpO2: 98%   Weight: 44.6 kg (98 lb 5.2 oz)   Height: 5' 5" (1.651 m)     Body mass index is 16.36 kg/m².    RESULTS: Reviewed labs from last 12 months    Last Lab Results:     Lab Results   Component Value Date    WBC 3.11 (L) 06/19/2023    HGB 13.1 06/19/2023    HCT 38.9 06/19/2023     06/19/2023     (L) 06/19/2023    K 3.9 06/19/2023     06/19/2023    CO2 24 06/19/2023    BUN 12 06/19/2023    CREATININE 0.9 06/19/2023    CALCIUM 11.1 (H) 06/19/2023    ALBUMIN 3.5 06/19/2023    AST 21 06/19/2023    ALT 14 06/19/2023    CHOL 121 05/03/2022    TRIG 36 05/03/2022    HDL 59 05/03/2022    LDLCALC 54.8 (L) 05/03/2022    HGBA1C 5.2 06/19/2023    TSH 1.879 06/19/2023       Assessment & Plan:       1. Preoperative clearance  -     IN OFFICE EKG 12-LEAD (to Muse)  -     CBC Auto Differential; Future  -     Comprehensive Metabolic Panel; Future; Expected date: 09/05/2024    See HPI, will have bilateral eye surgery in two phases, pt denies CP, palpitations or SOB. Will do blood work and EKG.  If all comes back fine, she is medically cleared, low cardiac risk for moderate risk surgery. Advised again re:  ASA, NSAIDS, fish oil, Vitamin E and MVI to hold 5 to 7 days prior to procedure.      3. " Essential hypertension  -     TSH; Future; Expected date: 09/05/2024  -     lisinopriL 10 MG tablet; Take 1 tablet (10 mg total) by mouth once daily.  Dispense: 90 tablet; Refill: 0    BP slightly above goal, same treatment, blood work.    4. Unintended weight loss    Etiology?  Have him monitoring this, tried Periactin she does not take anymore.    5. Seasonal allergic rhinitis due to pollen  -     levocetirizine (XYZAL) 5 MG tablet; Take 1 tablet (5 mg total) by mouth every evening.  Dispense: 90 tablet; Refill: 1    Refill provided for Xyzal.    5. Smoker    Counseled at length re: importance to decrease as much as possible (for now 1 pack a day) before the upcoming surgery, discussed the risks for the perioperative period and healing. Pt states she will do her best.    6. Atherosclerosis of aorta  -     atorvastatin (LIPITOR) 20 MG tablet; Take 1 tablet (20 mg total) by mouth every evening.  Dispense: 90 tablet; Refill: 0    On statin. Would need to QUIT SMOKING!    7. Need for immunization against influenza  -     influenza (Fluad) 45 mcg/0.5 mL vaccine 0.5 mL     Also advised re: COVID booster.    No follow-ups on file.    This note was created by combination of typed  and M-Modal dictation.  Transcription errors may be present.  If there are any questions, please contact me.

## 2024-09-06 LAB
OHS QRS DURATION: 76 MS
OHS QTC CALCULATION: 398 MS

## 2024-09-09 ENCOUNTER — CLINICAL SUPPORT (OUTPATIENT)
Dept: FAMILY MEDICINE | Facility: CLINIC | Age: 74
End: 2024-09-09
Payer: MEDICARE

## 2024-09-09 ENCOUNTER — LAB VISIT (OUTPATIENT)
Dept: LAB | Facility: HOSPITAL | Age: 74
End: 2024-09-09
Attending: INTERNAL MEDICINE
Payer: MEDICARE

## 2024-09-09 DIAGNOSIS — I10 ESSENTIAL HYPERTENSION: ICD-10-CM

## 2024-09-09 DIAGNOSIS — Z23 INFLUENZA VACCINE ADMINISTERED: Primary | ICD-10-CM

## 2024-09-09 DIAGNOSIS — Z01.818 PREOPERATIVE CLEARANCE: ICD-10-CM

## 2024-09-09 LAB
ALBUMIN SERPL BCP-MCNC: 3.7 G/DL (ref 3.5–5.2)
ALP SERPL-CCNC: 123 U/L (ref 55–135)
ALT SERPL W/O P-5'-P-CCNC: 10 U/L (ref 10–44)
ANION GAP SERPL CALC-SCNC: 9 MMOL/L (ref 8–16)
AST SERPL-CCNC: 19 U/L (ref 10–40)
BASOPHILS # BLD AUTO: 0.01 K/UL (ref 0–0.2)
BASOPHILS NFR BLD: 0.3 % (ref 0–1.9)
BILIRUB SERPL-MCNC: 0.5 MG/DL (ref 0.1–1)
BUN SERPL-MCNC: 9 MG/DL (ref 8–23)
CALCIUM SERPL-MCNC: 11.5 MG/DL (ref 8.7–10.5)
CHLORIDE SERPL-SCNC: 103 MMOL/L (ref 95–110)
CO2 SERPL-SCNC: 23 MMOL/L (ref 23–29)
CREAT SERPL-MCNC: 0.8 MG/DL (ref 0.5–1.4)
DIFFERENTIAL METHOD BLD: ABNORMAL
EOSINOPHIL # BLD AUTO: 0 K/UL (ref 0–0.5)
EOSINOPHIL NFR BLD: 0.6 % (ref 0–8)
ERYTHROCYTE [DISTWIDTH] IN BLOOD BY AUTOMATED COUNT: 14.1 % (ref 11.5–14.5)
EST. GFR  (NO RACE VARIABLE): >60 ML/MIN/1.73 M^2
GLUCOSE SERPL-MCNC: 102 MG/DL (ref 70–110)
HCT VFR BLD AUTO: 43.4 % (ref 37–48.5)
HGB BLD-MCNC: 14 G/DL (ref 12–16)
IMM GRANULOCYTES # BLD AUTO: 0.01 K/UL (ref 0–0.04)
IMM GRANULOCYTES NFR BLD AUTO: 0.3 % (ref 0–0.5)
LYMPHOCYTES # BLD AUTO: 1.6 K/UL (ref 1–4.8)
LYMPHOCYTES NFR BLD: 49.5 % (ref 18–48)
MCH RBC QN AUTO: 30.5 PG (ref 27–31)
MCHC RBC AUTO-ENTMCNC: 32.3 G/DL (ref 32–36)
MCV RBC AUTO: 95 FL (ref 82–98)
MONOCYTES # BLD AUTO: 0.3 K/UL (ref 0.3–1)
MONOCYTES NFR BLD: 7.7 % (ref 4–15)
NEUTROPHILS # BLD AUTO: 1.4 K/UL (ref 1.8–7.7)
NEUTROPHILS NFR BLD: 41.6 % (ref 38–73)
NRBC BLD-RTO: 0 /100 WBC
PLATELET # BLD AUTO: 181 K/UL (ref 150–450)
PMV BLD AUTO: 11 FL (ref 9.2–12.9)
POTASSIUM SERPL-SCNC: 4.1 MMOL/L (ref 3.5–5.1)
PROT SERPL-MCNC: 7.7 G/DL (ref 6–8.4)
RBC # BLD AUTO: 4.59 M/UL (ref 4–5.4)
SODIUM SERPL-SCNC: 135 MMOL/L (ref 136–145)
TSH SERPL DL<=0.005 MIU/L-ACNC: 1.25 UIU/ML (ref 0.4–4)
WBC # BLD AUTO: 3.25 K/UL (ref 3.9–12.7)

## 2024-09-09 PROCEDURE — 84443 ASSAY THYROID STIM HORMONE: CPT | Mod: HCNC | Performed by: INTERNAL MEDICINE

## 2024-09-09 PROCEDURE — G0008 ADMIN INFLUENZA VIRUS VAC: HCPCS | Mod: HCNC,S$GLB,, | Performed by: INTERNAL MEDICINE

## 2024-09-09 PROCEDURE — 85025 COMPLETE CBC W/AUTO DIFF WBC: CPT | Mod: HCNC | Performed by: INTERNAL MEDICINE

## 2024-09-09 PROCEDURE — 90653 IIV ADJUVANT VACCINE IM: CPT | Mod: HCNC,S$GLB,, | Performed by: INTERNAL MEDICINE

## 2024-09-09 PROCEDURE — 36415 COLL VENOUS BLD VENIPUNCTURE: CPT | Mod: HCNC,PO | Performed by: INTERNAL MEDICINE

## 2024-09-09 PROCEDURE — 80053 COMPREHEN METABOLIC PANEL: CPT | Mod: HCNC | Performed by: INTERNAL MEDICINE

## 2024-09-09 NOTE — PROGRESS NOTES
Pt tolerated flu vaccine to left deltoid without difficulty; no adverse reaction noted; VIS given  LOT# [id number]

## 2024-09-12 ENCOUNTER — TELEPHONE (OUTPATIENT)
Dept: FAMILY MEDICINE | Facility: CLINIC | Age: 74
End: 2024-09-12
Payer: MEDICARE

## 2024-09-17 ENCOUNTER — TELEPHONE (OUTPATIENT)
Dept: OPHTHALMOLOGY | Facility: CLINIC | Age: 74
End: 2024-09-17
Payer: MEDICARE

## 2024-09-18 DIAGNOSIS — I70.0 ATHEROSCLEROSIS OF AORTA: ICD-10-CM

## 2024-09-18 DIAGNOSIS — J30.1 SEASONAL ALLERGIC RHINITIS DUE TO POLLEN: ICD-10-CM

## 2024-09-18 DIAGNOSIS — I10 ESSENTIAL HYPERTENSION: ICD-10-CM

## 2024-09-18 RX ORDER — LISINOPRIL 10 MG/1
10 TABLET ORAL DAILY
Qty: 90 TABLET | Refills: 0 | Status: SHIPPED | OUTPATIENT
Start: 2024-09-18

## 2024-09-18 RX ORDER — LEVOCETIRIZINE DIHYDROCHLORIDE 5 MG/1
5 TABLET, FILM COATED ORAL NIGHTLY
Qty: 90 TABLET | Refills: 1 | Status: SHIPPED | OUTPATIENT
Start: 2024-09-18 | End: 2025-09-18

## 2024-09-18 RX ORDER — ATORVASTATIN CALCIUM 20 MG/1
20 TABLET, FILM COATED ORAL NIGHTLY
Qty: 90 TABLET | Refills: 0 | Status: SHIPPED | OUTPATIENT
Start: 2024-09-18

## 2024-09-18 NOTE — TELEPHONE ENCOUNTER
Care Due:                  Date            Visit Type   Department     Provider  --------------------------------------------------------------------------------                                             [facility]  Last Visit: 09-      PRE-OP       MEDICINE       Anel Sosa  Next Visit: None Scheduled  None         None Found                                                            Last  Test          Frequency    Reason                     Performed    Due Date  --------------------------------------------------------------------------------    Lipid Panel.  12 months..  atorvastatin.............  05- 04-    [facility] Embedded Care Due Messages. Reference number: [id number].   9/18/2024 10:28:09 AM CDT

## 2024-09-18 NOTE — TELEPHONE ENCOUNTER
Spoke to pt and informed her all medications were sent to [facility] on 9/5; pt states she would like them sent to mail order pharmacy

## 2024-09-18 NOTE — TELEPHONE ENCOUNTER
----- Message from Tai Sanches sent at 9/18/2024 10:17 AM CDT -----  Regarding: Melba  Type: RX Refill Request     Who Called:Melba      Have you contacted your pharmacy: Yes     Refill or New Rx: Refill      RX Name and Strength: lisinopriL 10 MG tablet, atorvastatin (LIPITOR) 20 MG tablet,t//Patient stated that Dr. Sosa was suppose to be ordering her some allergy pills as well but the doctor is no longer at the clinic. Please reach out to the patient as soon as possible and the pharmacy as well.     Preferred Pharmacy with phone number:.  [facility] [address] [place]  [address]  [address] [place] [address]  Phone: [phone] Fax: [phone]    Local or Mail Order: Local     Ordering Provider: Miya Mccray     Would the patient rather a call back or a response via [facility]? Callback      Best Call Back Number: [phone]      Additional Information:

## 2024-09-23 ENCOUNTER — ANESTHESIA EVENT (OUTPATIENT)
Dept: SURGERY | Facility: HOSPITAL | Age: 74
End: 2024-09-23
Payer: MEDICARE

## 2024-09-23 NOTE — PRE-PROCEDURE INSTRUCTIONS
Unable to reach pt via phone.  Left voicemail with arrival time also informing pt of need for responsible  accompaniment and instructing pt to follow pre-procedure instructions provided via voicemail.  No portal access available.    The following instructions were laeft via voicemail for pt to follow.     Basic pre-procedure instructions in preparation for your procedure on 9/24/24 with Dr. Lott     Arrival time 5:00 am    - DO NOT EAT OR DRINK after midnight the night before your procedure  -HOLD all Diabetic meds night before and morning of procedure   -HOLD Fast acting Insulin (insulin taken with meals) morning of procedure  -HOLD all Fluid pills morning of procedure (Lasix, Hydrochlorothiazide/HCTZ)  - HOLD all non-insulin shots until after surgery (Ozempic, Mounjaro, Trulicity, Wegovy and Adlyxin) (7 days prior)  -HOLD all DAILY non-insulin injections (such as: Byetta, Victoza, Saxenda, Adlyxin, Semaglutide) morning of your procedure  -HOLD all vitamins, mineral and herbal supplements (including herbal teas) for a total of 7 days prior to surgery  -HOLD anything containing Vitamin E for a total of 7 days prior to your procedure  -TAKE all Blood Pressure medications morning of procedure, EXCEPT those that contain a fluid pill (such as: Hydrochlorothiazide (HCTZ), Lasix (Furosemide))  -USE inhalers as needed and bring AM of surgery    -Please stop taking NSAIDS 7 days before your procedure (ex. Aspirin, Aleve, Advil, Goody Powder or BC, Ibuprofen, Motrin, Naproxen)  -Please stop taking the following blood thinners: Xarelto, Eliquis, Coumadin, Warfarin, Rivaroxaban and Apixaban which should all be stopped 3 days prior to surgery    Please be sure to review the given medication RESTRICTION Sheet in your surgery folder regarding medications that you MUST STOP BEFORE your surgery date.  Please note that if you take any of the RESTRICTED medications on that list your surgery WILL HAVE TO BE RESCHEDULED!!!!  -  Shower and wash face PM prior and AM of surgery  - No powder, lotions, creams, oils, gels, ointments, makeup,  or jewelry    - Wear comfortable clothing (button up shirt)     (Patient is required to have a responsible ride to transport home)     -[facility], 2nd floor Surgery Center, located   @ [address] [place] [id number]  2nd Floor Registration bypass first floor desk      If you have any questions or concerns please feel free to contact your surgeon's office at [phone]      Please reply to this message as receipt of delivery.      Cat[place], LPN [facility]  Pre-Admit - Anesthesia Dept

## 2024-09-23 NOTE — ANESTHESIA PREPROCEDURE EVALUATION
09/23/2024  Melba Pabon is a 74 y.o., female.      Pre-op Assessment    I have reviewed the Patient Summary Reports.     I have reviewed the Nursing Notes. I have reviewed the NPO Status.   I have reviewed the Medications.     Review of Systems  Anesthesia Hx:             Denies Family Hx of Anesthesia complications.    Denies Personal Hx of Anesthesia complications.                    Cardiovascular:     Hypertension                                            Physical Exam  General: Well nourished    Airway:  Mallampati: II   Mouth Opening: Normal  TM Distance: Normal    Chest/Lungs:  Normal Respiratory Rate    Heart:  Rate: Normal    Anesthesia Plan  Type of Anesthesia, risks & benefits discussed:    Anesthesia Type: Gen ETT  Intra-op Monitoring Plan: Standard ASA Monitors  Post Op Pain Control Plan: multimodal analgesia  Induction:  IV  Airway Plan: Video  Informed Consent: Informed consent signed with the Patient and all parties understand the risks and agree with anesthesia plan.  All questions answered.   ASA Score: 2  Day of Surgery Review of History & Physical: H&P Update referred to the surgeon/provider.    Ready For Surgery From Anesthesia Perspective.   .

## 2024-09-24 ENCOUNTER — HOSPITAL ENCOUNTER (OUTPATIENT)
Facility: HOSPITAL | Age: 74
Discharge: HOME | End: 2024-09-24
Attending: OPHTHALMOLOGY | Admitting: OPHTHALMOLOGY
Payer: MEDICARE

## 2024-09-24 ENCOUNTER — ANESTHESIA (OUTPATIENT)
Dept: SURGERY | Facility: HOSPITAL | Age: 74
End: 2024-09-24
Payer: MEDICARE

## 2024-09-24 VITALS
WEIGHT: 100 LBS | OXYGEN SATURATION: 93 % | BODY MASS INDEX: 16.66 KG/M2 | SYSTOLIC BLOOD PRESSURE: 171 MMHG | HEIGHT: 65 IN | RESPIRATION RATE: 19 BRPM | HEART RATE: 62 BPM | TEMPERATURE: 98 F | DIASTOLIC BLOOD PRESSURE: 98 MMHG

## 2024-09-24 DIAGNOSIS — H02.403 PTOSIS OF BOTH EYELIDS: ICD-10-CM

## 2024-09-24 DIAGNOSIS — H02.532 RETRACTION OF BOTH LOWER EYELIDS: Primary | ICD-10-CM

## 2024-09-24 DIAGNOSIS — H02.535 RETRACTION OF BOTH LOWER EYELIDS: Primary | ICD-10-CM

## 2024-09-24 PROCEDURE — 25000003 PHARM REV CODE 250: Performed by: OPHTHALMOLOGY

## 2024-09-24 PROCEDURE — 36000706: Performed by: OPHTHALMOLOGY

## 2024-09-24 PROCEDURE — 36000707: Performed by: OPHTHALMOLOGY

## 2024-09-24 PROCEDURE — 15730 MDFC FLAP W/PRSRV VASC PEDCL: CPT | Mod: HCNC,,, | Performed by: OPHTHALMOLOGY

## 2024-09-24 PROCEDURE — 25000003 PHARM REV CODE 250: Performed by: NURSE ANESTHETIST, CERTIFIED REGISTERED

## 2024-09-24 PROCEDURE — 37000008 HC ANESTHESIA 1ST 15 MINUTES: Performed by: OPHTHALMOLOGY

## 2024-09-24 PROCEDURE — 71000033 HC RECOVERY, INTIAL HOUR: Performed by: OPHTHALMOLOGY

## 2024-09-24 PROCEDURE — 94761 N-INVAS EAR/PLS OXIMETRY MLT: CPT

## 2024-09-24 PROCEDURE — 71000015 HC POSTOP RECOV 1ST HR: Performed by: OPHTHALMOLOGY

## 2024-09-24 PROCEDURE — 25000003 PHARM REV CODE 250: Performed by: ANESTHESIOLOGY

## 2024-09-24 PROCEDURE — 71000016 HC POSTOP RECOV ADDL HR: Performed by: OPHTHALMOLOGY

## 2024-09-24 PROCEDURE — 71000039 HC RECOVERY, EACH ADD'L HOUR: Performed by: OPHTHALMOLOGY

## 2024-09-24 PROCEDURE — 67917 REPAIR EYELID DEFECT: CPT | Mod: HCNC,50,, | Performed by: OPHTHALMOLOGY

## 2024-09-24 PROCEDURE — 25000003 PHARM REV CODE 250

## 2024-09-24 PROCEDURE — 37000009 HC ANESTHESIA EA ADD 15 MINS: Performed by: OPHTHALMOLOGY

## 2024-09-24 PROCEDURE — 63600175 PHARM REV CODE 636 W HCPCS: Mod: JZ,JG | Performed by: OPHTHALMOLOGY

## 2024-09-24 PROCEDURE — 63600175 PHARM REV CODE 636 W HCPCS: Performed by: NURSE ANESTHETIST, CERTIFIED REGISTERED

## 2024-09-24 PROCEDURE — 63600175 PHARM REV CODE 636 W HCPCS: Mod: JZ,JG | Performed by: ANESTHESIOLOGY

## 2024-09-24 RX ORDER — CLINDAMYCIN PHOSPHATE 900 MG/50ML
900 INJECTION, SOLUTION INTRAVENOUS ONCE
Status: DISCONTINUED | OUTPATIENT
Start: 2024-09-24 | End: 2024-09-24 | Stop reason: HOSPADM

## 2024-09-24 RX ORDER — LIDOCAINE HYDROCHLORIDE 20 MG/ML
INJECTION INTRAVENOUS
Status: DISCONTINUED | OUTPATIENT
Start: 2024-09-24 | End: 2024-09-24

## 2024-09-24 RX ORDER — ROCURONIUM BROMIDE 10 MG/ML
INJECTION, SOLUTION INTRAVENOUS
Status: DISCONTINUED | OUTPATIENT
Start: 2024-09-24 | End: 2024-09-24

## 2024-09-24 RX ORDER — SODIUM CHLORIDE 9 MG/ML
INJECTION, SOLUTION INTRAVENOUS CONTINUOUS
Status: DISCONTINUED | OUTPATIENT
Start: 2024-09-24 | End: 2024-09-24 | Stop reason: HOSPADM

## 2024-09-24 RX ORDER — LIDOCAINE HYDROCHLORIDE AND EPINEPHRINE 20; 10 MG/ML; UG/ML
INJECTION, SOLUTION INFILTRATION; PERINEURAL
Status: DISCONTINUED | OUTPATIENT
Start: 2024-09-24 | End: 2024-09-24 | Stop reason: HOSPADM

## 2024-09-24 RX ORDER — ONDANSETRON HYDROCHLORIDE 2 MG/ML
4 INJECTION, SOLUTION INTRAVENOUS DAILY PRN
Status: DISCONTINUED | OUTPATIENT
Start: 2024-09-24 | End: 2024-09-24 | Stop reason: HOSPADM

## 2024-09-24 RX ORDER — ACETAMINOPHEN 10 MG/ML
INJECTION, SOLUTION INTRAVENOUS
Status: DISCONTINUED | OUTPATIENT
Start: 2024-09-24 | End: 2024-09-24

## 2024-09-24 RX ORDER — OXYCODONE AND ACETAMINOPHEN 5; 325 MG/1; MG/1
1 TABLET ORAL
Status: DISCONTINUED | OUTPATIENT
Start: 2024-09-24 | End: 2024-09-24 | Stop reason: HOSPADM

## 2024-09-24 RX ORDER — PHENYLEPHRINE HYDROCHLORIDE 10 MG/ML
INJECTION INTRAVENOUS
Status: DISCONTINUED | OUTPATIENT
Start: 2024-09-24 | End: 2024-09-24

## 2024-09-24 RX ORDER — PROCHLORPERAZINE EDISYLATE 5 MG/ML
5 INJECTION INTRAMUSCULAR; INTRAVENOUS EVERY 30 MIN PRN
Status: DISCONTINUED | OUTPATIENT
Start: 2024-09-24 | End: 2024-09-24 | Stop reason: HOSPADM

## 2024-09-24 RX ORDER — LIDOCAINE HYDROCHLORIDE 10 MG/ML
1 INJECTION, SOLUTION EPIDURAL; INFILTRATION; INTRACAUDAL; PERINEURAL ONCE AS NEEDED
Status: DISCONTINUED | OUTPATIENT
Start: 2024-09-24 | End: 2024-09-24 | Stop reason: HOSPADM

## 2024-09-24 RX ORDER — NEOMYCIN SULFATE, POLYMYXIN B SULFATE AND DEXAMETHASONE 3.5; 10000; 1 MG/ML; [USP'U]/ML; MG/ML
1 SUSPENSION/ DROPS OPHTHALMIC EVERY 6 HOURS
Qty: 5 ML | Refills: 1 | Status: SHIPPED | OUTPATIENT
Start: 2024-09-24

## 2024-09-24 RX ORDER — DEXAMETHASONE SODIUM PHOSPHATE 4 MG/ML
INJECTION, SOLUTION INTRA-ARTICULAR; INTRALESIONAL; INTRAMUSCULAR; INTRAVENOUS; SOFT TISSUE
Status: DISCONTINUED | OUTPATIENT
Start: 2024-09-24 | End: 2024-09-24

## 2024-09-24 RX ORDER — GLUCAGON 1 MG
1 KIT INJECTION
Status: DISCONTINUED | OUTPATIENT
Start: 2024-09-24 | End: 2024-09-24 | Stop reason: HOSPADM

## 2024-09-24 RX ORDER — ONDANSETRON HYDROCHLORIDE 2 MG/ML
INJECTION, SOLUTION INTRAVENOUS
Status: DISCONTINUED | OUTPATIENT
Start: 2024-09-24 | End: 2024-09-24

## 2024-09-24 RX ORDER — FENTANYL CITRATE 50 UG/ML
25 INJECTION, SOLUTION INTRAMUSCULAR; INTRAVENOUS EVERY 5 MIN PRN
Status: DISCONTINUED | OUTPATIENT
Start: 2024-09-24 | End: 2024-09-24 | Stop reason: HOSPADM

## 2024-09-24 RX ORDER — CLINDAMYCIN PHOSPHATE 600 MG/50ML
INJECTION, SOLUTION INTRAVENOUS
Status: DISCONTINUED | OUTPATIENT
Start: 2024-09-24 | End: 2024-09-24

## 2024-09-24 RX ORDER — NEOMYCIN SULFATE, POLYMYXIN B SULFATE, AND DEXAMETHASONE 3.5; 10000; 1 MG/G; [USP'U]/G; MG/G
OINTMENT OPHTHALMIC
Qty: 3.5 G | Refills: 1 | Status: CANCELLED | OUTPATIENT
Start: 2024-09-24

## 2024-09-24 RX ORDER — HYDROMORPHONE HYDROCHLORIDE 1 MG/ML
0.2 INJECTION, SOLUTION INTRAMUSCULAR; INTRAVENOUS; SUBCUTANEOUS EVERY 5 MIN PRN
Status: DISCONTINUED | OUTPATIENT
Start: 2024-09-24 | End: 2024-09-24 | Stop reason: HOSPADM

## 2024-09-24 RX ORDER — BUPIVACAINE HYDROCHLORIDE 5 MG/ML
INJECTION, SOLUTION EPIDURAL; INTRACAUDAL
Status: DISCONTINUED | OUTPATIENT
Start: 2024-09-24 | End: 2024-09-24 | Stop reason: HOSPADM

## 2024-09-24 RX ORDER — PROPOFOL 10 MG/ML
VIAL (ML) INTRAVENOUS
Status: DISCONTINUED | OUTPATIENT
Start: 2024-09-24 | End: 2024-09-24

## 2024-09-24 RX ORDER — NEOMYCIN SULFATE, POLYMYXIN B SULFATE, AND DEXAMETHASONE 3.5; 10000; 1 MG/G; [USP'U]/G; MG/G
OINTMENT OPHTHALMIC EVERY 6 HOURS
Qty: 3.5 G | Refills: 0 | Status: SHIPPED | OUTPATIENT
Start: 2024-09-24

## 2024-09-24 RX ORDER — FENTANYL CITRATE 50 UG/ML
INJECTION, SOLUTION INTRAMUSCULAR; INTRAVENOUS
Status: DISCONTINUED | OUTPATIENT
Start: 2024-09-24 | End: 2024-09-24

## 2024-09-24 RX ORDER — TOBRAMYCIN 3 MG/ML
SOLUTION/ DROPS OPHTHALMIC
Status: DISCONTINUED | OUTPATIENT
Start: 2024-09-24 | End: 2024-09-24 | Stop reason: HOSPADM

## 2024-09-24 RX ORDER — HYDROCODONE BITARTRATE AND ACETAMINOPHEN 5; 325 MG/1; MG/1
1 TABLET ORAL EVERY 6 HOURS PRN
Qty: 16 TABLET | Refills: 0 | Status: SHIPPED | OUTPATIENT
Start: 2024-09-24

## 2024-09-24 RX ADMIN — ACETAMINOPHEN 1000 MG: 10 INJECTION INTRAVENOUS at 09:09

## 2024-09-24 RX ADMIN — FENTANYL CITRATE 50 MCG: 50 INJECTION, SOLUTION INTRAMUSCULAR; INTRAVENOUS at 07:09

## 2024-09-24 RX ADMIN — CLINDAMYCIN PHOSPHATE 600 MG: 600 INJECTION, SOLUTION INTRAVENOUS at 07:09

## 2024-09-24 RX ADMIN — PROPOFOL 100 MG: 10 INJECTION, EMULSION INTRAVENOUS at 07:09

## 2024-09-24 RX ADMIN — PHENYLEPHRINE HYDROCHLORIDE 100 MCG: 10 INJECTION INTRAVENOUS at 07:09

## 2024-09-24 RX ADMIN — ROCURONIUM BROMIDE 10 MG: 10 INJECTION INTRAVENOUS at 09:09

## 2024-09-24 RX ADMIN — SODIUM CHLORIDE: 0.9 INJECTION, SOLUTION INTRAVENOUS at 05:09

## 2024-09-24 RX ADMIN — LIDOCAINE HYDROCHLORIDE 60 MG: 20 INJECTION INTRAVENOUS at 07:09

## 2024-09-24 RX ADMIN — SODIUM CHLORIDE, SODIUM GLUCONATE, SODIUM ACETATE, POTASSIUM CHLORIDE, MAGNESIUM CHLORIDE, SODIUM PHOSPHATE, DIBASIC, AND POTASSIUM PHOSPHATE: .53; .5; .37; .037; .03; .012; .00082 INJECTION, SOLUTION INTRAVENOUS at 09:09

## 2024-09-24 RX ADMIN — ROCURONIUM BROMIDE 40 MG: 10 INJECTION INTRAVENOUS at 07:09

## 2024-09-24 RX ADMIN — ONDANSETRON 4 MG: 2 INJECTION INTRAMUSCULAR; INTRAVENOUS at 08:09

## 2024-09-24 RX ADMIN — DEXAMETHASONE SODIUM PHOSPHATE 4 MG: 4 INJECTION INTRA-ARTICULAR; INTRALESIONAL; INTRAMUSCULAR; INTRAVENOUS; SOFT TISSUE at 08:09

## 2024-09-24 RX ADMIN — SUGAMMADEX 200 MG: 100 INJECTION, SOLUTION INTRAVENOUS at 09:09

## 2024-09-24 NOTE — PLAN OF CARE
Chart reviewed. Preop nursing care completed per orders. Safe surgery checklist complete. Pt denies any open wounds cuts or sores. Pt has metal in body. Belongings secured in PACU locker 5. Waiting for H&P update, anesthesia consent prior to surgery. Pt AAOX3, VSS on room air. Pt toileted, Bed locked in lowest position, Call light within reach. Pt denies any needs at this time. Will continue to monitor.

## 2024-09-24 NOTE — ANESTHESIA POSTPROCEDURE EVALUATION
Anesthesia Post Evaluation    Patient: Melba Pabon    Procedure(s) Performed: Procedure(s) (LRB):  FLAP,ZYGOMATICOFACIAL W/PRESERVATION OF VASCULAR PEDICLE (Bilateral)  REPAIR, ECTROPION, EYELID (Bilateral)    Final Anesthesia Type: general      Patient location during evaluation: PACU  Patient participation: Yes- Able to Participate  Level of consciousness: awake and alert  Post-procedure vital signs: reviewed and stable  Pain management: adequate  Airway patency: patent    PONV status at discharge: No PONV  Anesthetic complications: no      Cardiovascular status: stable  Respiratory status: spontaneous ventilation  Hydration status: euvolemic  Follow-up not needed.          Vitals Value Taken Time   /98 09/24/24 1230   Temp 36.7 °C (98.1 °F) 09/24/24 0952   Pulse 62 09/24/24 1230   Resp 19 09/24/24 1230   SpO2 93 % 09/24/24 1230         Event Time   Out of Recovery 11:01:00         Pain/La Score: La Score: 9 (9/24/2024 11:30 AM)

## 2024-09-24 NOTE — DISCHARGE INSTRUCTIONS
- Resume same diet as prior to surgery  - Limit activity, no straining, stooping, or lifting things over 10 pounds for the next 14 days  - Call the eye clinic for severe uncontrolled pain, redness or other concerns  - Elevate the head of your bed while sleeping for the next week.   - Return to clinic for your postoperative visit in 1 week

## 2024-09-24 NOTE — OP NOTE
Operative Note  Ophthalmology Service     Date of Procedure:  9/24/2024    Surgeon: Jennifer Lott MD     Assistant: Drew Jin MD     Procedures:  Procedure(s) (LRB):  FLAP,ZYGOMATICOFACIAL W/PRESERVATION OF VASCULAR PEDICLE (Bilateral)  REPAIR, ECTROPION, EYELID (Bilateral)      Pre-op Dx:   Lower eyelid retraction of right eye [H02.532]  Lower eyelid retraction of left eye [H02.535]     Post-op Dx: same    Intraoperative Findings: As expected    Anesthesia: General and local     Specimens: None     Complications: None     Blood Loss: <20 cc     Indications for surgery: 74 y.o.-year-old with bilateral lower eyelid retraction and midface ptosis given bilateral inferior scleral show. Patient is seeking correction for upper eyelid ptosis which interferes with her activities, however given concern for worsening lagophthalmos and potential exposure keratopathy after eyelid surgery, lifting the lower eyelids first surgically was recommended. Patient understands the risk of pain, bleeding, infection, ocular injury, loss of the eye, asymmetry, need for revision in future,and scarring. Informed consent was obtained and placed in chart.      Procedure in detail:  The patient was brought to the operating room. After adequate anesthesia was achieved, approximately 10 cc of subcutaneous lidocaine 2% with epinephrine 1:100,000, 0.5% bupivacaine,  and hyaluronidase was injected near the inferior orbital rims and the lateral aspects of the lower eyelids of both eyes. The full face was prepped and draped in the usual ophthalmic fashion using topical 5% Betadine.      Attention was then directed to the right eye. A lateral canthal incision was performed using a #15 scalpel. Monopolar cautery was used for hemostasis.  Blunt dissection to the orbital rim was performed with cates scissors and monopolar cautery. Cates scissors were used to perform a canthotomy.  An inferior cantholysis was performed with Cates scissors and  monopolar cautery.  Monopolar cautery was used to attain hemostasis. Further blunt dissection was completed to create a myocutaneous flap in a preperiosteal plane along the zygoma laterally to the inferior aspect of the zygoma preserving the zygomaticofacial bundle and medially to the foramen of the infraorbital nerve. This allowed the midface to elevated freely. A sterile marking pen was used to charlie the position of the lateral canthus.  The lower eyelid was then draped over the lateral rim, and the lateral excess tissue was marked with a sterile pen and excised full thickness in a transverse orientation with brenda scissors. A 5-0 Vicryl on a P-2 needle was placed through the anterior-inferior tarsus and exited the tarsus posteriorly superiorly. The 5-0 vicryl was then used to approximate the tarsal strip to the periosteum 4-5 mm above the level of the superior josé luis of the lateral canthal tendon, allowing good apposition of the lower eyelid to the globe. It was temporarily tied. The height and contour of the tarsal strip was adjusted accordingly. Once the proper position was obtained, the 5-0 vicryl suture was passed back through the remnants of the superior josé luis and tied permanently. Attention was then directed to the midface. Two anchoring sutures with 5-0 Vicryl on a P-2 needle were placed through the periosteum of the zygoma 4-5 mm above the lateral canthus. The midface was then sutured in an upperward and lateral vector to allow for appropriate positioning of the midface.  The gray lines of the lateral upper and lower eyelids were reapposed using 6-0 vicryl on S-14 needle.  The lateral canthal incision was closed with a running 6-0 Prolene on a P-1 needle.      The procedure was repeated for the left side and adjusted as necessary to ensure functional positioning and the best symmetry possible. Tobradex ointment was applied to the skin wounds and Tobradex drops were instilled in both eyes. The patient  tolerated the procedure well without complications. The patient was awoken and taken to the recovery room in stable condition.

## 2024-09-24 NOTE — DISCHARGE SUMMARY
Discharge Summary  Ophthalmology Service    Admit Date: 9/24/2024     Discharge Date: 9/24/2024     Attending Physician: Jennifer Lott MD     Discharge Physician: Drew Jin MD    Discharged Condition: Good    Reason for Admission: Ptosis of both upper eyelids [H02.403]  Lower eyelid retraction of right eye [H02.532]  Lower eyelid retraction of left eye [H02.535]  Ptosis of both eyelids [H02.403]     Treatments/Procedures: Procedure(s) (LRB):  FLAP,ZYGOMATICOFACIAL W/PRESERVATION OF VASCULAR PEDICLE (Bilateral)  REPAIR, ECTROPION, EYELID (Bilateral) (see dictated report for details).    Hospital Course: Stable    Consults: None    Significant Diagnostic Studies: None    Disposition: Home    Patient Instructions:     1) Maxitrol drop: 1 drop four times daily left eye  2) Maxitrol ointment: 1/2 inch ribbon three times daily to suture line  3) Tylenol as needed for mild pain  4) Norco 5mg every six hours as needed for moderate to severe pain  5) Ice packs continuously for two days, then stop. Warm compresses 3x daily for 10 minutes for the next two days  6) Activity restriction: no heavy lifting (>5 lbs), bending, straining, running or jumping. No swimming. Can bathe but avoid water to sutures.   7) Post operative week one appointment set up in clinic  8) Follow instructions as written in post operative hand out      Patient Instructions:   Current Discharge Medication List        START taking these medications    Details   HYDROcodone-acetaminophen (NORCO) 5-325 mg per tablet Take 1 tablet by mouth every 6 (six) hours as needed for Pain.  Qty: 16 tablet, Refills: 0    Comments: Quantity prescribed more than 7 day supply? No      neomycin-polymyxin-dexamethasone (MAXITROL) 3.5 mg/g-10,000 unit/g-0.1 % Oint Place into both eyes every 6 (six) hours. Placed 1/2 inch ribbon along suture line in both eyes.  Qty: 3.5 g, Refills: 0      neomycin-polymyxin-dexamethasone (MAXITROL) 3.5mg/mL-10,000 unit/mL-0.1 % DrpS Place  1 drop into both eyes every 6 (six) hours.  Qty: 5 mL, Refills: 1           CONTINUE these medications which have NOT CHANGED    Details   atorvastatin (LIPITOR) 20 MG tablet Take 1 tablet (20 mg total) by mouth every evening.  Qty: 90 tablet, Refills: 0    Associated Diagnoses: Atherosclerosis of aorta      lisinopriL 10 MG tablet Take 1 tablet (10 mg total) by mouth once daily.  Qty: 90 tablet, Refills: 0    Comments: .  Associated Diagnoses: Essential hypertension      multivitamin (THERAGRAN) per tablet Take 1 tablet by mouth once daily.      levocetirizine (XYZAL) 5 MG tablet Take 1 tablet (5 mg total) by mouth every evening.  Qty: 90 tablet, Refills: 1    Associated Diagnoses: Seasonal allergic rhinitis due to pollen             No discharge procedures on file.

## 2024-09-24 NOTE — TRANSFER OF CARE
"Anesthesia Transfer of Care Note    Patient: Melba Pabon    Procedure(s) Performed: Procedure(s) (LRB):  FLAP,ZYGOMATICOFACIAL W/PRESERVATION OF VASCULAR PEDICLE (Bilateral)  REPAIR, ECTROPION, EYELID (Bilateral)    Patient location: PACU    Anesthesia Type: general    Transport from OR: Transported from OR on 6-10 L/min O2 by face mask with adequate spontaneous ventilation    Post pain: adequate analgesia    Post assessment: no apparent anesthetic complications    Post vital signs: stable    Level of consciousness: awake    Nausea/Vomiting: no nausea/vomiting    Complications: none    Transfer of care protocol was followed    Last vitals: Visit Vitals  BP (!) 170/85 (BP Location: Right arm, Patient Position: Lying)   Pulse 85   Temp 36.6 °C (97.9 °F) (Temporal)   Resp 18   Ht 5' 5" (1.651 m)   Wt 45.4 kg (100 lb)   SpO2 97%   Breastfeeding No   BMI 16.64 kg/m²     "

## 2024-09-24 NOTE — H&P
Pre-Operative History & Physical  Ophthalmology      SUBJECTIVE:     History of Present Illness:  Patient is a 74 y.o. female presents with Ptosis of both upper eyelids [H02.403]  Lower eyelid retraction of right eye [H02.532]  Lower eyelid retraction of left eye [H02.535].    MEDICATIONS:   No medications prior to admission.       ALLERGIES:   Review of patient's allergies indicates:   Allergen Reactions    Pcn [penicillins]        PAST MEDICAL HISTORY:   Past Medical History:   Diagnosis Date    Acute blood loss anemia 07/28/2018    Age-related osteoporosis without current pathological fracture 04/13/2018    Allergy     Cataract     Cerebral aneurysm     Gastrointestinal stromal tumor (GIST)     GI bleed     Hypertension      PAST SURGICAL HISTORY:   Past Surgical History:   Procedure Laterality Date    BRAIN SURGERY      metal plate in head    BREAST BIOPSY Right     CATARACT EXTRACTION Left 11/05/2020    Dr. Pond    CATARACT EXTRACTION W/  INTRAOCULAR LENS IMPLANT Right 01/14/2021    Dr. Pond    COLON SURGERY  2016    small bowel resection    DESCEMET'S MEMBRANE ENDOTHELIAL KERATOPLASTY (DMEK) Left 3/7/2022    Procedure: DESCEMET'S MEMBRANE ENDOTHELIAL KERATOPLASTY (DMEK);  Surgeon: Roopa Emmanuel MD;  Location: [facility] OR;  Service: Ophthalmology;  Laterality: Left;    EXCISION OF MASS OF ABDOMEN N/A 8/27/2018    Procedure: RESECTION GASTRIC MASS;  Surgeon: Chuck Palomares MD;  Location: [facility] OR;  Service: General;  Laterality: N/A;  RN Pre Op 8-16-18    EYE SURGERY      HERNIA REPAIR  2016    HYSTERECTOMY      INTRAOCULAR PROSTHESES INSERTION Left 11/5/2020    Procedure: INSERTION, IOL PROSTHESIS;  Surgeon: Howard Pond MD;  Location: [facility] OR;  Service: Ophthalmology;  Laterality: Left;    INTRAOCULAR PROSTHESES INSERTION Right 1/14/2021    Procedure: INSERTION, IOL PROSTHESIS;  Surgeon: Howard Pond MD;  Location: [facility] OR;  Service: Ophthalmology;  Laterality: Right;  RN Phone  Pre OP 1-7-2020, Covid NEGATIVE ON  1-. Arrival 09:00 am  C A    OOPHORECTOMY      PHACOEMULSIFICATION OF CATARACT Left 11/5/2020    Procedure: PHACOEMULSIFICATION, CATARACT;  Surgeon: Howard Pond MD;  Location: [facility] OR;  Service: Ophthalmology;  Laterality: Left;  RN Phone Pre OP 11-2-2020. --COVID NEGATIVE ON  11-4-2020. Arrival 11:45 am.C A    PHACOEMULSIFICATION OF CATARACT Right 1/14/2021    Procedure: PHACOEMULSIFICATION, CATARACT;  Surgeon: Howard Pond MD;  Location: [facility] OR;  Service: Ophthalmology;  Laterality: Right;    SMALL INTESTINE SURGERY       PAST FAMILY HISTORY:   Family History   Problem Relation Name Age of Onset    Dementia Mother      No Known Problems Father      No Known Problems Sister      No Known Problems Brother      No Known Problems Maternal Aunt      No Known Problems Maternal Uncle      No Known Problems Paternal Aunt      No Known Problems Paternal Uncle      No Known Problems Maternal Grandmother      No Known Problems Maternal Grandfather      No Known Problems Paternal Grandmother      No Known Problems Paternal Grandfather      Amblyopia Neg Hx      Blindness Neg Hx      Cancer Neg Hx      Cataracts Neg Hx      Diabetes Neg Hx      Glaucoma Neg Hx      Hypertension Neg Hx      Macular degeneration Neg Hx      Retinal detachment Neg Hx      Strabismus Neg Hx      Stroke Neg Hx      Thyroid disease Neg Hx       SOCIAL HISTORY:   Social History     Tobacco Use    Smoking status: Some Days     Current packs/day: 0.50     Average packs/day: 0.5 packs/day for 25.7 years (12.9 ttl pk-yrs)     Types: Cigarettes     Start date: 1999    Smokeless tobacco: Never    Tobacco comments:     occassional cigarette   Substance Use Topics    Alcohol use: No    Drug use: No        MENTAL STATUS: Alert    REVIEW OF SYSTEMS: Negative    OBJECTIVE:     Vital Signs (Most Recent)       Physical Exam:  General: NAD  HEENT: atraumatic  Lungs: Adequate respirations  Heart: +  pulses  Abdomen: Soft    ASSESSMENT/PLAN:     Patient is a 74 y.o. 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 The patient/family voiced good understanding, the informed consent was signed, witnessed, and placed in chart. All patient and family questions were answered.   - Will proceed with bilateral mid-face lift and bilateral repair of ectropion.   - Plan for general anesthesia   - Allergies reviewed:   Review of patient's allergies indicates:   Allergen Reactions    Pcn [penicillins]      - patient has been off blood thinners, aspirin, nsaids for one week.     Drew Jin MD  [facility]

## 2024-09-24 NOTE — PLAN OF CARE
Patient given discharge instructions with verbalization of all D/C instructions. IV discontinued. Patient escorted off unit via a wheelchair to private vehicle.

## 2024-09-24 NOTE — BRIEF OP NOTE
Brief Operative Note  Ophthalmology Service    Date of Procedure: 9/24/2024     Attending Physician: Jennifer Lott MD    Assistant: Drew Jin MD    Pre-Operative Diagnosis: Ptosis of both upper eyelids [H02.403]  Lower eyelid retraction of right eye [H02.532]  Lower eyelid retraction of left eye [H02.535]  Ptosis of both eyelids [H02.403]     Post-Operative Diagnosis: Same as pre-operative diagnosis    Treatments/Procedures:   Procedure(s) (LRB):  FLAP,ZYGOMATICOFACIAL W/PRESERVATION OF VASCULAR PEDICLE (Bilateral)  REPAIR, ECTROPION, EYELID (Bilateral)    Intraoperative Findings: as expected    Anesthesia: General    Complications: None    Estimated Blood Loss: < 5 cc    Specimens: None    -------------------------------------------------------------  Full dictated Operative Report to follow.  -------------------------------------------------------------

## 2024-09-24 NOTE — ANESTHESIA PROCEDURE NOTES
Intubation    Date/Time: 9/24/2024 7:29 AM    Performed by: Manuela Rush CRNA  Authorized by: William Mcadams MD    Intubation:     Induction:  Intravenous    Intubated:  Postinduction    Mask Ventilation:  Easy mask    Attempts:  1    Attempted By:  CRNA    Method of Intubation:  Video laryngoscopy    Blade:  Jones 3    Laryngeal View Grade: Grade I - full view of cords      Difficult Airway Encountered?: No      Complications:  None (very poor dentition, missing teeth, remaining teeth intact after intubation)    Airway Device:  Oral sherita    Airway Device Size:  7.5    Style/Cuff Inflation:  Cuffed    Inflation Amount (mL):  7    Secured at:  The lips    Placement Verified By:  Capnometry    Complicating Factors:  None    Findings Post-Intubation:  BS equal bilateral

## 2024-10-03 ENCOUNTER — OFFICE VISIT (OUTPATIENT)
Dept: OPHTHALMOLOGY | Facility: CLINIC | Age: 74
End: 2024-10-03
Payer: MEDICARE

## 2024-10-03 DIAGNOSIS — Z98.890 POST-OPERATIVE STATE: Primary | ICD-10-CM

## 2024-10-03 DIAGNOSIS — H02.532 LOWER EYELID RETRACTION OF RIGHT EYE: ICD-10-CM

## 2024-10-03 DIAGNOSIS — H02.432: ICD-10-CM

## 2024-10-03 DIAGNOSIS — H02.535 LOWER EYELID RETRACTION OF LEFT EYE: ICD-10-CM

## 2024-10-03 DIAGNOSIS — H02.421 ACQUIRED MYOGENIC PTOSIS OF EYELID, RIGHT: ICD-10-CM

## 2024-10-03 PROCEDURE — 99999 PR PBB SHADOW E&M-EST. PATIENT-LVL II: CPT | Mod: PBBFAC,HCNC,, | Performed by: OPHTHALMOLOGY

## 2024-10-03 PROCEDURE — 4010F ACE/ARB THERAPY RXD/TAKEN: CPT | Mod: HCNC,CPTII,S$GLB, | Performed by: OPHTHALMOLOGY

## 2024-10-03 PROCEDURE — 92285 EXTERNAL OCULAR PHOTOGRAPHY: CPT | Mod: HCNC,S$GLB,, | Performed by: OPHTHALMOLOGY

## 2024-10-03 PROCEDURE — 1160F RVW MEDS BY RX/DR IN RCRD: CPT | Mod: HCNC,CPTII,S$GLB, | Performed by: OPHTHALMOLOGY

## 2024-10-03 PROCEDURE — 3288F FALL RISK ASSESSMENT DOCD: CPT | Mod: HCNC,CPTII,S$GLB, | Performed by: OPHTHALMOLOGY

## 2024-10-03 PROCEDURE — 1101F PT FALLS ASSESS-DOCD LE1/YR: CPT | Mod: HCNC,CPTII,S$GLB, | Performed by: OPHTHALMOLOGY

## 2024-10-03 PROCEDURE — 99024 POSTOP FOLLOW-UP VISIT: CPT | Mod: HCNC,S$GLB,, | Performed by: OPHTHALMOLOGY

## 2024-10-03 PROCEDURE — 1159F MED LIST DOCD IN RCRD: CPT | Mod: HCNC,CPTII,S$GLB, | Performed by: OPHTHALMOLOGY

## 2024-10-03 NOTE — PROGRESS NOTES
HPI    Melba Pabon is a/an 74 y.o. female here for 1 week post op.  Date of procedure: 9/24/24  Procedure: FLAP,ZYGOMATICOFACIAL W/PRESERVATION OF VASCULAR PEDICLE   (Bilateral)  REPAIR, ECTROPION, EYELID (Bilateral)   Oral antibiotics: none    Eye meds: maxitrol venkata / gtts     Patient doing well following procedure. No pain or discomfort.     Last edited by Anjali Shea on 10/3/2024 11:59 AM.            Assessment /Plan     For exam results, see Encounter Report.    Post-operative state  -     External Photography - OU - Both Eyes    Lower eyelid retraction of right eye    Lower eyelid retraction of left eye    Paralytic ptosis, left    Acquired myogenic ptosis of eyelid, right      Patient doing well! Post-operative instructions reviewed. Sutures removed. All questions answered.  Return in 5 weeks or sooner any worsening of vision/symptoms or any concerns.

## 2024-10-07 ENCOUNTER — TELEPHONE (OUTPATIENT)
Dept: OPHTHALMOLOGY | Facility: CLINIC | Age: 74
End: 2024-10-07
Payer: MEDICARE

## 2024-10-07 NOTE — TELEPHONE ENCOUNTER
----- Message from Merlene sent at 10/7/2024  1:35 PM CDT -----  Contact: pt @ [phone]  Melba Pabon calling regarding Appointment Access  (message) for #pt is calling to reschedule post-op appt 11/7 pt will like a morning appt, asking for call back

## 2024-10-11 ENCOUNTER — TELEPHONE (OUTPATIENT)
Dept: OPHTHALMOLOGY | Facility: CLINIC | Age: 74
End: 2024-10-11
Payer: MEDICARE

## 2024-10-11 NOTE — TELEPHONE ENCOUNTER
----- Message from Tech Chelle sent at 10/11/2024 10:59 AM CDT -----  Contact: pt @ [phone] (home)    ----- Message -----  From: Lisa Mallory  Sent: 10/11/2024  10:35 AM CDT  To: Kaylie Rodriguez Staff      ----- Message -----  From: Temitope Winkler  Sent: 10/11/2024  10:29 AM CDT  To: Lien CARTWRIGHT Staff    Consult/Advisory     Name Of Caller: Melba Pabon     Contact Preference:[phone] (home)      Nature of call: pt is calling to get post op appt time change to earlier time. Asking for a call back

## 2024-11-07 ENCOUNTER — OFFICE VISIT (OUTPATIENT)
Dept: OPHTHALMOLOGY | Facility: CLINIC | Age: 74
End: 2024-11-07
Payer: MEDICARE

## 2024-11-07 DIAGNOSIS — H02.535 LOWER EYELID RETRACTION OF LEFT EYE: ICD-10-CM

## 2024-11-07 DIAGNOSIS — H02.532 LOWER EYELID RETRACTION OF RIGHT EYE: ICD-10-CM

## 2024-11-07 DIAGNOSIS — Z98.890 POST-OPERATIVE STATE: Primary | ICD-10-CM

## 2024-11-07 DIAGNOSIS — H02.423 ACQUIRED MYOGENIC PTOSIS OF EYELID, BILATERAL: ICD-10-CM

## 2024-11-07 PROCEDURE — 99999 PR PBB SHADOW E&M-EST. PATIENT-LVL II: CPT | Mod: PBBFAC,HCNC,, | Performed by: OPHTHALMOLOGY

## 2024-11-07 NOTE — PROGRESS NOTES
HPI    Melba Pabon is a/an 74 y.o. female here for 6 week post op.  Date of procedure: 9/24/24  Procedure: FLAP,ZYGOMATICOFACIAL W/PRESERVATION OF VASCULAR PEDICLE   (Bilateral)  REPAIR, ECTROPION, EYELID (Bilateral)   Oral antibiotics: none    Eye meds: none    Patient doing well following procedure. No pain or discomfort.     Last edited by Anjali Shea on 11/7/2024  9:20 AM.            Assessment /Plan     For exam results, see Encounter Report.    Post-operative state  -     External Photography - OU - Both Eyes    Lower eyelid retraction of right eye    Lower eyelid retraction of left eye    Acquired myogenic ptosis of eyelid, bilateral      Patient doing well! Post-operative instructions reviewed. All questions answered. Return prn or sooner any worsening of vision/symptoms or any concerns.    Return for bilateral external levator resection left greater than right under local/mac.     Recommend starting lubricating ointment nightly. Continue ATs bid ou.

## 2024-12-02 ENCOUNTER — OFFICE VISIT (OUTPATIENT)
Dept: FAMILY MEDICINE | Facility: CLINIC | Age: 74
End: 2024-12-02
Payer: MEDICARE

## 2024-12-02 ENCOUNTER — LAB VISIT (OUTPATIENT)
Dept: LAB | Facility: HOSPITAL | Age: 74
End: 2024-12-02
Payer: MEDICARE

## 2024-12-02 VITALS
WEIGHT: 100.31 LBS | HEART RATE: 88 BPM | SYSTOLIC BLOOD PRESSURE: 130 MMHG | RESPIRATION RATE: 16 BRPM | HEIGHT: 65 IN | OXYGEN SATURATION: 99 % | DIASTOLIC BLOOD PRESSURE: 72 MMHG | TEMPERATURE: 98 F | BODY MASS INDEX: 16.71 KG/M2

## 2024-12-02 DIAGNOSIS — Z12.31 ENCOUNTER FOR SCREENING MAMMOGRAM FOR BREAST CANCER: ICD-10-CM

## 2024-12-02 DIAGNOSIS — Z01.818 PREOP EXAMINATION: Primary | ICD-10-CM

## 2024-12-02 DIAGNOSIS — R63.6 UNDERWEIGHT DUE TO INADEQUATE CALORIC INTAKE: ICD-10-CM

## 2024-12-02 DIAGNOSIS — R79.9 ABNORMAL FINDING OF BLOOD CHEMISTRY, UNSPECIFIED: ICD-10-CM

## 2024-12-02 DIAGNOSIS — M81.0 AGE-RELATED OSTEOPOROSIS WITHOUT CURRENT PATHOLOGICAL FRACTURE: ICD-10-CM

## 2024-12-02 DIAGNOSIS — Z01.818 PREOP EXAMINATION: ICD-10-CM

## 2024-12-02 DIAGNOSIS — Z98.890 HISTORY OF SURGERY FOR CEREBRAL ANEURYSM: ICD-10-CM

## 2024-12-02 DIAGNOSIS — Z74.2 NEED FOR ASSISTANCE AT HOME AND NO OTHER HOUSEHOLD MEMBER ABLE TO RENDER CARE: ICD-10-CM

## 2024-12-02 DIAGNOSIS — Z01.20 NEED FOR ASSESSMENT BY DENTISTRY FOR POOR DENTITION: ICD-10-CM

## 2024-12-02 DIAGNOSIS — I10 ESSENTIAL HYPERTENSION: ICD-10-CM

## 2024-12-02 DIAGNOSIS — Z23 NEED FOR SHINGLES VACCINE: ICD-10-CM

## 2024-12-02 DIAGNOSIS — Z13.220 LIPID SCREENING: ICD-10-CM

## 2024-12-02 LAB
ALBUMIN SERPL BCP-MCNC: 3.6 G/DL (ref 3.5–5.2)
ALP SERPL-CCNC: 99 U/L (ref 40–150)
ALT SERPL W/O P-5'-P-CCNC: 9 U/L (ref 10–44)
ANION GAP SERPL CALC-SCNC: 6 MMOL/L (ref 8–16)
AST SERPL-CCNC: 23 U/L (ref 10–40)
BILIRUB SERPL-MCNC: 0.7 MG/DL (ref 0.1–1)
BUN SERPL-MCNC: 13 MG/DL (ref 8–23)
CALCIUM SERPL-MCNC: 10.6 MG/DL (ref 8.7–10.5)
CHLORIDE SERPL-SCNC: 103 MMOL/L (ref 95–110)
CHOLEST SERPL-MCNC: 113 MG/DL (ref 120–199)
CHOLEST/HDLC SERPL: 2.1 {RATIO} (ref 2–5)
CO2 SERPL-SCNC: 25 MMOL/L (ref 23–29)
CREAT SERPL-MCNC: 0.8 MG/DL (ref 0.5–1.4)
EST. GFR  (NO RACE VARIABLE): >60 ML/MIN/1.73 M^2
ESTIMATED AVG GLUCOSE: 100 MG/DL (ref 68–131)
GLUCOSE SERPL-MCNC: 91 MG/DL (ref 70–110)
HBA1C MFR BLD: 5.1 % (ref 4–5.6)
HDLC SERPL-MCNC: 55 MG/DL (ref 40–75)
HDLC SERPL: 48.7 % (ref 20–50)
LDLC SERPL CALC-MCNC: 50.8 MG/DL (ref 63–159)
NONHDLC SERPL-MCNC: 58 MG/DL
POTASSIUM SERPL-SCNC: 4.1 MMOL/L (ref 3.5–5.1)
PROT SERPL-MCNC: 7.1 G/DL (ref 6–8.4)
SODIUM SERPL-SCNC: 134 MMOL/L (ref 136–145)
TRIGL SERPL-MCNC: 36 MG/DL (ref 30–150)

## 2024-12-02 PROCEDURE — 3288F FALL RISK ASSESSMENT DOCD: CPT | Mod: CPTII,S$GLB,,

## 2024-12-02 PROCEDURE — 3008F BODY MASS INDEX DOCD: CPT | Mod: CPTII,S$GLB,,

## 2024-12-02 PROCEDURE — 4010F ACE/ARB THERAPY RXD/TAKEN: CPT | Mod: CPTII,S$GLB,,

## 2024-12-02 PROCEDURE — 1160F RVW MEDS BY RX/DR IN RCRD: CPT | Mod: CPTII,S$GLB,,

## 2024-12-02 PROCEDURE — 90471 IMMUNIZATION ADMIN: CPT | Mod: S$GLB,,,

## 2024-12-02 PROCEDURE — 99214 OFFICE O/P EST MOD 30 MIN: CPT | Mod: 25,S$GLB,,

## 2024-12-02 PROCEDURE — 80061 LIPID PANEL: CPT

## 2024-12-02 PROCEDURE — 83036 HEMOGLOBIN GLYCOSYLATED A1C: CPT

## 2024-12-02 PROCEDURE — 1159F MED LIST DOCD IN RCRD: CPT | Mod: CPTII,S$GLB,,

## 2024-12-02 PROCEDURE — 3075F SYST BP GE 130 - 139MM HG: CPT | Mod: CPTII,S$GLB,,

## 2024-12-02 PROCEDURE — 36415 COLL VENOUS BLD VENIPUNCTURE: CPT | Mod: PO

## 2024-12-02 PROCEDURE — 3078F DIAST BP <80 MM HG: CPT | Mod: CPTII,S$GLB,,

## 2024-12-02 PROCEDURE — 1101F PT FALLS ASSESS-DOCD LE1/YR: CPT | Mod: CPTII,S$GLB,,

## 2024-12-02 PROCEDURE — 80053 COMPREHEN METABOLIC PANEL: CPT

## 2024-12-02 PROCEDURE — 90750 HZV VACC RECOMBINANT IM: CPT | Mod: S$GLB,,,

## 2024-12-02 PROCEDURE — 99999 PR PBB SHADOW E&M-EST. PATIENT-LVL V: CPT | Mod: PBBFAC,,,

## 2024-12-02 NOTE — PROGRESS NOTES
Health Maintenance Due   Topic     Colorectal Cancer Screening  CONSULT WITH PCP    DEXA Scan      Lipid Panel      Shingles Vaccine (2 of 2) hx chickenpox ; inform pt can get vaccine at pharmacy.    Mammogram

## 2024-12-02 NOTE — PROGRESS NOTES
HPI     Chief Complaint:  Chief Complaint   Patient presents with    Pre-op Exam       Melba Pabon is a 74 y.o. female with multiple medical diagnoses as listed in the medical history and problem list that presents for  preop    HPI    History of Present Illness    CHIEF COMPLAINT:  Melba presents today for pre-operative evaluation for eye surgery.    OCULAR HISTORY:  She underwent eye surgery on the left eye in September. She had an aneurysm 40 years ago that affected her vision.    SURGICAL HISTORY:  She has a history of small bowel surgery in 2016 and breast surgery with marker placement.    PREVENTIVE CARE:  She received the first shingles vaccination in December 2023. She reports needing a bone density test, which has not yet been completed. She receives annual mammograms.    MEDICAL HISTORY:  She has a history of aneurysm 40 years ago.              Pre Operative Assessment:    Procedure to be performed:  bilateral blepharoplasty      I recommend use of standard pre-op and post-op precautions for this patient. In my opinion, she is medically optimized for this procedure from a primary care standpoint, and can proceed without further evaluation.           Assessment & Plan     Reviewed patient's recent medical history, including eye surgery in September  Assessed need for pre-operative evaluation for upcoming bilateral eye surgery on December 17, 2024  Considered recent EKG from September as sufficient for pre-operative clearance  Evaluated need for updated lab work, including cholesterol and A1C  Assessed patient's preventive care needs, including bone density scan, mammogram, and colonoscopy    BREAST CANCER SCREENING:  - Ordered mammogram.  - Schedule bone density scan and mammogram at a later date (orders placed).    IMMUNIZATION:  - Ordered shingles vaccine (2nd dose of series).         Problem List Items Addressed This Visit       History of surgery for cerebral aneurysm    Relevant Orders     Ambulatory referral/consult to Home Health    Ambulatory referral/consult to Outpatient Case Management    Essential hypertension    Age-related osteoporosis without current pathological fracture    Relevant Orders    DXA Bone Density Axial Skeleton 1 or more sites    Underweight due to inadequate caloric intake    Relevant Orders    Ambulatory referral/consult to Home Health    Lipid screening    Relevant Orders    LIPID PANEL    Preop examination - Primary    Relevant Orders    Comprehensive Metabolic Panel    Hemoglobin A1C    GENERAL ADULT MEDICAL EXAMINATION:  - Ordered pre-operative lab work including chemistries, cholesterol, and A1C.  - Ordered bone density scan (DEXA).  - Follow up to establish care with primary care physician in the office.  - Melba can schedule appointments with the nurse practitioner when primary care physician is unavailable.     Other Visit Diagnoses       Abnormal finding of blood chemistry, unspecified        Relevant Orders    LIPID PANEL    Hemoglobin A1C  TYPE 2 DIABETES:  - Ordered pre-operative lab work including chemistries, cholesterol, and A1C.    Encounter for screening mammogram for breast cancer        Relevant Orders    Mammo Digital Screening Bilat w/ John    Need for assessment by dentistry for poor dentition        Relevant Orders    Ambulatory referral/consult to Dentistry    DENTAL DISORDER:  - Referred to \[facility]\"" for dentistry services.  - Await phone call from \[facility]\"" dentistry for appointment scheduling.    Need for assistance at home and no other household member able to render care        Relevant Orders    Ambulatory referral/consult to Home Health    Ambulatory referral/consult to Outpatient Case Management    REDUCED MOBILITY:  - Referred for home health services.    Need for shingles vaccine        Relevant Medications    varicella zoster (Shingrix) IM vaccine (>/= 49 yo) (Completed)              --------------------------------------------      Health  "Maintenance:  Health Maintenance         Date Due Completion Date    Colorectal Cancer Screening 09/10/2017 9/10/2007    DEXA Scan 06/05/2021 6/5/2018    Lipid Panel 05/03/2023 5/3/2022    Mammogram 10/10/2024 10/10/2023    High Dose Statin 12/02/2025 12/2/2024    TETANUS VACCINE 10/16/2028 10/16/2018            Health maintenance reviewed    Follow Up:  Follow up if symptoms worsen or fail to improve.    Exam     Review of Systems:  (as noted above)  Review of Systems  ROS:  General: -fever, -chills, -fatigue, -weight gain, -weight loss  Eyes: -vision changes, -redness, -discharge  ENT: -ear pain, -nasal congestion, -sore throat  Cardiovascular: -chest pain, -palpitations, -lower extremity edema  Respiratory: -cough, -shortness of breath  Gastrointestinal: -abdominal pain, -nausea, -vomiting, -diarrhea, -constipation, -blood in stool  Genitourinary: -dysuria, -hematuria, -frequency  Musculoskeletal: -joint pain, -muscle pain  Skin: -rash, -lesion  Neurological: -headache, -dizziness, -numbness, -tingling  Psychiatric: -anxiety, -depression, -sleep difficulty     Physical Exam:   Physical Exam  Vitals:    12/02/24 1315   BP: 130/72   BP Location: Left arm   Patient Position: Sitting   Pulse: 88   Resp: 16   Temp: 98 °F (36.7 °C)   TempSrc: Oral   SpO2: 99%   Weight: 45.5 kg (100 lb 5 oz)   Height: 5' 5" (1.651 m)      Body mass index is 16.69 kg/m².    Physical Exam    Vitals: Blood pressure: 130/72.  General: No acute distress. Well-developed. Well-nourished.  Eyes: EOMI. Sclerae anicteric.  HENT: Normocephalic. Atraumatic. Nares patent.   Cardiovascular: Regular rate. Regular rhythm. + murmur. No rubs. No gallops.   Respiratory: Normal respiratory effort. Clear to auscultation bilaterally. No rales. No rhonchi. No wheezing.  Musculoskeletal: No  obvious deformity.  Extremities: No lower extremity edema.  Neurological: Alert & oriented x3. No slurred speech. Normal gait.  Psychiatric: Normal mood. Normal affect. " Good insight. Good judgment.  Skin: Warm. Dry. No rash.           History     Past Medical History:  Past Medical History:   Diagnosis Date    Acute blood loss anemia 07/28/2018    Age-related osteoporosis without current pathological fracture 04/13/2018    Allergy     Cataract     Cerebral aneurysm     Gastrointestinal stromal tumor (GIST)     GI bleed     Hypertension     Lipid screening 12/2/2024       Past Surgical History:  Past Surgical History:   Procedure Laterality Date    BRAIN SURGERY      metal plate in head    BREAST BIOPSY Right     CATARACT EXTRACTION Left 11/05/2020    Dr. Pond    CATARACT EXTRACTION W/  INTRAOCULAR LENS IMPLANT Right 01/14/2021    Dr. Pond    COLON SURGERY  2016    small bowel resection    DESCEMET'S MEMBRANE ENDOTHELIAL KERATOPLASTY (DMEK) Left 3/7/2022    Procedure: DESCEMET'S MEMBRANE ENDOTHELIAL KERATOPLASTY (DMEK);  Surgeon: Roopa Emmanuel MD;  Location: [facility] [place] OR;  Service: Ophthalmology;  Laterality: Left;    ECTROPION REPAIR Bilateral 9/24/2024    Procedure: REPAIR, ECTROPION, EYELID;  Surgeon: Jennifer Lott MD;  Location: [facility] OR;  Service: Ophthalmology;  Laterality: Bilateral;    EXCISION OF MASS OF ABDOMEN N/A 8/27/2018    Procedure: RESECTION GASTRIC MASS;  Surgeon: Chuck Palomares MD;  Location: [facility] OR;  Service: General;  Laterality: N/A;  RN Pre Op 8-16-18    EYE SURGERY      FLAP,ZYGOMATICOFACIAL W/PRESERVATION OF VASCULAR PEDICLE Bilateral 9/24/2024    Procedure: FLAP,ZYGOMATICOFACIAL W/PRESERVATION OF VASCULAR PEDICLE;  Surgeon: Jennifer Lott MD;  Location: [facility] OR;  Service: Ophthalmology;  Laterality: Bilateral;    HERNIA REPAIR  2016    HYSTERECTOMY      INTRAOCULAR PROSTHESES INSERTION Left 11/5/2020    Procedure: INSERTION, IOL PROSTHESIS;  Surgeon: Howard Pond MD;  Location: [facility] OR;  Service: Ophthalmology;  Laterality: Left;    INTRAOCULAR PROSTHESES INSERTION Right 1/14/2021    Procedure: INSERTION, IOL PROSTHESIS;  Surgeon:  Howard Pond MD;  Location: [facility] OR;  Service: Ophthalmology;  Laterality: Right;  RN Phone Pre OP 1-7-2020, Covid NEGATIVE ON  1-. Arrival 09:00 am  C A    OOPHORECTOMY      PHACOEMULSIFICATION OF CATARACT Left 11/5/2020    Procedure: PHACOEMULSIFICATION, CATARACT;  Surgeon: Howard Pond MD;  Location: [facility] OR;  Service: Ophthalmology;  Laterality: Left;  RN Phone Pre OP 11-2-2020. --COVID NEGATIVE ON  11-4-2020. Arrival 11:45 am.C A    PHACOEMULSIFICATION OF CATARACT Right 1/14/2021    Procedure: PHACOEMULSIFICATION, CATARACT;  Surgeon: Howard Pond MD;  Location: [facility] OR;  Service: Ophthalmology;  Laterality: Right;    SMALL INTESTINE SURGERY         Social History:  Social History     Socioeconomic History    Marital status: Single   Tobacco Use    Smoking status: Some Days     Current packs/day: 0.50     Average packs/day: 0.5 packs/day for 25.9 years (13.0 ttl pk-yrs)     Types: Cigarettes     Start date: 1999    Smokeless tobacco: Never    Tobacco comments:     occassional cigarette   Substance and Sexual Activity    Alcohol use: No    Drug use: No    Sexual activity: Not Currently     Partners: Male       Family History:  Family History   Problem Relation Name Age of Onset    Dementia Mother      No Known Problems Father      No Known Problems Sister      No Known Problems Brother      No Known Problems Maternal Aunt      No Known Problems Maternal Uncle      No Known Problems Paternal Aunt      No Known Problems Paternal Uncle      No Known Problems Maternal Grandmother      No Known Problems Maternal Grandfather      No Known Problems Paternal Grandmother      No Known Problems Paternal Grandfather      Amblyopia Neg Hx      Blindness Neg Hx      Cancer Neg Hx      Cataracts Neg Hx      Diabetes Neg Hx      Glaucoma Neg Hx      Hypertension Neg Hx      Macular degeneration Neg Hx      Retinal detachment Neg Hx      Strabismus Neg Hx      Stroke Neg Hx      Thyroid  disease Neg Hx         Allergies and Medications: (updated and reviewed)  Review of patient's allergies indicates:   Allergen Reactions    Pcn [penicillins]      Current Outpatient Medications   Medication Sig Dispense Refill    atorvastatin (LIPITOR) 20 MG tablet Take 1 tablet (20 mg total) by mouth every evening. 90 tablet 0    HYDROcodone-acetaminophen (NORCO) 5-325 mg per tablet Take 1 tablet by mouth every 6 (six) hours as needed for Pain. 16 tablet 0    levocetirizine (XYZAL) 5 MG tablet Take 1 tablet (5 mg total) by mouth every evening. 90 tablet 1    lisinopriL 10 MG tablet Take 1 tablet (10 mg total) by mouth once daily. 90 tablet 0    multivitamin (THERAGRAN) per tablet Take 1 tablet by mouth once daily.      neomycin-polymyxin-dexamethasone (MAXITROL) 3.5 mg/g-10,000 unit/g-0.1 % Oint Place into both eyes every 6 (six) hours. Placed 1/2 inch ribbon along suture line in both eyes. 3.5 g 0    neomycin-polymyxin-dexamethasone (MAXITROL) 3.5mg/mL-10,000 unit/mL-0.1 % DrpS Place 1 drop into both eyes every 6 (six) hours. 5 mL 1     Current Facility-Administered Medications   Medication Dose Route Frequency Provider Last Rate Last Admin    lidocaine (PF) 10 mg/ml (1%) injection 10 mg  1 mL Intradermal Once Chuck Palomares MD         Facility-Administered Medications Ordered in Other Visits   Medication Dose Route Frequency Provider Last Rate Last Admin    cyclopentolate 1% ophthalmic solution 1 drop  1 drop Right Eye On Call Procedure Howard Pond MD   1 drop at 01/14/21 1007       Patient Care Team:  [facility] (Gastroenterology)         - The patient is given an After Visit Summary that lists all medications with directions, allergies, education, orders placed during this encounter and follow-up instructions.      - I have reviewed the patient's medical information including past medical, family, and social history sections including the medications  and allergies.      - We discussed the patient's current medications.     This note was created by combination of typed  and MModal dictation.  Transcription errors may be present.  If there are any questions, please contact me.     This note was generated with the assistance of ambient listening technology. Verbal consent was obtained by the patient and accompanying visitor(s) for the recording of patient appointment to facilitate this note. I attest to having reviewed and edited the generated note for accuracy, though some syntax or spelling errors may persist. Please contact the author of this note for any clarification.          Miya White PA-C

## 2024-12-03 ENCOUNTER — TELEPHONE (OUTPATIENT)
Dept: FAMILY MEDICINE | Facility: CLINIC | Age: 74
End: 2024-12-03
Payer: MEDICARE

## 2024-12-03 NOTE — TELEPHONE ENCOUNTER
----- Message from Miya White PA-C sent at 12/3/2024  9:51 AM CST -----  Please contact the patient and let them know that their results were fine and do not require any change in treatment.

## 2024-12-05 DIAGNOSIS — I70.0 ATHEROSCLEROSIS OF AORTA: ICD-10-CM

## 2024-12-05 DIAGNOSIS — I10 ESSENTIAL HYPERTENSION: ICD-10-CM

## 2024-12-05 RX ORDER — LISINOPRIL 10 MG/1
10 TABLET ORAL DAILY
Qty: 90 TABLET | Refills: 0 | Status: SHIPPED | OUTPATIENT
Start: 2024-12-05

## 2024-12-05 RX ORDER — ATORVASTATIN CALCIUM 20 MG/1
20 TABLET, FILM COATED ORAL NIGHTLY
Qty: 90 TABLET | Refills: 0 | Status: SHIPPED | OUTPATIENT
Start: 2024-12-05

## 2024-12-10 ENCOUNTER — TELEPHONE (OUTPATIENT)
Dept: OPHTHALMOLOGY | Facility: CLINIC | Age: 74
End: 2024-12-10
Payer: MEDICARE

## 2024-12-12 ENCOUNTER — PATIENT OUTREACH (OUTPATIENT)
Dept: ADMINISTRATIVE | Facility: OTHER | Age: 74
End: 2024-12-12

## 2024-12-12 NOTE — PROGRESS NOTES
CHW - Case Closure    This Community Health Worker spoke to patient via telephone today.   Pt/Caregiver reported: Patient Denied Needs. Pt isn't eligable for an aide at this time.  Pt/Caregiver denied any additional needs at this time and agrees with episode closure at this time.  Provided patient with Community Health Worker's contact information and encouraged him/her to contact this Community Health Worker if additional needs arise.              CHW - Outreach Attempt    Community Health Worker left a voicemail message for 1st attempt to contact patient regarding: Lvm to notify pt if in need of resources to give me a call. 1st attempt.   Community Health Worker to attempt to contact patient on:    12/12/24

## 2024-12-16 ENCOUNTER — TELEPHONE (OUTPATIENT)
Dept: OPHTHALMOLOGY | Facility: CLINIC | Age: 74
End: 2024-12-16
Payer: MEDICARE

## 2024-12-16 NOTE — TELEPHONE ENCOUNTER
Called pt advised that Doc will not be in tomorrow to do her sx. Pt was not happy but understood and wants a new sx date as soon as we can.

## 2024-12-31 ENCOUNTER — TELEPHONE (OUTPATIENT)
Dept: OPHTHALMOLOGY | Facility: CLINIC | Age: 74
End: 2024-12-31
Payer: MEDICARE

## 2024-12-31 NOTE — TELEPHONE ENCOUNTER
----- Message from [employer] sent at 12/31/2024 11:19 AM CST -----  Regarding: Consult/Advisory  Contact: Melba Pabon     Consult/Advisory     Name Of Caller:Melba Pabon         Contact Preference:[phone] (home)       Nature of call:Patient is calling for someone to call and clarify her surgery date and time. Requesting a call back

## 2025-01-01 ENCOUNTER — OFFICE VISIT (OUTPATIENT)
Dept: FAMILY MEDICINE | Facility: CLINIC | Age: 75
End: 2025-01-01
Payer: MEDICARE

## 2025-01-01 VITALS
OXYGEN SATURATION: 98 % | HEART RATE: 130 BPM | BODY MASS INDEX: 19.04 KG/M2 | TEMPERATURE: 98 F | SYSTOLIC BLOOD PRESSURE: 100 MMHG | WEIGHT: 97 LBS | DIASTOLIC BLOOD PRESSURE: 62 MMHG | HEIGHT: 60 IN

## 2025-01-01 DIAGNOSIS — K92.1 BLACK TARRY STOOLS: Primary | ICD-10-CM

## 2025-01-01 DIAGNOSIS — R19.7 DIARRHEA OF PRESUMED INFECTIOUS ORIGIN: ICD-10-CM

## 2025-01-01 PROCEDURE — 1101F PT FALLS ASSESS-DOCD LE1/YR: CPT | Mod: CPTII,HCNC,S$GLB,

## 2025-01-01 PROCEDURE — 3078F DIAST BP <80 MM HG: CPT | Mod: CPTII,HCNC,S$GLB,

## 2025-01-01 PROCEDURE — 99214 OFFICE O/P EST MOD 30 MIN: CPT | Mod: HCNC,S$GLB,,

## 2025-01-01 PROCEDURE — 3288F FALL RISK ASSESSMENT DOCD: CPT | Mod: CPTII,HCNC,S$GLB,

## 2025-01-01 PROCEDURE — 3074F SYST BP LT 130 MM HG: CPT | Mod: CPTII,HCNC,S$GLB,

## 2025-01-01 PROCEDURE — 4010F ACE/ARB THERAPY RXD/TAKEN: CPT | Mod: CPTII,HCNC,S$GLB,

## 2025-01-01 PROCEDURE — 99999 PR PBB SHADOW E&M-EST. PATIENT-LVL IV: CPT | Mod: PBBFAC,HCNC,,

## 2025-01-01 PROCEDURE — 1126F AMNT PAIN NOTED NONE PRSNT: CPT | Mod: CPTII,HCNC,S$GLB,

## 2025-01-01 PROCEDURE — 1159F MED LIST DOCD IN RCRD: CPT | Mod: CPTII,HCNC,S$GLB,

## 2025-01-01 PROCEDURE — 1160F RVW MEDS BY RX/DR IN RCRD: CPT | Mod: CPTII,HCNC,S$GLB,

## 2025-01-01 PROCEDURE — 3008F BODY MASS INDEX DOCD: CPT | Mod: CPTII,HCNC,S$GLB,

## 2025-01-01 RX ORDER — LEVOFLOXACIN 750 MG/1
750 TABLET, FILM COATED ORAL DAILY
Qty: 3 TABLET | Refills: 0 | Status: SHIPPED | OUTPATIENT
Start: 2025-01-01 | End: 2025-08-11

## 2025-01-02 ENCOUNTER — TELEPHONE (OUTPATIENT)
Dept: OPHTHALMOLOGY | Facility: CLINIC | Age: 75
End: 2025-01-02
Payer: MEDICARE

## 2025-02-06 RX ORDER — CYPROHEPTADINE HYDROCHLORIDE 4 MG/1
4 TABLET ORAL 2 TIMES DAILY PRN
Qty: 30 TABLET | Refills: 2 | Status: SHIPPED | OUTPATIENT
Start: 2025-02-06

## 2025-02-18 DIAGNOSIS — J30.1 SEASONAL ALLERGIC RHINITIS DUE TO POLLEN: ICD-10-CM

## 2025-02-18 DIAGNOSIS — I70.0 ATHEROSCLEROSIS OF AORTA: ICD-10-CM

## 2025-02-18 DIAGNOSIS — I10 ESSENTIAL HYPERTENSION: ICD-10-CM

## 2025-02-18 RX ORDER — LEVOCETIRIZINE DIHYDROCHLORIDE 5 MG/1
5 TABLET, FILM COATED ORAL NIGHTLY
Qty: 90 TABLET | Refills: 1 | Status: SHIPPED | OUTPATIENT
Start: 2025-02-18 | End: 2026-02-18

## 2025-02-18 RX ORDER — ATORVASTATIN CALCIUM 20 MG/1
20 TABLET, FILM COATED ORAL NIGHTLY
Qty: 90 TABLET | Refills: 0 | Status: SHIPPED | OUTPATIENT
Start: 2025-02-18

## 2025-02-18 RX ORDER — LISINOPRIL 10 MG/1
10 TABLET ORAL DAILY
Qty: 90 TABLET | Refills: 0 | Status: SHIPPED | OUTPATIENT
Start: 2025-02-18

## 2025-03-07 ENCOUNTER — TELEPHONE (OUTPATIENT)
Dept: OPHTHALMOLOGY | Facility: CLINIC | Age: 75
End: 2025-03-07
Payer: MEDICARE

## 2025-03-10 ENCOUNTER — TELEPHONE (OUTPATIENT)
Dept: OPHTHALMOLOGY | Facility: CLINIC | Age: 75
End: 2025-03-10
Payer: MEDICARE

## 2025-03-10 NOTE — TELEPHONE ENCOUNTER
Called pt there was no answer a message was left     ----- Message from Marie sent at 3/10/2025  1:44 PM CDT -----  Regarding: Rescheduling Existing Appts  Name Of Caller:   Vicente Preference:   [phone] Nature of call:    Pt would like to reschedule their post-op. She won't have transportation on 07/10 however she's available on Tuesday 07/08.

## 2025-03-11 ENCOUNTER — TELEPHONE (OUTPATIENT)
Dept: OPHTHALMOLOGY | Facility: CLINIC | Age: 75
End: 2025-03-11
Payer: MEDICARE

## 2025-03-11 DIAGNOSIS — H02.423 ACQUIRED MYOGENIC PTOSIS OF EYELID, BILATERAL: Primary | ICD-10-CM

## 2025-03-18 NOTE — TELEPHONE ENCOUNTER
Left message for patient/caregiver to call office.  Per Dr. Miller, patient needs to be seen in office or Urgent Care.     show

## 2025-04-15 ENCOUNTER — OFFICE VISIT (OUTPATIENT)
Dept: FAMILY MEDICINE | Facility: CLINIC | Age: 75
End: 2025-04-15
Payer: MEDICARE

## 2025-04-15 VITALS
BODY MASS INDEX: 15.2 KG/M2 | OXYGEN SATURATION: 96 % | RESPIRATION RATE: 18 BRPM | SYSTOLIC BLOOD PRESSURE: 126 MMHG | WEIGHT: 91.25 LBS | TEMPERATURE: 99 F | HEART RATE: 100 BPM | DIASTOLIC BLOOD PRESSURE: 78 MMHG | HEIGHT: 65 IN

## 2025-04-15 DIAGNOSIS — F17.210 CIGARETTE NICOTINE DEPENDENCE WITHOUT COMPLICATION: ICD-10-CM

## 2025-04-15 DIAGNOSIS — M85.859: ICD-10-CM

## 2025-04-15 DIAGNOSIS — Z78.0 ASYMPTOMATIC MENOPAUSAL STATE: ICD-10-CM

## 2025-04-15 DIAGNOSIS — M25.551 CHRONIC HIP PAIN, RIGHT: Primary | ICD-10-CM

## 2025-04-15 DIAGNOSIS — G89.29 CHRONIC HIP PAIN, RIGHT: Primary | ICD-10-CM

## 2025-04-15 DIAGNOSIS — N95.1 MENOPAUSAL STATE: ICD-10-CM

## 2025-04-15 DIAGNOSIS — E83.52 HYPERCALCEMIA: ICD-10-CM

## 2025-04-15 DIAGNOSIS — Z12.31 BREAST CANCER SCREENING BY MAMMOGRAM: ICD-10-CM

## 2025-04-15 PROCEDURE — 99214 OFFICE O/P EST MOD 30 MIN: CPT | Mod: 25,S$GLB,,

## 2025-04-15 PROCEDURE — 99406 BEHAV CHNG SMOKING 3-10 MIN: CPT | Mod: S$GLB,,,

## 2025-04-15 PROCEDURE — 3008F BODY MASS INDEX DOCD: CPT | Mod: CPTII,S$GLB,,

## 2025-04-15 PROCEDURE — 1159F MED LIST DOCD IN RCRD: CPT | Mod: CPTII,S$GLB,,

## 2025-04-15 PROCEDURE — 99999 PR PBB SHADOW E&M-EST. PATIENT-LVL V: CPT | Mod: PBBFAC,,,

## 2025-04-15 PROCEDURE — 3074F SYST BP LT 130 MM HG: CPT | Mod: CPTII,S$GLB,,

## 2025-04-15 PROCEDURE — 1126F AMNT PAIN NOTED NONE PRSNT: CPT | Mod: CPTII,S$GLB,,

## 2025-04-15 PROCEDURE — 1160F RVW MEDS BY RX/DR IN RCRD: CPT | Mod: CPTII,S$GLB,,

## 2025-04-15 PROCEDURE — 3288F FALL RISK ASSESSMENT DOCD: CPT | Mod: CPTII,S$GLB,,

## 2025-04-15 PROCEDURE — 3078F DIAST BP <80 MM HG: CPT | Mod: CPTII,S$GLB,,

## 2025-04-15 PROCEDURE — 4010F ACE/ARB THERAPY RXD/TAKEN: CPT | Mod: CPTII,S$GLB,,

## 2025-04-15 PROCEDURE — 1101F PT FALLS ASSESS-DOCD LE1/YR: CPT | Mod: CPTII,S$GLB,,

## 2025-04-15 NOTE — PROGRESS NOTES
Health Maintenance Due   Topic Date Due    Colorectal Cancer Screening  Consult PCP     DEXA Scan  Consult PCP     Mammogram  Consult PCP

## 2025-04-15 NOTE — PROGRESS NOTES
Family Medicine     Patient name: Melba Pabon  MRN: [id number]  : 1950  PCP NAME: Franco Gonzales MD    Subjective     History of Present Illness:  Patient ID: Melba Pabon is a 74 y.o. Black or  female presents to the clinic today. Chronic medical issues, if present, have been documented.   Active Problem List with Overview Notes    Diagnosis Date Noted    Lipid screening 2024    Preop examination 2024    Dysuria 2024    Chronic hip pain, right 2024    Underweight due to inadequate caloric intake 2023    Neutropenia, unspecified type 2023    Corneal edema 2022    Unintended weight loss 2021    Nuclear sclerotic cataract of right eye 2021    NS (nuclear sclerosis), left 2020    Atherosclerosis of aorta 2018    Gastrointestinal stromal tumor (GIST) 10/01/2018    Gastric mass 2018    Hypercalcemia 2018    History of surgery for cerebral aneurysm 2018    Essential hypertension 2018    Age-related osteoporosis without current pathological fracture 2018    History of resection of small bowel 2016    Vocal cord palsy 10/21/2013     Formatting of this note might be different from the original.  Right  dx update  Right  dx update      Parathyroid adenoma 2013    Aneurysm 2013       Chief Complaint  Chief Complaint   Patient presents with    Establish Care     Mammogram and colonoscopy before eye surgery in July  pt has trouble standing for long periods      Feels at baseline today.  Needs paperwork for transportation assistance completed.  Being worked up for eye surgery.  Still smoking cigarettes    Medications   Medication List with Changes/Refills   Current Medications    ATORVASTATIN (LIPITOR) 20 MG TABLET    Take 1 tablet (20 mg total) by mouth every evening.    CYPROHEPTADINE (PERIACTIN) 4 MG TABLET    Take 1 tablet (4 mg total) by mouth 2 (two) times daily as  needed (as needed).    HYDROCODONE-ACETAMINOPHEN (NORCO) 5-325 MG PER TABLET    Take 1 tablet by mouth every 6 (six) hours as needed for Pain.    LEVOCETIRIZINE (XYZAL) 5 MG TABLET    Take 1 tablet (5 mg total) by mouth every evening.    LISINOPRIL 10 MG TABLET    Take 1 tablet (10 mg total) by mouth once daily.    MULTIVITAMIN (THERAGRAN) PER TABLET    Take 1 tablet by mouth once daily.    NEOMYCIN-POLYMYXIN-DEXAMETHASONE (MAXITROL) 3.5 MG/G-10,000 UNIT/G-0.1 % OINT    Place into both eyes every 6 (six) hours. Placed 1/2 inch ribbon along suture line in both eyes.    NEOMYCIN-POLYMYXIN-DEXAMETHASONE (MAXITROL) 3.5MG/ML-10,000 UNIT/ML-0.1 % DRPS    Place 1 drop into both eyes every 6 (six) hours.    TOBRAMYCIN-DEXAMETHASONE 0.3-0.1% (TOBRADEX) 0.3-0.1 % DRPS    Place 1 drop into both eyes as needed (when closed shut/dry).       Allergies  Review of patient's allergies indicates:   Allergen Reactions    Pcn [penicillins]      Past Medical history  Past Medical History:   Diagnosis Date    Acute blood loss anemia 07/28/2018    Age-related osteoporosis without current pathological fracture 04/13/2018    Allergy     Cataract     Cerebral aneurysm     Gastrointestinal stromal tumor (GIST)     GI bleed     Hypertension     Lipid screening 12/2/2024          Surgical History  Past Surgical History:   Procedure Laterality Date    BRAIN SURGERY      metal plate in head    BREAST BIOPSY Right     CATARACT EXTRACTION Left 11/05/2020    Dr. Pond    CATARACT EXTRACTION W/  INTRAOCULAR LENS IMPLANT Right 01/14/2021    Dr. Pond    COLON SURGERY  2016    small bowel resection    DESCEMET'S MEMBRANE ENDOTHELIAL KERATOPLASTY (DMEK) Left 3/7/2022    Procedure: DESCEMET'S MEMBRANE ENDOTHELIAL KERATOPLASTY (DMEK);  Surgeon: Roopa Emmanuel MD;  Location: [facility];  Service: Ophthalmology;  Laterality: Left;    ECTROPION REPAIR Bilateral 9/24/2024    Procedure: REPAIR, ECTROPION, EYELID;  Surgeon: Jennifer Lott MD;  [facility];  Service: Ophthalmology;  Laterality: Bilateral;    EXCISION OF MASS OF ABDOMEN N/A 8/27/2018    Procedure: RESECTION GASTRIC MASS;  Surgeon: Chuck Palomares MD;  Location: [facility] OR;  Service: General;  Laterality: N/A;  RN Pre Op 8-16-18    EYE SURGERY      FLAP,ZYGOMATICOFACIAL W/PRESERVATION OF VASCULAR PEDICLE Bilateral 9/24/2024    Procedure: FLAP,ZYGOMATICOFACIAL W/PRESERVATION OF VASCULAR PEDICLE;  Surgeon: Jennifer Lott MD;  Location: [facility] OR;  Service: Ophthalmology;  Laterality: Bilateral;    HERNIA REPAIR  2016    HYSTERECTOMY      INTRAOCULAR PROSTHESES INSERTION Left 11/5/2020    Procedure: INSERTION, IOL PROSTHESIS;  Surgeon: Howard Pond MD;  Location: [facility] OR;  Service: Ophthalmology;  Laterality: Left;    INTRAOCULAR PROSTHESES INSERTION Right 1/14/2021    Procedure: INSERTION, IOL PROSTHESIS;  Surgeon: Howard Pond MD;  Location: [facility] OR;  Service: Ophthalmology;  Laterality: Right;  RN Phone Pre OP 1-7-2020, Covid NEGATIVE ON  1-. Arrival 09:00 am  C A    OOPHORECTOMY      PHACOEMULSIFICATION OF CATARACT Left 11/5/2020    Procedure: PHACOEMULSIFICATION, CATARACT;  Surgeon: Howard Pond MD;  Location: [facility] OR;  Service: Ophthalmology;  Laterality: Left;  RN Phone Pre OP 11-2-2020. --COVID NEGATIVE ON  11-4-2020. Arrival 11:45 am.C A    PHACOEMULSIFICATION OF CATARACT Right 1/14/2021    Procedure: PHACOEMULSIFICATION, CATARACT;  Surgeon: Howard Pond MD;  Location: [facility] OR;  Service: Ophthalmology;  Laterality: Right;    SMALL INTESTINE SURGERY       Family History  Family History   Problem Relation Name Age of Onset    Dementia Mother      No Known Problems Father      No Known Problems Sister      No Known Problems Brother      No Known Problems Maternal Aunt      No Known Problems Maternal Uncle      No Known Problems Paternal Aunt      No Known Problems Paternal Uncle      No Known Problems Maternal Grandmother      No Known Problems  "Maternal Grandfather      No Known Problems Paternal Grandmother      No Known Problems Paternal Grandfather      Amblyopia Neg Hx      Blindness Neg Hx      Cancer Neg Hx      Cataracts Neg Hx      Diabetes Neg Hx      Glaucoma Neg Hx      Hypertension Neg Hx      Macular degeneration Neg Hx      Retinal detachment Neg Hx      Strabismus Neg Hx      Stroke Neg Hx      Thyroid disease Neg Hx       Social History  Social History[1]       Review of system     ROS  Negative except as mentioned above  Physical exam   Vital Signs  Vitals:    04/15/25 1317   BP: 126/78   Pulse: 100   Resp: 18   Temp: 98.5 °F (36.9 °C)   TempSrc: Oral   SpO2: 96%   Weight: 41.4 kg (91 lb 4.3 oz)   Height: 5' 5" (1.651 m)       Physical Exam  Constitutional:       Appearance: Normal appearance.   Eyes:      General: Visual field deficit present.   Cardiovascular:      Rate and Rhythm: Normal rate and regular rhythm.      Pulses: Normal pulses.      Heart sounds: Normal heart sounds.   Pulmonary:      Effort: Pulmonary effort is normal. No respiratory distress.      Breath sounds: Normal breath sounds. No wheezing.   Abdominal:      General: Bowel sounds are normal. There is no distension.      Palpations: Abdomen is soft.      Tenderness: There is no abdominal tenderness.   Musculoskeletal:      Right lower leg: No edema.      Left lower leg: No edema.   Skin:     General: Skin is warm.   Neurological:      Mental Status: She is alert and oriented to person, place, and time.       Wt Readings from Last 3 Encounters:   04/15/25 41.4 kg (91 lb 4.3 oz)   12/02/24 45.5 kg (100 lb 5 oz)   09/24/24 45.4 kg (100 lb)          Body mass index is 15.19 kg/m².      Laboratory data and other diagnostic findings     Lab Results   Component Value Date    WBC 3.25 (L) 09/09/2024    HGB 14.0 09/09/2024    HCT 43.4 09/09/2024    MCV 95 09/09/2024     09/09/2024         Lab Results   Component Value Date    CREATININE 0.8 12/02/2024    BUN 13 " 12/02/2024     (L) 12/02/2024    K 4.1 12/02/2024     12/02/2024    CO2 25 12/02/2024         Assessment and plan       Chronic hip pain, right  Stable  Osteopenia determined by dual energy x-ray photon absorptiometry (DEXA) scan of hip    Menopausal state  -     DXA Bone Density Axial Skeleton 1 or more sites; Future; Expected date: 04/15/2025    Breast cancer screening by mammogram  -     Mammo Digital Screening Bilat w/ John (XPD); Future; Expected date: 04/15/2025    Asymptomatic menopausal state  -     DXA Bone Density Axial Skeleton 1 or more sites; Future; Expected date: 04/15/2025    Hypercalcemia   Latest Reference Range & Units 09/09/24 09:24 12/02/24 14:32   Calcium 8.7 - 10.5 mg/dL 11.5 (H) 10.6 (H)   (H): Data is abnormally high  Cause of hypercalcemia is unclear.  Workup labs ordered.  -     Comprehensive Metabolic Panel; Future; Expected date: 04/15/2025  -     PTH, Intact; Future; Expected date: 04/15/2025  -     CBC Auto Differential; Future; Expected date: 04/15/2025  -     Vitamin D 25 hydroxy; Future; Expected date: 04/15/2025    Cigarette nicotine dependence without complication  Assistance with smoking cessation was offered, including:  []  Medications  [x]  Counseling  []  Printed Information on Smoking Cessation  []  Referral to a Smoking Cessation Program    Patient was counseled regarding smoking for  5  minutes.           Problem List Items Addressed This Visit       Hypercalcemia    Relevant Orders    Comprehensive Metabolic Panel    PTH, Intact    CBC Auto Differential    Vitamin D 25 hydroxy    Chronic hip pain, right - Primary     Other Visit Diagnoses         Osteopenia determined by dual energy x-ray photon absorptiometry (DEXA) scan of hip          Menopausal state        Relevant Orders    DXA Bone Density Axial Skeleton 1 or more sites      Breast cancer screening by mammogram        Relevant Orders    Mammo Digital Screening Bilat w/ John (XPD)      Asymptomatic  menopausal state        Relevant Orders    DXA Bone Density Axial Skeleton 1 or more sites      Cigarette nicotine dependence without complication                    Medications Administered this visit       Future Appointments  Future Appointments   Date Time Provider Department Center   5/13/2025 10:00 AM [facility] MAMMO1 [facility] [place]   5/13/2025 12:30 PM [facility] DEXA1 [facility] BMD [place]   6/24/2025 10:00 AM Franco Gonzales MD [facility] MED [place]   6/24/2025 10:30 AM [facility] [place]   7/16/2025 11:00 AM Jennifer Lott MD [facility]         No follow-ups on file. and PRN.      Franco Gonzales MD    This office note was created by combination of typed  and MModal dictation.  Transcription errors may be present.  If there are any questions, please contact me.                 [1]   Social History  Tobacco Use    Smoking status: Some Days     Current packs/day: 0.50     Average packs/day: 0.5 packs/day for 26.3 years (13.1 ttl pk-yrs)     Types: Cigarettes     Start date: 1999    Smokeless tobacco: Never    Tobacco comments:     occassional cigarette   Substance Use Topics    Alcohol use: No    Drug use: No

## 2025-04-28 DIAGNOSIS — Z00.00 ENCOUNTER FOR MEDICARE ANNUAL WELLNESS EXAM: ICD-10-CM

## 2025-05-13 ENCOUNTER — HOSPITAL ENCOUNTER (OUTPATIENT)
Dept: RADIOLOGY | Facility: HOSPITAL | Age: 75
Discharge: HOME | End: 2025-05-13
Payer: MEDICARE

## 2025-05-13 ENCOUNTER — HOSPITAL ENCOUNTER (OUTPATIENT)
Dept: RADIOLOGY | Facility: CLINIC | Age: 75
Discharge: HOME | End: 2025-05-13
Payer: MEDICARE

## 2025-05-13 DIAGNOSIS — Z78.0 ASYMPTOMATIC MENOPAUSAL STATE: ICD-10-CM

## 2025-05-13 DIAGNOSIS — N95.1 MENOPAUSAL STATE: ICD-10-CM

## 2025-05-13 DIAGNOSIS — Z12.31 BREAST CANCER SCREENING BY MAMMOGRAM: ICD-10-CM

## 2025-05-13 PROCEDURE — 77080 DXA BONE DENSITY AXIAL: CPT | Mod: TC,PO

## 2025-05-13 PROCEDURE — 77067 SCR MAMMO BI INCL CAD: CPT | Mod: TC,PO

## 2025-05-13 PROCEDURE — 77067 SCR MAMMO BI INCL CAD: CPT | Mod: 26,,, | Performed by: RADIOLOGY

## 2025-05-13 PROCEDURE — 77063 BREAST TOMOSYNTHESIS BI: CPT | Mod: 26,,, | Performed by: RADIOLOGY

## 2025-05-14 ENCOUNTER — RESULTS FOLLOW-UP (OUTPATIENT)
Dept: FAMILY MEDICINE | Facility: CLINIC | Age: 75
End: 2025-05-14
Payer: MEDICARE

## 2025-05-14 NOTE — TELEPHONE ENCOUNTER
----- Message from Franco Gonzales MD sent at 5/14/2025  8:44 AM CDT -----  Please inform Ms. Pabon that her mammogram was normal.   ----- Message -----  From: Marcel Lobato MD  Sent: 5/14/2025   7:48 AM CDT  To: Franco Gonzales MD

## 2025-06-13 ENCOUNTER — OFFICE VISIT (OUTPATIENT)
Dept: FAMILY MEDICINE | Facility: CLINIC | Age: 75
End: 2025-06-13
Payer: MEDICARE

## 2025-06-13 VITALS
BODY MASS INDEX: 15.26 KG/M2 | HEART RATE: 77 BPM | WEIGHT: 91.69 LBS | DIASTOLIC BLOOD PRESSURE: 84 MMHG | SYSTOLIC BLOOD PRESSURE: 132 MMHG | TEMPERATURE: 99 F | OXYGEN SATURATION: 98 %

## 2025-06-13 DIAGNOSIS — Z01.818 PREOP EXAMINATION: Primary | ICD-10-CM

## 2025-06-13 DIAGNOSIS — C49.A0 GASTROINTESTINAL STROMAL TUMOR (GIST): ICD-10-CM

## 2025-06-13 LAB
OHS QRS DURATION: 82 MS
OHS QTC CALCULATION: 409 MS

## 2025-06-13 PROCEDURE — 99999 PR PBB SHADOW E&M-EST. PATIENT-LVL III: CPT | Mod: PBBFAC,,,

## 2025-06-13 NOTE — LETTER
[address] [facility] [address]  Phone: [phone]  Fax: [phone]          PRE-OP EVALUATION    Patient: Melba Pabon  YOB: 1950   Date: 06/13/2025     To Whom It May Concern:     Melba Pabon was seen in my office on 06/13/2025 for a preoperative evaluation.    She is low risk for cardiac complications and is optimized for bilateral ptosis repair under local/MAC anesthesia.      If you have any questions or concerns, or if I can be of further assistance, please do not hesitate to contact me.       Sincerely,          Franco Gonzales MD     
     [address] [facility] [address]  Phone: [phone]  Fax: [phone]          PRE-OP EVALUATION    Patient: Melba Pabon  YOB: 1950   Date: 06/13/2025  MRN: [id number]       Chelle Lechuga,  Surgical Coordinator:       Melba Pabon was seen in my office on 06/13/2025 for a preoperative evaluation.    She is low risk for cardiac complications and is optimized for bilateral ptosis repair under local/MAC anesthesia.      If you have any questions or concerns, or if I can be of further assistance, please do not hesitate to contact me.       Sincerely,          Franco Gonzales MD   
105

## 2025-06-13 NOTE — PROGRESS NOTES
Family Medicine     Patient name: Melba Pabon  MRN: [id number]  : 1950  PCP NAME: Franco Gonzales MD    Subjective     History of Present Illness:  Patient ID: Melba Pabon is a 75 y.o. Black or  female presents to the clinic today. Chronic medical issues, if present, have been documented.   Active Problem List with Overview Notes    Diagnosis Date Noted    Lipid screening 2024    Preop examination 2024    Dysuria 2024    Chronic hip pain, right 2024    Underweight due to inadequate caloric intake 2023    Neutropenia, unspecified type 2023    Corneal edema 2022    Unintended weight loss 2021    Nuclear sclerotic cataract of right eye 2021    NS (nuclear sclerosis), left 2020    Atherosclerosis of aorta 2018    Gastrointestinal stromal tumor (GIST) 10/01/2018    Gastric mass 2018    Hypercalcemia 2018    History of surgery for cerebral aneurysm 2018    Essential hypertension 2018    Age-related osteoporosis without current pathological fracture 2018    History of resection of small bowel 2016    Vocal cord palsy 10/21/2013     Formatting of this note might be different from the original.  Right  dx update  Right  dx update      Parathyroid adenoma 2013    Aneurysm 2013       Chief Complaint  No chief complaint on file.      Presents for preoperative evaluation.  She is scheduled to undergo bilateral ptosis repair under local anesthesia by Ophthalmology Department.  No other concerns today    Medications   Medication List with Changes/Refills   Current Medications    ATORVASTATIN (LIPITOR) 20 MG TABLET    Take 1 tablet (20 mg total) by mouth every evening.    CYPROHEPTADINE (PERIACTIN) 4 MG TABLET    Take 1 tablet (4 mg total) by mouth 2 (two) times daily as needed (as needed).    HYDROCODONE-ACETAMINOPHEN (NORCO) 5-325 MG PER TABLET    Take 1 tablet by mouth  every 6 (six) hours as needed for Pain.    LEVOCETIRIZINE (XYZAL) 5 MG TABLET    Take 1 tablet (5 mg total) by mouth every evening.    LISINOPRIL 10 MG TABLET    Take 1 tablet (10 mg total) by mouth once daily.    MULTIVITAMIN (THERAGRAN) PER TABLET    Take 1 tablet by mouth once daily.    NEOMYCIN-POLYMYXIN-DEXAMETHASONE (MAXITROL) 3.5 MG/G-10,000 UNIT/G-0.1 % OINT    Place into both eyes every 6 (six) hours. Placed 1/2 inch ribbon along suture line in both eyes.    NEOMYCIN-POLYMYXIN-DEXAMETHASONE (MAXITROL) 3.5MG/ML-10,000 UNIT/ML-0.1 % DRPS    Place 1 drop into both eyes every 6 (six) hours.    TOBRAMYCIN-DEXAMETHASONE 0.3-0.1% (TOBRADEX) 0.3-0.1 % DRPS    Place 1 drop into both eyes as needed (when closed shut/dry).       Allergies  Review of patient's allergies indicates:   Allergen Reactions    Pcn [penicillins]      Past Medical history  Past Medical History:   Diagnosis Date    Acute blood loss anemia 07/28/2018    Age-related osteoporosis without current pathological fracture 04/13/2018    Allergy     Cataract     Cerebral aneurysm     Gastrointestinal stromal tumor (GIST)     GI bleed     Hypertension     Lipid screening 12/2/2024          Surgical History  Past Surgical History:   Procedure Laterality Date    BRAIN SURGERY      metal plate in head    BREAST BIOPSY Right     CATARACT EXTRACTION Left 11/05/2020    Dr. Pond    CATARACT EXTRACTION W/  INTRAOCULAR LENS IMPLANT Right 01/14/2021    Dr. Pond    COLON SURGERY  2016    small bowel resection    DESCEMET'S MEMBRANE ENDOTHELIAL KERATOPLASTY (DMEK) Left 3/7/2022    Procedure: DESCEMET'S MEMBRANE ENDOTHELIAL KERATOPLASTY (DMEK);  Surgeon: Roopa Emmanuel MD;  Location: [facility] OR;  Service: Ophthalmology;  Laterality: Left;    ECTROPION REPAIR Bilateral 9/24/2024    Procedure: REPAIR, ECTROPION, EYELID;  Surgeon: Jennifer Lott MD;  Location: [facility] OR;  Service: Ophthalmology;  Laterality: Bilateral;    EXCISION OF MASS OF ABDOMEN N/A 8/27/2018     Procedure: RESECTION GASTRIC MASS;  Surgeon: Chuck Palomares MD;  Location: [facility] OR;  Service: General;  Laterality: N/A;  RN Pre Op 8-16-18    EYE SURGERY      FLAP,ZYGOMATICOFACIAL W/PRESERVATION OF VASCULAR PEDICLE Bilateral 9/24/2024    Procedure: FLAP,ZYGOMATICOFACIAL W/PRESERVATION OF VASCULAR PEDICLE;  Surgeon: Jennifer Lott MD;  Location: [facility] OR;  Service: Ophthalmology;  Laterality: Bilateral;    HERNIA REPAIR  2016    HYSTERECTOMY      INTRAOCULAR PROSTHESES INSERTION Left 11/5/2020    Procedure: INSERTION, IOL PROSTHESIS;  Surgeon: Howard Pond MD;  Location: [facility] OR;  Service: Ophthalmology;  Laterality: Left;    INTRAOCULAR PROSTHESES INSERTION Right 1/14/2021    Procedure: INSERTION, IOL PROSTHESIS;  Surgeon: Howard Pond MD;  Location: [facility] OR;  Service: Ophthalmology;  Laterality: Right;  RN Phone Pre OP 1-7-2020, Covid NEGATIVE ON  1-. Arrival 09:00 am  C A    OOPHORECTOMY      PHACOEMULSIFICATION OF CATARACT Left 11/5/2020    Procedure: PHACOEMULSIFICATION, CATARACT;  Surgeon: Howard Pond MD;  Location: [facility] OR;  Service: Ophthalmology;  Laterality: Left;  RN Phone Pre OP 11-2-2020. --COVID NEGATIVE ON  11-4-2020. Arrival 11:45 am.C A    PHACOEMULSIFICATION OF CATARACT Right 1/14/2021    Procedure: PHACOEMULSIFICATION, CATARACT;  Surgeon: Howard Pond MD;  Location: [facility] OR;  Service: Ophthalmology;  Laterality: Right;    SMALL INTESTINE SURGERY       Family History  Family History   Problem Relation Name Age of Onset    Dementia Mother      No Known Problems Father      No Known Problems Sister      No Known Problems Brother      No Known Problems Maternal Aunt      No Known Problems Maternal Uncle      No Known Problems Paternal Aunt      No Known Problems Paternal Uncle      No Known Problems Maternal Grandmother      No Known Problems Maternal Grandfather      No Known Problems Paternal Grandmother      No Known Problems Paternal  Grandfather      Amblyopia Neg Hx      Blindness Neg Hx      Cancer Neg Hx      Cataracts Neg Hx      Diabetes Neg Hx      Glaucoma Neg Hx      Hypertension Neg Hx      Macular degeneration Neg Hx      Retinal detachment Neg Hx      Strabismus Neg Hx      Stroke Neg Hx      Thyroid disease Neg Hx       Social History  Social History[1]       Review of system     ROS  Negative except as mentioned above  Physical exam   Vital Signs  Vitals:    06/13/25 1126   BP: 132/84   Pulse: 77   Temp: 98.6 °F (37 °C)   TempSrc: Oral   SpO2: 98%   Weight: 41.6 kg (91 lb 11.4 oz)       Physical Exam  Cardiovascular:      Rate and Rhythm: Normal rate and regular rhythm.      Pulses: Normal pulses.      Heart sounds: Normal heart sounds.   Pulmonary:      Effort: Pulmonary effort is normal.      Breath sounds: Normal breath sounds.   Abdominal:      General: Bowel sounds are normal.      Palpations: Abdomen is soft.           Wt Readings from Last 3 Encounters:   06/13/25 41.6 kg (91 lb 11.4 oz)   04/15/25 41.4 kg (91 lb 4.3 oz)   12/02/24 45.5 kg (100 lb 5 oz)          Body mass index is 15.26 kg/m².      Laboratory data and other diagnostic findings     Lab Results   Component Value Date    WBC 3.25 (L) 09/09/2024    HGB 14.0 09/09/2024    HCT 43.4 09/09/2024    MCV 95 09/09/2024     09/09/2024         Lab Results   Component Value Date    CREATININE 0.8 12/02/2024    BUN 13 12/02/2024     (L) 12/02/2024    K 4.1 12/02/2024     12/02/2024    CO2 25 12/02/2024         Assessment and plan     Problem List Items Addressed This Visit       Gastrointestinal stromal tumor (GIST)    Preop examination - Primary    Relevant Orders    IN OFFICE EKG 12-LEAD (to [place])       Preop examination  She is medically optimized for surgery.  Labs reviewed.  Not on blood thinners or antiplatelet agents.   EKG today is normal sinus rhythm. No chest pain, shortness of breath on exertion, palpitations or presyncopal/syncopal symptoms.   Counseled on minimizing smoking before and after surgery in order to reduce risk of wound infections and problems.  Supporting documentation prepared and faxed to Ophthalmology.  -     IN OFFICE EKG 12-LEAD (to [place])    Gastrointestinal stromal tumor (GIST)           Future Appointments  Future Appointments   Date Time Provider Department Center   6[facility]24[facility]2025 10:00 AM [facility] [place]   6/24/2025 10:30 AM LAB, [facility] LAB [place]   7/16/2025 11:00 AM Jennifer Lott MD [facility]   12/17/2025 10:00 AM Franco Gonzales MD [facility] MED [place]             Franco Gonzales MD    This office note was created by combination of typed  and MModal dictation.  Transcription errors may be present.  If there are any questions, please contact me.       32  minutes of total time spent on the encounter, which includes face to face time and non-face to face time preparing to see the patient (eg, review of tests), Obtaining and/or reviewing separately obtained history, Documenting clinical information in the electronic or other health record, Independently interpreting results (not separately reported) and communicating results to the patient/family/caregiver, or Care coordination (not separately reported).              [1]   Social History  Tobacco Use    Smoking status: Some Days     Current packs/day: 0.50     Average packs/day: 0.5 packs/day for 26.4 years (13.2 ttl pk-yrs)     Types: Cigarettes     Start date: 1999    Smokeless tobacco: Never    Tobacco comments:     occassional cigarette   Substance Use Topics    Alcohol use: No    Drug use: No

## 2025-06-16 DIAGNOSIS — I70.0 ATHEROSCLEROSIS OF AORTA: ICD-10-CM

## 2025-06-16 DIAGNOSIS — J30.1 SEASONAL ALLERGIC RHINITIS DUE TO POLLEN: ICD-10-CM

## 2025-06-16 DIAGNOSIS — I10 ESSENTIAL HYPERTENSION: ICD-10-CM

## 2025-06-16 RX ORDER — CYPROHEPTADINE HYDROCHLORIDE 4 MG/1
4 TABLET ORAL 2 TIMES DAILY PRN
Qty: 30 TABLET | Refills: 5 | Status: SHIPPED | OUTPATIENT
Start: 2025-06-16

## 2025-06-16 RX ORDER — ATORVASTATIN CALCIUM 20 MG/1
20 TABLET, FILM COATED ORAL NIGHTLY
Qty: 90 TABLET | Refills: 1 | Status: SHIPPED | OUTPATIENT
Start: 2025-06-16

## 2025-06-16 RX ORDER — LISINOPRIL 10 MG/1
10 TABLET ORAL DAILY
Qty: 90 TABLET | Refills: 1 | Status: SHIPPED | OUTPATIENT
Start: 2025-06-16

## 2025-06-16 RX ORDER — LEVOCETIRIZINE DIHYDROCHLORIDE 5 MG/1
5 TABLET, FILM COATED ORAL NIGHTLY
Qty: 90 TABLET | Refills: 1 | Status: SHIPPED | OUTPATIENT
Start: 2025-06-16 | End: 2025-12-13

## 2025-06-16 NOTE — TELEPHONE ENCOUNTER
No care due was identified.  [facility] Embedded Care Due Messages. Reference number: [id number].   6/16/2025 11:01:30 AM CDT

## 2025-06-16 NOTE — TELEPHONE ENCOUNTER
----- Message from Med Assistant Chandler sent at 6/16/2025 10:39 AM CDT -----  Type: RX Refill Request    Who Called:  Pt   Have you contacted your pharmacy:    Refill    RX Name and Strength:  lisinopriL 10 MG tablet 7 days left of it  atorvastatin (LIPITOR) 20 MG tablet  cyproheptadine (PERIACTIN) 4 mg tablet 7 days left of it   levocetirizine (XYZAL) 5 MG tablet  Preferred Pharmacy with phone number:    [facility] - [place], [place] - [address] [place] [address]  Phone: [phone] Fax: [phone]    [facility] - [facility] [address]  [address] [address]  Phone: [phone] Fax: [phone]     Local or Mail Order:  Mail   Would the patient rather a call back or a response via [facility]?    Best Call Back Number:  Telephone Information:  Mobile          [phone]     Additional Information:    Thank you.

## 2025-06-25 ENCOUNTER — TELEPHONE (OUTPATIENT)
Dept: FAMILY MEDICINE | Facility: CLINIC | Age: 75
End: 2025-06-25
Payer: MEDICARE

## 2025-06-25 NOTE — TELEPHONE ENCOUNTER
Copied from CRM #[id number]. Topic: Medications - Medication Status Check   >> Jun 25, 2025  9:18 AM Med Assistant Silva wrote:  Type: Patient Call Back    Who called:Self    What is the request in detail: pt. Is asking for a call with an update on her medication refills requested.. Pt. States she is out of medication at this time..she would like it to go to:    [facility] - [place], [place] - [address] [place] [address]  Phone: [phone] Fax: [phone]  Hours: Not open 24 hours        Can the clinic reply by [facility]?NO    Would the patient rather a call back or a response via [facility]? Yes, call    Best call back number: [phone] (home)

## 2025-07-02 ENCOUNTER — PATIENT OUTREACH (OUTPATIENT)
Dept: ADMINISTRATIVE | Facility: OTHER | Age: 75
End: 2025-07-02
Payer: MEDICARE

## 2025-07-02 ENCOUNTER — HOSPITAL ENCOUNTER (INPATIENT)
Facility: HOSPITAL | Age: 75
LOS: 1 days | Discharge: HOME | DRG: 373 | End: 2025-07-05
Attending: EMERGENCY MEDICINE | Admitting: STUDENT IN AN ORGANIZED HEALTH CARE EDUCATION/TRAINING PROGRAM
Payer: MEDICARE

## 2025-07-02 DIAGNOSIS — A49.8 CLOSTRIDIUM DIFFICILE INFECTION: ICD-10-CM

## 2025-07-02 DIAGNOSIS — R07.9 CHEST PAIN: ICD-10-CM

## 2025-07-02 DIAGNOSIS — R19.7 DIARRHEA, UNSPECIFIED TYPE: ICD-10-CM

## 2025-07-02 DIAGNOSIS — E87.6 HYPOKALEMIA: Primary | ICD-10-CM

## 2025-07-02 PROBLEM — E78.5 HYPERLIPIDEMIA: Status: ACTIVE | Noted: 2025-07-02

## 2025-07-02 LAB
ABSOLUTE NEUTROPHIL MANUAL (OHS): 3.4 K/UL
ALBUMIN SERPL BCP-MCNC: 3 G/DL (ref 3.5–5.2)
ALP SERPL-CCNC: 97 UNIT/L (ref 40–150)
ALT SERPL W/O P-5'-P-CCNC: 6 UNIT/L (ref 10–44)
ANION GAP (OHS): 13 MMOL/L (ref 8–16)
AST SERPL-CCNC: 19 UNIT/L (ref 11–45)
BILIRUB SERPL-MCNC: 0.6 MG/DL (ref 0.1–1)
BUN SERPL-MCNC: 14 MG/DL (ref 8–23)
CALCIUM SERPL-MCNC: 9.7 MG/DL (ref 8.7–10.5)
CHLORIDE SERPL-SCNC: 102 MMOL/L (ref 95–110)
CO2 SERPL-SCNC: 22 MMOL/L (ref 23–29)
CREAT SERPL-MCNC: 0.9 MG/DL (ref 0.5–1.4)
EOSINOPHIL NFR BLD MANUAL: 2 % (ref 0–8)
ERYTHROCYTE [DISTWIDTH] IN BLOOD BY AUTOMATED COUNT: 14.3 % (ref 11.5–14.5)
GFR SERPLBLD CREATININE-BSD FMLA CKD-EPI: >60 ML/MIN/1.73/M2
GLUCOSE SERPL-MCNC: 94 MG/DL (ref 70–110)
HCT VFR BLD AUTO: 40.4 % (ref 37–48.5)
HGB BLD-MCNC: 13.7 GM/DL (ref 12–16)
LIPASE SERPL-CCNC: 8 U/L (ref 4–60)
LYMPHOCYTES NFR BLD MANUAL: 18 % (ref 18–48)
MAGNESIUM SERPL-MCNC: 1.7 MG/DL (ref 1.6–2.6)
MCH RBC QN AUTO: 30.8 PG (ref 27–31)
MCHC RBC AUTO-ENTMCNC: 33.9 G/DL (ref 32–36)
MCV RBC AUTO: 91 FL (ref 82–98)
MONOCYTES NFR BLD MANUAL: 13 % (ref 4–15)
NEUTROPHILS NFR BLD MANUAL: 45 % (ref 38–73)
NEUTS BAND NFR BLD MANUAL: 22 %
NUCLEATED RBC (/100WBC) (OHS): 0 /100 WBC
OB PNL STL: POSITIVE
PLATELET # BLD AUTO: 198 K/UL (ref 150–450)
PMV BLD AUTO: 9.8 FL (ref 9.2–12.9)
POTASSIUM SERPL-SCNC: 2.5 MMOL/L (ref 3.5–5.1)
POTASSIUM SERPL-SCNC: 3.2 MMOL/L (ref 3.5–5.1)
PROT SERPL-MCNC: 7.1 GM/DL (ref 6–8.4)
RBC # BLD AUTO: 4.45 M/UL (ref 4–5.4)
SODIUM SERPL-SCNC: 137 MMOL/L (ref 136–145)
WBC # BLD AUTO: 5.03 K/UL (ref 3.9–12.7)

## 2025-07-02 PROCEDURE — 82272 OCCULT BLD FECES 1-3 TESTS: CPT

## 2025-07-02 PROCEDURE — G0378 HOSPITAL OBSERVATION PER HR: HCPCS

## 2025-07-02 PROCEDURE — 83735 ASSAY OF MAGNESIUM: CPT

## 2025-07-02 PROCEDURE — 87425 ROTAVIRUS AG IA: CPT

## 2025-07-02 PROCEDURE — 96361 HYDRATE IV INFUSION ADD-ON: CPT | Mod: HCNC

## 2025-07-02 PROCEDURE — 99285 EMERGENCY DEPT VISIT HI MDM: CPT | Mod: 25,HCNC

## 2025-07-02 PROCEDURE — 89055 LEUKOCYTE ASSESSMENT FECAL: CPT

## 2025-07-02 PROCEDURE — 85007 BL SMEAR W/DIFF WBC COUNT: CPT | Performed by: EMERGENCY MEDICINE

## 2025-07-02 PROCEDURE — 25000003 PHARM REV CODE 250

## 2025-07-02 PROCEDURE — 96372 THER/PROPH/DIAG INJ SC/IM: CPT

## 2025-07-02 PROCEDURE — 25000003 PHARM REV CODE 250: Performed by: EMERGENCY MEDICINE

## 2025-07-02 PROCEDURE — 84075 ASSAY ALKALINE PHOSPHATASE: CPT | Performed by: EMERGENCY MEDICINE

## 2025-07-02 PROCEDURE — 93005 ELECTROCARDIOGRAM TRACING: CPT

## 2025-07-02 PROCEDURE — 63600175 PHARM REV CODE 636 W HCPCS

## 2025-07-02 PROCEDURE — 93010 ELECTROCARDIOGRAM REPORT: CPT | Mod: ,,, | Performed by: INTERNAL MEDICINE

## 2025-07-02 PROCEDURE — 83690 ASSAY OF LIPASE: CPT

## 2025-07-02 PROCEDURE — 87427 SHIGA-LIKE TOXIN AG IA: CPT | Mod: 59

## 2025-07-02 PROCEDURE — 87046 STOOL CULTR AEROBIC BACT EA: CPT | Mod: 91

## 2025-07-02 PROCEDURE — 84132 ASSAY OF SERUM POTASSIUM: CPT

## 2025-07-02 PROCEDURE — 87324 CLOSTRIDIUM AG IA: CPT

## 2025-07-02 PROCEDURE — 87329 GIARDIA AG IA: CPT

## 2025-07-02 PROCEDURE — 96361 HYDRATE IV INFUSION ADD-ON: CPT

## 2025-07-02 PROCEDURE — 96360 HYDRATION IV INFUSION INIT: CPT | Mod: 59,HCNC

## 2025-07-02 RX ORDER — CETIRIZINE HYDROCHLORIDE 5 MG/1
5 TABLET ORAL NIGHTLY
Status: DISCONTINUED | OUTPATIENT
Start: 2025-07-02 | End: 2025-07-05 | Stop reason: HOSPADM

## 2025-07-02 RX ORDER — CYPROHEPTADINE HYDROCHLORIDE 4 MG/1
4 TABLET ORAL 2 TIMES DAILY PRN
Status: DISCONTINUED | OUTPATIENT
Start: 2025-07-02 | End: 2025-07-05 | Stop reason: HOSPADM

## 2025-07-02 RX ORDER — POLYETHYLENE GLYCOL 3350 17 G/17G
17 POWDER, FOR SOLUTION ORAL DAILY PRN
Status: DISCONTINUED | OUTPATIENT
Start: 2025-07-02 | End: 2025-07-05 | Stop reason: HOSPADM

## 2025-07-02 RX ORDER — ATORVASTATIN CALCIUM 10 MG/1
20 TABLET, FILM COATED ORAL NIGHTLY
Status: DISCONTINUED | OUTPATIENT
Start: 2025-07-03 | End: 2025-07-05 | Stop reason: HOSPADM

## 2025-07-02 RX ORDER — SODIUM CHLORIDE, SODIUM LACTATE, POTASSIUM CHLORIDE, CALCIUM CHLORIDE 600; 310; 30; 20 MG/100ML; MG/100ML; MG/100ML; MG/100ML
INJECTION, SOLUTION INTRAVENOUS CONTINUOUS
Status: ACTIVE | OUTPATIENT
Start: 2025-07-02 | End: 2025-07-03

## 2025-07-02 RX ORDER — ENOXAPARIN SODIUM 100 MG/ML
40 INJECTION SUBCUTANEOUS EVERY 24 HOURS
Status: DISCONTINUED | OUTPATIENT
Start: 2025-07-02 | End: 2025-07-03

## 2025-07-02 RX ORDER — BENZONATATE 100 MG/1
100 CAPSULE ORAL 3 TIMES DAILY PRN
Status: DISCONTINUED | OUTPATIENT
Start: 2025-07-02 | End: 2025-07-05 | Stop reason: HOSPADM

## 2025-07-02 RX ORDER — NALOXONE HCL 0.4 MG/ML
0.02 VIAL (ML) INJECTION
Status: DISCONTINUED | OUTPATIENT
Start: 2025-07-02 | End: 2025-07-05 | Stop reason: HOSPADM

## 2025-07-02 RX ORDER — GUAIFENESIN 100 MG/5ML
200 LIQUID ORAL EVERY 4 HOURS PRN
Status: DISCONTINUED | OUTPATIENT
Start: 2025-07-02 | End: 2025-07-05 | Stop reason: HOSPADM

## 2025-07-02 RX ORDER — ONDANSETRON 4 MG/1
4 TABLET, ORALLY DISINTEGRATING ORAL EVERY 6 HOURS PRN
Status: DISCONTINUED | OUTPATIENT
Start: 2025-07-02 | End: 2025-07-05 | Stop reason: HOSPADM

## 2025-07-02 RX ORDER — SIMETHICONE 80 MG
1 TABLET,CHEWABLE ORAL 3 TIMES DAILY PRN
Status: DISCONTINUED | OUTPATIENT
Start: 2025-07-02 | End: 2025-07-05 | Stop reason: HOSPADM

## 2025-07-02 RX ORDER — PSEUDOEPHEDRINE/ACETAMINOPHEN 30MG-500MG
100 TABLET ORAL
Status: COMPLETED | OUTPATIENT
Start: 2025-07-02 | End: 2025-07-02

## 2025-07-02 RX ORDER — SODIUM CHLORIDE 0.9 G/100ML
500 IRRIGANT IRRIGATION
Status: COMPLETED | OUTPATIENT
Start: 2025-07-02 | End: 2025-07-02

## 2025-07-02 RX ORDER — CALCIUM CARBONATE 200(500)MG
500 TABLET,CHEWABLE ORAL 3 TIMES DAILY PRN
Status: DISCONTINUED | OUTPATIENT
Start: 2025-07-02 | End: 2025-07-05 | Stop reason: HOSPADM

## 2025-07-02 RX ORDER — SODIUM CHLORIDE 0.9 % (FLUSH) 0.9 %
10 SYRINGE (ML) INJECTION EVERY 12 HOURS PRN
Status: DISCONTINUED | OUTPATIENT
Start: 2025-07-02 | End: 2025-07-05 | Stop reason: HOSPADM

## 2025-07-02 RX ORDER — IBUPROFEN 200 MG
16 TABLET ORAL
Status: DISCONTINUED | OUTPATIENT
Start: 2025-07-02 | End: 2025-07-05 | Stop reason: HOSPADM

## 2025-07-02 RX ORDER — GLUCAGON 1 MG
1 KIT INJECTION
Status: DISCONTINUED | OUTPATIENT
Start: 2025-07-02 | End: 2025-07-05 | Stop reason: HOSPADM

## 2025-07-02 RX ORDER — LISINOPRIL 5 MG/1
10 TABLET ORAL DAILY
Status: DISCONTINUED | OUTPATIENT
Start: 2025-07-03 | End: 2025-07-05 | Stop reason: HOSPADM

## 2025-07-02 RX ORDER — SYRING-NEEDL,DISP,INSUL,0.3 ML 29 G X1/2"
296 SYRINGE, EMPTY DISPOSABLE MISCELLANEOUS
Status: COMPLETED | OUTPATIENT
Start: 2025-07-02 | End: 2025-07-02

## 2025-07-02 RX ORDER — ACETAMINOPHEN 325 MG/1
325 TABLET ORAL EVERY 6 HOURS PRN
Status: DISCONTINUED | OUTPATIENT
Start: 2025-07-02 | End: 2025-07-05 | Stop reason: HOSPADM

## 2025-07-02 RX ORDER — POTASSIUM CHLORIDE 20 MEQ/1
40 TABLET, EXTENDED RELEASE ORAL ONCE
Status: DISCONTINUED | OUTPATIENT
Start: 2025-07-02 | End: 2025-07-02

## 2025-07-02 RX ORDER — TALC
6 POWDER (GRAM) TOPICAL NIGHTLY PRN
Status: DISCONTINUED | OUTPATIENT
Start: 2025-07-02 | End: 2025-07-05 | Stop reason: HOSPADM

## 2025-07-02 RX ORDER — IBUPROFEN 200 MG
24 TABLET ORAL
Status: DISCONTINUED | OUTPATIENT
Start: 2025-07-02 | End: 2025-07-05 | Stop reason: HOSPADM

## 2025-07-02 RX ADMIN — ONDANSETRON 4 MG: 4 TABLET, ORALLY DISINTEGRATING ORAL at 07:07

## 2025-07-02 RX ADMIN — CETIRIZINE HYDROCHLORIDE 5 MG: 5 TABLET ORAL at 09:07

## 2025-07-02 RX ADMIN — LACTULOSE 20 G: 20 SOLUTION ORAL at 06:07

## 2025-07-02 RX ADMIN — SODIUM CHLORIDE 1000 ML: 9 INJECTION, SOLUTION INTRAVENOUS at 09:07

## 2025-07-02 RX ADMIN — SODIUM CHLORIDE, POTASSIUM CHLORIDE, SODIUM LACTATE AND CALCIUM CHLORIDE: 600; 310; 30; 20 INJECTION, SOLUTION INTRAVENOUS at 03:07

## 2025-07-02 RX ADMIN — SODIUM CHLORIDE 500 ML: 900 IRRIGANT IRRIGATION at 06:07

## 2025-07-02 RX ADMIN — ENOXAPARIN SODIUM 40 MG: 40 INJECTION SUBCUTANEOUS at 05:07

## 2025-07-02 RX ADMIN — POTASSIUM BICARBONATE 40 MEQ: 391 TABLET, EFFERVESCENT ORAL at 03:07

## 2025-07-02 RX ADMIN — SODIUM CHLORIDE, POTASSIUM CHLORIDE, SODIUM LACTATE AND CALCIUM CHLORIDE: 600; 310; 30; 20 INJECTION, SOLUTION INTRAVENOUS at 05:07

## 2025-07-02 RX ADMIN — Medication 100 ML: at 06:07

## 2025-07-02 RX ADMIN — MAGNESIUM CITRATE 296 ML: 1.75 LIQUID ORAL at 06:07

## 2025-07-02 RX ADMIN — POTASSIUM BICARBONATE 40 MEQ: 391 TABLET, EFFERVESCENT ORAL at 11:07

## 2025-07-02 NOTE — SUBJECTIVE & OBJECTIVE
Past Medical History:   Diagnosis Date    Acute blood loss anemia 07/28/2018    Age-related osteoporosis without current pathological fracture 04/13/2018    Allergy     Cataract     Cerebral aneurysm     Gastrointestinal stromal tumor (GIST)     GI bleed     Hyperlipidemia 7/2/2025    Hypertension     Lipid screening 12/2/2024       Past Surgical History:   Procedure Laterality Date    BRAIN SURGERY      metal plate in head    BREAST BIOPSY Right     CATARACT EXTRACTION Left 11/05/2020    Dr. Pond    CATARACT EXTRACTION W/  INTRAOCULAR LENS IMPLANT Right 01/14/2021    Dr. Pond    COLON SURGERY  2016    small bowel resection    DESCEMET'S MEMBRANE ENDOTHELIAL KERATOPLASTY (DMEK) Left 3/7/2022    Procedure: DESCEMET'S MEMBRANE ENDOTHELIAL KERATOPLASTY (DMEK);  Surgeon: Roopa Emmanuel MD;  Location: [facility] OR;  Service: Ophthalmology;  Laterality: Left;    ECTROPION REPAIR Bilateral 9/24/2024    Procedure: REPAIR, ECTROPION, EYELID;  Surgeon: Jennifer Lott MD;  Location: [facility] OR;  Service: Ophthalmology;  Laterality: Bilateral;    EXCISION OF MASS OF ABDOMEN N/A 8/27/2018    Procedure: RESECTION GASTRIC MASS;  Surgeon: Chuck Palomares MD;  Location: [facility] OR;  Service: General;  Laterality: N/A;  RN Pre Op 8-16-18    EYE SURGERY      FLAP,ZYGOMATICOFACIAL W/PRESERVATION OF VASCULAR PEDICLE Bilateral 9/24/2024    Procedure: FLAP,ZYGOMATICOFACIAL W/PRESERVATION OF VASCULAR PEDICLE;  Surgeon: Jennifer Lott MD;  Location: [facility] OR;  Service: Ophthalmology;  Laterality: Bilateral;    HERNIA REPAIR  2016    HYSTERECTOMY      INTRAOCULAR PROSTHESES INSERTION Left 11/5/2020    Procedure: INSERTION, IOL PROSTHESIS;  Surgeon: Howard Pond MD;  Location: [facility] OR;  Service: Ophthalmology;  Laterality: Left;    INTRAOCULAR PROSTHESES INSERTION Right 1/14/2021    Procedure: INSERTION, IOL PROSTHESIS;  Surgeon: Howard Pond MD;  Location: [facility] OR;  Service: Ophthalmology;  Laterality: Right;  RN  Phone Pre OP 1-7-2020, Covid NEGATIVE ON  1-. Arrival 09:00 am  C A    OOPHORECTOMY      PHACOEMULSIFICATION OF CATARACT Left 11/5/2020    Procedure: PHACOEMULSIFICATION, CATARACT;  Surgeon: Howard Pond MD;  Location: [facility] OR;  Service: Ophthalmology;  Laterality: Left;  RN Phone Pre OP 11-2-2020. --COVID NEGATIVE ON  11-4-2020. Arrival 11:45 am.C A    PHACOEMULSIFICATION OF CATARACT Right 1/14/2021    Procedure: PHACOEMULSIFICATION, CATARACT;  Surgeon: Howard Pond MD;  Location: [facility] OR;  Service: Ophthalmology;  Laterality: Right;    SMALL INTESTINE SURGERY         Review of patient's allergies indicates:   Allergen Reactions    Pcn [penicillins]        Current Facility-Administered Medications on File Prior to Encounter   Medication    cyclopentolate 1% ophthalmic solution 1 drop    lidocaine (PF) 10 mg/ml (1%) injection 10 mg     Current Outpatient Medications on File Prior to Encounter   Medication Sig    atorvastatin (LIPITOR) 20 MG tablet Take 1 tablet (20 mg total) by mouth every evening.    cyproheptadine (PERIACTIN) 4 mg tablet Take 1 tablet (4 mg total) by mouth 2 (two) times daily as needed (as needed).    levocetirizine (XYZAL) 5 MG tablet Take 1 tablet (5 mg total) by mouth every evening.    lisinopriL 10 MG tablet Take 1 tablet (10 mg total) by mouth once daily.    HYDROcodone-acetaminophen (NORCO) 5-325 mg per tablet Take 1 tablet by mouth every 6 (six) hours as needed for Pain. (Patient not taking: Reported on 6/13/2025)    ibuprofen (ADVIL,MOTRIN) 800 MG tablet Take 800 mg by mouth every 6 (six) hours as needed.    multivitamin (THERAGRAN) per tablet Take 1 tablet by mouth once daily.    neomycin-polymyxin-dexamethasone (MAXITROL) 3.5 mg/g-10,000 unit/g-0.1 % Oint Place into both eyes every 6 (six) hours. Placed 1/2 inch ribbon along suture line in both eyes. (Patient not taking: Reported on 6/13/2025)    neomycin-polymyxin-dexamethasone (MAXITROL) 3.5mg/mL-10,000  unit/mL-0.1 % DrpS Place 1 drop into both eyes every 6 (six) hours. (Patient not taking: Reported on 6/13/2025)    tobramycin-dexAMETHasone 0.3-0.1% (TOBRADEX) 0.3-0.1 % DrpS Place 1 drop into both eyes as needed (when closed shut/dry). (Patient not taking: Reported on 6/13/2025)     Family History       Problem Relation (Age of Onset)    Dementia Mother    No Known Problems Father, Sister, Brother, Maternal Aunt, Maternal Uncle, Paternal Aunt, Paternal Uncle, Maternal Grandmother, Maternal Grandfather, Paternal Grandmother, Paternal Grandfather          Tobacco Use    Smoking status: Some Days     Current packs/day: 0.50     Average packs/day: 0.5 packs/day for 26.5 years (13.2 ttl pk-yrs)     Types: Cigarettes     Start date: 1999    Smokeless tobacco: Never    Tobacco comments:     occassional cigarette   Substance and Sexual Activity    Alcohol use: No    Drug use: No    Sexual activity: Not Currently     Partners: Male     Review of Systems   Constitutional:  Negative for chills, fatigue and fever.   Respiratory:  Negative for cough and shortness of breath.    Cardiovascular:  Negative for chest pain, palpitations and leg swelling.   Gastrointestinal:  Positive for abdominal pain and diarrhea. Negative for abdominal distention.   Neurological:  Negative for dizziness, syncope, weakness and light-headedness.     Objective:     Vital Signs (Most Recent):  Temp: 98.1 °F (36.7 °C) (07/02/25 0732)  Pulse: 81 (07/02/25 1400)  Resp: 16 (07/02/25 0732)  BP: 129/73 (07/02/25 1400)  SpO2: 98 % (07/02/25 1400) Vital Signs (24h Range):  Temp:  [98.1 °F (36.7 °C)] 98.1 °F (36.7 °C)  Pulse:  [78-98] 81  Resp:  [16] 16  SpO2:  [94 %-99 %] 98 %  BP: (121-145)/(69-82) 129/73     Weight: 45.4 kg (100 lb)  Body mass index is 16.64 kg/m².     Physical Exam  Vitals and nursing note reviewed.   HENT:      Head: Normocephalic.      Mouth/Throat:      Pharynx: Oropharynx is clear.   Cardiovascular:      Rate and Rhythm: Normal rate  and regular rhythm.   Pulmonary:      Effort: Pulmonary effort is normal. No respiratory distress.   Abdominal:      Tenderness: There is abdominal tenderness in the right lower quadrant, epigastric area and left lower quadrant. There is guarding.      Comments: Abdominal bulge in epigastrium. TTP.    Skin:     General: Skin is warm and dry.   Neurological:      Mental Status: She is alert. Mental status is at baseline.   Psychiatric:         Mood and Affect: Mood normal.         Behavior: Behavior normal.                Significant Labs: All pertinent labs within the past 24 hours have been reviewed.    Significant Imaging: I have reviewed all pertinent imaging results/findings within the past 24 hours.

## 2025-07-02 NOTE — NURSING
Patient arrived to floor via stretcher via transporter from ED. Patient transferred to bed independently.  AAOx4.  Patient was oriented to room, information on whiteboard, and medication regimen.  Bed low, adequate lighting provided, side rails x2 up, call bell within reach.  Admission assessment completed.  IVF started.  VSS.  Patient denied having any acute distress at this time.  None observed.  Will continue to monitor and follow treatment plan.     [facility]  Nurses Note -- 4 Eyes      7/2/2025       Skin assessed on: Admit      [x] No Pressure Injuries Present    [x]Prevention Measures Documented    [] Yes LDA  for Pressure Injury Previously documented     [] Yes New Pressure Injury Discovered   [] LDA for New Pressure Injury Added      Attending RN:  Zoila Stover RN     Second RN:  RYLAN Rausch

## 2025-07-02 NOTE — PROGRESS NOTES
CHW - Initial Contact    This Community Health Worker completed Social Determinant of Health questionnaire with patient at bedside today.    Pt identified barriers of most importance are: has food insecurities.   Referrals to community agencies completed with patient/caregiver consent outside of [facility] include: yes: [email or web address]  Referrals were put through [facility] - no: none at this time.  Support and Services: has no support at this time.  Other information discussed the patient needs / wants help with: Completed SDOH with patient at bedside today, pt has food insecurities. Will follow up in two weeks to check status of referrals and pt's future needs.    Follow up required: yes.  Initial Outreach, Initial Outreach, Case Closure, Follow-up Outreach - Due: 12/12/2024, 12/26/2024, 3/10/2025, 7/16/2025

## 2025-07-02 NOTE — ED PROVIDER NOTES
Encounter Date: 7/2/2025    SCRIBE #1 NOTE: I, Misty Arreaga, am scribing for, and in the presence of,  Dillon Cabrera MD. I have scribed the following portions of the note - Other sections scribed: HPI, ROS.       History     Chief Complaint   Patient presents with    Diarrhea     Pt to ED c/o diarrhea x's 2 days. Noticed stool was black on yesterday x's 1 episode. Denies CP/SOB. +abdominal pain.     Abdominal Pain     75-year-old female with a past medical history of osteoporosis, cerebral aneurysm, gastrointestinal stromal tumor, hypertension, parathyroid adenoma, s/p small bowel resection, hypercalcemia, who presents to the Emergency Department for evaluation of diarrhea and abdominal pain x 2 days. Patient reports having 5-6 loose epsiodes of diarrhea past 24 hours, took Pepto Bismol, began having black stools. Patient reports history having black stools, having to be observed overnight for anemia. She also reports having a mild, right temporal headache. Denies abdominal pain or discomfort. Patient denies cough, shortness of breath, chest pain, fever, chills, nausea, vomiting, dysuria, congestion, sore throat, arm or leg trouble, eye pain, ear pain, rash, or other associated symptoms.    Per chart review, patient seen 07/27/2018 with periumbilical abdominal pain with bulging, exam notable for bulging and tenderness, melena, associated weakness, dizziness, nausea, 1x episode vomiting, , slightly pale, strongly guaiac positive, large abdominal wall defect in the hypogastrium sponatenously reduced when lying supine, CBC with RBC 3.00, H&H 9.5, 27.1, MCH 31.7, BUn 38, Calcium 11.4, ALT 8, anion grap 6, UA w/ 1+ occult blood, few bacteria,  blood pressure 94/50, admitted d/t acute upper GI bleed and anemia,       The history is provided by the patient and medical records. No  was used.     Review of patient's allergies indicates:   Allergen Reactions    Pcn [penicillins]      Past  Medical History:   Diagnosis Date    Acute blood loss anemia 07/28/2018    Age-related osteoporosis without current pathological fracture 04/13/2018    Allergy     Cataract     Cerebral aneurysm     Gastrointestinal stromal tumor (GIST)     GI bleed     Hyperlipidemia 7/2/2025    Hypertension     Lipid screening 12/2/2024     Past Surgical History:   Procedure Laterality Date    BRAIN SURGERY      metal plate in head    BREAST BIOPSY Right     CATARACT EXTRACTION Left 11/05/2020    Dr. Pond    CATARACT EXTRACTION W/  INTRAOCULAR LENS IMPLANT Right 01/14/2021    Dr. Pond    COLON SURGERY  2016    small bowel resection    DESCEMET'S MEMBRANE ENDOTHELIAL KERATOPLASTY (DMEK) Left 3/7/2022    Procedure: DESCEMET'S MEMBRANE ENDOTHELIAL KERATOPLASTY (DMEK);  Surgeon: Roopa Emmanuel MD;  Location: [facility] OR;  Service: Ophthalmology;  Laterality: Left;    ECTROPION REPAIR Bilateral 9/24/2024    Procedure: REPAIR, ECTROPION, EYELID;  Surgeon: Jennifer Lott MD;  Location: [facility] OR;  Service: Ophthalmology;  Laterality: Bilateral;    EXCISION OF MASS OF ABDOMEN N/A 8/27/2018    Procedure: RESECTION GASTRIC MASS;  Surgeon: Chuck Palomares MD;  Location: [facility] OR;  Service: General;  Laterality: N/A;  RN Pre Op 8-16-18    EYE SURGERY      FLAP,ZYGOMATICOFACIAL W/PRESERVATION OF VASCULAR PEDICLE Bilateral 9/24/2024    Procedure: FLAP,ZYGOMATICOFACIAL W/PRESERVATION OF VASCULAR PEDICLE;  Surgeon: Jennifer Lott MD;  Location: [facility] OR;  Service: Ophthalmology;  Laterality: Bilateral;    HERNIA REPAIR  2016    HYSTERECTOMY      INTRAOCULAR PROSTHESES INSERTION Left 11/5/2020    Procedure: INSERTION, IOL PROSTHESIS;  Surgeon: Howard Pond MD;  Location: [facility] OR;  Service: Ophthalmology;  Laterality: Left;    INTRAOCULAR PROSTHESES INSERTION Right 1/14/2021    Procedure: INSERTION, IOL PROSTHESIS;  Surgeon: Howard Pond MD;  Location: [facility] OR;  Service: Ophthalmology;  Laterality: Right;  RN Phone Pre  OP 1-7-2020, Covid NEGATIVE ON  1-. Arrival 09:00 am  C A    OOPHORECTOMY      PHACOEMULSIFICATION OF CATARACT Left 11/5/2020    Procedure: PHACOEMULSIFICATION, CATARACT;  Surgeon: Howard Pond MD;  Location: [facility] OR;  Service: Ophthalmology;  Laterality: Left;  RN Phone Pre OP 11-2-2020. --COVID NEGATIVE ON  11-4-2020. Arrival 11:45 am.C A    PHACOEMULSIFICATION OF CATARACT Right 1/14/2021    Procedure: PHACOEMULSIFICATION, CATARACT;  Surgeon: Howard Pond MD;  Location: [facility] OR;  Service: Ophthalmology;  Laterality: Right;    SMALL INTESTINE SURGERY       Family History   Problem Relation Name Age of Onset    Dementia Mother      No Known Problems Father      No Known Problems Sister      No Known Problems Brother      No Known Problems Maternal Aunt      No Known Problems Maternal Uncle      No Known Problems Paternal Aunt      No Known Problems Paternal Uncle      No Known Problems Maternal Grandmother      No Known Problems Maternal Grandfather      No Known Problems Paternal Grandmother      No Known Problems Paternal Grandfather      Amblyopia Neg Hx      Blindness Neg Hx      Cancer Neg Hx      Cataracts Neg Hx      Diabetes Neg Hx      Glaucoma Neg Hx      Hypertension Neg Hx      Macular degeneration Neg Hx      Retinal detachment Neg Hx      Strabismus Neg Hx      Stroke Neg Hx      Thyroid disease Neg Hx       Social History[1]  Review of Systems   Constitutional:  Negative for chills, diaphoresis and fever.   HENT:  Negative for ear pain and sore throat.    Eyes:  Negative for pain.   Respiratory:  Negative for cough and shortness of breath.    Cardiovascular:  Negative for chest pain.   Gastrointestinal:  Positive for diarrhea (5-6/24hours, black stools s/p Pepto Bismol). Negative for abdominal pain, nausea and vomiting.   Genitourinary:  Negative for dysuria.   Musculoskeletal:  Negative for back pain.        (-) Arm or leg trouble.    Skin:  Negative for rash.    Neurological:  Positive for headaches (mild R temporal).   Psychiatric/Behavioral:  Negative for confusion.        Physical Exam     Initial Vitals [07/02/25 0732]   BP Pulse Resp Temp SpO2   135/78 98 16 98.1 °F (36.7 °C) 98 %      MAP       --         Physical Exam  The patient was examined specifically for the following:   General:No significant distress, Good color, Warm and dry. Head and neck:Scalp atraumatic, Neck supple. Neurological:Appropriate conversation, Gross motor deficits. Eyes:Conjugate gaze, Clear corneas. ENT: No epistaxis. Cardiac: Regular rate and rhythm, Grossly normal heart tones. Pulmonary: Wheezing, Rales. Gastrointestinal: Abdominal tenderness, Abdominal distention. Musculoskeletal: Extremity deformity, Apparent pain with range of motion of the joints. Skin: Rash.   The findings on examination were normal except for the following:  The patient has a partially closed left eye with exophthalmos.  The lungs are clear.  The heart tones are normal.  There is minimal vague diffuse abdominal tenderness but no guarding rebound mass or distention.  The lungs are clear.  The heart tones are normal.  Vital signs are stable.  Rectal exam reveals light brown guaiac-negative stool.  ED Course   Procedures  Labs Reviewed   COMPREHENSIVE METABOLIC PANEL - Abnormal       Result Value    Sodium 137      Potassium 2.5 (*)     Chloride 102      CO2 22 (*)     Glucose 94      BUN 14      Creatinine 0.9      Calcium 9.7      Protein Total 7.1      Albumin 3.0 (*)     Bilirubin Total 0.6      ALP 97      AST 19      ALT 6 (*)     Anion Gap 13      eGFR >60     POTASSIUM - Abnormal    Potassium 3.2 (*)    CBC WITH DIFFERENTIAL - Normal    WBC 5.03      RBC 4.45      HGB 13.7      HCT 40.4      MCV 91      MCH 30.8      MCHC 33.9      RDW 14.3      Platelet Count 198      MPV 9.8      Nucleated RBC 0     MAGNESIUM - Normal    Magnesium  1.7     CLOSTRIDIOIDES DIFFICILE   CULTURE, STOOL   CBC W/ AUTO DIFFERENTIAL     Narrative:     The following orders were created for panel order CBC Auto Differential.  Procedure                               Abnormality         Status                     ---------                               -----------         ------                     CBC with Differential[[id number]]       Normal              Final result               Manual Differential[[id number]]                             Final result                 Please view results for these tests on the individual orders.   MANUAL DIFFERENTIAL    Gran # (ANC) 3.4      Segmented Neutrophil % 45.0      Bands % 22.0      Lymphocyte % 18.0      Monocyte % 13.0      Eosinophil % 2.0     OCCULT BLOOD X 1, STOOL   WBC, STOOL   ROTAVIRUS ANTIGEN, STOOL   GIARDIA/CRYPTOSPORIDIUM (EIA)     EKG Readings: (Independently Interpreted)   This patient is in a normal sinus rhythm with a heart rate of 85.  This patient may have left ventricular hypertrophy.  The axis is normal.  Intervals are normal.  There is no definite evidence of acute myocardial infarction or malignant arrhythmia.  This is doctor Cabrera dictating an I independently interpreted this EKG.       Imaging Results    None          Medications   sodium chloride 0.9% flush 10 mL (has no administration in time range)   naloxone 0.4 mg/mL injection 0.02 mg (has no administration in time range)   glucose chewable tablet 16 g (has no administration in time range)   glucose chewable tablet 24 g (has no administration in time range)   dextrose 50% injection 12.5 g (has no administration in time range)   dextrose 50% injection 25 g (has no administration in time range)   glucagon (human recombinant) injection 1 mg (has no administration in time range)   enoxaparin injection 40 mg (has no administration in time range)   atorvastatin tablet 20 mg (has no administration in time range)   cyproheptadine 4 mg tablet 4 mg (has no administration in time range)   cetirizine tablet 5 mg (has no administration  in time range)   lisinopriL tablet 10 mg (has no administration in time range)   sodium chloride 0.9% bolus 1,000 mL 1,000 mL (1,000 mLs Intravenous New Bag 7/2/25 0343)   potassium bicarbonate disintegrating tablet 40 mEq (40 mEq Oral Given 7/2/25 1775)     Medical Decision Making  Amount and/or Complexity of Data Reviewed  External Data Reviewed: notes.     Details: See HPI.   Labs: ordered. Decision-making details documented in ED Course.    Risk  Prescription drug management.    Given the above this patient presents emergency with a 2 day history of diarrhea.  The patient had 6 stools in the last 24 hours.  She is discovered to have hypokalemia with a potassium of 2.5.  I will admit for potassium replacement.  EKG is pending.  The patient is accepted by Hospital Medicine.        Scribe Attestation:   Scribe #1: I performed the above scribed service and the documentation accurately describes the services I performed. I attest to the accuracy of the note.                               Clinical Impression:  Final diagnoses:  [E87.6] Hypokalemia (Primary)  [R19.7] Diarrhea, unspecified type          ED Disposition Condition    Observation                Please note that the documentation on this chart was provided by the scribe above on the date of service noted above, and that the documentation in the chart accurately reflects the work and decisions made by me alone.  Signed, Dr. Cabrera         [1]   Social History  Tobacco Use    Smoking status: Some Days     Current packs/day: 0.50     Average packs/day: 0.5 packs/day for 26.5 years (13.2 ttl pk-yrs)     Types: Cigarettes     Start date: 1999    Smokeless tobacco: Never    Tobacco comments:     occassional cigarette   Substance Use Topics    Alcohol use: No    Drug use: No        Dillon Cabrera MD  07/02/25 1147

## 2025-07-02 NOTE — H&P
[facility] Emergency Dept  [facility] Medicine  History & Physical    Patient Name: Melba Pabon  MRN: [id number]  Patient Class: OP- Observation  Admission Date: 7/2/2025  Attending Physician: Raymond Pabon MD   Primary Care Provider: Franco Gonzales MD         Patient information was obtained from patient and ER records.     Subjective:     Principal Problem:Diarrhea    Chief Complaint:   Chief Complaint   Patient presents with    Diarrhea     Pt to ED c/o diarrhea x's 2 days. Noticed stool was black on yesterday x's 1 episode. Denies CP/SOB. +abdominal pain.     Abdominal Pain        HPI: Melba Pabon is a 75 y.o. F with a past medical history of hypertension, hyperlipidemia, cerebral aneurysm, gastrointestinal stromal tumor, s/p small bowel resection, osteoporosis, parathyroid adenoma who presented to the [facility] Emergency Department for evaluation of diarrhea and abdominal tenderness x 2 days. She reports having 5-6 loose epsiodes of diarrhea over the past 24 hours. She cannot recall anything in particular which could have provoked the episode. She reports taking Pepto Bismol for alleviation of symptoms which turned her stools black. She denies recent antibiotic use. Also denies fever, chills, nausea/vomiting, or myalgias.     In ED: Mildly hypertensive, but vitals otherwise stable and wnl. CBC without leukocytosis or anemia. CMP notable for hypokalemia (2.5) and hypoalbuminemia (3.0). EKG NSR without arrhythmias or T wave changes. No imaging obtained. Given potassium bicarb and a 1L NS bolus. Admitted to Hospital Medicine for further management of diarrhea and hypokalemia.    Past Medical History:   Diagnosis Date    Acute blood loss anemia 07/28/2018    Age-related osteoporosis without current pathological fracture 04/13/2018    Allergy     Cataract     Cerebral aneurysm     Gastrointestinal stromal tumor (GIST)     GI bleed     Hyperlipidemia 7/2/2025    Hypertension     Lipid screening  12/2/2024       Past Surgical History:   Procedure Laterality Date    BRAIN SURGERY      metal plate in head    BREAST BIOPSY Right     CATARACT EXTRACTION Left 11/05/2020    Dr. Pond    CATARACT EXTRACTION W/  INTRAOCULAR LENS IMPLANT Right 01/14/2021    Dr. Pond    COLON SURGERY  2016    small bowel resection    DESCEMET'S MEMBRANE ENDOTHELIAL KERATOPLASTY (DMEK) Left 3/7/2022    Procedure: DESCEMET'S MEMBRANE ENDOTHELIAL KERATOPLASTY (DMEK);  Surgeon: Roopa Emmanuel MD;  Location: [facility] OR;  Service: Ophthalmology;  Laterality: Left;    ECTROPION REPAIR Bilateral 9/24/2024    Procedure: REPAIR, ECTROPION, EYELID;  Surgeon: Jennifer Lott MD;  Location: [facility] OR;  Service: Ophthalmology;  Laterality: Bilateral;    EXCISION OF MASS OF ABDOMEN N/A 8/27/2018    Procedure: RESECTION GASTRIC MASS;  Surgeon: Chuck Palomares MD;  Location: [facility] OR;  Service: General;  Laterality: N/A;  RN Pre Op 8-16-18    EYE SURGERY      FLAP,ZYGOMATICOFACIAL W/PRESERVATION OF VASCULAR PEDICLE Bilateral 9/24/2024    Procedure: FLAP,ZYGOMATICOFACIAL W/PRESERVATION OF VASCULAR PEDICLE;  Surgeon: Jennifer Lott MD;  Location: [facility] OR;  Service: Ophthalmology;  Laterality: Bilateral;    HERNIA REPAIR  2016    HYSTERECTOMY      INTRAOCULAR PROSTHESES INSERTION Left 11/5/2020    Procedure: INSERTION, IOL PROSTHESIS;  Surgeon: Howard Pond MD;  Location: [facility] OR;  Service: Ophthalmology;  Laterality: Left;    INTRAOCULAR PROSTHESES INSERTION Right 1/14/2021    Procedure: INSERTION, IOL PROSTHESIS;  Surgeon: Howard Pond MD;  Location: [facility] OR;  Service: Ophthalmology;  Laterality: Right;  RN Phone Pre OP 1-7-2020, Covid NEGATIVE ON  1-. Arrival 09:00 am  C A    OOPHORECTOMY      PHACOEMULSIFICATION OF CATARACT Left 11/5/2020    Procedure: PHACOEMULSIFICATION, CATARACT;  Surgeon: Howard Pond MD;  Location: [facility] OR;  Service: Ophthalmology;  Laterality: Left;  RN Phone Pre OP 11-2-2020.  --COVID NEGATIVE ON  11-4-2020. Arrival 11:45 am.C A    PHACOEMULSIFICATION OF CATARACT Right 1/14/2021    Procedure: PHACOEMULSIFICATION, CATARACT;  Surgeon: Howard Pond MD;  Location: [facility];  Service: Ophthalmology;  Laterality: Right;    SMALL INTESTINE SURGERY         Review of patient's allergies indicates:   Allergen Reactions    Pcn [penicillins]        Current Facility-Administered Medications on File Prior to Encounter   Medication    cyclopentolate 1% ophthalmic solution 1 drop    lidocaine (PF) 10 mg/ml (1%) injection 10 mg     Current Outpatient Medications on File Prior to Encounter   Medication Sig    atorvastatin (LIPITOR) 20 MG tablet Take 1 tablet (20 mg total) by mouth every evening.    cyproheptadine (PERIACTIN) 4 mg tablet Take 1 tablet (4 mg total) by mouth 2 (two) times daily as needed (as needed).    levocetirizine (XYZAL) 5 MG tablet Take 1 tablet (5 mg total) by mouth every evening.    lisinopriL 10 MG tablet Take 1 tablet (10 mg total) by mouth once daily.    HYDROcodone-acetaminophen (NORCO) 5-325 mg per tablet Take 1 tablet by mouth every 6 (six) hours as needed for Pain. (Patient not taking: Reported on 6/13/2025)    ibuprofen (ADVIL,MOTRIN) 800 MG tablet Take 800 mg by mouth every 6 (six) hours as needed.    multivitamin (THERAGRAN) per tablet Take 1 tablet by mouth once daily.    neomycin-polymyxin-dexamethasone (MAXITROL) 3.5 mg/g-10,000 unit/g-0.1 % Oint Place into both eyes every 6 (six) hours. Placed 1/2 inch ribbon along suture line in both eyes. (Patient not taking: Reported on 6/13/2025)    neomycin-polymyxin-dexamethasone (MAXITROL) 3.5mg/mL-10,000 unit/mL-0.1 % DrpS Place 1 drop into both eyes every 6 (six) hours. (Patient not taking: Reported on 6/13/2025)    tobramycin-dexAMETHasone 0.3-0.1% (TOBRADEX) 0.3-0.1 % DrpS Place 1 drop into both eyes as needed (when closed shut/dry). (Patient not taking: Reported on 6/13/2025)     Family History       Problem Relation  (Age of Onset)    Dementia Mother    No Known Problems Father, Sister, Brother, Maternal Aunt, Maternal Uncle, Paternal Aunt, Paternal Uncle, Maternal Grandmother, Maternal Grandfather, Paternal Grandmother, Paternal Grandfather          Tobacco Use    Smoking status: Some Days     Current packs/day: 0.50     Average packs/day: 0.5 packs/day for 26.5 years (13.2 ttl pk-yrs)     Types: Cigarettes     Start date: 1999    Smokeless tobacco: Never    Tobacco comments:     occassional cigarette   Substance and Sexual Activity    Alcohol use: No    Drug use: No    Sexual activity: Not Currently     Partners: Male     Review of Systems   Constitutional:  Negative for chills, fatigue and fever.   Respiratory:  Negative for cough and shortness of breath.    Cardiovascular:  Negative for chest pain, palpitations and leg swelling.   Gastrointestinal:  Positive for abdominal pain and diarrhea. Negative for abdominal distention.   Neurological:  Negative for dizziness, syncope, weakness and light-headedness.     Objective:     Vital Signs (Most Recent):  Temp: 98.1 °F (36.7 °C) (07/02/25 0732)  Pulse: 81 (07/02/25 1400)  Resp: 16 (07/02/25 0732)  BP: 129/73 (07/02/25 1400)  SpO2: 98 % (07/02/25 1400) Vital Signs (24h Range):  Temp:  [98.1 °F (36.7 °C)] 98.1 °F (36.7 °C)  Pulse:  [78-98] 81  Resp:  [16] 16  SpO2:  [94 %-99 %] 98 %  BP: (121-145)/(69-82) 129/73     Weight: 45.4 kg (100 lb)  Body mass index is 16.64 kg/m².     Physical Exam  Vitals and nursing note reviewed.   HENT:      Head: Normocephalic.      Mouth/Throat:      Pharynx: Oropharynx is clear.   Cardiovascular:      Rate and Rhythm: Normal rate and regular rhythm.   Pulmonary:      Effort: Pulmonary effort is normal. No respiratory distress.   Abdominal:      Tenderness: There is abdominal tenderness in the right lower quadrant, epigastric area and left lower quadrant. There is guarding.      Comments: Abdominal bulge in epigastrium. TTP.    Skin:     General:  Skin is warm and dry.   Neurological:      Mental Status: She is alert. Mental status is at baseline.   Psychiatric:         Mood and Affect: Mood normal.         Behavior: Behavior normal.                Significant Labs: All pertinent labs within the past 24 hours have been reviewed.    Significant Imaging: I have reviewed all pertinent imaging results/findings within the past 24 hours.  Assessment/Plan:     Assessment & Plan  Diarrhea  75 y.o. F with hx of hypertension, hyperlipidemia, cerebral aneurysm, gastrointestinal stromal tumor, s/p small bowel resection, osteoporosis, parathyroid adenoma presented to the ED with complaints of diarrhea and abdominal tenderness x 2 days. Reports black tarry stools, however, reports taking pepto-bismo for symptomatic relief. Denies recent antibiotic use or known sick contacts.     - Obtain CT abd/pel given hx of previous SBO and multiple abdominal surgeries.  - F/u stool studies   - NS @ 75 mL/hr  Essential hypertension  Patient's blood pressure range in the last 24 hours was: BP  Min: 121/72  Max: 145/82.The patient's inpatient anti-hypertensive regimen is listed below:  Current Antihypertensives  lisinopriL tablet 10 mg, Daily, Oral    Plan  - BP is controlled, no changes needed to their regimen  Atherosclerosis of aorta  - Continue Lipitor    Hypokalemia  Patient's most recent potassium results are listed below.   Recent Labs     07/02/25  0926 07/02/25  1306   K 2.5* 3.2*     Plan  - Replete potassium per protocol  - Monitor potassium Every 6 hours  - Patient's hypokalemia is stable  Hyperlipidemia  - Continue Lipitor   VTE Risk Mitigation (From admission, onward)           Ordered     enoxaparin injection 40 mg  Daily         07/02/25 1236     IP VTE HIGH RISK PATIENT  Once         07/02/25 1236     Place sequential compression device  Until discontinued         07/02/25 1236                                     On 07/02/2025, patient should be placed in hospital  observation services under my care in collaboration with Dr. Raymond Pabon.           Michele Pena PA-C  Department of Hospital Medicine  [facility] - Emergency Dept

## 2025-07-02 NOTE — HPI
Melba Pabon is a 75 y.o. F with a past medical history of hypertension, hyperlipidemia, cerebral aneurysm, gastrointestinal stromal tumor, s/p small bowel resection, osteoporosis, parathyroid adenoma who presented to the [facility] Emergency Department for evaluation of diarrhea and abdominal tenderness x 2 days. She reports having 5-6 loose epsiodes of diarrhea over the past 24 hours. She cannot recall anything in particular which could have provoked the episode. She reports taking Pepto Bismol for alleviation of symptoms which turned her stools black. She denies recent antibiotic use. Also denies fever, chills, nausea/vomiting, or myalgias.     In ED: Mildly hypertensive, but vitals otherwise stable and wnl. CBC without leukocytosis or anemia. CMP notable for hypokalemia (2.5) and hypoalbuminemia (3.0). EKG NSR without arrhythmias or T wave changes. No imaging obtained. Given potassium bicarb and a 1L NS bolus. Admitted to Hospital Medicine for further management of diarrhea and hypokalemia.

## 2025-07-02 NOTE — PHARMACY MED REC
"  Patient verified and confirmed all medications at bedside.     Admission Medication History     The home medication history was taken by Lois Salgado.    You may go to "Admission" then "Reconcile Home Medications" tabs to review and/or act upon these items.     The home medication list has been updated by the Pharmacy department.   Please read ALL comments highlighted in yellow.   Please address this information as you see fit.    Feel free to contact us if you have any questions or require assistance.      The medications listed below were removed from the home medication list. Please reorder if appropriate:  Patient reports no longer taking the following medication(s):  Ibuprofen (advil0 800 Mg  Theragram (multivitamin)  Maxitrol 3.5 mg ointment  Maxitrol eye drops  Tobradex eye drops        Medications listed below were obtained from: Patient/family and Analytic software- [employer]  (Not in a hospital admission)          Lois Salgado [facility] Medication Access Specialist [phone]                  .          "

## 2025-07-02 NOTE — PLAN OF CARE
Problem: Adult Inpatient Plan of Care  Goal: Plan of Care Review  Outcome: Progressing  Goal: Patient-Specific Goal (Individualized)  Outcome: Progressing  Goal: Absence of Hospital-Acquired Illness or Injury  Outcome: Progressing  Goal: Optimal Comfort and Wellbeing  Outcome: Progressing  Goal: Readiness for Transition of Care  Outcome: Progressing    Pt alert and able to make needs known. Ivf in progress, shaan well. Bed in lowest position, call light within reach, and instructed to call for any assistance. Continue with plan of care.

## 2025-07-02 NOTE — ED NOTES
76 y/o female report to ED due to abdominal pain and diarrhea X 2 days. Pt states her bowels were black in color. Pt has been using pepto bismol for sx. Pt has some abdominal tenderness. Pt denies CP, SOB, vomiting and fever. VSS, AAOx4. Pt has been placed on all cardiac monitoring, call light in reach, bed in lowest position, side rails raised x 2, and instructed to call staff for assistance whenever needed.

## 2025-07-02 NOTE — PLAN OF CARE
Case Management Assessment     PCP: Dr. Gonzales  Pharmacy: [facility] on [address]    Patient Arrived From: home alone  Existing Help at Home: family    Barriers to Discharge: none    Discharge Plan:    A. home   B. home      Patient will discharge to home when medically stable.  She will need f/u with PCP and plans to schedule her own appt.  Patient's daughter will transport home.             07/02/25 1215   Discharge Planning   Assessment Type Discharge Planning Brief Assessment   Resource/Environmental Concerns none   Support Systems Children   Assistance Needed None   Equipment Currently Used at Home none   Current Living Arrangements apartment   Care Facility Name nA   Patient/Family Anticipates Transition to home   Patient/Family Anticipated Services at Transition outpatient care  (Patient to schedule own f/u appt.  She reported that she is scheduled for eye surgery on Tuesday and not sure when she'll be cleared to travel.  She uses public transportation to get to and from appointments.)   DME Needed Upon Discharge  none   Discharge Plan A Home   Discharge Plan B Home

## 2025-07-03 PROBLEM — A49.8 CLOSTRIDIUM DIFFICILE INFECTION: Status: ACTIVE | Noted: 2025-07-03

## 2025-07-03 PROBLEM — K59.00 CONSTIPATION: Status: ACTIVE | Noted: 2025-07-02

## 2025-07-03 LAB
ABSOLUTE EOSINOPHIL (OHS): 0.02 K/UL
ABSOLUTE MONOCYTE (OHS): 0.58 K/UL (ref 0.3–1)
ABSOLUTE NEUTROPHIL COUNT (OHS): 2.52 K/UL (ref 1.8–7.7)
ALBUMIN SERPL BCP-MCNC: 2.6 G/DL (ref 3.5–5.2)
ALP SERPL-CCNC: 81 UNIT/L (ref 40–150)
ALT SERPL W/O P-5'-P-CCNC: 6 UNIT/L (ref 10–44)
ANION GAP (OHS): 11 MMOL/L (ref 8–16)
AST SERPL-CCNC: 15 UNIT/L (ref 11–45)
BASOPHILS # BLD AUTO: 0.01 K/UL
BASOPHILS NFR BLD AUTO: 0.2 %
BILIRUB SERPL-MCNC: 0.4 MG/DL (ref 0.1–1)
BUN SERPL-MCNC: 11 MG/DL (ref 8–23)
C DIFF GDH STL QL: POSITIVE
C DIFF TOX A+B STL QL IA: POSITIVE
CALCIUM SERPL-MCNC: 9.1 MG/DL (ref 8.7–10.5)
CHLORIDE SERPL-SCNC: 106 MMOL/L (ref 95–110)
CO2 SERPL-SCNC: 23 MMOL/L (ref 23–29)
CREAT SERPL-MCNC: 0.7 MG/DL (ref 0.5–1.4)
E COLI SXT1 STL QL IA: NEGATIVE
E COLI SXT2 STL QL IA: NEGATIVE
ERYTHROCYTE [DISTWIDTH] IN BLOOD BY AUTOMATED COUNT: 14.5 % (ref 11.5–14.5)
GFR SERPLBLD CREATININE-BSD FMLA CKD-EPI: >60 ML/MIN/1.73/M2
GLUCOSE SERPL-MCNC: 82 MG/DL (ref 70–110)
HCT VFR BLD AUTO: 35.4 % (ref 37–48.5)
HGB BLD-MCNC: 11.9 GM/DL (ref 12–16)
IMM GRANULOCYTES # BLD AUTO: 0.01 K/UL (ref 0–0.04)
IMM GRANULOCYTES NFR BLD AUTO: 0.2 % (ref 0–0.5)
LYMPHOCYTES # BLD AUTO: 1.18 K/UL (ref 1–4.8)
MAGNESIUM SERPL-MCNC: 2 MG/DL (ref 1.6–2.6)
MCH RBC QN AUTO: 30.5 PG (ref 27–31)
MCHC RBC AUTO-ENTMCNC: 33.6 G/DL (ref 32–36)
MCV RBC AUTO: 91 FL (ref 82–98)
NUCLEATED RBC (/100WBC) (OHS): 0 /100 WBC
PHOSPHATE SERPL-MCNC: 2 MG/DL (ref 2.7–4.5)
PLATELET # BLD AUTO: 183 K/UL (ref 150–450)
PMV BLD AUTO: 10 FL (ref 9.2–12.9)
POTASSIUM SERPL-SCNC: 2.8 MMOL/L (ref 3.5–5.1)
POTASSIUM SERPL-SCNC: 3.8 MMOL/L (ref 3.5–5.1)
PROT SERPL-MCNC: 6 GM/DL (ref 6–8.4)
RBC # BLD AUTO: 3.9 M/UL (ref 4–5.4)
RELATIVE EOSINOPHIL (OHS): 0.5 %
RELATIVE LYMPHOCYTE (OHS): 27.3 % (ref 18–48)
RELATIVE MONOCYTE (OHS): 13.4 % (ref 4–15)
RELATIVE NEUTROPHIL (OHS): 58.4 % (ref 38–73)
RV AG STL QL IA.RAPID: NEGATIVE
SODIUM SERPL-SCNC: 140 MMOL/L (ref 136–145)
WBC # BLD AUTO: 4.32 K/UL (ref 3.9–12.7)
WBC #/AREA STL HPF: ABNORMAL /[HPF]

## 2025-07-03 PROCEDURE — G0378 HOSPITAL OBSERVATION PER HR: HCPCS

## 2025-07-03 PROCEDURE — 36415 COLL VENOUS BLD VENIPUNCTURE: CPT

## 2025-07-03 PROCEDURE — 25000003 PHARM REV CODE 250

## 2025-07-03 PROCEDURE — 63600175 PHARM REV CODE 636 W HCPCS

## 2025-07-03 PROCEDURE — 80053 COMPREHEN METABOLIC PANEL: CPT

## 2025-07-03 PROCEDURE — 63600175 PHARM REV CODE 636 W HCPCS: Performed by: STUDENT IN AN ORGANIZED HEALTH CARE EDUCATION/TRAINING PROGRAM

## 2025-07-03 PROCEDURE — 96372 THER/PROPH/DIAG INJ SC/IM: CPT | Performed by: STUDENT IN AN ORGANIZED HEALTH CARE EDUCATION/TRAINING PROGRAM

## 2025-07-03 PROCEDURE — 84132 ASSAY OF SERUM POTASSIUM: CPT

## 2025-07-03 PROCEDURE — 85025 COMPLETE CBC W/AUTO DIFF WBC: CPT

## 2025-07-03 PROCEDURE — 83735 ASSAY OF MAGNESIUM: CPT

## 2025-07-03 PROCEDURE — 84100 ASSAY OF PHOSPHORUS: CPT

## 2025-07-03 PROCEDURE — A4216 STERILE WATER/SALINE, 10 ML: HCPCS

## 2025-07-03 RX ORDER — ENOXAPARIN SODIUM 100 MG/ML
30 INJECTION SUBCUTANEOUS EVERY 24 HOURS
Status: DISCONTINUED | OUTPATIENT
Start: 2025-07-03 | End: 2025-07-05 | Stop reason: HOSPADM

## 2025-07-03 RX ADMIN — POTASSIUM PHOSPHATE, MONOBASIC 500 MG: 500 TABLET, SOLUBLE ORAL at 02:07

## 2025-07-03 RX ADMIN — POTASSIUM BICARBONATE 40 MEQ: 391 TABLET, EFFERVESCENT ORAL at 09:07

## 2025-07-03 RX ADMIN — Medication 125 MG: at 01:07

## 2025-07-03 RX ADMIN — ATORVASTATIN CALCIUM 20 MG: 10 TABLET, FILM COATED ORAL at 09:07

## 2025-07-03 RX ADMIN — ENOXAPARIN SODIUM 30 MG: 30 INJECTION SUBCUTANEOUS at 04:07

## 2025-07-03 RX ADMIN — POTASSIUM PHOSPHATE, MONOBASIC 500 MG: 500 TABLET, SOLUBLE ORAL at 09:07

## 2025-07-03 RX ADMIN — CETIRIZINE HYDROCHLORIDE 5 MG: 5 TABLET ORAL at 09:07

## 2025-07-03 RX ADMIN — LISINOPRIL 10 MG: 5 TABLET ORAL at 09:07

## 2025-07-03 RX ADMIN — LACTULOSE 20 G: 20 SOLUTION ORAL at 09:07

## 2025-07-03 RX ADMIN — Medication 125 MG: at 05:07

## 2025-07-03 NOTE — PROGRESS NOTES
[facility] Medicine  Progress Note    Patient Name: Melba Pabon  MRN: [id number]  Patient Class: OP- Observation   Admission Date: 7/2/2025  Length of Stay: 0 days  Attending Physician: Raymond Pabon MD  Primary Care Provider: Franco Gonzales MD        Subjective     Principal Problem:Constipation        HPI:  Melba Pabon is a 75 y.o. F with a past medical history of hypertension, hyperlipidemia, cerebral aneurysm, gastrointestinal stromal tumor, s/p small bowel resection, osteoporosis, parathyroid adenoma who presented to the [facility] Emergency Department for evaluation of diarrhea and abdominal tenderness x 2 days. She reports having 5-6 loose epsiodes of diarrhea over the past 24 hours. She cannot recall anything in particular which could have provoked the episode. She reports taking Pepto Bismol for alleviation of symptoms which turned her stools black. She denies recent antibiotic use. Also denies fever, chills, nausea/vomiting, or myalgias.     In ED: Mildly hypertensive, but vitals otherwise stable and wnl. CBC without leukocytosis or anemia. CMP notable for hypokalemia (2.5) and hypoalbuminemia (3.0). EKG NSR without arrhythmias or T wave changes. No imaging obtained. Given potassium bicarb and a 1L NS bolus. Admitted to Hospital Medicine for further management of diarrhea and hypokalemia.    Overview/Hospital Course:  No notes on file    Interval History: VSS, AF. Reports several loose stools overnight. Abdominal tenderness now resolved.     Review of Systems   Constitutional:  Negative for activity change, appetite change, chills and fever.   Respiratory:  Negative for cough and shortness of breath.    Cardiovascular:  Negative for chest pain and palpitations.   Gastrointestinal:  Positive for diarrhea. Negative for abdominal pain, nausea and vomiting.   Neurological:  Negative for dizziness, syncope, weakness and light-headedness.     Objective:     Vital Signs (Most  Recent):  Temp: 98.6 °F (37 °C) (07/03/25 0713)  Pulse: 75 (07/03/25 0731)  Resp: 17 (07/03/25 0713)  BP: 135/78 (07/03/25 0713)  SpO2: 97 % (07/03/25 0713) Vital Signs (24h Range):  Temp:  [98.4 °F (36.9 °C)-98.8 °F (37.1 °C)] 98.6 °F (37 °C)  Pulse:  [67-97] 75  Resp:  [17-20] 17  SpO2:  [93 %-99 %] 97 %  BP: (119-148)/(69-82) 135/78     Weight: 41.3 kg (91 lb)  Body mass index is 15.14 kg/m².    Intake/Output Summary (Last 24 hours) at 7/3/2025 0832  Last data filed at 7/2/2025 1900  Gross per 24 hour   Intake 1179.09 ml   Output --   Net 1179.09 ml         Physical Exam  Vitals and nursing note reviewed.   HENT:      Head: Normocephalic.      Mouth/Throat:      Pharynx: Oropharynx is clear.   Eyes:      Conjunctiva/sclera: Conjunctivae normal.   Cardiovascular:      Rate and Rhythm: Normal rate and regular rhythm.   Pulmonary:      Effort: Pulmonary effort is normal.   Abdominal:      General: There is no distension.      Palpations: Abdomen is soft.      Tenderness: There is no abdominal tenderness. There is no guarding.   Skin:     General: Skin is warm and dry.   Neurological:      General: No focal deficit present.      Mental Status: She is alert. Mental status is at baseline.   Psychiatric:         Mood and Affect: Mood normal.         Behavior: Behavior normal.               Significant Labs: All pertinent labs within the past 24 hours have been reviewed.    Significant Imaging: I have reviewed all pertinent imaging results/findings within the past 24 hours.      Assessment & Plan  Constipation  75 y.o. F with hx of hypertension, hyperlipidemia, cerebral aneurysm, gastrointestinal stromal tumor, s/p small bowel resection, osteoporosis, parathyroid adenoma presented to the ED with complaints of diarrhea and abdominal tenderness x 2 days. Reports black tarry stools, however, reports taking pepto-bismo for symptomatic relief. Denies recent antibiotic use or known sick contacts.     - Obtain CT abd/pel  Findings are concerning for fecal impaction and or stercoral colitis.   - Diarrhea likely 2/2 fecal impaction overflow  - Brown-bomb enema  - Lactulose TID  - NS @ 75 mL/hr  Essential hypertension  Patient's blood pressure range in the last 24 hours was: BP  Min: 119/76  Max: 148/78.The patient's inpatient anti-hypertensive regimen is listed below:  Current Antihypertensives  lisinopriL tablet 10 mg, Daily, Oral    Plan  - BP is controlled, no changes needed to their regimen  Atherosclerosis of aorta  - Continue Lipitor    Hypokalemia  Patient's most recent potassium results are listed below.   Recent Labs     07/02/25  0926 07/02/25  1306 07/03/25  0536   K 2.5* 3.2* 2.8*     Plan  - Replete potassium per protocol  - Monitor potassium Every 6 hours  - Patient's hypokalemia is stable  Hyperlipidemia  - Continue Lipitor   VTE Risk Mitigation (From admission, onward)           Ordered     enoxaparin injection 40 mg  Daily         07/02/25 1236     IP VTE HIGH RISK PATIENT  Once         07/02/25 1236     Place sequential compression device  Until discontinued         07/02/25 1236                    Discharge Planning   GORDON: 7/3/2025     Code Status: Full Code   Medical Readiness for Discharge Date:   Discharge Plan A: Home                        Michele Pena PA-C  Department of Hospital Medicine   [facility]

## 2025-07-03 NOTE — PROGRESS NOTES
Pharmacist Renal Dose Adjustment Note    Melba Pabon is a 75 y.o. female being treated with the medication Enoxaparin 40 mg daily     Patient Data:    Vital Signs (Most Recent):  Temp: 98.6 °F (37 °C) (07/03/25 0713)  Pulse: 77 (07/03/25 0743)  Resp: 17 (07/03/25 0713)  BP: 135/78 (07/03/25 0713)  SpO2: 97 % (07/03/25 0713) Vital Signs (72h Range):  Temp:  [98.1 °F (36.7 °C)-98.8 °F (37.1 °C)]   Pulse:  [67-98]   Resp:  [16-20]   BP: (119-148)/(69-82)   SpO2:  [93 %-99 %]      Recent Labs   Lab 07/02/25  0926 07/03/25  0536   CREATININE 0.9 0.7     Serum creatinine: 0.7 mg/dL 07/03/25 0536  Estimated creatinine clearance: 46 mL/min    Medication: Enoxaparin 40 mg daily will be changed to Enoxaparin 30 mg daily due to BMI < 20        Pharmacist's Name: Sultana Santiago  Pharmacist's Extension: [phone]

## 2025-07-03 NOTE — SUBJECTIVE & OBJECTIVE
Interval History: VSS, AF. Reports several loose stools overnight. Abdominal tenderness now resolved.     Review of Systems   Constitutional:  Negative for activity change, appetite change, chills and fever.   Respiratory:  Negative for cough and shortness of breath.    Cardiovascular:  Negative for chest pain and palpitations.   Gastrointestinal:  Positive for diarrhea. Negative for abdominal pain, nausea and vomiting.   Neurological:  Negative for dizziness, syncope, weakness and light-headedness.     Objective:     Vital Signs (Most Recent):  Temp: 98.6 °F (37 °C) (07/03/25 0713)  Pulse: 75 (07/03/25 0731)  Resp: 17 (07/03/25 0713)  BP: 135/78 (07/03/25 0713)  SpO2: 97 % (07/03/25 0713) Vital Signs (24h Range):  Temp:  [98.4 °F (36.9 °C)-98.8 °F (37.1 °C)] 98.6 °F (37 °C)  Pulse:  [67-97] 75  Resp:  [17-20] 17  SpO2:  [93 %-99 %] 97 %  BP: (119-148)/(69-82) 135/78     Weight: 41.3 kg (91 lb)  Body mass index is 15.14 kg/m².    Intake/Output Summary (Last 24 hours) at 7/3/2025 0832  Last data filed at 7/2/2025 1900  Gross per 24 hour   Intake 1179.09 ml   Output --   Net 1179.09 ml         Physical Exam  Vitals and nursing note reviewed.   HENT:      Head: Normocephalic.      Mouth/Throat:      Pharynx: Oropharynx is clear.   Eyes:      Conjunctiva/sclera: Conjunctivae normal.   Cardiovascular:      Rate and Rhythm: Normal rate and regular rhythm.   Pulmonary:      Effort: Pulmonary effort is normal.   Abdominal:      General: There is no distension.      Palpations: Abdomen is soft.      Tenderness: There is no abdominal tenderness. There is no guarding.   Skin:     General: Skin is warm and dry.   Neurological:      General: No focal deficit present.      Mental Status: She is alert. Mental status is at baseline.   Psychiatric:         Mood and Affect: Mood normal.         Behavior: Behavior normal.               Significant Labs: All pertinent labs within the past 24 hours have been reviewed.    Significant  Imaging: I have reviewed all pertinent imaging results/findings within the past 24 hours.

## 2025-07-04 PROBLEM — K59.00 CONSTIPATION: Status: RESOLVED | Noted: 2025-07-02 | Resolved: 2025-07-04

## 2025-07-04 PROBLEM — E83.39 HYPOPHOSPHATEMIA: Status: ACTIVE | Noted: 2025-07-04

## 2025-07-04 LAB
ABSOLUTE EOSINOPHIL (OHS): 0.04 K/UL
ABSOLUTE MONOCYTE (OHS): 0.33 K/UL (ref 0.3–1)
ABSOLUTE NEUTROPHIL COUNT (OHS): 1.2 K/UL (ref 1.8–7.7)
ALBUMIN SERPL BCP-MCNC: 2.5 G/DL (ref 3.5–5.2)
ALP SERPL-CCNC: 82 UNIT/L (ref 40–150)
ALT SERPL W/O P-5'-P-CCNC: 6 UNIT/L (ref 10–44)
ANION GAP (OHS): 8 MMOL/L (ref 8–16)
AST SERPL-CCNC: 19 UNIT/L (ref 11–45)
BACTERIA STL CULT: NORMAL
BASOPHILS # BLD AUTO: 0.01 K/UL
BASOPHILS NFR BLD AUTO: 0.3 %
BILIRUB SERPL-MCNC: 0.6 MG/DL (ref 0.1–1)
BUN SERPL-MCNC: 8 MG/DL (ref 8–23)
CALCIUM SERPL-MCNC: 8.8 MG/DL (ref 8.7–10.5)
CHLORIDE SERPL-SCNC: 104 MMOL/L (ref 95–110)
CO2 SERPL-SCNC: 23 MMOL/L (ref 23–29)
CREAT SERPL-MCNC: 0.7 MG/DL (ref 0.5–1.4)
ERYTHROCYTE [DISTWIDTH] IN BLOOD BY AUTOMATED COUNT: 14.4 % (ref 11.5–14.5)
GFR SERPLBLD CREATININE-BSD FMLA CKD-EPI: >60 ML/MIN/1.73/M2
GLUCOSE SERPL-MCNC: 76 MG/DL (ref 70–110)
HCT VFR BLD AUTO: 33.9 % (ref 37–48.5)
HGB BLD-MCNC: 11.6 GM/DL (ref 12–16)
IMM GRANULOCYTES # BLD AUTO: 0 K/UL (ref 0–0.04)
IMM GRANULOCYTES NFR BLD AUTO: 0 % (ref 0–0.5)
LYMPHOCYTES # BLD AUTO: 1.59 K/UL (ref 1–4.8)
MAGNESIUM SERPL-MCNC: 1.8 MG/DL (ref 1.6–2.6)
MCH RBC QN AUTO: 31.1 PG (ref 27–31)
MCHC RBC AUTO-ENTMCNC: 34.2 G/DL (ref 32–36)
MCV RBC AUTO: 91 FL (ref 82–98)
NUCLEATED RBC (/100WBC) (OHS): 0 /100 WBC
PHOSPHATE SERPL-MCNC: 1.6 MG/DL (ref 2.7–4.5)
PLATELET # BLD AUTO: 189 K/UL (ref 150–450)
PMV BLD AUTO: 10 FL (ref 9.2–12.9)
POTASSIUM SERPL-SCNC: 3.2 MMOL/L (ref 3.5–5.1)
PROT SERPL-MCNC: 5.7 GM/DL (ref 6–8.4)
RBC # BLD AUTO: 3.73 M/UL (ref 4–5.4)
RELATIVE EOSINOPHIL (OHS): 1.3 %
RELATIVE LYMPHOCYTE (OHS): 50.2 % (ref 18–48)
RELATIVE MONOCYTE (OHS): 10.4 % (ref 4–15)
RELATIVE NEUTROPHIL (OHS): 37.8 % (ref 38–73)
SODIUM SERPL-SCNC: 135 MMOL/L (ref 136–145)
WBC # BLD AUTO: 3.17 K/UL (ref 3.9–12.7)

## 2025-07-04 PROCEDURE — 84100 ASSAY OF PHOSPHORUS: CPT

## 2025-07-04 PROCEDURE — 25000003 PHARM REV CODE 250

## 2025-07-04 PROCEDURE — 96365 THER/PROPH/DIAG IV INF INIT: CPT

## 2025-07-04 PROCEDURE — 21400001 HC TELEMETRY ROOM

## 2025-07-04 PROCEDURE — 83735 ASSAY OF MAGNESIUM: CPT

## 2025-07-04 PROCEDURE — 36415 COLL VENOUS BLD VENIPUNCTURE: CPT

## 2025-07-04 PROCEDURE — 80053 COMPREHEN METABOLIC PANEL: CPT

## 2025-07-04 PROCEDURE — 96366 THER/PROPH/DIAG IV INF ADDON: CPT

## 2025-07-04 PROCEDURE — 85025 COMPLETE CBC W/AUTO DIFF WBC: CPT

## 2025-07-04 PROCEDURE — 27000207 HC ISOLATION

## 2025-07-04 PROCEDURE — 63600175 PHARM REV CODE 636 W HCPCS

## 2025-07-04 PROCEDURE — 63600175 PHARM REV CODE 636 W HCPCS: Performed by: STUDENT IN AN ORGANIZED HEALTH CARE EDUCATION/TRAINING PROGRAM

## 2025-07-04 PROCEDURE — A4216 STERILE WATER/SALINE, 10 ML: HCPCS

## 2025-07-04 RX ADMIN — POTASSIUM BICARBONATE 40 MEQ: 391 TABLET, EFFERVESCENT ORAL at 09:07

## 2025-07-04 RX ADMIN — CETIRIZINE HYDROCHLORIDE 5 MG: 5 TABLET ORAL at 08:07

## 2025-07-04 RX ADMIN — Medication 125 MG: at 05:07

## 2025-07-04 RX ADMIN — Medication 125 MG: at 04:07

## 2025-07-04 RX ADMIN — Medication 125 MG: at 11:07

## 2025-07-04 RX ADMIN — Medication 6 MG: at 08:07

## 2025-07-04 RX ADMIN — LISINOPRIL 10 MG: 5 TABLET ORAL at 09:07

## 2025-07-04 RX ADMIN — ATORVASTATIN CALCIUM 20 MG: 10 TABLET, FILM COATED ORAL at 08:07

## 2025-07-04 RX ADMIN — SODIUM PHOSPHATE, MONOBASIC, MONOHYDRATE AND SODIUM PHOSPHATE, DIBASIC, ANHYDROUS 30 MMOL: 142; 276 INJECTION, SOLUTION INTRAVENOUS at 09:07

## 2025-07-04 RX ADMIN — ENOXAPARIN SODIUM 30 MG: 30 INJECTION SUBCUTANEOUS at 04:07

## 2025-07-04 RX ADMIN — Medication 125 MG: at 01:07

## 2025-07-04 NOTE — NURSING
[facility] [place]  Nurses Note -- 4 Eyes      7-3-25      Skin assessed on: Q Shift      [x] No Pressure Injuries Present    []Prevention Measures Documented    [] Yes LDA  for Pressure Injury Previously documented     [] Yes New Pressure Injury Discovered   [] LDA for New Pressure Injury Added      Attending RN:  Myrtle Shankar, RN     Second RN:  Tess PUTNAM

## 2025-07-04 NOTE — HOSPITAL COURSE
Melba Pabon is a 75 y.o. F admitted to [facility] for management of diarrhea and abdominal pain. CT abd/pel showed patulous rectum containing large colonic stool, concerning for fecal impaction and or stercoral colitis. Patient was started on aggressive bowel regimen. Follow-up KUB was without evidence of significant constipation. Patient's stool studies were positive for C. Diff toxin and antigen. She was started on po Vancomycin. Diarrhea resolved and electrolytes subsequently improved. Prior to discharge, she was able to tolerate a regular diet. She remained afebrile without leukocytosis on labs. Prescription for 10-day course oral Vancomycin and Miralax sent to pharmacy of choice. Patient is medically stable for discharge.

## 2025-07-04 NOTE — NURSING
[facility], [facility] [place]  Nurses Note -- 4 Eyes        Date:  07/03/2025        Skin assessed on:  Q shift        [x] No Pressure Injuries Present                 []Prevention Measures Documented     [] Yes LDA Previously documented      [] Yes New Pressure Injury Discovered              [] LDA Added        Attending RN:  INDY Pulido     Second RN:   CHRISTIAN Lee

## 2025-07-04 NOTE — PLAN OF CARE
Problem: Adult Inpatient Plan of Care  Goal: Plan of Care Review  Outcome: Progressing  Goal: Patient-Specific Goal (Individualized)  Outcome: Progressing  Goal: Optimal Comfort and Wellbeing  Outcome: Progressing  Goal: Readiness for Transition of Care  Outcome: Progressing     Problem: Wound  Goal: Absence of Infection Signs and Symptoms  Outcome: Progressing  Goal: Optimal Pain Control and Function  Outcome: Progressing     Problem: Infection  Goal: Absence of Infection Signs and Symptoms  Outcome: Progressing     Problem: Fall Injury Risk  Goal: Absence of Fall and Fall-Related Injury  Outcome: Progressing

## 2025-07-04 NOTE — SUBJECTIVE & OBJECTIVE
Interval History: VSS, AF. Reports significant improvement of diarrhea with po Vancomycin. Likely d/c tomorrow.    Review of Systems   Constitutional:  Negative for activity change, appetite change, chills and fever.   Respiratory:  Negative for cough and shortness of breath.    Cardiovascular:  Negative for chest pain and palpitations.   Gastrointestinal:  Positive for diarrhea. Negative for abdominal pain, nausea and vomiting.   Neurological:  Negative for dizziness, syncope, weakness and light-headedness.     Objective:     Vital Signs (Most Recent):  Temp: 98.5 °F (36.9 °C) (07/04/25 0757)  Pulse: 69 (07/04/25 0757)  Resp: 18 (07/04/25 0757)  BP: (!) 145/78 (07/04/25 0757)  SpO2: 96 % (07/04/25 0757) Vital Signs (24h Range):  Temp:  [97.8 °F (36.6 °C)-98.9 °F (37.2 °C)] 98.5 °F (36.9 °C)  Pulse:  [57-90] 69  Resp:  [18] 18  SpO2:  [92 %-100 %] 96 %  BP: (110-145)/(68-78) 145/78     Weight: 41.3 kg (91 lb)  Body mass index is 15.14 kg/m².    Intake/Output Summary (Last 24 hours) at 7/4/2025 0917  Last data filed at 7/4/2025 0106  Gross per 24 hour   Intake 300 ml   Output --   Net 300 ml         Physical Exam  Vitals and nursing note reviewed.   HENT:      Head: Normocephalic.      Mouth/Throat:      Pharynx: Oropharynx is clear.   Eyes:      Conjunctiva/sclera: Conjunctivae normal.   Cardiovascular:      Rate and Rhythm: Normal rate and regular rhythm.   Pulmonary:      Effort: Pulmonary effort is normal.   Abdominal:      General: There is no distension.      Palpations: Abdomen is soft.      Tenderness: There is no abdominal tenderness. There is no guarding.   Skin:     General: Skin is warm and dry.   Neurological:      General: No focal deficit present.      Mental Status: She is alert. Mental status is at baseline.   Psychiatric:         Mood and Affect: Mood normal.         Behavior: Behavior normal.               Significant Labs: All pertinent labs within the past 24 hours have been  reviewed.    Significant Imaging: I have reviewed all pertinent imaging results/findings within the past 24 hours.

## 2025-07-04 NOTE — NURSING
[facility], [facility] [place]  Nurses Note -- 4 Eyes      7/4/2025       Skin assessed on: Q Shift      [x] No Pressure Injuries Present    [x]Prevention Measures Documented    [] Yes LDA  for Pressure Injury Previously documented     [] Yes New Pressure Injury Discovered   [] LDA for New Pressure Injury Added      Attending RN:  Liz Barraza RN     Second RN:  yenny

## 2025-07-04 NOTE — PROGRESS NOTES
[facility]  Progress Note    Patient Name: Melba Pabon  MRN: [id number]  Patient Class: OP- Observation   Admission Date: 7/2/2025  Length of Stay: 0 days  Attending Physician: Raymond Pabon MD  Primary Care Provider: Franco Gonzales MD        Subjective     Principal Problem:Constipation        HPI:  Melba Pabon is a 75 y.o. F with a past medical history of hypertension, hyperlipidemia, cerebral aneurysm, gastrointestinal stromal tumor, s/p small bowel resection, osteoporosis, parathyroid adenoma who presented to the [facility] Emergency Department for evaluation of diarrhea and abdominal tenderness x 2 days. She reports having 5-6 loose epsiodes of diarrhea over the past 24 hours. She cannot recall anything in particular which could have provoked the episode. She reports taking Pepto Bismol for alleviation of symptoms which turned her stools black. She denies recent antibiotic use. Also denies fever, chills, nausea/vomiting, or myalgias.     In ED: Mildly hypertensive, but vitals otherwise stable and wnl. CBC without leukocytosis or anemia. CMP notable for hypokalemia (2.5) and hypoalbuminemia (3.0). EKG NSR without arrhythmias or T wave changes. No imaging obtained. Given potassium bicarb and a 1L NS bolus. Admitted to Hospital Medicine for further management of diarrhea and hypokalemia.    Overview/Hospital Course:  Melba Pabon is a 75 y.o. F admitted to [facility] for management of diarrhea and abdominal pain. CT abd/pel showed patulous rectum containing large colonic stool, concerning for fecal impaction and or stercoral colitis. Patient was started on aggressive bowel regimen. Follow-up KUB was without evidence of significant constipation. Patient's stool studies were positive for C. Diff toxin and antigen. She was started on po Vancomycin. Reported significant improvement of diarrhea. Plan to discharge tomorrow pending resolution of electrolyte  abnormalities.    Interval History: VSS, AF. Reports significant improvement of diarrhea with po Vancomycin. Likely d/c tomorrow.    Review of Systems   Constitutional:  Negative for activity change, appetite change, chills and fever.   Respiratory:  Negative for cough and shortness of breath.    Cardiovascular:  Negative for chest pain and palpitations.   Gastrointestinal:  Positive for diarrhea. Negative for abdominal pain, nausea and vomiting.   Neurological:  Negative for dizziness, syncope, weakness and light-headedness.     Objective:     Vital Signs (Most Recent):  Temp: 98.5 °F (36.9 °C) (07/04/25 0757)  Pulse: 69 (07/04/25 0757)  Resp: 18 (07/04/25 0757)  BP: (!) 145/78 (07/04/25 0757)  SpO2: 96 % (07/04/25 0757) Vital Signs (24h Range):  Temp:  [97.8 °F (36.6 °C)-98.9 °F (37.2 °C)] 98.5 °F (36.9 °C)  Pulse:  [57-90] 69  Resp:  [18] 18  SpO2:  [92 %-100 %] 96 %  BP: (110-145)/(68-78) 145/78     Weight: 41.3 kg (91 lb)  Body mass index is 15.14 kg/m².    Intake/Output Summary (Last 24 hours) at 7/4/2025 0917  Last data filed at 7/4/2025 0106  Gross per 24 hour   Intake 300 ml   Output --   Net 300 ml         Physical Exam  Vitals and nursing note reviewed.   HENT:      Head: Normocephalic.      Mouth/Throat:      Pharynx: Oropharynx is clear.   Eyes:      Conjunctiva/sclera: Conjunctivae normal.   Cardiovascular:      Rate and Rhythm: Normal rate and regular rhythm.   Pulmonary:      Effort: Pulmonary effort is normal.   Abdominal:      General: There is no distension.      Palpations: Abdomen is soft.      Tenderness: There is no abdominal tenderness. There is no guarding.   Skin:     General: Skin is warm and dry.   Neurological:      General: No focal deficit present.      Mental Status: She is alert. Mental status is at baseline.   Psychiatric:         Mood and Affect: Mood normal.         Behavior: Behavior normal.               Significant Labs: All pertinent labs within the past 24 hours have been  reviewed.    Significant Imaging: I have reviewed all pertinent imaging results/findings within the past 24 hours.      Assessment & Plan  Essential hypertension  Patient's blood pressure range in the last 24 hours was: BP  Min: 110/73  Max: 145/78.The patient's inpatient anti-hypertensive regimen is listed below:  Current Antihypertensives  lisinopriL tablet 10 mg, Daily, Oral    Plan  - BP is controlled, no changes needed to their regimen  Atherosclerosis of aorta  - Continue Lipitor    Hypokalemia  Patient's most recent potassium results are listed below.   Recent Labs     07/03/25  0536 07/03/25  1215 07/04/25  0438   K 2.8* 3.8 3.2*     Plan  - Replete potassium per protocol  - Monitor potassium Every 6 hours  - Patient's hypokalemia is stable  - Likely 2/2 persistent diarrhea  Hyperlipidemia  - Continue Lipitor   Clostridium difficile infection  75 y.o. F who presented to the ED with complaints of diarrhea and abdominal tenderness x 2 days. Reports black tarry stools, however, reports taking pepto-bismo for symptomatic relief.     - CT abd/pel findings initially concerning for fecal impaction and or stercoral colitis. Resolved on f/u KUB and patient reported resolution of pain.  - C. Diff antigen and toxin positive  - Reporting improvement of diarrhea with po Vancomycin, however, still having some electrolyte abnormalities.    Hypophosphatemia  Patient's most recent phosphorus results are listed below.   Recent Labs     07/03/25  0536 07/04/25  0438   PHOS 2.0* 1.6*     Plan  - Will treat hypophosphatemia with NaPhos  - Patient's hypophosphatemia is stable  - Likely 2/2 persistent diarrhea. Continue to replace as needed.  VTE Risk Mitigation (From admission, onward)           Ordered     enoxaparin injection 30 mg  Daily         07/03/25 1053     IP VTE HIGH RISK PATIENT  Once         07/02/25 1236     Place sequential compression device  Until discontinued         07/02/25 1236                    Discharge  Planning   GORDON: 7/3/2025     Code Status: Full Code   Medical Readiness for Discharge Date:   Discharge Plan A: Home                        Michele Pena PA-C  Department of Hospital Medicine   [facility]

## 2025-07-05 VITALS
HEIGHT: 65 IN | RESPIRATION RATE: 18 BRPM | DIASTOLIC BLOOD PRESSURE: 76 MMHG | HEART RATE: 67 BPM | TEMPERATURE: 99 F | WEIGHT: 91 LBS | SYSTOLIC BLOOD PRESSURE: 129 MMHG | BODY MASS INDEX: 15.16 KG/M2 | OXYGEN SATURATION: 96 %

## 2025-07-05 PROBLEM — A49.8 CLOSTRIDIUM DIFFICILE INFECTION: Status: RESOLVED | Noted: 2025-07-03 | Resolved: 2025-07-05

## 2025-07-05 PROBLEM — E87.6 HYPOKALEMIA: Status: RESOLVED | Noted: 2025-07-02 | Resolved: 2025-07-05

## 2025-07-05 PROBLEM — E83.39 HYPOPHOSPHATEMIA: Status: RESOLVED | Noted: 2025-07-04 | Resolved: 2025-07-05

## 2025-07-05 LAB
ABSOLUTE EOSINOPHIL (OHS): 0.05 K/UL
ABSOLUTE MONOCYTE (OHS): 0.34 K/UL (ref 0.3–1)
ABSOLUTE NEUTROPHIL COUNT (OHS): 0.89 K/UL (ref 1.8–7.7)
ALBUMIN SERPL BCP-MCNC: 2.5 G/DL (ref 3.5–5.2)
ALP SERPL-CCNC: 89 UNIT/L (ref 40–150)
ALT SERPL W/O P-5'-P-CCNC: 9 UNIT/L (ref 10–44)
ANION GAP (OHS): 8 MMOL/L (ref 8–16)
AST SERPL-CCNC: 18 UNIT/L (ref 11–45)
BASOPHILS # BLD AUTO: 0.01 K/UL
BASOPHILS NFR BLD AUTO: 0.3 %
BILIRUB SERPL-MCNC: 0.4 MG/DL (ref 0.1–1)
BUN SERPL-MCNC: 7 MG/DL (ref 8–23)
CALCIUM SERPL-MCNC: 8.9 MG/DL (ref 8.7–10.5)
CHLORIDE SERPL-SCNC: 102 MMOL/L (ref 95–110)
CO2 SERPL-SCNC: 25 MMOL/L (ref 23–29)
CREAT SERPL-MCNC: 0.6 MG/DL (ref 0.5–1.4)
CRYPTOSP AG SPEC QL: NEGATIVE
ERYTHROCYTE [DISTWIDTH] IN BLOOD BY AUTOMATED COUNT: 14.2 % (ref 11.5–14.5)
G LAMBLIA AG STL QL IA: NEGATIVE
GFR SERPLBLD CREATININE-BSD FMLA CKD-EPI: >60 ML/MIN/1.73/M2
GLUCOSE SERPL-MCNC: 84 MG/DL (ref 70–110)
HCT VFR BLD AUTO: 33.5 % (ref 37–48.5)
HGB BLD-MCNC: 11.3 GM/DL (ref 12–16)
IMM GRANULOCYTES # BLD AUTO: 0 K/UL (ref 0–0.04)
IMM GRANULOCYTES NFR BLD AUTO: 0 % (ref 0–0.5)
LYMPHOCYTES # BLD AUTO: 1.61 K/UL (ref 1–4.8)
MAGNESIUM SERPL-MCNC: 1.7 MG/DL (ref 1.6–2.6)
MCH RBC QN AUTO: 30.5 PG (ref 27–31)
MCHC RBC AUTO-ENTMCNC: 33.7 G/DL (ref 32–36)
MCV RBC AUTO: 91 FL (ref 82–98)
NUCLEATED RBC (/100WBC) (OHS): 0 /100 WBC
OHS QRS DURATION: 72 MS
OHS QTC CALCULATION: 418 MS
PHOSPHATE SERPL-MCNC: 1.8 MG/DL (ref 2.7–4.5)
PHOSPHATE SERPL-MCNC: 2.3 MG/DL (ref 2.7–4.5)
PLATELET # BLD AUTO: 177 K/UL (ref 150–450)
PMV BLD AUTO: 9.4 FL (ref 9.2–12.9)
POTASSIUM SERPL-SCNC: 3.3 MMOL/L (ref 3.5–5.1)
POTASSIUM SERPL-SCNC: 3.5 MMOL/L (ref 3.5–5.1)
PROT SERPL-MCNC: 5.6 GM/DL (ref 6–8.4)
RBC # BLD AUTO: 3.7 M/UL (ref 4–5.4)
RELATIVE EOSINOPHIL (OHS): 1.7 %
RELATIVE LYMPHOCYTE (OHS): 55.5 % (ref 18–48)
RELATIVE MONOCYTE (OHS): 11.7 % (ref 4–15)
RELATIVE NEUTROPHIL (OHS): 30.8 % (ref 38–73)
SODIUM SERPL-SCNC: 135 MMOL/L (ref 136–145)
WBC # BLD AUTO: 2.9 K/UL (ref 3.9–12.7)

## 2025-07-05 PROCEDURE — 36415 COLL VENOUS BLD VENIPUNCTURE: CPT

## 2025-07-05 PROCEDURE — 85025 COMPLETE CBC W/AUTO DIFF WBC: CPT

## 2025-07-05 PROCEDURE — A4216 STERILE WATER/SALINE, 10 ML: HCPCS

## 2025-07-05 PROCEDURE — 25000003 PHARM REV CODE 250

## 2025-07-05 PROCEDURE — 63600175 PHARM REV CODE 636 W HCPCS

## 2025-07-05 PROCEDURE — 82565 ASSAY OF CREATININE: CPT

## 2025-07-05 PROCEDURE — 84100 ASSAY OF PHOSPHORUS: CPT

## 2025-07-05 PROCEDURE — 83735 ASSAY OF MAGNESIUM: CPT

## 2025-07-05 PROCEDURE — 84132 ASSAY OF SERUM POTASSIUM: CPT

## 2025-07-05 RX ORDER — SODIUM,POTASSIUM PHOSPHATES 280-250MG
1 POWDER IN PACKET (EA) ORAL
Qty: 12 PACKET | Refills: 0 | Status: SHIPPED | OUTPATIENT
Start: 2025-07-05 | End: 2025-07-06

## 2025-07-05 RX ORDER — POLYETHYLENE GLYCOL 3350 17 G/17G
17 POWDER, FOR SOLUTION ORAL DAILY PRN
Qty: 90 EACH | Refills: 0 | Status: SHIPPED | OUTPATIENT
Start: 2025-07-05

## 2025-07-05 RX ORDER — VANCOMYCIN HYDROCHLORIDE 125 MG/1
125 CAPSULE ORAL 4 TIMES DAILY
Qty: 36 CAPSULE | Refills: 0 | Status: SHIPPED | OUTPATIENT
Start: 2025-07-05 | End: 2025-07-07 | Stop reason: SDUPTHER

## 2025-07-05 RX ADMIN — LISINOPRIL 10 MG: 5 TABLET ORAL at 08:07

## 2025-07-05 RX ADMIN — POTASSIUM BICARBONATE 20 MEQ: 391 TABLET, EFFERVESCENT ORAL at 08:07

## 2025-07-05 RX ADMIN — POTASSIUM PHOSPHATE, MONOBASIC 1000 MG: 500 TABLET, SOLUBLE ORAL at 08:07

## 2025-07-05 RX ADMIN — Medication 125 MG: at 06:07

## 2025-07-05 RX ADMIN — Medication 125 MG: at 12:07

## 2025-07-05 NOTE — PLAN OF CARE
Case Management Final Discharge Note    Discharge Disposition: Home    New DME ordered / company name: None    Relevant SDOH / Transition of Care Barriers:  None    Person available to provide assistance at home when needed and their contact information: Independent    Scheduled followup appointment: Pt will schedule follow-up after procedure on Tuesday 7/8/2025    Referrals placed: None    Transportation: Private        Patient and family educated on discharge services and updated on DC plan. Bedside RN notified, patient clear to discharge from Case Management Perspective.     07/05/25 1509   Final Note   Assessment Type Final Discharge Note   Anticipated Discharge Disposition Home   Hospital Resources/Appts/Education Provided Provided patient/caregiver with written discharge plan information   Post-Acute Status   Discharge Delays None known at this time

## 2025-07-05 NOTE — PLAN OF CARE
No complaints of pain N/V/D.  Ambulating to the BR with standby assist. Free of falls. No distress noted. Continue with plan of care as ordered.   Problem: Adult Inpatient Plan of Care  Goal: Plan of Care Review  Outcome: Progressing  Goal: Patient-Specific Goal (Individualized)  Outcome: Progressing  Goal: Optimal Comfort and Wellbeing  Outcome: Progressing  Goal: Readiness for Transition of Care  Outcome: Progressing     Problem: Wound  Goal: Optimal Functional Ability  Outcome: Progressing     Problem: Infection  Goal: Absence of Infection Signs and Symptoms  Outcome: Progressing     Problem: Fall Injury Risk  Goal: Absence of Fall and Fall-Related Injury  Outcome: Progressing

## 2025-07-05 NOTE — DISCHARGE INSTRUCTIONS
Our goal at [facility] is to always give you outstanding care and exceptional service. You may receive a survey from [employer] by mail, text or e-mail in the next 24-48 hours asking about the care you received with us. The survey should only take 5-10 minutes to complete and is very important to us.     Your feedback provides us with a way to recognize our staff who work tirelessly to provide the best care! Also, your responses help us learn how to improve when your experience was below our aspiration of excellence. We are always looking for ways to improve your stay. We WILL use your feedback to continue making improvements to help us provide the highest quality care. We keep your personal information and feedback confidential. We appreciate your time completing this survey and can't wait to hear from you!!!    We look forward to your continued care with us! Thanks so much for choosing [facility] for your healthcare needs!

## 2025-07-05 NOTE — NURSING
[facility], [facility] [place]  Nurses Note -- 4 Eyes      7-4-25      Skin assessed on: Q Shift      [x] No Pressure Injuries Present    []Prevention Measures Documented    [] Yes LDA  for Pressure Injury Previously documented     [] Yes New Pressure Injury Discovered   [] LDA for New Pressure Injury Added      Attending RN:  Myrtle Shankar RN     Second RN:  Liz Izaguirre RN

## 2025-07-06 ENCOUNTER — NURSE TRIAGE (OUTPATIENT)
Dept: ADMINISTRATIVE | Facility: CLINIC | Age: 75
End: 2025-07-06
Payer: MEDICARE

## 2025-07-06 RX ORDER — SODIUM,POTASSIUM PHOSPHATES 280-250MG
1 POWDER IN PACKET (EA) ORAL
Qty: 12 PACKET | Refills: 0 | Status: SHIPPED | OUTPATIENT
Start: 2025-07-06

## 2025-07-06 NOTE — TELEPHONE ENCOUNTER
Speaking with this pt who was prescribed Phos-nak. [facility] pharmacy will not have medication until Tuesday. She wanted to be sure it was ok to wait until then.      11:19 secure chat sent to Michele Pena PA-C who will send prescription To [facility] pharmacy at [address] [place]    Pt informed, and verbalized understanding        Reason for Disposition   [1] Caller has URGENT medicine question about med that primary care doctor (or NP/PA) or specialist prescribed AND [2] triager unable to answer question    Additional Information   Negative: [1] Intentional drug overdose AND [2] suicidal thoughts or ideas   Negative: MORE THAN A DOUBLE DOSE of a prescription or over-the-counter (OTC) drug   Negative: [1] DOUBLE DOSE (an extra dose or lesser amount) of prescription drug AND [2] any symptoms (e.g., dizziness, nausea, pain, sleepiness)   Negative: [1] DOUBLE DOSE (an extra dose or lesser amount) of over-the-counter (OTC) drug AND [2] any symptoms (e.g., dizziness, nausea, pain, sleepiness)   Negative: Took another person's prescription drug   Negative: [1] DOUBLE DOSE (an extra dose or lesser amount) of prescription drug AND [2] NO symptoms  (Exception: A double dose of antibiotics.)   Negative: Diabetes drug error or overdose (e.g., took wrong type of insulin or took extra dose)   Negative: [1] Prescription not at pharmacy AND [2] was prescribed by doctor (or NP/PA) recently  (Exception: Triager has access to EMR and prescription is recorded there. Go to Home Care and confirm for pharmacy.)   Negative: [1] Pharmacy calling with prescription question AND [2] triager unable to answer question    Protocols used: Medication Question Call-A-

## 2025-07-07 ENCOUNTER — PATIENT OUTREACH (OUTPATIENT)
Dept: ADMINISTRATIVE | Facility: CLINIC | Age: 75
End: 2025-07-07
Payer: MEDICARE

## 2025-07-07 ENCOUNTER — NURSE TRIAGE (OUTPATIENT)
Dept: ADMINISTRATIVE | Facility: CLINIC | Age: 75
End: 2025-07-07
Payer: MEDICARE

## 2025-07-07 RX ORDER — VANCOMYCIN HYDROCHLORIDE 125 MG/1
125 CAPSULE ORAL 4 TIMES DAILY
Qty: 36 CAPSULE | Refills: 0 | Status: SHIPPED | OUTPATIENT
Start: 2025-07-07 | End: 2025-07-16

## 2025-07-07 NOTE — ASSESSMENT & PLAN NOTE
- Continue Lipitor   
75 y.o. F who presented to the ED with complaints of diarrhea and abdominal tenderness x 2 days. Reports black tarry stools, however, reports taking pepto-bismo for symptomatic relief.     - CT abd/pel findings initially concerning for fecal impaction and or stercoral colitis. Resolved on f/u KUB and patient reported resolution of pain.  - C. Diff antigen and toxin positive  - Reporting improvement of diarrhea with po Vancomycin, however, still having some electrolyte abnormalities.    
75 y.o. F with hx of hypertension, hyperlipidemia, cerebral aneurysm, gastrointestinal stromal tumor, s/p small bowel resection, osteoporosis, parathyroid adenoma presented to the ED with complaints of diarrhea and abdominal tenderness x 2 days. Reports black tarry stools, however, reports taking pepto-bismo for symptomatic relief. Denies recent antibiotic use or known sick contacts.     - Obtain CT abd/pel Findings are concerning for fecal impaction and or stercoral colitis.   - Diarrhea likely 2/2 fecal impaction overflow  - Brown-bomb enema  - Lactulose TID  - NS @ 75 mL/hr  
75 y.o. F with hx of hypertension, hyperlipidemia, cerebral aneurysm, gastrointestinal stromal tumor, s/p small bowel resection, osteoporosis, parathyroid adenoma presented to the ED with complaints of diarrhea and abdominal tenderness x 2 days. Reports black tarry stools, however, reports taking pepto-bismo for symptomatic relief. Denies recent antibiotic use or known sick contacts.     - Obtain CT abd/pel given hx of previous SBO and multiple abdominal surgeries.  - F/u stool studies   - NS @ 75 mL/hr  
Patient's blood pressure range in the last 24 hours was: BP  Min: 110/73  Max: 145/78.The patient's inpatient anti-hypertensive regimen is listed below:  Current Antihypertensives  lisinopriL tablet 10 mg, Daily, Oral    Plan  - BP is controlled, no changes needed to their regimen  
Patient's blood pressure range in the last 24 hours was: BP  Min: 119/76  Max: 148/78.The patient's inpatient anti-hypertensive regimen is listed below:  Current Antihypertensives  lisinopriL tablet 10 mg, Daily, Oral    Plan  - BP is controlled, no changes needed to their regimen  
Patient's blood pressure range in the last 24 hours was: BP  Min: 121/72  Max: 145/82.The patient's inpatient anti-hypertensive regimen is listed below:  Current Antihypertensives  lisinopriL tablet 10 mg, Daily, Oral    Plan  - BP is controlled, no changes needed to their regimen  
Patient's blood pressure range in the last 24 hours was: No data recorded.The patient's inpatient anti-hypertensive regimen is listed below:  Current Antihypertensives       Plan  - BP is controlled, no changes needed to their regimen  
Patient's most recent phosphorus results are listed below.   Recent Labs     07/03/25  0536 07/04/25  0438   PHOS 2.0* 1.6*     Plan  - Will treat hypophosphatemia with NaPhos  - Patient's hypophosphatemia is stable  - Likely 2/2 persistent diarrhea. Continue to replace as needed.  
Patient's most recent potassium results are listed below.   Recent Labs     07/02/25  0926 07/02/25  1306   K 2.5* 3.2*     Plan  - Replete potassium per protocol  - Monitor potassium Every 6 hours  - Patient's hypokalemia is stable  
Patient's most recent potassium results are listed below.   Recent Labs     07/02/25  0926 07/02/25  1306 07/03/25  0536   K 2.5* 3.2* 2.8*     Plan  - Replete potassium per protocol  - Monitor potassium Every 6 hours  - Patient's hypokalemia is stable  
Patient's most recent potassium results are listed below.   Recent Labs     07/03/25  0536 07/03/25  1215 07/04/25  0438   K 2.8* 3.8 3.2*     Plan  - Replete potassium per protocol  - Monitor potassium Every 6 hours  - Patient's hypokalemia is stable  - Likely 2/2 persistent diarrhea  
Detail Level: Detailed
Add 01976 Cpt? (Important Note: In 2017 The Use Of 34349 Is Being Tracked By Cms To Determine Future Global Period Reimbursement For Global Periods): no

## 2025-07-07 NOTE — DISCHARGE SUMMARY
[facility] Medicine  Discharge Summary      Patient Name: Melba Pabon  MRN: [id number]  MIMA: [id number]  Patient Class: IP- Inpatient  Admission Date: 7/2/2025  Hospital Length of Stay: 1 days  Discharge Date and Time: 07/07/2025 12:21 PM  Attending Physician: No att. providers found   Discharging Provider: Michele Pena PA-C  Primary Care Provider: Franco Gonzales MD    Primary Care Team:  Hosp Med SUSIE 2    HPI:   Melba Pabon is a 75 y.o. F with a past medical history of hypertension, hyperlipidemia, cerebral aneurysm, gastrointestinal stromal tumor, s/p small bowel resection, osteoporosis, parathyroid adenoma who presented to the [facility] Emergency Department for evaluation of diarrhea and abdominal tenderness x 2 days. She reports having 5-6 loose epsiodes of diarrhea over the past 24 hours. She cannot recall anything in particular which could have provoked the episode. She reports taking Pepto Bismol for alleviation of symptoms which turned her stools black. She denies recent antibiotic use. Also denies fever, chills, nausea/vomiting, or myalgias.     In ED: Mildly hypertensive, but vitals otherwise stable and wnl. CBC without leukocytosis or anemia. CMP notable for hypokalemia (2.5) and hypoalbuminemia (3.0). EKG NSR without arrhythmias or T wave changes. No imaging obtained. Given potassium bicarb and a 1L NS bolus. Admitted to Hospital Medicine for further management of diarrhea and hypokalemia.    * No surgery found *      Hospital Course:   Melba Pabon is a 75 y.o. F admitted to [facility] for management of diarrhea and abdominal pain. CT abd/pel showed patulous rectum containing large colonic stool, concerning for fecal impaction and or stercoral colitis. Patient was started on aggressive bowel regimen. Follow-up KUB was without evidence of significant constipation. Patient's stool studies were positive for C. Diff toxin and antigen. She was started on po  Vancomycin. Diarrhea resolved and electrolytes subsequently improved. Prior to discharge, she was able to tolerate a regular diet. She remained afebrile without leukocytosis on labs. Prescription for 10-day course oral Vancomycin and Miralax sent to pharmacy of choice. Patient is medically stable for discharge.       Goals of Care Treatment Preferences:  Code Status: Full Code         Consults:     Assessment & Plan  Essential hypertension  Patient's blood pressure range in the last 24 hours was: No data recorded.The patient's inpatient anti-hypertensive regimen is listed below:  Current Antihypertensives       Plan  - BP is controlled, no changes needed to their regimen  Atherosclerosis of aorta  - Continue Lipitor    Hyperlipidemia  - Continue Lipitor   Final Active Diagnoses:    Diagnosis Date Noted POA    Hyperlipidemia [E78.5] 07/02/2025 Yes    Atherosclerosis of aorta [I70.0] 11/01/2018 Yes    Essential hypertension [I10] 04/13/2018 Yes      Problems Resolved During this Admission:    Diagnosis Date Noted Date Resolved POA    PRINCIPAL PROBLEM:  Clostridium difficile infection [A49.8] 07/03/2025 07/05/2025 Yes    Hypophosphatemia [E83.39] 07/04/2025 07/05/2025 Yes    Hypokalemia [E87.6] 07/02/2025 07/05/2025 Yes    Constipation [K59.00] 07/02/2025 07/04/2025 Yes       Discharged Condition: stable    Disposition: Home or Self Care    Follow Up:   Follow-up Information       Franco Gonzales MD. Schedule an appointment as soon as possible for a visit.    Specialties: Internal Medicine, Family Medicine, Hospitalist  Why: Call to schedule a hospital followup appointment as soon as you are cleared to travel by your ophthamologist.  Contact information:  [address] [place] [id number] [phone]                           Patient Instructions:      Notify your health care provider if you experience any of the following:  temperature >100.4     Notify your health care provider if you experience any  of the following:  persistent nausea and vomiting or diarrhea     Notify your health care provider if you experience any of the following:  severe uncontrolled pain     Notify your health care provider if you experience any of the following:  difficulty breathing or increased cough     Notify your health care provider if you experience any of the following:  severe persistent headache     Notify your health care provider if you experience any of the following:  persistent dizziness, light-headedness, or visual disturbances     Notify your health care provider if you experience any of the following:  increased confusion or weakness     Activity as tolerated       Significant Diagnostic Studies: N/A    Pending Diagnostic Studies:       None           Medications:  Reconciled Home Medications:      Medication List        START taking these medications      polyethylene glycol 17 gram Pwpk  Commonly known as: GLYCOLAX  Take 17 g by mouth daily as needed for Constipation.            CONTINUE taking these medications      atorvastatin 20 MG tablet  Commonly known as: LIPITOR  Take 1 tablet (20 mg total) by mouth every evening.     cyproheptadine 4 mg tablet  Commonly known as: PERIACTIN  Take 1 tablet (4 mg total) by mouth 2 (two) times daily as needed (as needed).     ibuprofen 800 MG tablet  Commonly known as: ADVIL,MOTRIN  Take 800 mg by mouth every 6 (six) hours as needed.     levocetirizine 5 MG tablet  Commonly known as: XYZAL  Take 1 tablet (5 mg total) by mouth every evening.     lisinopriL 10 MG tablet  Take 1 tablet (10 mg total) by mouth once daily.     multivitamin per tablet  Commonly known as: THERAGRAN  Take 1 tablet by mouth once daily.            ASK your doctor about these medications      HYDROcodone-acetaminophen 5-325 mg per tablet  Commonly known as: NORCO  Take 1 tablet by mouth every 6 (six) hours as needed for Pain.     * neomycin-polymyxin-dexamethasone 3.5mg/mL-10,000 unit/mL-0.1 %  Drps  Commonly known as: MAXITROL  Place 1 drop into both eyes every 6 (six) hours.     * neomycin-polymyxin-dexamethasone 3.5 mg/g-10,000 unit/g-0.1 % Oint  Commonly known as: MAXITROL  Place into both eyes every 6 (six) hours. Placed 1/2 inch ribbon along suture line in both eyes.     tobramycin-dexAMETHasone 0.3-0.1% 0.3-0.1 % Drps  Commonly known as: TOBRADEX  Place 1 drop into both eyes as needed (when closed shut/dry).           * This list has 2 medication(s) that are the same as other medications prescribed for you. Read the directions carefully, and ask your doctor or other care provider to review them with you.                  Indwelling Lines/Drains at time of discharge:   Lines/Drains/Airways       None                       Time spent on the discharge of patient: 35 minutes         Michele Pena PA-C  Department of Hospital Medicine  [facility] S[facility]

## 2025-07-07 NOTE — TELEPHONE ENCOUNTER
Spoke with pt who was discharged from Hospital states that she still does not have  phos-nak. States pharmacy does not have in stock.  States that she has phosphorus packets. Pt also states that she does not have vancomycin capsules which was sent to [facility] pharmacy    Called [facility] pharmacy who states that medication is not in stock.    Spoke with [facility] pharmacy who states that vancomycin medication was not picked up due to not being in stock    Did call [facility] to confirm if vancomycin capsules in stock.    Secure chat sent to To Michele Pena PA-C who placed order for vancomycin medication.    Pt called back and informed that Phosphorus pack is generic for potassium packets, and advised to only use 12 packets as prescribed by doctor, also advised that MsMelida Bautista PA-C, did place order for Vancomycin, and can be picked up at [facility] [address]. Pt verbalized understanding          Reason for Disposition   [1] Caller has URGENT medicine question about med that primary care doctor (or NP/PA) or specialist prescribed AND [2] triager unable to answer question    Additional Information   Negative: [1] Intentional drug overdose AND [2] suicidal thoughts or ideas   Negative: MORE THAN A DOUBLE DOSE of a prescription or over-the-counter (OTC) drug   Negative: [1] DOUBLE DOSE (an extra dose or lesser amount) of prescription drug AND [2] any symptoms (e.g., dizziness, nausea, pain, sleepiness)   Negative: [1] DOUBLE DOSE (an extra dose or lesser amount) of over-the-counter (OTC) drug AND [2] any symptoms (e.g., dizziness, nausea, pain, sleepiness)   Negative: Took another person's prescription drug   Negative: [1] DOUBLE DOSE (an extra dose or lesser amount) of prescription drug AND [2] NO symptoms  (Exception: A double dose of antibiotics.)   Negative: Diabetes drug error or overdose (e.g., took wrong type of insulin or took extra dose)   Negative: [1] Prescription not  at pharmacy AND [2] was prescribed by doctor (or NP/PA) recently  (Exception: Triager has access to EMR and prescription is recorded there. Go to Home Care and confirm for pharmacy.)   Negative: [1] Pharmacy calling with prescription question AND [2] triager unable to answer question    Protocols used: Medication Question Call-A-AH

## 2025-07-07 NOTE — PROGRESS NOTES
C3 nurse attempted to contact Melba Pabon for a TCC post hospital discharge follow up call. No answer, LVM with callback information. The patient has a scheduled HOSFU with her PCP, Franco Gonzales MD on 7/9/25 at 1040. No messages routed at this time.

## 2025-07-08 NOTE — PROGRESS NOTES
C3 nurse spoke with Mebla Pabon for a TCC post hospital discharge follow up call. The patient has a scheduled HOSFU with her PCP, Franco Gonzales MD on 7/9/25 at 1040. No messages routed at this time.

## 2025-07-09 ENCOUNTER — LAB VISIT (OUTPATIENT)
Dept: LAB | Facility: HOSPITAL | Age: 75
End: 2025-07-09
Payer: MEDICARE

## 2025-07-09 ENCOUNTER — OFFICE VISIT (OUTPATIENT)
Dept: FAMILY MEDICINE | Facility: CLINIC | Age: 75
End: 2025-07-09
Payer: MEDICARE

## 2025-07-09 VITALS
OXYGEN SATURATION: 97 % | DIASTOLIC BLOOD PRESSURE: 78 MMHG | TEMPERATURE: 99 F | HEART RATE: 74 BPM | SYSTOLIC BLOOD PRESSURE: 124 MMHG | BODY MASS INDEX: 16.2 KG/M2 | RESPIRATION RATE: 18 BRPM | WEIGHT: 97.25 LBS | HEIGHT: 65 IN

## 2025-07-09 DIAGNOSIS — A04.72 C. DIFFICILE DIARRHEA: Primary | ICD-10-CM

## 2025-07-09 DIAGNOSIS — A04.72 C. DIFFICILE DIARRHEA: ICD-10-CM

## 2025-07-09 DIAGNOSIS — Z09 HOSPITAL DISCHARGE FOLLOW-UP: ICD-10-CM

## 2025-07-09 DIAGNOSIS — E83.39 HYPOPHOSPHATASIA: ICD-10-CM

## 2025-07-09 LAB
ABSOLUTE EOSINOPHIL (OHS): 0.04 K/UL
ABSOLUTE MONOCYTE (OHS): 0.28 K/UL (ref 0.3–1)
ABSOLUTE NEUTROPHIL COUNT (OHS): 1.41 K/UL (ref 1.8–7.7)
ANION GAP (OHS): 4 MMOL/L (ref 8–16)
BASOPHILS # BLD AUTO: 0.01 K/UL
BASOPHILS NFR BLD AUTO: 0.3 %
BUN SERPL-MCNC: 9 MG/DL (ref 8–23)
CALCIUM SERPL-MCNC: 9.6 MG/DL (ref 8.7–10.5)
CHLORIDE SERPL-SCNC: 105 MMOL/L (ref 95–110)
CO2 SERPL-SCNC: 28 MMOL/L (ref 23–29)
CREAT SERPL-MCNC: 0.7 MG/DL (ref 0.5–1.4)
ERYTHROCYTE [DISTWIDTH] IN BLOOD BY AUTOMATED COUNT: 14.9 % (ref 11.5–14.5)
GFR SERPLBLD CREATININE-BSD FMLA CKD-EPI: >60 ML/MIN/1.73/M2
GLUCOSE SERPL-MCNC: 91 MG/DL (ref 70–110)
HCT VFR BLD AUTO: 37.8 % (ref 37–48.5)
HGB BLD-MCNC: 12.4 GM/DL (ref 12–16)
IMM GRANULOCYTES # BLD AUTO: 0.01 K/UL (ref 0–0.04)
IMM GRANULOCYTES NFR BLD AUTO: 0.3 % (ref 0–0.5)
LYMPHOCYTES # BLD AUTO: 1.42 K/UL (ref 1–4.8)
MCH RBC QN AUTO: 30.6 PG (ref 27–31)
MCHC RBC AUTO-ENTMCNC: 32.8 G/DL (ref 32–36)
MCV RBC AUTO: 93 FL (ref 82–98)
NUCLEATED RBC (/100WBC) (OHS): 0 /100 WBC
PHOSPHATE SERPL-MCNC: 3.5 MG/DL (ref 2.7–4.5)
PLATELET # BLD AUTO: 246 K/UL (ref 150–450)
PLATELET BLD QL SMEAR: NORMAL
PMV BLD AUTO: 9.9 FL (ref 9.2–12.9)
POTASSIUM SERPL-SCNC: 4.6 MMOL/L (ref 3.5–5.1)
RBC # BLD AUTO: 4.05 M/UL (ref 4–5.4)
RELATIVE EOSINOPHIL (OHS): 1.3 %
RELATIVE LYMPHOCYTE (OHS): 44.8 % (ref 18–48)
RELATIVE MONOCYTE (OHS): 8.8 % (ref 4–15)
RELATIVE NEUTROPHIL (OHS): 44.5 % (ref 38–73)
SODIUM SERPL-SCNC: 137 MMOL/L (ref 136–145)
WBC # BLD AUTO: 3.17 K/UL (ref 3.9–12.7)

## 2025-07-09 PROCEDURE — 85025 COMPLETE CBC W/AUTO DIFF WBC: CPT | Mod: HCNC

## 2025-07-09 PROCEDURE — 84100 ASSAY OF PHOSPHORUS: CPT | Mod: HCNC

## 2025-07-09 PROCEDURE — 82565 ASSAY OF CREATININE: CPT | Mod: HCNC

## 2025-07-09 PROCEDURE — 36415 COLL VENOUS BLD VENIPUNCTURE: CPT | Mod: HCNC,PO

## 2025-07-09 PROCEDURE — 99999 PR PBB SHADOW E&M-EST. PATIENT-LVL IV: CPT | Mod: PBBFAC,HCNC,,

## 2025-07-09 NOTE — PROGRESS NOTES
Transitional Care Note      Subjective:       Patient ID: Melba Pabon is a 75 y.o. female.  Chief Complaint: Follow-up (Patient was in the hospital for clostridium difficile infection)      Discharge Information     Discharge Date:  7/5/2025           Primary Discharge Diagnosis:  Clostridium difficile diarrhea          How patient is feeling since discharge from the hospital?  Feels back to baseline          Family and/or Caretaker present at visit?  No.  Diagnostic tests reviewed/disposition: No diagnosic tests pending after this hospitalization.    Home health/community services discussion/referrals: Patient does not have home health established from hospital visit.  They do not need home health.  If needed, we will set up home health for the patient.   Establishment or re-establishment of referral orders for community resources: No other necessary community resources.   Discussion with other health care providers: No discussion with other health care providers necessary.   HPI  Discharged from hospital 5 days ago after being managed for C diff diarrhea.  Was also noted to have hypokalemia and hypophosphatemia.  Received potassium supplementation with restoration of normal potassium levels prior to discharge.  Today, she reports the diarrhea has resolved.  However she has not had a bowel movement.    Also on oral vancomycin taper for completion of treatment for C diff.    Review of Systems   Respiratory:  Negative for cough, shortness of breath and wheezing.    Gastrointestinal:  Positive for constipation. Negative for abdominal pain, diarrhea and vomiting.   Genitourinary:  Negative for dysuria.   Musculoskeletal:  Negative for arthralgias.         Medication List with Changes/Refills   Current Medications    ATORVASTATIN (LIPITOR) 20 MG TABLET    Take 1 tablet (20 mg total) by mouth every evening.    CYPROHEPTADINE (PERIACTIN) 4 MG TABLET    Take 1 tablet (4 mg total) by mouth 2 (two) times daily as needed  (as needed).    HYDROCODONE-ACETAMINOPHEN (NORCO) 5-325 MG PER TABLET    Take 1 tablet by mouth every 6 (six) hours as needed for Pain.    IBUPROFEN (ADVIL,MOTRIN) 800 MG TABLET    Take 800 mg by mouth every 6 (six) hours as needed.    LEVOCETIRIZINE (XYZAL) 5 MG TABLET    Take 1 tablet (5 mg total) by mouth every evening.    LISINOPRIL 10 MG TABLET    Take 1 tablet (10 mg total) by mouth once daily.    MULTIVITAMIN (THERAGRAN) PER TABLET    Take 1 tablet by mouth once daily.    NEOMYCIN-POLYMYXIN-DEXAMETHASONE (MAXITROL) 3.5 MG/G-10,000 UNIT/G-0.1 % OINT    Place into both eyes every 6 (six) hours. Placed 1/2 inch ribbon along suture line in both eyes.    NEOMYCIN-POLYMYXIN-DEXAMETHASONE (MAXITROL) 3.5MG/ML-10,000 UNIT/ML-0.1 % DRPS    Place 1 drop into both eyes every 6 (six) hours.    POLYETHYLENE GLYCOL (GLYCOLAX) 17 GRAM PWPK    Take 17 g by mouth daily as needed for Constipation.    POTASSIUM, SODIUM PHOSPHATES (PHOS-NAK) 280-160-250 MG PWPK    Take 1 packet by mouth 4 (four) times daily with meals and nightly.    TOBRAMYCIN-DEXAMETHASONE 0.3-0.1% (TOBRADEX) 0.3-0.1 % DRPS    Place 1 drop into both eyes as needed (when closed shut/dry).    VANCOMYCIN (VANCOCIN) 125 MG CAPSULE    Take 1 capsule (125 mg total) by mouth 4 (four) times daily. for 9 days     Objective:      Physical Exam  Constitutional:       Appearance: Normal appearance.   Eyes:      General: Visual field deficit present.   Cardiovascular:      Rate and Rhythm: Normal rate and regular rhythm.      Pulses: Normal pulses.      Heart sounds: Normal heart sounds.   Pulmonary:      Effort: Pulmonary effort is normal. No respiratory distress.      Breath sounds: Normal breath sounds. No wheezing.   Abdominal:      General: Bowel sounds are normal. There is no distension.      Palpations: Abdomen is soft.      Tenderness: There is no abdominal tenderness.   Musculoskeletal:      Right lower leg: No edema.      Left lower leg: No edema.   Skin:      General: Skin is warm.   Neurological:      Mental Status: She is alert and oriented to person, place, and time.         Assessment:       1. C. difficile diarrhea    2. Hypophosphatasia    3. Hospital discharge follow-up        Plan:         Hospital documentation reviewed.  She is back to baseline.  She has started taking her oral vancomycin and potassium supplements.  No bowel movements, but she has obtained MiraLax.  I have asked her to contact the office in 48 hours if she has not yet had a bowel movement.  We will document history of C diff in chart.  Due to history of C diff, we will recommend she avoid liberal and unnecessary use of antibiotics and avoid PPIs.     Latest Reference Range & Units 07/05/25 12:30   Phosphorus Level 2.7 - 4.5 mg/dL 1.8 (L)     Recheck phosphorus levels today.    Problem List Items Addressed This Visit    None  Visit Diagnoses         C. difficile diarrhea    -  Primary    Relevant Orders    Basic Metabolic Panel (Completed)    CBC Auto Differential      Hypophosphatasia        Relevant Orders    Phosphorus (Completed)      Hospital discharge follow-up                Franco Gonzales MD

## 2025-07-14 ENCOUNTER — TELEPHONE (OUTPATIENT)
Dept: FAMILY MEDICINE | Facility: CLINIC | Age: 75
End: 2025-07-14
Payer: MEDICARE

## 2025-07-14 DIAGNOSIS — K59.04 CHRONIC IDIOPATHIC CONSTIPATION: Primary | ICD-10-CM

## 2025-07-14 NOTE — TELEPHONE ENCOUNTER
Pt was seen on 7/9 for hosp f/u; she was prescribed glycolax powder while in hosp for constipation but states it is not helping; asking for different medication to be prescribed

## 2025-07-14 NOTE — TELEPHONE ENCOUNTER
Copied from CRM #[id number]. Topic: Medications - Medication Question  >> Jul 14, 2025  9:24 AM Med Assistant Silva wrote:  Type: Patient Call Back    Who called: self    What is the request in detail: pt. States she isn't having a complete bowel movement still .. She's asking for the medication to be changed to something that will help her please..     Can the clinic reply by [facility]?NO    Would the patient rather a call back or a response via [facility]? Yes, call     Best call back number: [phone] (home)

## 2025-08-16 ENCOUNTER — TELEPHONE (OUTPATIENT)
Dept: HEPATOLOGY | Facility: HOSPITAL | Age: 75
End: 2025-08-16
Payer: MEDICARE

## (undated) DEVICE — SOL IRRI STRL WATER 1000ML

## (undated) DEVICE — SYR 3CC LUER LOC

## (undated) DEVICE — SUT ETHILON 10/0 CS160-6

## (undated) DEVICE — SYR 10CC LUER LOCK

## (undated) DEVICE — MARKER SKIN RULER STERILE

## (undated) DEVICE — RELOAD ENDO LINEAR RELOD GR 60

## (undated) DEVICE — GOWN ECLIPSE REINF LVL4 TWL XL

## (undated) DEVICE — GLOVE BIOGEL SKINSENSE PI 7.5

## (undated) DEVICE — SUT CTD VICRYL 6-0 S-14

## (undated) DEVICE — SPONGE LAP 18X18 PREWASHED

## (undated) DEVICE — GLOVE BIOGEL PI MICRO SZ 7.5

## (undated) DEVICE — NDL STRAIGHT 4CM LEIBINGER

## (undated) DEVICE — SUT 3/0 27IN PDS II VIO MO

## (undated) DEVICE — SOL PVP-I SCRUB 7.5% 4OZ

## (undated) DEVICE — SKIN MARKER DEVON 160

## (undated) DEVICE — SEE MEDLINE ITEM 146417

## (undated) DEVICE — SOL BSS BALANCED SALT

## (undated) DEVICE — SHEILD & GARTERS FOX METAL EYE

## (undated) DEVICE — NDL 18GA X1 1/2 REG BEVEL

## (undated) DEVICE — HANDPIECE TRANSFORMER I/A

## (undated) DEVICE — SOL IRR STRL WATER 500ML

## (undated) DEVICE — SOL 9P NACL IRR PIC IL

## (undated) DEVICE — PENCIL ROCKER SWITCH 10FT CORD

## (undated) DEVICE — COVER CAMERA OPERATING ROOM

## (undated) DEVICE — KNIFE ANGLE 1MM

## (undated) DEVICE — SUT CTD VICRYL 5-0 P-2 UNDB

## (undated) DEVICE — DRAPE STERI INSTRUMENT 1018

## (undated) DEVICE — DRAPE THREE-QUARTER 53X77IN

## (undated) DEVICE — SUT CTD VICRYL 3-0 CR/SH

## (undated) DEVICE — NDL HYPO A BEVEL 30X1/2

## (undated) DEVICE — GLOVE BIOGEL ECLIPSE SZ 7.5

## (undated) DEVICE — SOL BETADINE 5%

## (undated) DEVICE — SET EXTENSION 30 IN W/LL ROLLE

## (undated) DEVICE — ELECTRODE REM PLYHSV RETURN 9

## (undated) DEVICE — GAUZE SPONGE 4X4 12PLY

## (undated) DEVICE — DRESSING ABSRBNT ISLAND 3.6X8

## (undated) DEVICE — DRAPE EENT SPLIT STERILE

## (undated) DEVICE — NDL 22GA X1 1/2 REG BEVEL

## (undated) DEVICE — TRAY FOLEY 16FR INFECTION CONT

## (undated) DEVICE — STAPLER SKIN PROXIMATE WIDE

## (undated) DEVICE — SUT 1 36IN PDS II VIO MONO

## (undated) DEVICE — KIT CUSTOM BASIC EYE ST / MEA

## (undated) DEVICE — Device

## (undated) DEVICE — GLOVE SURG BIOGEL LATEX SZ 7.5

## (undated) DEVICE — DROPPER MEDICINE

## (undated) DEVICE — SLEEVE SCD EXPRESS CALF MEDIUM

## (undated) DEVICE — TOWEL OR XRAY BLUE 17X26IN

## (undated) DEVICE — TUBING SUC UNIV W/CONN 12FT

## (undated) DEVICE — TANK GAS ISPAN 125GR FOR SF6

## (undated) DEVICE — APPLICATOR CHLORAPREP ORN 26ML

## (undated) DEVICE — APPLIER LIGACLIP MED 11IN

## (undated) DEVICE — KIT EYE PIC PACK WB

## (undated) DEVICE — SUT PROLENE 6-0 P-1 18

## (undated) DEVICE — APPLICATOR COT TIP WD STRL 3IN

## (undated) DEVICE — CAUTERY HIGH TEMP 2IN FLEXIBLE

## (undated) DEVICE — PACK ENDOSCOPY GENERAL

## (undated) DEVICE — BLADE SURG #15 CARBON STEEL

## (undated) DEVICE — SHIELD LIGHT MEROCEL CORNEAL

## (undated) DEVICE — CONTAINER SPECIMEN OR STER 4OZ

## (undated) DEVICE — SPONGE COTTON TRAY 4X4IN

## (undated) DEVICE — NDL RETROBULAR AKTKINSON 23G

## (undated) DEVICE — TRAY MUSCLE LID EYE

## (undated) DEVICE — CLEANER TIP ELECSURG 2X2IN

## (undated) DEVICE — DRAPE STERI-DRAPE 1000 17X11IN

## (undated) DEVICE — COVER OVERHEAD SURG LT BLUE

## (undated) DEVICE — BLANKET UPPER BODY 78.7X29.9IN

## (undated) DEVICE — STOPCOCK DISCOFIX 3 WAY

## (undated) DEVICE — CARTRIDGE LENS D

## (undated) DEVICE — INSTRUMENT SURG SUCT FRZR W/C

## (undated) DEVICE — SEE MEDLINE ITEM 152622

## (undated) DEVICE — SYR 3CC 21 X 1 LUER LOCK

## (undated) DEVICE — PROBE VITRECTOMY CENTURION 23G

## (undated) DEVICE — KIT FIBRIN SEALANT EVICEL 5 ML

## (undated) DEVICE — ELECTRODE 6 FLAT BLD

## (undated) DEVICE — PROTECTOR CORNEAL CROUCH ST

## (undated) DEVICE — CANNULA ANTERIOR CHAMBER 30G

## (undated) DEVICE — HANDLE SCISSORS HS ACE 23CM

## (undated) DEVICE — CANISTER SUCTION 2 LTR

## (undated) DEVICE — DRAPE ORTH SPLIT 77X108IN

## (undated) DEVICE — SPONGE GAUZE 16PLY 4X4

## (undated) DEVICE — GLOVE BIOGEL 7.5

## (undated) DEVICE — DRESSING EYE OVAL LF